# Patient Record
Sex: FEMALE | Race: WHITE | NOT HISPANIC OR LATINO | Employment: FULL TIME | URBAN - METROPOLITAN AREA
[De-identification: names, ages, dates, MRNs, and addresses within clinical notes are randomized per-mention and may not be internally consistent; named-entity substitution may affect disease eponyms.]

---

## 2017-01-16 ENCOUNTER — TELEPHONE (OUTPATIENT)
Dept: OBSTETRICS AND GYNECOLOGY | Facility: CLINIC | Age: 22
End: 2017-01-16

## 2017-01-16 NOTE — TELEPHONE ENCOUNTER
----- Message from Erica Cardozo sent at 1/16/2017 11:18 AM CST -----  Contact: Holly Marshall is calling as has questions regarding Nexplanon implant. States endometriosis is painful and it is hard to go to school or work. Please call [phone]. Thanks!

## 2017-01-24 ENCOUNTER — OFFICE VISIT (OUTPATIENT)
Dept: OBSTETRICS AND GYNECOLOGY | Facility: CLINIC | Age: 22
End: 2017-01-24
Payer: COMMERCIAL

## 2017-01-24 VITALS
BODY MASS INDEX: 19.12 KG/M2 | WEIGHT: 126.13 LBS | DIASTOLIC BLOOD PRESSURE: 78 MMHG | SYSTOLIC BLOOD PRESSURE: 116 MMHG | HEIGHT: 68 IN

## 2017-01-24 DIAGNOSIS — G43.001 MIGRAINE WITHOUT AURA AND WITH STATUS MIGRAINOSUS, NOT INTRACTABLE: ICD-10-CM

## 2017-01-24 DIAGNOSIS — Z30.40 ENCOUNTER FOR SURVEILLANCE OF CONTRACEPTIVES: ICD-10-CM

## 2017-01-24 DIAGNOSIS — N94.6 DYSMENORRHEA: Primary | ICD-10-CM

## 2017-01-24 DIAGNOSIS — N80.9 ENDOMETRIOSIS: ICD-10-CM

## 2017-01-24 PROCEDURE — 99999 PR PBB SHADOW E&M-EST. PATIENT-LVL II: CPT | Mod: PBBFAC,,, | Performed by: OBSTETRICS & GYNECOLOGY

## 2017-01-24 PROCEDURE — 1159F MED LIST DOCD IN RCRD: CPT | Mod: S$GLB,,, | Performed by: OBSTETRICS & GYNECOLOGY

## 2017-01-24 PROCEDURE — 99213 OFFICE O/P EST LOW 20 MIN: CPT | Mod: S$GLB,,, | Performed by: OBSTETRICS & GYNECOLOGY

## 2017-01-24 RX ORDER — DROSPIRENONE AND ETHINYL ESTRADIOL 0.02-3(28)
1 KIT ORAL DAILY
Qty: 30 TABLET | Refills: 11 | Status: SHIPPED | OUTPATIENT
Start: 2017-01-24 | End: 2017-09-01 | Stop reason: SDUPTHER

## 2017-01-24 RX ORDER — SUMATRIPTAN 50 MG/1
50 TABLET, FILM COATED ORAL ONCE AS NEEDED
Qty: 10 TABLET | Refills: 2 | Status: SHIPPED | OUTPATIENT
Start: 2017-01-24 | End: 2017-07-07

## 2017-01-24 NOTE — PROGRESS NOTES
Chief Complaint   Patient presents with    Menorrhagia     break through    Abdominal Pain     every day, causes nausea and vomiting    Migraine       History of Present Illness: Holly Joseph is a 21 y.o. female that presents today 1/24/2017 for   Chief Complaint   Patient presents with    Menorrhagia     break through    Abdominal Pain     every day, causes nausea and vomiting    Migraine     She reports severe cramping prior to periods and after. She reports last month no bleeding this month spotting. She reports migraines were better and she did not have cramping. She reports having a diagnosis of endometriosis and thinks that her endometriosis is back. She reports pain all during the month.     Past Medical History   Diagnosis Date    Allergy     Asthma     Constipation     Endometriosis     Fibromyalgia     Gastritis     HLA B27 (HLA B27 positive)     Migraine     Scoliosis     Smoker        Past Surgical History   Procedure Laterality Date    Adenoidectomy      Mouth surgery       x2    Appendectomy  09/2014     endometriosis    Colonoscopy N/A 5/19/2016     Procedure: COLONOSCOPY;  Surgeon: Jarret Zhao Jr., MD;  Location: [facility];  Service: Endoscopy;  Laterality: N/A;    Upper gastrointestinal endoscopy  05/2016     Dr. Zhao       Current Outpatient Prescriptions   Medication Sig Dispense Refill    ETONOGESTREL (NEXPLANON SDRM) by Subdermal route.      montelukast (SINGULAIR) 10 mg tablet Take 1 tablet (10 mg total) by mouth once daily. 30 tablet 3    drospirenone-ethinyl estradiol (DB) 3-0.02 mg per tablet Take 1 tablet by mouth once daily. Active pills only 30 tablet 11    levalbuterol (XOPENEX HFA) 45 mcg/actuation inhaler Inhale 1-2 puffs into the lungs every 4 (four) hours as needed for Wheezing. 1 Inhaler 1    levalbuterol (XOPENEX) 1.25 mg/3 mL nebulizer solution Take 3 mLs (1.25 mg total) by nebulization every 4 (four) hours as needed for Wheezing. 3 mL 7     levocetirizine (XYZAL) 5 MG tablet Take 1 tablet (5 mg total) by mouth every evening. 30 tablet 12    ondansetron (ZOFRAN ODT) 8 MG TbDL Take 1 tablet (8 mg total) by mouth 3 (three) times daily as needed. 12 tablet 1    sumatriptan (IMITREX) 50 MG tablet Take 1 tablet (50 mg total) by mouth once as needed for Migraine. 10 tablet 2     No current facility-administered medications for this visit.        Review of patient's allergies indicates:   Allergen Reactions    Toradol [ketorolac]     Penicillins Rash    Sulfa (sulfonamide antibiotics) Hives and Rash       Family History   Problem Relation Age of Onset    Fibromyalgia Mother     Depression Mother     Diabetes Father     Depression Father     Mental illness Father      bipolar disorder    Multiple sclerosis Maternal Aunt     Crohn's disease Maternal Grandmother     Colon cancer Neg Hx     Colon polyps Neg Hx     Ulcerative colitis Neg Hx     Stomach cancer Neg Hx     Esophageal cancer Neg Hx        Social History   Substance Use Topics    Smoking status: Current Every Day Smoker     Packs/day: 0.50     Types: Cigarettes    Smokeless tobacco: Never Used    Alcohol use 0.0 oz/week     0 Standard drinks or equivalent per week      Comment: social       OB History    Para Term  AB SAB TAB Ectopic Multiple Living   0                      Review of Symptoms:  GENERAL: Denies weight gain or weight loss. Feeling well overall.   SKIN: Denies rash or lesions.   HEAD: Denies head injury or headache.   NODES: Denies enlarged lymph nodes.   CHEST: Denies chest pain or shortness of breath.   CARDIOVASCULAR: Denies palpitations or left sided chest pain.   ABDOMEN: No abdominal pain, constipation, diarrhea, nausea, vomiting or rectal bleeding.   URINARY: No frequency, dysuria, hematuria, or burning on urination.  HEMATOLOGIC: No easy bruisability or excessive bleeding.   MUSCULOSKELETAL: Denies joint pain or swelling.     Visit Vitals    BP  "116/78     5' 8" (1.727 m)    Wt 57.2 kg (126 lb 1.7 oz)    LMP 01/17/2017     Physical Exam:  APPEARANCE: Well nourished, well developed, in no acute distress.  SKIN: Normal skin turgor, no lesions.  NECK: Neck symmetric without masses   RESPIRATORY: Normal respiratory effort with no retractions or use of accessory muscles  CARDIOVASCULAR: Peripheral vascular system with no swelling no varicosities and palpation of pulses normal  LYMPHATIC: No enlargements of the lymph nodes noted in the neck, axillae, or groin  ABDOMEN: Soft. No tenderness or masses. No hepatosplenomegaly. No hernias.EXTREMITIES: No clubbing cyanosis or edema.    ASSESSMENT/PLAN:  Dysmenorrhea  -     drospirenone-ethinyl estradiol (DB) 3-0.02 mg per tablet; Take 1 tablet by mouth once daily. Active pills only  Dispense: 30 tablet; Refill: 11    Migraine without aura and with status migrainosus, not intractable  -     drospirenone-ethinyl estradiol (DB) 3-0.02 mg per tablet; Take 1 tablet by mouth once daily. Active pills only  Dispense: 30 tablet; Refill: 11  -     sumatriptan (IMITREX) 50 MG tablet; Take 1 tablet (50 mg total) by mouth once as needed for Migraine.  Dispense: 10 tablet; Refill: 2    Endometriosis  -     drospirenone-ethinyl estradiol (DB) 3-0.02 mg per tablet; Take 1 tablet by mouth once daily. Active pills only  Dispense: 30 tablet; Refill: 11    Encounter for surveillance of contraceptives      We discussed repeat laparoscopy for endometriosis and chromopertubation.   15 minutes spent today with this patient. Greater than half spent in counseling today.     "

## 2017-01-24 NOTE — MR AVS SNAPSHOT
[facility] - [facility]  [address] [place] [address]  Phone: [phone]                  Holly Joseph   2017 1:20 PM   Office Visit    Description:  Female : 1995   Provider:  Ana María Easley MD   Department:  [facility]           Reason for Visit     Menorrhagia     Abdominal Pain     Migraine                To Do List           Goals (5 Years of Data)     None      [facility]     [facility] -  Assistance  Registered nurses in the [facility] On Call Center provide clinical advisement, health education, appointment booking, and other advisory services.  Call for this free service at [phone].             Medications           Message regarding Medications     Verify the changes and/or additions to your medication regime listed below are the same as discussed with your clinician today.  If any of these changes or additions are incorrect, please notify your healthcare provider.        STOP taking these medications     venlafaxine (EFFEXOR-XR) 75 MG 24 hr capsule Take 1 capsule (75 mg total) by mouth once daily.    norgestimate-ethinyl estradiol (ORTHO TRI-CYCLEN,TRI-SPRINTEC) 0.18/0.215/0.25 mg-35 mcg (28) tablet Take 1 tablet by mouth once daily.    hydrOXYzine HCl (ATARAX) 25 MG tablet Take 1 tablet (25 mg total) by mouth daily as needed for Anxiety.    dicyclomine (BENTYL) 10 MG capsule Take 1 capsule (10 mg total) by mouth 4 (four) times daily before meals and nightly. 10-20 mg, q ac & hs, prn, to ease stomach pains.           Verify that the below list of medications is an accurate representation of the medications you are currently taking.  If none reported, the list may be blank. If incorrect, please contact your healthcare provider. Carry this list with you in case of emergency.           Current Medications     ETONOGESTREL (NEXPLANON SDRM) by Subdermal route.    montelukast (SINGULAIR) 10 mg tablet Take 1 tablet (10 mg  "total) by mouth once daily.    levalbuterol (XOPENEX HFA) 45 mcg/actuation inhaler Inhale 1-2 puffs into the lungs every 4 (four) hours as needed for Wheezing.    levalbuterol (XOPENEX) 1.25 mg/3 mL nebulizer solution Take 3 mLs (1.25 mg total) by nebulization every 4 (four) hours as needed for Wheezing.    levocetirizine (XYZAL) 5 MG tablet Take 1 tablet (5 mg total) by mouth every evening.    ondansetron (ZOFRAN ODT) 8 MG TbDL Take 1 tablet (8 mg total) by mouth 3 (three) times daily as needed.           Clinical Reference Information           Vital Signs - Last Recorded  Most recent update: 1/24/2017  1:20 PM by Holly Quijano LPN    BP Ht Wt LMP BMI    116/78 5' 8" (1.727 m) 57.2 kg (126 lb 1.7 oz) 01/17/2017 19.17 kg/m2      Blood Pressure          Most Recent Value    BP  116/78      Allergies as of 1/24/2017     Toradol [Ketorolac]    Penicillins    Sulfa (Sulfonamide Antibiotics)      Immunizations Administered on Date of Encounter - 1/24/2017     None      Smoking Cessation     If you would like to quit smoking:   You may be eligible for free services if you are a [place] resident and started smoking cigarettes before September 1, 1988.  Call the Smoking Cessation Trust (SCT) toll free at ([phone] 014-8146 or ([phone] [phone].   Call 9-800-QUIT-NOW if you do not meet the above criteria.            "

## 2017-01-30 ENCOUNTER — OFFICE VISIT (OUTPATIENT)
Dept: OBSTETRICS AND GYNECOLOGY | Facility: CLINIC | Age: 22
End: 2017-01-30
Payer: COMMERCIAL

## 2017-01-30 VITALS
SYSTOLIC BLOOD PRESSURE: 118 MMHG | HEIGHT: 68 IN | BODY MASS INDEX: 19.12 KG/M2 | DIASTOLIC BLOOD PRESSURE: 78 MMHG | WEIGHT: 126.13 LBS

## 2017-01-30 DIAGNOSIS — N94.6 DYSMENORRHEA: ICD-10-CM

## 2017-01-30 DIAGNOSIS — N80.9 ENDOMETRIOSIS: Primary | ICD-10-CM

## 2017-01-30 PROCEDURE — 99024 POSTOP FOLLOW-UP VISIT: CPT | Mod: S$GLB,,, | Performed by: OBSTETRICS & GYNECOLOGY

## 2017-01-30 PROCEDURE — 99999 PR PBB SHADOW E&M-EST. PATIENT-LVL II: CPT | Mod: PBBFAC,,, | Performed by: OBSTETRICS & GYNECOLOGY

## 2017-01-30 NOTE — MR AVS SNAPSHOT
[facility] - [facility]  [address] [place] [address]  Phone: [phone]                  Holly Joseph   2017 10:00 AM   Office Visit    Description:  Female : 1995   Provider:  Ana María Easley MD   Department:  [facility] - [facility]           Reason for Visit     Pre-op Exam           Diagnoses this Visit        Comments    Endometriosis    -  Primary     Dysmenorrhea                To Do List           Goals (5 Years of Data)     None      [facility]     [facility] On [facility] Care [facility] -  Assistance  Registered nurses in the [facility] On Call Center provide clinical advisement, health education, appointment booking, and other advisory services.  Call for this free service at [phone].             Medications           Message regarding Medications     Verify the changes and/or additions to your medication regime listed below are the same as discussed with your clinician today.  If any of these changes or additions are incorrect, please notify your healthcare provider.             Verify that the below list of medications is an accurate representation of the medications you are currently taking.  If none reported, the list may be blank. If incorrect, please contact your healthcare provider. Carry this list with you in case of emergency.           Current Medications     drospirenone-ethinyl estradiol (DB) 3-0.02 mg per tablet Take 1 tablet by mouth once daily. Active pills only    ETONOGESTREL (NEXPLANON SDRM) by Subdermal route.    levalbuterol (XOPENEX HFA) 45 mcg/actuation inhaler Inhale 1-2 puffs into the lungs every 4 (four) hours as needed for Wheezing.    levalbuterol (XOPENEX) 1.25 mg/3 mL nebulizer solution Take 3 mLs (1.25 mg total) by nebulization every 4 (four) hours as needed for Wheezing.    levocetirizine (XYZAL) 5 MG tablet Take 1 tablet (5 mg total) by mouth every evening.    montelukast (SINGULAIR) 10 mg tablet Take 1 tablet (10 mg total) by  "mouth once daily.    ondansetron (ZOFRAN ODT) 8 MG TbDL Take 1 tablet (8 mg total) by mouth 3 (three) times daily as needed.    sumatriptan (IMITREX) 50 MG tablet Take 1 tablet (50 mg total) by mouth once as needed for Migraine.           Clinical Reference Information           Vital Signs - Last Recorded  Most recent update: 1/30/2017 10:03 AM by Odilia San LPN    BP Ht Wt LMP BMI    118/78 5' 8" (1.727 m) 57.2 kg (126 lb 1.7 oz) 01/17/2017 19.17 kg/m2      Blood Pressure          Most Recent Value    BP  118/78      Allergies as of 1/30/2017     Toradol [Ketorolac]    Penicillins    Sulfa (Sulfonamide Antibiotics)      Immunizations Administered on Date of Encounter - 1/30/2017     None      Smoking Cessation     If you would like to quit smoking:   You may be eligible for free services if you are a [place] resident and started smoking cigarettes before September 1, 1988.  Call the Smoking Cessation Trust (SCT) toll free at (758) [phone] or [phone]) 398-5307.   Call 1-886-QUIT-NOW if you do not meet the above criteria.            "

## 2017-01-30 NOTE — PROGRESS NOTES
Chief Complaint   Patient presents with    Pre-op Exam     2/2/2017 Dx lap       History of Present Illness: Holly Joseph is a 21 y.o. female that presents today 1/30/2017 for   Chief Complaint   Patient presents with    Pre-op Exam     2/2/2017 Dx lap     She reports severe cramping prior to periods and after. She reports last month no bleeding this month spotting. She reports migraines were better and she did not have cramping. She reports having a diagnosis of endometriosis and thinks that her endometriosis is back. She reports pain all during the month.     Past Medical History   Diagnosis Date    Allergy     Asthma     Constipation     Endometriosis     Fibromyalgia     Gastritis     HLA B27 (HLA B27 positive)     Migraine     Scoliosis     Smoker        Past Surgical History   Procedure Laterality Date    Adenoidectomy      Mouth surgery       x2    Appendectomy  09/2014     endometriosis    Colonoscopy N/A 5/19/2016     Procedure: COLONOSCOPY;  Surgeon: Jarret Zhao Jr., MD;  Location: [facility];  Service: Endoscopy;  Laterality: N/A;    Upper gastrointestinal endoscopy  05/2016     Dr. Zhao       Current Outpatient Prescriptions   Medication Sig Dispense Refill    drospirenone-ethinyl estradiol (DB) 3-0.02 mg per tablet Take 1 tablet by mouth once daily. Active pills only 30 tablet 11    ETONOGESTREL (NEXPLANON SDRM) by Subdermal route.      levalbuterol (XOPENEX HFA) 45 mcg/actuation inhaler Inhale 1-2 puffs into the lungs every 4 (four) hours as needed for Wheezing. 1 Inhaler 1    levalbuterol (XOPENEX) 1.25 mg/3 mL nebulizer solution Take 3 mLs (1.25 mg total) by nebulization every 4 (four) hours as needed for Wheezing. 3 mL 7    levocetirizine (XYZAL) 5 MG tablet Take 1 tablet (5 mg total) by mouth every evening. 30 tablet 12    montelukast (SINGULAIR) 10 mg tablet Take 1 tablet (10 mg total) by mouth once daily. 30 tablet 3    ondansetron (ZOFRAN ODT) 8 MG TbDL Take 1  "tablet (8 mg total) by mouth 3 (three) times daily as needed. 12 tablet 1    sumatriptan (IMITREX) 50 MG tablet Take 1 tablet (50 mg total) by mouth once as needed for Migraine. 10 tablet 2     No current facility-administered medications for this visit.        Review of patient's allergies indicates:   Allergen Reactions    Toradol [ketorolac]     Penicillins Rash    Sulfa (sulfonamide antibiotics) Hives and Rash       Family History   Problem Relation Age of Onset    Fibromyalgia Mother     Depression Mother     Diabetes Father     Depression Father     Mental illness Father      bipolar disorder    Multiple sclerosis Maternal Aunt     Crohn's disease Maternal Grandmother     Colon cancer Neg Hx     Colon polyps Neg Hx     Ulcerative colitis Neg Hx     Stomach cancer Neg Hx     Esophageal cancer Neg Hx        Social History   Substance Use Topics    Smoking status: Current Every Day Smoker     Packs/day: 0.50     Types: Cigarettes    Smokeless tobacco: Never Used    Alcohol use 0.0 oz/week     0 Standard drinks or equivalent per week      Comment: social       OB History    Para Term  AB SAB TAB Ectopic Multiple Living   0                      Review of Symptoms:  GENERAL: Denies weight gain or weight loss. Feeling well overall.   SKIN: Denies rash or lesions.   HEAD: Denies head injury or headache.   NODES: Denies enlarged lymph nodes.   CHEST: Denies chest pain or shortness of breath.   CARDIOVASCULAR: Denies palpitations or left sided chest pain.   ABDOMEN: No abdominal pain, constipation, diarrhea, nausea, vomiting or rectal bleeding.   URINARY: No frequency, dysuria, hematuria, or burning on urination.  HEMATOLOGIC: No easy bruisability or excessive bleeding.   MUSCULOSKELETAL: Denies joint pain or swelling.     Visit Vitals    /78    Ht 5' 8" (1.727 m)    Wt 57.2 kg (126 lb 1.7 oz)    LMP 2017     Physical Exam:  APPEARANCE: Well nourished, well developed, in " no acute distress.  SKIN: Normal skin turgor, no lesions.  NECK: Neck symmetric without masses   RESPIRATORY: Normal respiratory effort with no retractions or use of accessory muscles  CARDIOVASCULAR: Peripheral vascular system with no swelling no varicosities and palpation of pulses normal  LYMPHATIC: No enlargements of the lymph nodes noted in the neck, axillae, or groin  ABDOMEN: Soft. No tenderness or masses. No hepatosplenomegaly. No hernias.EXTREMITIES: No clubbing cyanosis or edema.    ASSESSMENT/PLAN:  Endometriosis    Dysmenorrhea      We discussed repeat laparoscopy for endometriosis and chromopertubation again and risks an benefits discussed

## 2017-02-02 ENCOUNTER — TELEPHONE (OUTPATIENT)
Dept: OBSTETRICS AND GYNECOLOGY | Facility: CLINIC | Age: 22
End: 2017-02-02

## 2017-02-02 ENCOUNTER — DOCUMENTATION ONLY (OUTPATIENT)
Dept: OBSTETRICS AND GYNECOLOGY | Facility: CLINIC | Age: 22
End: 2017-02-02

## 2017-02-02 RX ORDER — OXYCODONE AND ACETAMINOPHEN 5; 325 MG/1; MG/1
1 TABLET ORAL EVERY 4 HOURS PRN
Qty: 20 TABLET | Refills: 0 | Status: SHIPPED | OUTPATIENT
Start: 2017-02-02 | End: 2017-02-16

## 2017-02-02 NOTE — PROGRESS NOTES
OPERATIVE REPORT    SURGEON: Ana María Easley M.D.     ASSISTANT: None    PREOPERATIVE DIAGNOSIS:   1. Dysmenorrhea  2. Endometriosis    POSTOPERATIVE DIAGNOSIS:   1. same     OPERATION:   1. Diagnostic Laparoscopy  2. Chromopertubation      ANESTHESIA: General.    ESTIMATED BLOOD LOSS: minimal    URINE OUTPUT: 200cc    FINDINGS: Grossly normal female anatomy  Uterus and upper abdomen grossly normal. Normal appearing right and left ovary. With normal spill from bilateral tubes.       PROCEDURE IN DETAIL: Patient was taken to the operating room, where general endotracheal anesthesia was found to be adequate. Patient was prepped and draped in the usual sterile fashion in the dorsal lithotomy position in the [facility]. A Hidalgo catheter was placed in the bladder and HUMI uterine manipulator was transfixed to the cervix. Attention was then turned to the abdomen, where a 10-mm umbilical skin incision was made sharply with the scalpel and carried down until the rectus fascia was identified in the midline and scored. The 5mm bladeless trocar was introduced through this incision, and after intraabdominal placement was confirmed with the 5-mm laparoscope, approximately 3 liters of CO2 gas were used to insufflate the abdominal cavity. A 5mm bladeless trocar was introduced into the left lower quadrant  under direct laparoscopic visualization. Inspection performed. Then methylene blue was pushed through the HUMI manipulator and blue spill noted from both tubes. The left port was then removed under direct laparoscopic visualization and was hemostatic. All CO2 gas was allowed to escape from the abdominal cavity, and the umbilical port was removed. The skin incisions were closed with 4-0 Vicryl in a subcuticular fashion and dressing placed. Sponge, lap, and needle counts were correct x 2 and there were no complications. The patient was taken out of the [facility], awakened from anesthesia, extubated, and doing well on her way to  the recovery room.

## 2017-02-13 ENCOUNTER — TELEPHONE (OUTPATIENT)
Dept: OBSTETRICS AND GYNECOLOGY | Facility: CLINIC | Age: 22
End: 2017-02-13

## 2017-02-13 NOTE — TELEPHONE ENCOUNTER
----- Message from Liza Ortiz sent at 2/13/2017  8:14 AM CST -----  Please call patient in regards to a 2 week follow up appt from surgery [phone] (home)

## 2017-02-16 ENCOUNTER — OFFICE VISIT (OUTPATIENT)
Dept: OBSTETRICS AND GYNECOLOGY | Facility: CLINIC | Age: 22
End: 2017-02-16
Payer: COMMERCIAL

## 2017-02-16 VITALS — BODY MASS INDEX: 19.11 KG/M2 | WEIGHT: 125.69 LBS

## 2017-02-16 DIAGNOSIS — Z09 POSTOP CHECK: Primary | ICD-10-CM

## 2017-02-16 PROCEDURE — 99999 PR PBB SHADOW E&M-EST. PATIENT-LVL II: CPT | Mod: PBBFAC,,, | Performed by: OBSTETRICS & GYNECOLOGY

## 2017-02-16 PROCEDURE — 99024 POSTOP FOLLOW-UP VISIT: CPT | Mod: S$GLB,,, | Performed by: OBSTETRICS & GYNECOLOGY

## 2017-02-16 NOTE — PROGRESS NOTES
POST-OP NOTE    2/16/2017    HPI: no complaints    Past Medical History   Diagnosis Date    Allergy     Asthma     Constipation     Endometriosis     Fibromyalgia     Gastritis     HLA B27 (HLA B27 positive)     Migraine     Scoliosis     Smoker      Past Surgical History   Procedure Laterality Date    Adenoidectomy      Mouth surgery       x2    Appendectomy  09/2014     endometriosis    Colonoscopy N/A 5/19/2016     Procedure: COLONOSCOPY;  Surgeon: Jarret Zhao Jr., MD;  Location: [facility];  Service: Endoscopy;  Laterality: N/A;    Upper gastrointestinal endoscopy  05/2016     Dr. Zhao     Current Outpatient Prescriptions   Medication Sig Dispense Refill    drospirenone-ethinyl estradiol (DB) 3-0.02 mg per tablet Take 1 tablet by mouth once daily. Active pills only 30 tablet 11    ETONOGESTREL (NEXPLANON SDRM) by Subdermal route.      levalbuterol (XOPENEX HFA) 45 mcg/actuation inhaler Inhale 1-2 puffs into the lungs every 4 (four) hours as needed for Wheezing. 1 Inhaler 1    levalbuterol (XOPENEX) 1.25 mg/3 mL nebulizer solution Take 3 mLs (1.25 mg total) by nebulization every 4 (four) hours as needed for Wheezing. 3 mL 7    levocetirizine (XYZAL) 5 MG tablet Take 1 tablet (5 mg total) by mouth every evening. 30 tablet 12    montelukast (SINGULAIR) 10 mg tablet Take 1 tablet (10 mg total) by mouth once daily. 30 tablet 3    ondansetron (ZOFRAN ODT) 8 MG TbDL Take 1 tablet (8 mg total) by mouth 3 (three) times daily as needed. 12 tablet 1    sumatriptan (IMITREX) 50 MG tablet Take 1 tablet (50 mg total) by mouth once as needed for Migraine. 10 tablet 2     No current facility-administered medications for this visit.      Review of patient's allergies indicates:   Allergen Reactions    Toradol [ketorolac]     Penicillins Rash    Sulfa (sulfonamide antibiotics) Hives and Rash     Social History   Substance Use Topics    Smoking status: Current Every Day Smoker     Packs/day: 0.50      Types: Cigarettes    Smokeless tobacco: Never Used    Alcohol use 0.0 oz/week     0 Standard drinks or equivalent per week      Comment: social     Visit Vitals    Wt 57 kg (125 lb 10.6 oz)    LMP 01/17/2017       ROS:  GENERAL: Denies weight gain, weight loss, fatigue, and malaise.   SKIN: Denies rash or lesions.   HEAD: Denies head injury or headache.   NODES: Denies enlarged lymph nodes.   CHEST: Denies chest pain or shortness of breath.   CARDIOVASCULAR: Denies palpitations or left sided chest pain.   ABDOMEN: No abdominal pain, constipation, diarrhea, nausea, vomiting or rectal bleeding.   URINARY: No frequency, urgency, dysuria, or hematuria  MUSCULOSKELETAL: Denies joint pain or swelling.   NEUROLOGIC: Denies seizures.    PE:   APPEARANCE: Well nourished, well developed, in no acute distress.  SKIN: Normal skin turgor, no lesions.  ABDOMEN: Incision healing fine. Soft. No tenderness or masses. No hepatosplenomegaly. No hernias.  EXTREMITIES: NO clubbing cyanosis or edema    ASSESSMENT  Encounter Diagnoses   Name Primary?    Postop check Yes       PLAN  1. RTC:prn

## 2017-02-16 NOTE — MR AVS SNAPSHOT
[facility]  [address] [place] [address]  Phone: [phone]                  Holly Joseph   2017 11:00 AM   Office Visit    Description:  Female : 1995   Provider:  Ana María Easley MD   Department:  [facility]           Reason for Visit     Post-op Evaluation                To Do List           Goals (5 Years of Data)     None      [facility]     [facility] Care [facility] -  Assistance  Registered nurses in the [facility] provide clinical advisement, health education, appointment booking, and other advisory services.  Call for this free service at [phone].             Medications           Message regarding Medications     Verify the changes and/or additions to your medication regime listed below are the same as discussed with your clinician today.  If any of these changes or additions are incorrect, please notify your healthcare provider.        STOP taking these medications     oxycodone-acetaminophen (ROXICET) 5-325 mg per tablet Take 1 tablet by mouth every 4 (four) hours as needed for Pain.           Verify that the below list of medications is an accurate representation of the medications you are currently taking.  If none reported, the list may be blank. If incorrect, please contact your healthcare provider. Carry this list with you in case of emergency.           Current Medications     drospirenone-ethinyl estradiol (DB) 3-0.02 mg per tablet Take 1 tablet by mouth once daily. Active pills only    ETONOGESTREL (NEXPLANON SDRM) by Subdermal route.    levalbuterol (XOPENEX HFA) 45 mcg/actuation inhaler Inhale 1-2 puffs into the lungs every 4 (four) hours as needed for Wheezing.    levalbuterol (XOPENEX) 1.25 mg/3 mL nebulizer solution Take 3 mLs (1.25 mg total) by nebulization every 4 (four) hours as needed for Wheezing.    levocetirizine (XYZAL) 5 MG tablet Take 1 tablet (5 mg total) by mouth every evening.     montelukast (SINGULAIR) 10 mg tablet Take 1 tablet (10 mg total) by mouth once daily.    ondansetron (ZOFRAN ODT) 8 MG TbDL Take 1 tablet (8 mg total) by mouth 3 (three) times daily as needed.    sumatriptan (IMITREX) 50 MG tablet Take 1 tablet (50 mg total) by mouth once as needed for Migraine.           Clinical Reference Information           Your Vitals Were     Weight Last Period BMI          57 kg (125 lb 10.6 oz) 01/17/2017 19.11 kg/m2        Allergies as of 2/16/2017     Toradol [Ketorolac]    Penicillins    Sulfa (Sulfonamide Antibiotics)      Immunizations Administered on Date of Encounter - 2/16/2017     None      Smoking Cessation     If you would like to quit smoking:   You may be eligible for free services if you are a [place] resident and started smoking cigarettes before September 1, 1988.  Call the Smoking Cessation Trust ([facility]) toll free at (630) 631-1755 or (932) 554-4047.   Call 1-800-QUIT-NOW if you do not meet the above criteria.            Language Assistance Services     ATTENTION: Language assistance services are available, free of charge. Please call [phone].      ATENCIÓN: Si habla español, tiene a bedolla disposición servicios gratuitos de asistencia lingüística. Llame al [phone].     MARIELA Ý: N?u b?n nói Ti?ng Vi?t, có các d?ch v? h? tr? ngôn ng? mi?n phí dành cho b?n. G?i s? [phone].         [facility] complies with applicable Federal civil rights laws and does not discriminate on the basis of race, color, national origin, age, disability, or sex.

## 2017-03-13 ENCOUNTER — TELEPHONE (OUTPATIENT)
Dept: OBSTETRICS AND GYNECOLOGY | Facility: CLINIC | Age: 22
End: 2017-03-13

## 2017-03-13 NOTE — TELEPHONE ENCOUNTER
Pt states she's been vaginally bleeding since surgery 2/2 and for the last two days its been very heavy she is changing 1 large tampon and super plus pad an hour. She is on OCP in addition to Nexplanon. Advised her to go to ED regarding bleeding and I would forward message to you. Please advise.

## 2017-03-13 NOTE — TELEPHONE ENCOUNTER
----- Message from Latia Davidson sent at 3/13/2017  2:05 PM CDT -----  Contact: self [phone]  Patient is requesting a call back from the nurse stated she had surgery on Feb 2 and been bleeding every since, and bleeding through everything within a hour time.  Requesting advice.    Please call the patient upon request at phone number [phone].

## 2017-04-10 ENCOUNTER — OFFICE VISIT (OUTPATIENT)
Dept: FAMILY MEDICINE | Facility: CLINIC | Age: 22
End: 2017-04-10
Payer: COMMERCIAL

## 2017-04-10 ENCOUNTER — LAB VISIT (OUTPATIENT)
Dept: LAB | Facility: HOSPITAL | Age: 22
End: 2017-04-10
Payer: COMMERCIAL

## 2017-04-10 VITALS
OXYGEN SATURATION: 98 % | SYSTOLIC BLOOD PRESSURE: 100 MMHG | DIASTOLIC BLOOD PRESSURE: 80 MMHG | HEART RATE: 120 BPM | TEMPERATURE: 98 F | BODY MASS INDEX: 19.21 KG/M2 | HEIGHT: 68 IN | WEIGHT: 126.75 LBS

## 2017-04-10 DIAGNOSIS — R50.9 FEVER, UNSPECIFIED FEVER CAUSE: ICD-10-CM

## 2017-04-10 DIAGNOSIS — J02.9 SORE THROAT: ICD-10-CM

## 2017-04-10 DIAGNOSIS — R50.9 FEVER, UNSPECIFIED FEVER CAUSE: Primary | ICD-10-CM

## 2017-04-10 DIAGNOSIS — B34.9 VIRAL ILLNESS: ICD-10-CM

## 2017-04-10 DIAGNOSIS — R05.9 COUGH: ICD-10-CM

## 2017-04-10 LAB
FLUAV AG SPEC QL IA: NEGATIVE
FLUBV AG SPEC QL IA: NEGATIVE
HETEROPH AB SERPL QL IA: NEGATIVE
SPECIMEN SOURCE: NORMAL

## 2017-04-10 PROCEDURE — 86308 HETEROPHILE ANTIBODY SCREEN: CPT | Mod: PO

## 2017-04-10 PROCEDURE — 87400 INFLUENZA A/B EACH AG IA: CPT | Mod: PO

## 2017-04-10 PROCEDURE — 99213 OFFICE O/P EST LOW 20 MIN: CPT | Mod: S$GLB,,, | Performed by: NURSE PRACTITIONER

## 2017-04-10 PROCEDURE — 1160F RVW MEDS BY RX/DR IN RCRD: CPT | Mod: S$GLB,,, | Performed by: NURSE PRACTITIONER

## 2017-04-10 PROCEDURE — 36415 COLL VENOUS BLD VENIPUNCTURE: CPT | Mod: PO

## 2017-04-10 PROCEDURE — 99999 PR PBB SHADOW E&M-EST. PATIENT-LVL III: CPT | Mod: PBBFAC,,, | Performed by: NURSE PRACTITIONER

## 2017-04-10 RX ORDER — PROMETHAZINE HYDROCHLORIDE AND PHENYLEPHRINE HYDROCHLORIDE 6.25; 5 MG/5ML; MG/5ML
5 SYRUP ORAL EVERY 6 HOURS PRN
Qty: 180 ML | Refills: 0 | Status: SHIPPED | OUTPATIENT
Start: 2017-04-10 | End: 2017-04-20

## 2017-04-10 NOTE — MR AVS SNAPSHOT
[facility]  [address]  [place] [facility] [place] [facility] [address]  Phone: [phone]  Fax: [phone]                  Holly Joseph   4/10/2017 3:20 PM   Office Visit    Description:  Female : 1995   Provider:  Traci Joyce NP   Department:  [facility]           Reason for Visit     Sore Throat           Diagnoses this Visit        Comments    Fever, unspecified fever cause    -  Primary     Sore throat         Cough                To Do List           Future Appointments        Provider Department Dept Phone    4/10/2017 4:05 PM LAB, [place] [facility] [phone]    2017 3:20 PM Ana María Easley MD [facility] - [phone]      Goals (5 Years of Data)     None       These Medications        Disp Refills Start End    promethazine-phenylephrine (PROMETHAZINE VC) 6.25-5 mg/5 mL Syrp 180 mL 0 4/10/2017 2017    Take 5 mLs by mouth every 6 (six) hours as needed. - Oral    Pharmacy: [facility] [address] [place] [address] [address] [facility] [address] Ph #: [phone]         [facility]     [facility] Care [facility] - 24 Assistance  Unless otherwise directed by your provider, please contact [facility], our nurse care line that is available for / assistance.     Registered nurses in the [facility] provide: appointment scheduling, clinical advisement, health education, and other advisory services.  Call: [phone] (toll free)               Medications           Message regarding Medications     Verify the changes and/or additions to your medication regime listed below are the same as discussed with your clinician today.  If any of these changes or additions are incorrect, please notify your healthcare provider.        START taking these NEW medications        Refills    promethazine-phenylephrine (PROMETHAZINE VC) 6.25-5 mg/5 mL Syrp 0    Sig: Take 5 mLs by  "mouth every 6 (six) hours as needed.    Class: Normal    Route: Oral           Verify that the below list of medications is an accurate representation of the medications you are currently taking.  If none reported, the list may be blank. If incorrect, please contact your healthcare provider. Carry this list with you in case of emergency.           Current Medications     drospirenone-ethinyl estradiol (DB) 3-0.02 mg per tablet Take 1 tablet by mouth once daily. Active pills only    ETONOGESTREL (NEXPLANON SDRM) by Subdermal route.    levalbuterol (XOPENEX HFA) 45 mcg/actuation inhaler Inhale 1-2 puffs into the lungs every 4 (four) hours as needed for Wheezing.    levalbuterol (XOPENEX) 1.25 mg/3 mL nebulizer solution Take 3 mLs (1.25 mg total) by nebulization every 4 (four) hours as needed for Wheezing.    levocetirizine (XYZAL) 5 MG tablet Take 1 tablet (5 mg total) by mouth every evening.    montelukast (SINGULAIR) 10 mg tablet Take 1 tablet (10 mg total) by mouth once daily.    ondansetron (ZOFRAN ODT) 8 MG TbDL Take 1 tablet (8 mg total) by mouth 3 (three) times daily as needed.    promethazine-phenylephrine (PROMETHAZINE VC) 6.25-5 mg/5 mL Syrp Take 5 mLs by mouth every 6 (six) hours as needed.    sumatriptan (IMITREX) 50 MG tablet Take 1 tablet (50 mg total) by mouth once as needed for Migraine.           Clinical Reference Information           Your Vitals Were     BP Pulse Temp Height Weight SpO2    100/80 120 98.4 °F (36.9 °C) (Oral) 5' 8" (1.727 m) 57.5 kg (126 lb 12.2 oz) 98%    BMI                19.27 kg/m2          Blood Pressure          Most Recent Value    BP  100/80      Allergies as of 4/10/2017     Toradol [Ketorolac]    Penicillins    Sulfa (Sulfonamide Antibiotics)      Immunizations Administered on Date of Encounter - 4/10/2017     None      Orders Placed During Today's Visit      Normal Orders This Visit    Influenza antigen Nasopharyngeal Swab     Future Labs/Procedures Expected by Expires "    HETEROPHILE AB SCREEN  4/10/2017 6/9/2018      Smoking Cessation     If you would like to quit smoking:   You may be eligible for free services if you are a [place] resident and started smoking cigarettes before September 1, 1988.  Call the Smoking Cessation Trust ([employer]) toll free at (739) 147-0960 or (940) 034-8907.   Call 1-800-QUIT-NOW if you do not meet the above criteria.   Contact us via email: [email or web address]   View our website for more information: [email or web address]        Language Assistance Services     ATTENTION: Language assistance services are available, free of charge. Please call [phone].      ATENCIÓN: Si habla lenny, tiene a bedolla disposición servicios gratuitos de asistencia lingüística. Llame al [phone].     CHÚ Ý: N?u b?n nói Ti?ng Vi?t, có các d?ch v? h? tr? ngôn ng? mi?n phí dành cho b?n. G?i s? [phone].         [facility] complies with applicable Federal civil rights laws and does not discriminate on the basis of race, color, national origin, age, disability, or sex.

## 2017-04-10 NOTE — PROGRESS NOTES
Subjective:       Patient ID: Holly Joseph is a 21 y.o. female.    Chief Complaint: Sore Throat (sore throat,hoarseness,fever,fatigue,bodyaches since weekend)    HPI Comments: History of mono 2012       Sore Throat    This is a new problem. The current episode started in the past 7 days. The problem has been rapidly worsening. The maximum temperature recorded prior to her arrival was 101 - 101.9 F. Associated symptoms include coughing, swollen glands and trouble swallowing. Pertinent negatives include no abdominal pain, congestion, diarrhea, drooling, ear discharge, ear pain, headaches, hoarse voice, plugged ear sensation, neck pain, shortness of breath, stridor or vomiting. She has had no exposure to strep or mono. The treatment provided no relief.     Vitals:    04/10/17 1517   BP: 100/80   Pulse: (!) 120   Temp: 98.4 °F (36.9 °C)     Review of Systems   Constitutional: Negative.  Negative for diaphoresis, fatigue and fever.   HENT: Positive for sore throat and trouble swallowing. Negative for congestion, drooling, ear discharge, ear pain and hoarse voice.    Eyes: Negative.    Respiratory: Positive for cough. Negative for shortness of breath, wheezing and stridor.    Cardiovascular: Negative.  Negative for chest pain.   Gastrointestinal: Negative.  Negative for abdominal pain, diarrhea, nausea and vomiting.   Endocrine: Negative.    Genitourinary: Negative.  Negative for dysuria and hematuria.   Musculoskeletal: Negative.  Negative for neck pain.   Skin: Negative for color change and rash.   Allergic/Immunologic: Negative.    Neurological: Negative for speech difficulty, numbness and headaches.   Hematological: Negative.    Psychiatric/Behavioral: Negative.        Past Medical History:   Diagnosis Date    Allergy     Asthma     Constipation     Endometriosis     Fibromyalgia     Gastritis     HLA B27 (HLA B27 positive)     Migraine     Scoliosis     Smoker      Objective:      Physical Exam    Constitutional: She is oriented to person, place, and time. She appears well-developed and well-nourished.   HENT:   Head: Normocephalic and atraumatic.   Right Ear: Hearing, tympanic membrane and ear canal normal.   Left Ear: Hearing, tympanic membrane and ear canal normal.   Nose: Mucosal edema present. Right sinus exhibits no maxillary sinus tenderness and no frontal sinus tenderness. Left sinus exhibits no maxillary sinus tenderness and no frontal sinus tenderness.   Mouth/Throat: Uvula is midline and mucous membranes are normal. Posterior oropharyngeal erythema present.   Eyes: Conjunctivae and EOM are normal. Pupils are equal, round, and reactive to light.   Neck: Neck supple.   Cardiovascular: Normal rate, regular rhythm, normal heart sounds and intact distal pulses.  Exam reveals no friction rub.    No murmur heard.  Pulmonary/Chest: Effort normal and breath sounds normal.   Abdominal: Soft. Bowel sounds are normal.   Musculoskeletal: Normal range of motion.   Neurological: She is alert and oriented to person, place, and time.   Skin: Skin is warm and dry.   Psychiatric: She has a normal mood and affect. Her behavior is normal. Judgment and thought content normal.   Nursing note and vitals reviewed.      Assessment:       1. Fever, unspecified fever cause    2. Sore throat    3. Cough    4. Viral illness        Plan:       Fever, unspecified fever cause  -     Influenza antigen Nasopharyngeal Swab  -     HETEROPHILE AB SCREEN; Future; Expected date: 4/10/17    Sore throat  -     Influenza antigen Nasopharyngeal Swab  -     HETEROPHILE AB SCREEN; Future; Expected date: 4/10/17    Cough  -     Influenza antigen Nasopharyngeal Swab  -     HETEROPHILE AB SCREEN; Future; Expected date: 4/10/17  -     promethazine-phenylephrine (PROMETHAZINE VC) 6.25-5 mg/5 mL Syrp; Take 5 mLs by mouth every 6 (six) hours as needed.  Dispense: 180 mL; Refill: 0    Viral illness      discussed to take cough meds and symptoms meds    Call if not better

## 2017-04-19 ENCOUNTER — TELEPHONE (OUTPATIENT)
Dept: FAMILY MEDICINE | Facility: CLINIC | Age: 22
End: 2017-04-19

## 2017-06-06 NOTE — TELEPHONE ENCOUNTER
----- Message from Lashawn Hay sent at 4/19/2017 12:29 PM CDT -----  Contact: self  Patinet was in last week due to a migraine, cough and other symptoms. Patient is not doing better. Please call patient at [phone]. Thanks!  
Scheduled patient appointment with April tomorrow at 3:30 advised patient to go to [facility] if she is unable to wait.    
yes

## 2017-06-08 ENCOUNTER — OFFICE VISIT (OUTPATIENT)
Dept: OBSTETRICS AND GYNECOLOGY | Facility: CLINIC | Age: 22
End: 2017-06-08
Payer: COMMERCIAL

## 2017-06-08 VITALS — SYSTOLIC BLOOD PRESSURE: 108 MMHG | DIASTOLIC BLOOD PRESSURE: 62 MMHG | BODY MASS INDEX: 19.16 KG/M2 | WEIGHT: 126 LBS

## 2017-06-08 DIAGNOSIS — R30.0 DYSURIA: ICD-10-CM

## 2017-06-08 DIAGNOSIS — Z01.419 ENCOUNTER FOR GYNECOLOGICAL EXAMINATION WITHOUT ABNORMAL FINDING: Primary | ICD-10-CM

## 2017-06-08 DIAGNOSIS — Z12.4 CERVICAL CANCER SCREENING: ICD-10-CM

## 2017-06-08 PROCEDURE — 87624 HPV HI-RISK TYP POOLED RSLT: CPT

## 2017-06-08 PROCEDURE — 88141 CYTOPATH C/V INTERPRET: CPT | Mod: ,,, | Performed by: PATHOLOGY

## 2017-06-08 PROCEDURE — 87086 URINE CULTURE/COLONY COUNT: CPT

## 2017-06-08 PROCEDURE — 99999 PR PBB SHADOW E&M-EST. PATIENT-LVL III: CPT | Mod: PBBFAC,,, | Performed by: OBSTETRICS & GYNECOLOGY

## 2017-06-08 PROCEDURE — 99395 PREV VISIT EST AGE 18-39: CPT | Mod: S$GLB,,, | Performed by: OBSTETRICS & GYNECOLOGY

## 2017-06-08 PROCEDURE — 88175 CYTOPATH C/V AUTO FLUID REDO: CPT | Performed by: PATHOLOGY

## 2017-06-08 RX ORDER — CIPROFLOXACIN 250 MG/1
250 TABLET, FILM COATED ORAL 2 TIMES DAILY
Qty: 14 TABLET | Refills: 0 | Status: SHIPPED | OUTPATIENT
Start: 2017-06-08 | End: 2017-06-15

## 2017-06-08 NOTE — PROGRESS NOTES
Chief Complaint   Patient presents with    Well Woman    Dysuria     for 3 weeks       History of Present Illness: Holly Joseph is a 21 y.o. female that presents today 6/8/2017 for well gyn visit.    Past Medical History:   Diagnosis Date    Allergy     Asthma     Constipation     Endometriosis     Fibromyalgia     Gastritis     HLA B27 (HLA B27 positive)     Migraine     Scoliosis     Smoker        Past Surgical History:   Procedure Laterality Date    ADENOIDECTOMY      APPENDECTOMY  09/2014    endometriosis    COLONOSCOPY N/A 5/19/2016    Procedure: COLONOSCOPY;  Surgeon: Jarret Zhao Jr., MD;  Location: [facility];  Service: Endoscopy;  Laterality: N/A;    MOUTH SURGERY      x2    UPPER GASTROINTESTINAL ENDOSCOPY  05/2016    Dr. Zhao       Current Outpatient Prescriptions   Medication Sig Dispense Refill    ETONOGESTREL (NEXPLANON SDRM) by Subdermal route.      ciprofloxacin HCl (CIPRO) 250 MG tablet Take 1 tablet (250 mg total) by mouth 2 (two) times daily. 14 tablet 0    drospirenone-ethinyl estradiol (DB) 3-0.02 mg per tablet Take 1 tablet by mouth once daily. Active pills only 30 tablet 11    levalbuterol (XOPENEX HFA) 45 mcg/actuation inhaler Inhale 1-2 puffs into the lungs every 4 (four) hours as needed for Wheezing. 1 Inhaler 1    levalbuterol (XOPENEX) 1.25 mg/3 mL nebulizer solution Take 3 mLs (1.25 mg total) by nebulization every 4 (four) hours as needed for Wheezing. 3 mL 7    levocetirizine (XYZAL) 5 MG tablet Take 1 tablet (5 mg total) by mouth every evening. 30 tablet 12    montelukast (SINGULAIR) 10 mg tablet Take 1 tablet (10 mg total) by mouth once daily. 30 tablet 3    ondansetron (ZOFRAN ODT) 8 MG TbDL Take 1 tablet (8 mg total) by mouth 3 (three) times daily as needed. 12 tablet 1    sumatriptan (IMITREX) 50 MG tablet Take 1 tablet (50 mg total) by mouth once as needed for Migraine. 10 tablet 2     No current facility-administered medications for this visit.         Review of patient's allergies indicates:   Allergen Reactions    Toradol [ketorolac]     Penicillins Rash    Sulfa (sulfonamide antibiotics) Hives and Rash       Family History   Problem Relation Age of Onset    Fibromyalgia Mother     Depression Mother     Diabetes Father     Depression Father     Mental illness Father      bipolar disorder    Multiple sclerosis Maternal Aunt     Crohn's disease Maternal Grandmother     Colon cancer Neg Hx     Colon polyps Neg Hx     Ulcerative colitis Neg Hx     Stomach cancer Neg Hx     Esophageal cancer Neg Hx        Social History     Social History    Marital status: Single     Spouse name: N/A    Number of children: 0    Years of education: N/A     Occupational History    [employer]      Social History Main Topics    Smoking status: Current Every Day Smoker     Packs/day: 0.50     Types: Cigarettes    Smokeless tobacco: Never Used    Alcohol use 0.0 oz/week      Comment: social    Drug use: No    Sexual activity: Yes     Partners: Male     Birth control/ protection: OCP     Other Topics Concern    None     Social History Narrative    None       OB History    Para Term  AB Living   0        SAB TAB Ectopic Multiple Live Births                    Review of Symptoms:  GENERAL: Denies weight gain or weight loss. Feeling well overall.   SKIN: Denies rash or lesions.   HEAD: Denies head injury or headache.   NODES: Denies enlarged lymph nodes.   CHEST: Denies chest pain or shortness of breath.   CARDIOVASCULAR: Denies palpitations or left sided chest pain.   ABDOMEN: No abdominal pain, constipation, diarrhea, nausea, vomiting or rectal bleeding.   URINARY: +dysuria, hematuria, or burning on urination.  HEMATOLOGIC: No easy bruisability or excessive bleeding.   MUSCULOSKELETAL: Denies joint pain or swelling.     /62   Wt 57.2 kg (126 lb)   LMP 2017   Physical Exam:  APPEARANCE: Well nourished, well developed, in no  acute distress.  SKIN: Normal skin turgor, no lesions.  NECK: Neck symmetric without masses   RESPIRATORY: Normal respiratory effort with no retractions or use of accessory muscles  CARDIOVASCULAR: Peripheral vascular system with no swelling no varicosities and palpation of pulses normal  LYMPHATIC: No enlargements of the lymph nodes noted in the neck, axillae, or groin  ABDOMEN: Soft. No tenderness or masses. No hepatosplenomegaly. No hernias.  BREASTS: Symmetrical, no skin changes or visible lesions. No palpable masses, nipple discharge or adenopathy bilaterally.  PELVIC: Normal external female genitalia without lesions. Normal hair distribution. Adequate perineal body, normal urethral meatus. Urethra with no masses.  Bladder nontender. Vagina moist and well rugated without lesions or discharge. Cervix pink and without lesions. No significant cystocele or rectocele. Bimanual exam showed uterus normal size, shape, position, mobile and nontender. Adnexa without masses or tenderness. Urethra and bladder normal. PAP DONE  EXTREMITIES: No clubbing cyanosis or edema.    ASSESSMENT/PLAN:  Encounter for gynecological examination without abnormal finding    Cervical cancer screening  -     Urine culture    Dysuria  -     Liquid-based pap smear, screening    Other orders  -     ciprofloxacin HCl (CIPRO) 250 MG tablet; Take 1 tablet (250 mg total) by mouth 2 (two) times daily.  Dispense: 14 tablet; Refill: 0          Patient was counseled today on Pap guidelines, recommendation for pelvic exams, mammograms every other year after the age of 40 and annually after the age of 50, Colonoscopy after the age of 50, Dexa Bone Scan and calcium and vitamin D supplementation in menopause and to see her PCP for other health maintenance.   FOLLOW-UP:prn

## 2017-06-09 LAB — BACTERIA UR CULT: NO GROWTH

## 2017-06-19 ENCOUNTER — PATIENT MESSAGE (OUTPATIENT)
Dept: OBSTETRICS AND GYNECOLOGY | Facility: CLINIC | Age: 22
End: 2017-06-19

## 2017-06-19 LAB
HPV HR 12 DNA CVX QL NAA+PROBE: POSITIVE
HPV16 DNA SPEC QL NAA+PROBE: NEGATIVE
HPV18 DNA SPEC QL NAA+PROBE: NEGATIVE

## 2017-06-20 ENCOUNTER — OFFICE VISIT (OUTPATIENT)
Dept: FAMILY MEDICINE | Facility: CLINIC | Age: 22
End: 2017-06-20
Payer: COMMERCIAL

## 2017-06-20 ENCOUNTER — TELEPHONE (OUTPATIENT)
Dept: NEUROLOGY | Facility: CLINIC | Age: 22
End: 2017-06-20

## 2017-06-20 ENCOUNTER — TELEPHONE (OUTPATIENT)
Dept: FAMILY MEDICINE | Facility: CLINIC | Age: 22
End: 2017-06-20

## 2017-06-20 VITALS
DIASTOLIC BLOOD PRESSURE: 70 MMHG | OXYGEN SATURATION: 97 % | TEMPERATURE: 99 F | RESPIRATION RATE: 16 BRPM | WEIGHT: 127.88 LBS | HEART RATE: 86 BPM | BODY MASS INDEX: 19.38 KG/M2 | SYSTOLIC BLOOD PRESSURE: 100 MMHG | HEIGHT: 68 IN

## 2017-06-20 DIAGNOSIS — J45.21 MILD INTERMITTENT ASTHMA WITH ACUTE EXACERBATION: ICD-10-CM

## 2017-06-20 DIAGNOSIS — R05.9 COUGH: ICD-10-CM

## 2017-06-20 DIAGNOSIS — R51.9 CHRONIC NONINTRACTABLE HEADACHE, UNSPECIFIED HEADACHE TYPE: ICD-10-CM

## 2017-06-20 DIAGNOSIS — G89.29 CHRONIC NONINTRACTABLE HEADACHE, UNSPECIFIED HEADACHE TYPE: ICD-10-CM

## 2017-06-20 DIAGNOSIS — R06.2 WHEEZING: Primary | ICD-10-CM

## 2017-06-20 PROCEDURE — 94640 AIRWAY INHALATION TREATMENT: CPT | Mod: 59,S$GLB,, | Performed by: NURSE PRACTITIONER

## 2017-06-20 PROCEDURE — 99214 OFFICE O/P EST MOD 30 MIN: CPT | Mod: 25,S$GLB,, | Performed by: NURSE PRACTITIONER

## 2017-06-20 PROCEDURE — 99999 PR PBB SHADOW E&M-EST. PATIENT-LVL IV: CPT | Mod: PBBFAC,,, | Performed by: NURSE PRACTITIONER

## 2017-06-20 PROCEDURE — 96372 THER/PROPH/DIAG INJ SC/IM: CPT | Mod: S$GLB,,, | Performed by: NURSE PRACTITIONER

## 2017-06-20 RX ORDER — PREDNISONE 10 MG/1
TABLET ORAL
Qty: 18 TABLET | Refills: 0 | Status: SHIPPED | OUTPATIENT
Start: 2017-06-20 | End: 2017-07-07

## 2017-06-20 RX ORDER — BETAMETHASONE SODIUM PHOSPHATE AND BETAMETHASONE ACETATE 3; 3 MG/ML; MG/ML
6 INJECTION, SUSPENSION INTRA-ARTICULAR; INTRALESIONAL; INTRAMUSCULAR; SOFT TISSUE
Status: COMPLETED | OUTPATIENT
Start: 2017-06-20 | End: 2017-06-20

## 2017-06-20 RX ORDER — LEVALBUTEROL TARTRATE 45 UG/1
1-2 AEROSOL, METERED ORAL EVERY 4 HOURS PRN
Qty: 1 INHALER | Refills: 1 | Status: SHIPPED | OUTPATIENT
Start: 2017-06-20 | End: 2018-08-08 | Stop reason: SDUPTHER

## 2017-06-20 RX ORDER — LEVALBUTEROL INHALATION SOLUTION 1.25 MG/3ML
1.25 SOLUTION RESPIRATORY (INHALATION)
Status: COMPLETED | OUTPATIENT
Start: 2017-06-20 | End: 2017-06-20

## 2017-06-20 RX ORDER — LEVALBUTEROL 1.25 MG/.5ML
1.25 SOLUTION, CONCENTRATE RESPIRATORY (INHALATION)
Status: DISCONTINUED | OUTPATIENT
Start: 2017-06-20 | End: 2017-06-20

## 2017-06-20 RX ORDER — CYCLOBENZAPRINE HCL 10 MG
10 TABLET ORAL NIGHTLY
Qty: 90 TABLET | Refills: 0 | Status: SHIPPED | OUTPATIENT
Start: 2017-06-20 | End: 2017-06-30

## 2017-06-20 RX ADMIN — LEVALBUTEROL INHALATION SOLUTION 1.25 MG: 1.25 SOLUTION RESPIRATORY (INHALATION) at 02:06

## 2017-06-20 RX ADMIN — BETAMETHASONE SODIUM PHOSPHATE AND BETAMETHASONE ACETATE 6 MG: 3; 3 INJECTION, SUSPENSION INTRA-ARTICULAR; INTRALESIONAL; INTRAMUSCULAR; SOFT TISSUE at 02:06

## 2017-06-20 NOTE — PROGRESS NOTES
Subjective:       Patient ID: Holly Joseph is a 21 y.o. female.    Chief Complaint: Asthma (asthma flare up since sunday.Wheezing,hard to breath.Just took her inhaler an hour ago. Been doing breathing treatment,but not helping)    Cough   This is a new problem. The current episode started in the past 7 days. The problem has been rapidly worsening. The problem occurs constantly. The cough is productive of sputum. Associated symptoms include shortness of breath and wheezing. Pertinent negatives include no chest pain, chills, ear congestion, ear pain, fever, headaches, heartburn, hemoptysis, myalgias, nasal congestion, postnasal drip, rash, rhinorrhea, sore throat, sweats or weight loss. She has tried rest and a beta-agonist inhaler for the symptoms. Her past medical history is significant for asthma. There is no history of bronchiectasis, bronchitis, COPD, emphysema, environmental allergies or pneumonia.     Vitals:    06/20/17 1357   BP: 100/70   Pulse: 86   Resp: 16   Temp: 98.6 °F (37 °C)     Review of Systems   Constitutional: Negative for chills, diaphoresis, fatigue, fever and weight loss.   HENT: Negative.  Negative for ear pain, postnasal drip, rhinorrhea and sore throat.    Eyes: Negative.    Respiratory: Positive for shortness of breath and wheezing. Negative for cough and hemoptysis.    Cardiovascular: Negative for chest pain.   Gastrointestinal: Negative for abdominal pain, diarrhea, heartburn and nausea.   Endocrine: Negative.    Genitourinary: Negative for dysuria and hematuria.   Musculoskeletal: Negative.  Negative for myalgias.   Skin: Negative for color change and rash.   Allergic/Immunologic: Negative.  Negative for environmental allergies.   Neurological: Negative for speech difficulty, numbness and headaches.   Hematological: Negative.    Psychiatric/Behavioral: Negative.        Past Medical History:   Diagnosis Date    Allergy     Asthma     Constipation     Endometriosis      Fibromyalgia     Gastritis     HLA B27 (HLA B27 positive)     Migraine     Scoliosis     Smoker      Objective:      Physical Exam   Constitutional: She is oriented to person, place, and time. She appears well-developed and well-nourished.   HENT:   Head: Normocephalic and atraumatic.   Right Ear: Hearing, tympanic membrane and ear canal normal.   Left Ear: Hearing, tympanic membrane and ear canal normal.   Nose: Nose normal. Right sinus exhibits no maxillary sinus tenderness and no frontal sinus tenderness. Left sinus exhibits no maxillary sinus tenderness and no frontal sinus tenderness.   Mouth/Throat: Uvula is midline, oropharynx is clear and moist and mucous membranes are normal.   Eyes: Conjunctivae and EOM are normal. Pupils are equal, round, and reactive to light.   Neck: Neck supple.   Cardiovascular: Normal rate, regular rhythm, normal heart sounds and intact distal pulses.  Exam reveals no gallop and no friction rub.    No murmur heard.  Pulmonary/Chest: She is in respiratory distress. She has wheezes.   Abdominal: Soft. Bowel sounds are normal.   Musculoskeletal: Normal range of motion.   Neurological: She is alert and oriented to person, place, and time.   Skin: Skin is warm and dry.   Psychiatric: She has a normal mood and affect. Her behavior is normal.   Nursing note and vitals reviewed.      Assessment:       1. Wheezing    2. Cough    3. Mild intermittent asthma with acute exacerbation    4. Chronic nonintractable headache, unspecified headache type        Plan:       Wheezing  -     levalbuterol nebulizer solution 1.25 mg; Take 0.5 mLs (1.25 mg total) by nebulization one time.  -     betamethasone acetate-betamethasone sodium phosphate injection 6 mg; Inject 1 mL (6 mg total) into the muscle one time.    Cough  -     levalbuterol nebulizer solution 1.25 mg; Take 0.5 mLs (1.25 mg total) by nebulization one time.  -     betamethasone acetate-betamethasone sodium phosphate injection 6 mg; Inject  1 mL (6 mg total) into the muscle one time.    Mild intermittent asthma with acute exacerbation  -     levalbuterol (XOPENEX HFA) 45 mcg/actuation inhaler; Inhale 1-2 puffs into the lungs every 4 (four) hours as needed for Wheezing.  Dispense: 1 Inhaler; Refill: 1  -     betamethasone acetate-betamethasone sodium phosphate injection 6 mg; Inject 1 mL (6 mg total) into the muscle one time.  -     predniSONE (DELTASONE) 10 MG tablet; Take 3 tablets for 3 days then 2 tablets for 3 days then 1 tablet for 3 days  Dispense: 18 tablet; Refill: 0    Chronic nonintractable headache, unspecified headache type  -     Ambulatory referral to Neurology  -     cyclobenzaprine (FLEXERIL) 10 MG tablet; Take 1 tablet (10 mg total) by mouth every evening.  Dispense: 90 tablet; Refill: 0        Fu if not better

## 2017-06-20 NOTE — TELEPHONE ENCOUNTER
----- Message from Danae Grayson sent at 6/20/2017  2:26 PM CDT -----  Schedule a new patient appointment and referral is in the system.  Call [phone].

## 2017-06-21 ENCOUNTER — PATIENT MESSAGE (OUTPATIENT)
Dept: NEUROLOGY | Facility: CLINIC | Age: 22
End: 2017-06-21

## 2017-06-21 ENCOUNTER — OFFICE VISIT (OUTPATIENT)
Dept: NEUROLOGY | Facility: CLINIC | Age: 22
End: 2017-06-21
Payer: COMMERCIAL

## 2017-06-21 VITALS
WEIGHT: 123.44 LBS | DIASTOLIC BLOOD PRESSURE: 81 MMHG | HEIGHT: 68 IN | SYSTOLIC BLOOD PRESSURE: 112 MMHG | BODY MASS INDEX: 18.71 KG/M2 | RESPIRATION RATE: 18 BRPM | HEART RATE: 81 BPM

## 2017-06-21 DIAGNOSIS — J45.902 UNSPECIFIED ASTHMA, WITH STATUS ASTHMATICUS: ICD-10-CM

## 2017-06-21 DIAGNOSIS — G43.719 CHRONIC MIGRAINE WITHOUT AURA, WITH INTRACTABLE MIGRAINE, SO STATED, WITHOUT MENTION OF STATUS MIGRAINOSUS: ICD-10-CM

## 2017-06-21 DIAGNOSIS — G47.00 INSOMNIA, UNSPECIFIED TYPE: ICD-10-CM

## 2017-06-21 DIAGNOSIS — M41.20 SCOLIOSIS (AND KYPHOSCOLIOSIS), IDIOPATHIC: ICD-10-CM

## 2017-06-21 DIAGNOSIS — R51.9 INTRACTABLE HEADACHE, UNSPECIFIED CHRONICITY PATTERN, UNSPECIFIED HEADACHE TYPE: Primary | ICD-10-CM

## 2017-06-21 DIAGNOSIS — F33.0 MDD (MAJOR DEPRESSIVE DISORDER), RECURRENT EPISODE, MILD: ICD-10-CM

## 2017-06-21 PROCEDURE — 99204 OFFICE O/P NEW MOD 45 MIN: CPT | Mod: S$GLB,,, | Performed by: PSYCHIATRY & NEUROLOGY

## 2017-06-21 PROCEDURE — 99999 PR PBB SHADOW E&M-EST. PATIENT-LVL III: CPT | Mod: PBBFAC,,, | Performed by: PSYCHIATRY & NEUROLOGY

## 2017-06-21 RX ORDER — NARATRIPTAN 2.5 MG/1
TABLET ORAL
Qty: 9 TABLET | Refills: 3 | Status: SHIPPED | OUTPATIENT
Start: 2017-06-21 | End: 2017-11-30

## 2017-06-21 RX ORDER — GABAPENTIN 300 MG/1
300 CAPSULE ORAL 3 TIMES DAILY
Qty: 90 CAPSULE | Refills: 11 | Status: SHIPPED | OUTPATIENT
Start: 2017-06-21 | End: 2017-12-13

## 2017-06-21 NOTE — LETTER
June 21, 2017      Traci Joyce, NP  [address] [facility] [address] [place] [address]           [facility]  [address] [facility] [address] [place] [address]  Phone: [phone]  Fax: [phone]          Patient: Holly Joseph   MR Number: [id number]   YOB: 1995   Date of Visit: 6/21/2017       Dear Traci Joyce:    Thank you for referring Holly Joseph to me for evaluation. Attached you will find relevant portions of my assessment and plan of care.    If you have questions, please do not hesitate to call me. I look forward to following Holly Joseph along with you.    Sincerely,    Katherine Zarate MD    Enclosure  CC:  No Recipients    If you would like to receive this communication electronically, please contact [email or web address] or [phone] to request more information on [employer] Link access.    For providers and/or their staff who would like to refer a patient to [facility], please contact us through our one-stop-shop provider referral line, [facility], at [phone].    If you feel you have received this communication in error or would no longer like to receive these types of communications, please e-mail [email or web address]

## 2017-06-21 NOTE — PROGRESS NOTES
Headache questionnaire    1. When did your Headaches start?    2009      2. Where are your headaches located?   All around      3. Your headache's characteristics:   Throbbing, Pounding, Sharp      4. How long does the headache last?   days      5. How often does the headache occur?   weekly      6. Are your headaches preceded or accompanied by other symptoms? no   If yes, please describe.  N/A      7. Does the headache awaken you at night? yes   If so, how often? 4x a week or I just can't sleep        8. Please lucinda the word that best describes your headache's intensity:    severe      9. Using a scale of 1 through 10, with 0 = no pain and 10 = the worst pain:   What score is your headache now? 3   What score is your headache at its worst? 10   What score is your headache at its best? 1        10. Possible associated headache symptoms:  [x]  Sensitivity to light  [] Dizziness  [] Nasal or sinus pressure/ pain   [x] Sensitivity to noise  [] Vertigo  [x] Problems with concentration  [] Sensitivity to smells  [] Ringing in ears  [] Problems with memory    [x] Blurred vision  [] Irritability  [x] Problems with task completion   [] Double vision  [] Anger  [x]  Problems with relaxation  [x] Loss of appetite  [x] Anxiety  [x] Neck tightness, Neck pain  [x] Nausea   [] Nasal congestion  [] Vomiting         11. Headache improving factors:  [x] Sleep  [] Heat  [x] Darkness  [] Ice  [] Local pressure [] Menses (period)  [] Massage   [] Medications:        12. Headache worsening factors:   [] Fatigue [] Sneezing  [x] Changes in Weather  [x] Light [] Bending Over [x] Stress  [x] Noise [] Ovulation  [] Multiple Sclerosis Flare-Up  [] Smells  [] Menses  [] Food   [] Coughing [] Alcohol      13. Number of caffeinated drinks per day: 2 at most      14. Number of diet drinks per day:  0      15. Have you seen any other [facility]ists within the last 3 years?  no          Please Check any Medications Tried for Headache    AED  Neuromodulators  MAOIs  Ergot Alkaloids    Acetazolamide (Diamox) [] Phenelzine (Nardil) [] Dihydroergotamine (Migranal) []   Carbamazepine (Tegretol) [] Tranylcypromine (Parnate) [] Ergotamine (Ergomar) []   Gabapentin (Neurontin) [] Antihistamine/Serotonergic  Triptans    Lacosamide (Vimpat) [] Cyproheptadine (Periactin) [] Almotriptan (Axert) []   Lamotrigine (Lamictal) [] Antihypertensives  Eletriptan (Relpax) []   Levatiracetam (Keppra) [] Atenolol (Tenormin) [] Frovatriptan (Frova) []   Oxcarbazepine (Trileptal) [] Bisoprostol (Zebeta) [] Naratriptan (Amerge) []   Phenobarbital [] Candesartan (Atacand) [] Rizatriptan (Maxalt) []   Phenytoin (Dilantin) [] Diltiazem (Cardizem) [] Sumatriptan (Imitrex) [x]   Pregabalin (Lyrica) [] Lisinopril (Prinivil, Zestril) [] Zolmitriptan (Zomig) []   Primidone (Mysoline) [] Metoprolol (Toprol) [] Combo Abortives    Tiagabine (Gabatril) [] Nadolol (Corgard) [] Butalbital and Acetaminophen (Bupap) []   Topiramate (Topamax)  (Trokendi) [] Nicardipine (Cardene) []     Vigabatrin (Sabril) [] Nimodipine (Nimotop) [] Butalbital, Acetaminophen, and caffeine (Fioricet) [x]   Valproic Acid (Depakote) (Divalproex Sodium) [] Propranolol (Inderal) []     Zonisamide (Zonegran) [] Telmisartan (Micardis) [] Butalbital, Aspirin, and caffeine (Fiorinal) []   Benzodiazepines  Timolol (Blocadren) []     Alprazolam (Xanax) [] Verapamil (Calan, Verelan) [] Butalbital, Caffeine, Acetaminophen, and Codeine (Fioricet with Codeine) []   Diazepam (Valium) [] NSAIDs      Lorazepam (Ativan) [] Acetaminophen (Tylenol) [x]     Clonazepam (Klonopin) [] Acetylsalicylic Acid (Aspirin) [x] Butalbital, Caffeine, Aspirin, and Codeine  (Fiorinal with Codeine) []   Antidepressants  Diclofenac (Cambia) []     Amitriptyline (Elavil) [] Ibuprofen (Motrin) [x]     Desipramine (Norpramin) [] Indomethacin (Indocin) [] Aspirin, Caffeine, and Acetaminophen (Excedrin) (Goodys) [x]   Doxepin (Sinequan) [] Ketoprofen  (Orudis) []     Fluoxetine (Prozac) [x] Ketorolac (Toradol) [x] Acetaminophen, Dichloralphenazone, and Isometheptene (Midrin) []   Imipramine (Tofranil) [] Naproxen (Anaprox) (Aleve) [x]     Nortriptyline (Pamelor) [] Meclofenamic Acid (Meclomen) []     Venlafaxine (Effexor) [x] Meloxicam (Mobic) [] Aspirin, Salicylamide, and Caffeine (BC Powder) []

## 2017-06-21 NOTE — PROGRESS NOTES
Subjective:       Patient ID: Holly Joseph is a 21 y.o. female.    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 At onset the headaches were episodic, they gradually became chronic and daily. She does not report a visual aura. She has tried Prozac (suicidal ideation) and Venlafaxine (jaw tightening). For acute attacks she has tried sumatriptan (jaw tightening) and Excedrin migraine (jitteriness).   Please see details of headache characteristics headache below.  Headache questionnaire   1. When did your Headaches start?   2009   2. Where are your headaches located?   All around   3. Your headache's characteristics:   Throbbing, Pounding, Sharp   4. How long does the headache last?   days   5. How often does the headache occur?   weekly   6. Are your headaches preceded or accompanied by other symptoms? no   If yes, please describe. N/A   7. Does the headache awaken you at night? yes   If so, how often? 4x a week or I just can't sleep   8. Please lucinda the word that best describes your headache's intensity:   severe   9. Using a scale of 1 through 10, with 0 = no pain and 10 = the worst pain:   What score is your headache now? 3   What score is your headache at its worst? 10   What score is your headache at its best? 1   10. Possible associated headache symptoms:   Sensitivity to light Dizziness Nasal or sinus pressure/ pain   Sensitivity to noise Vertigo Problems with concentration   Sensitivity to smells Ringing in ears Problems with memory   Blurred vision Irritability Problems with task completion   Double vision Anger Problems with relaxation   Loss of appetite Anxiety Neck tightness, Neck pain   Nausea Nasal congestion   Vomiting   11. Headache improving factors:   Sleep Heat   Darkness Ice   Local pressure Menses (period)   Massage Medications:   12. Headache worsening factors:   Fatigue Sneezing Changes  in Weather   Light Bending Over Stress   Noise Ovulation Multiple Sclerosis Flare-Up   Smells Menses Food   Coughing Alcohol   13. Number of caffeinated drinks per day: 2 at most   14. Number of diet drinks per day: 0     Review of Systems   Constitutional: Positive for fatigue. Negative for activity change, appetite change and fever.   HENT: Negative for congestion, dental problem, hearing loss, sinus pressure, tinnitus, trouble swallowing and voice change.    Eyes: Positive for photophobia and pain. Negative for redness and visual disturbance.   Respiratory: Positive for cough, chest tightness, shortness of breath and wheezing.    Cardiovascular: Positive for chest pain. Negative for palpitations and leg swelling.   Gastrointestinal: Positive for abdominal pain and constipation. Negative for blood in stool, nausea and vomiting.   Endocrine: Negative for cold intolerance and heat intolerance.   Genitourinary: Negative for difficulty urinating, frequency, menstrual problem and urgency.   Musculoskeletal: Positive for arthralgias, back pain, myalgias and neck pain. Negative for gait problem, joint swelling and neck stiffness.   Skin: Negative.    Neurological: Positive for light-headedness and headaches. Negative for dizziness, tremors, seizures, syncope, facial asymmetry, speech difficulty, weakness and numbness.   Hematological: Negative for adenopathy. Does not bruise/bleed easily.   Psychiatric/Behavioral: Positive for decreased concentration, dysphoric mood and sleep disturbance. Negative for agitation, behavioral problems, confusion, self-injury and suicidal ideas. The patient is nervous/anxious. The patient is not hyperactive.          Past Medical History:   Diagnosis Date    Allergy     Asthma     Constipation     Endometriosis     Fibromyalgia     Gastritis     HLA B27 (HLA B27 positive)     Migraine     Scoliosis     Smoker      Past Surgical History:   Procedure Laterality Date    ADENOIDECTOMY       APPENDECTOMY  09/2014    endometriosis    COLONOSCOPY N/A 5/19/2016    Procedure: COLONOSCOPY;  Surgeon: Jarret Zhao Jr., MD;  Location: [facility];  Service: Endoscopy;  Laterality: N/A;    MOUTH SURGERY      x2    UPPER GASTROINTESTINAL ENDOSCOPY  05/2016    Dr. Zhao     Family History   Problem Relation Age of Onset    Fibromyalgia Mother     Depression Mother     Diabetes Father     Depression Father     Mental illness Father      bipolar disorder    Multiple sclerosis Maternal Aunt     Crohn's disease Maternal Grandmother     Colon cancer Neg Hx     Colon polyps Neg Hx     Ulcerative colitis Neg Hx     Stomach cancer Neg Hx     Esophageal cancer Neg Hx      Social History     Social History    Marital status: Single     Spouse name: N/A    Number of children: 0    Years of education: N/A     Occupational History    hotel management      Social History Main Topics    Smoking status: Current Every Day Smoker     Packs/day: 0.50     Types: Cigarettes    Smokeless tobacco: Never Used    Alcohol use 0.0 oz/week      Comment: social    Drug use: No    Sexual activity: Yes     Partners: Male     Birth control/ protection: OCP     Other Topics Concern    Not on file     Social History Narrative    No narrative on file     Review of patient's allergies indicates:   Allergen Reactions    Toradol [ketorolac]     Penicillins Rash    Sulfa (sulfonamide antibiotics) Hives and Rash       Current Outpatient Prescriptions:     cyclobenzaprine (FLEXERIL) 10 MG tablet, Take 1 tablet (10 mg total) by mouth every evening., Disp: 90 tablet, Rfl: 0    drospirenone-ethinyl estradiol (DB) 3-0.02 mg per tablet, Take 1 tablet by mouth once daily. Active pills only, Disp: 30 tablet, Rfl: 11    ETONOGESTREL (NEXPLANON SDRM), by Subdermal route., Disp: , Rfl:     levalbuterol (XOPENEX HFA) 45 mcg/actuation inhaler, Inhale 1-2 puffs into the lungs every 4 (four) hours as needed for Wheezing.,  Disp: 1 Inhaler, Rfl: 1    levalbuterol (XOPENEX) 1.25 mg/3 mL nebulizer solution, Take 3 mLs (1.25 mg total) by nebulization every 4 (four) hours as needed for Wheezing., Disp: 3 mL, Rfl: 7    predniSONE (DELTASONE) 10 MG tablet, Take 3 tablets for 3 days then 2 tablets for 3 days then 1 tablet for 3 days, Disp: 18 tablet, Rfl: 0    sumatriptan (IMITREX) 50 MG tablet, Take 1 tablet (50 mg total) by mouth once as needed for Migraine., Disp: 10 tablet, Rfl: 2  No current facility-administered medications for this visit.       Objective:      Vitals:    06/21/17 0959   BP: 112/81   Pulse: 81   Resp: 18     Body mass index is 18.77 kg/m².    Physical Exam     Constitutional:   She appears well-developed and well-nourished. She is well groomed    HENT:    Head: Atraumatic, oral and nasal mucosa intact  Eyes: Conjunctivae and EOM are normal. Pupils are equal, round, and reactive to light OU  Neck: Neck supple. No thyromegaly present  Cardiovascular: Normal rate and normal heart sounds  No murmur heard  Pulmonary/Chest: Effort normal and breath sounds normal  Musculoskeletal: Normal range of motion. No joint stiffness. No vertebral point tenderness  Skin: Skin is warm and dry. Onychomycosis right foot  Psychiatric: Normal mood and affect     Neuro exam:    Mental status:  Awake, attentive, Alert, oriented to self, place, year and month  Language function is intact    Cranial Nerves:  Smell was not formally evaluated  Cranial Nerves II - XII: intact  Pursuits were smooth, normal saccades, no nystagmus OU  Funduscopic exam - disc were flat and pink, no exudates or hemorrhages OU  Motor - facial movement was symmetrical and normal     Palate moved well and was symmetrical with normal palatal and oral sensation  Tongue slightly asymmetric noted     Coordination:     Rapid alternating movements and rapid finger tapping - normal bilaterally  Finger to nose - normal and symmetric bilaterally   Heel to shin test - normal and  symmetric bilaterally   Arm roll - smooth and symmetric   No intentional or positional tremor.     Motor:  Normal muscle bulk and symmetry. No fasciculations were noted    No pronator drift  Strength 5/5 bilaterally     Reflexes:  Tendon reflexes were 2 + at biceps, triceps, brachioradialis, patellar, and Achilles bilaterally  On plantar stimulation toes were down going bilaterally  No clonus was noted     Sensory: Intact to light touch, pin prick in all extremities.     Gait: Romberg absent. Normal gait. Normal arm swing and turns. Good tandem    Review of Data: Unremarkable CBC, CMP        Assessment and Plan   Chronic intractable Migraine without aura. Never imaged, exam with minimal tongue asymmetry, likely of no significant but will obtain an MRI   Therapeutic options are restricted. Not a candidate for Topamax or Zonegran due to side effects of increased depression and decreased appetite. The patient is underweight and has tendencies to depression. Her blood pressure is too low to allow use of antihypertensives. She has been on Prozac (suicidal ideation) and Venlafaxine (jaw tightening).  I have chosen Gabapentin starting 300 mg QHS titrating to TID as tolerated. Consider Depakote if no benefit or poorly tolerated  For acute attacks, since she experiencing severe triptan effect with sumatriptan, will use a very well tolerated triptan, Amerge  I have discussed the side effects of the medications prescribed and the patient acknowledges understanding  Multiple co-morbidities as listed in the problem list  RTC in 3 months with headache diaries    Meg Zarate M.D  Medical Director, Headache and Facial Pain  [facility]

## 2017-06-28 ENCOUNTER — TELEPHONE (OUTPATIENT)
Dept: NEUROLOGY | Facility: CLINIC | Age: 22
End: 2017-06-28

## 2017-06-28 NOTE — TELEPHONE ENCOUNTER
Last clinic note, demographics and insurance inrfo faxed to [employer] at 1 [phone] per request for approval.

## 2017-07-07 ENCOUNTER — HOSPITAL ENCOUNTER (OUTPATIENT)
Dept: RADIOLOGY | Facility: HOSPITAL | Age: 22
Discharge: HOME | End: 2017-07-07
Attending: PSYCHIATRY & NEUROLOGY
Payer: COMMERCIAL

## 2017-07-07 ENCOUNTER — OFFICE VISIT (OUTPATIENT)
Dept: OBSTETRICS AND GYNECOLOGY | Facility: CLINIC | Age: 22
End: 2017-07-07
Payer: COMMERCIAL

## 2017-07-07 VITALS — HEIGHT: 68 IN | BODY MASS INDEX: 20.18 KG/M2 | WEIGHT: 133.19 LBS

## 2017-07-07 DIAGNOSIS — N80.9 ENDOMETRIOSIS: ICD-10-CM

## 2017-07-07 DIAGNOSIS — R87.610 ATYPICAL SQUAMOUS CELL CHANGES OF UNDETERMINED SIGNIFICANCE (ASCUS) ON CERVICAL CYTOLOGY WITH POSITIVE HIGH RISK HUMAN PAPILLOMA VIRUS (HPV): Primary | ICD-10-CM

## 2017-07-07 DIAGNOSIS — G43.001 MIGRAINE WITHOUT AURA AND WITH STATUS MIGRAINOSUS, NOT INTRACTABLE: ICD-10-CM

## 2017-07-07 DIAGNOSIS — N87.0 CIN I (CERVICAL INTRAEPITHELIAL NEOPLASIA I): ICD-10-CM

## 2017-07-07 DIAGNOSIS — R87.810 ATYPICAL SQUAMOUS CELL CHANGES OF UNDETERMINED SIGNIFICANCE (ASCUS) ON CERVICAL CYTOLOGY WITH POSITIVE HIGH RISK HUMAN PAPILLOMA VIRUS (HPV): Primary | ICD-10-CM

## 2017-07-07 DIAGNOSIS — R51.9 INTRACTABLE HEADACHE, UNSPECIFIED CHRONICITY PATTERN, UNSPECIFIED HEADACHE TYPE: ICD-10-CM

## 2017-07-07 DIAGNOSIS — N94.6 DYSMENORRHEA: ICD-10-CM

## 2017-07-07 LAB
B-HCG UR QL: NEGATIVE
CTP QC/QA: YES

## 2017-07-07 PROCEDURE — 99499 UNLISTED E&M SERVICE: CPT | Mod: S$GLB,,, | Performed by: OBSTETRICS & GYNECOLOGY

## 2017-07-07 PROCEDURE — 81025 URINE PREGNANCY TEST: CPT | Mod: QW,S$GLB,, | Performed by: OBSTETRICS & GYNECOLOGY

## 2017-07-07 PROCEDURE — 88305 TISSUE EXAM BY PATHOLOGIST: CPT | Mod: 26,,, | Performed by: PATHOLOGY

## 2017-07-07 PROCEDURE — 70553 MRI BRAIN STEM W/O & W/DYE: CPT | Mod: 26,,, | Performed by: RADIOLOGY

## 2017-07-07 PROCEDURE — 25500020 PHARM REV CODE 255: Mod: PO | Performed by: PSYCHIATRY & NEUROLOGY

## 2017-07-07 PROCEDURE — 99999 PR PBB SHADOW E&M-EST. PATIENT-LVL II: CPT | Mod: PBBFAC,,, | Performed by: OBSTETRICS & GYNECOLOGY

## 2017-07-07 PROCEDURE — 57455 BIOPSY OF CERVIX W/SCOPE: CPT | Mod: S$GLB,,, | Performed by: OBSTETRICS & GYNECOLOGY

## 2017-07-07 PROCEDURE — A9585 GADOBUTROL INJECTION: HCPCS | Mod: PO | Performed by: PSYCHIATRY & NEUROLOGY

## 2017-07-07 PROCEDURE — 70553 MRI BRAIN STEM W/O & W/DYE: CPT | Mod: TC,PO

## 2017-07-07 PROCEDURE — 88305 TISSUE EXAM BY PATHOLOGIST: CPT | Performed by: PATHOLOGY

## 2017-07-07 RX ORDER — DROSPIRENONE AND ETHINYL ESTRADIOL 0.02-3(28)
1 KIT ORAL DAILY
Qty: 30 TABLET | Refills: 11 | Status: SHIPPED | OUTPATIENT
Start: 2017-07-07 | End: 2017-11-30

## 2017-07-07 RX ORDER — CYCLOBENZAPRINE HCL 10 MG
10 TABLET ORAL 3 TIMES DAILY PRN
COMMUNITY
End: 2018-02-05

## 2017-07-07 RX ORDER — GADOBUTROL 604.72 MG/ML
6 INJECTION INTRAVENOUS
Status: COMPLETED | OUTPATIENT
Start: 2017-07-07 | End: 2017-07-07

## 2017-07-07 RX ADMIN — GADOBUTROL 6 ML: 604.72 INJECTION INTRAVENOUS at 03:07

## 2017-07-07 NOTE — PROGRESS NOTES
TIMEOUT PERFORMED  Patient identified, procedure confirmed, and allergies reviewed.     COLPOSCOPY:    UPT: negative    Female patient presents for colposcopy.    PRE-COLPOSCOPY PROCEDURE COUNSELING:  Discussed the abnormal pap test findings, HPV, need for colposcopy and possible biopsies to determine a diagnosis and plan of care, treatments available, the minimal risks of bleeding and infection with a colposcopy, alternatives to colposcopy and she agrees to proceed.    COLPOSCOPY EXAM:   TIME OUT PERFORMED. The cervix was visualized with a speculum. Acetic acid was applied.  The entire tranformation zone could be adequately visualized.      White flat epithelium was present at  2,Location.  Mosaic pattern was not present.    Punctation was not present.    Abnormal vessels were not present.    Biopsy performed at 2 Location.    ECC - Not done.    Specimens placed in formalin and sent to pathology. Monsel's solution was applied at biopsy sites to stop bleeding.    The speculum was removed.  The patient tolerated the procedure well.    IMPRESSION:   Abnormal Pap 795.09    Colposcopy Impression:  Satisfactory:  SUE 1    POST COLPOSCOPY COUNSELING:   Manage post colposcopy cramping with NSAIDs, Tylenol or Rx per [employer].  Avoid anything in vagina (intercourse, douching, tampons) one week after the procedure.  Expect a clumpy blackish vaginal discharge (Monsel's solution) for several days.  Report bleeding heavier than a period, worsening pain, fever > 101.0 F, or foul-smelling vaginal discharge.  HPV vaccine recommended according to FDA age guidelines.  Importance of follow-up stressed.    Counseling lasted approximately 15 minutes and all her questions were answered.    FOLLOW-UP:   Based on biopsy results.

## 2017-08-21 ENCOUNTER — TELEPHONE (OUTPATIENT)
Dept: NEUROLOGY | Facility: CLINIC | Age: 22
End: 2017-08-21

## 2017-08-22 ENCOUNTER — TELEPHONE (OUTPATIENT)
Dept: NEUROLOGY | Facility: CLINIC | Age: 22
End: 2017-08-22

## 2017-08-22 NOTE — TELEPHONE ENCOUNTER
----- Message from Lashawn Hay sent at 8/22/2017  9:56 AM CDT -----  Contact: self  Patient is returning call regarding rescheduling an appointment.  Please call patient at [phone] available at 12:00 or through My chart.  Thanks!

## 2017-08-24 ENCOUNTER — TELEPHONE (OUTPATIENT)
Dept: FAMILY MEDICINE | Facility: CLINIC | Age: 22
End: 2017-08-24

## 2017-08-24 DIAGNOSIS — Z11.1 VISIT FOR TB SKIN TEST: Primary | ICD-10-CM

## 2017-08-24 NOTE — TELEPHONE ENCOUNTER
----- Message from Halima Bhakta sent at 8/24/2017  8:52 AM CDT -----  Patient stated she needs to get TB test before starting clinicals on Monday, August 28th/please call back at [phone] to advise. (okay to leave message)

## 2017-08-24 NOTE — TELEPHONE ENCOUNTER
Nurse visit scheduled for admin of ppd tomorrow at 1:15 pm, and scheduled for read of ppd on Monday at 11:45 am.  Patient advised that read must be done a minimum of 48 and maximum of 72 hours after administration of skin test.  Voices understanding.    Order pended for review.

## 2017-08-25 ENCOUNTER — CLINICAL SUPPORT (OUTPATIENT)
Dept: FAMILY MEDICINE | Facility: CLINIC | Age: 22
End: 2017-08-25
Payer: COMMERCIAL

## 2017-08-25 DIAGNOSIS — Z11.1 VISIT FOR TB SKIN TEST: Primary | ICD-10-CM

## 2017-08-25 PROCEDURE — 86580 TB INTRADERMAL TEST: CPT | Mod: S$GLB,,, | Performed by: FAMILY MEDICINE

## 2017-08-28 ENCOUNTER — CLINICAL SUPPORT (OUTPATIENT)
Dept: FAMILY MEDICINE | Facility: CLINIC | Age: 22
End: 2017-08-28
Payer: COMMERCIAL

## 2017-08-28 DIAGNOSIS — Z11.1 ENCOUNTER FOR PPD SKIN TEST READING: Primary | ICD-10-CM

## 2017-08-28 LAB
TB INDURATION - 48 HR READ: NORMAL MM
TB INDURATION - 72 HR READ: 0 MM
TB SKIN TEST - 48 HR READ: NORMAL
TB SKIN TEST - 72 HR READ: NEGATIVE

## 2017-08-28 NOTE — PROGRESS NOTES
Patient in clinic for PPD reading, left arm (placed 8/25/17).  Negative, 0mm.  See immunization and LINKS

## 2017-09-01 ENCOUNTER — TELEPHONE (OUTPATIENT)
Dept: FAMILY MEDICINE | Facility: CLINIC | Age: 22
End: 2017-09-01

## 2017-09-01 ENCOUNTER — OFFICE VISIT (OUTPATIENT)
Dept: FAMILY MEDICINE | Facility: CLINIC | Age: 22
End: 2017-09-01
Payer: COMMERCIAL

## 2017-09-01 VITALS
WEIGHT: 134.06 LBS | SYSTOLIC BLOOD PRESSURE: 100 MMHG | RESPIRATION RATE: 16 BRPM | OXYGEN SATURATION: 96 % | DIASTOLIC BLOOD PRESSURE: 80 MMHG | BODY MASS INDEX: 20.32 KG/M2 | HEIGHT: 68 IN | TEMPERATURE: 99 F | HEART RATE: 94 BPM

## 2017-09-01 DIAGNOSIS — R05.9 COUGH: ICD-10-CM

## 2017-09-01 DIAGNOSIS — J01.00 ACUTE NON-RECURRENT MAXILLARY SINUSITIS: ICD-10-CM

## 2017-09-01 DIAGNOSIS — R06.2 WHEEZING: Primary | ICD-10-CM

## 2017-09-01 DIAGNOSIS — F17.200 TOBACCO DEPENDENCE: ICD-10-CM

## 2017-09-01 DIAGNOSIS — F17.200 SMOKING: Primary | ICD-10-CM

## 2017-09-01 DIAGNOSIS — J06.9 UPPER RESPIRATORY TRACT INFECTION, UNSPECIFIED TYPE: ICD-10-CM

## 2017-09-01 DIAGNOSIS — J45.902 UNSPECIFIED ASTHMA, WITH STATUS ASTHMATICUS: ICD-10-CM

## 2017-09-01 DIAGNOSIS — J45.21 MILD INTERMITTENT ASTHMA WITH ACUTE EXACERBATION: ICD-10-CM

## 2017-09-01 PROCEDURE — 99999 PR PBB SHADOW E&M-EST. PATIENT-LVL IV: CPT | Mod: PBBFAC,,, | Performed by: NURSE PRACTITIONER

## 2017-09-01 PROCEDURE — 3008F BODY MASS INDEX DOCD: CPT | Mod: S$GLB,,, | Performed by: NURSE PRACTITIONER

## 2017-09-01 PROCEDURE — 96372 THER/PROPH/DIAG INJ SC/IM: CPT | Mod: S$GLB,,, | Performed by: NURSE PRACTITIONER

## 2017-09-01 PROCEDURE — 99214 OFFICE O/P EST MOD 30 MIN: CPT | Mod: 25,S$GLB,, | Performed by: NURSE PRACTITIONER

## 2017-09-01 RX ORDER — VARENICLINE TARTRATE 1 MG/1
1 TABLET, FILM COATED ORAL 2 TIMES DAILY
Qty: 60 TABLET | Refills: 5 | Status: SHIPPED | OUTPATIENT
Start: 2017-09-01 | End: 2017-09-12

## 2017-09-01 RX ORDER — PREDNISONE 20 MG/1
TABLET ORAL
Qty: 9 TABLET | Refills: 0 | Status: SHIPPED | OUTPATIENT
Start: 2017-09-01 | End: 2017-09-12 | Stop reason: ALTCHOICE

## 2017-09-01 RX ORDER — DOXYCYCLINE 100 MG/1
100 CAPSULE ORAL 2 TIMES DAILY
Qty: 20 CAPSULE | Refills: 0 | Status: SHIPPED | OUTPATIENT
Start: 2017-09-01 | End: 2017-09-12 | Stop reason: ALTCHOICE

## 2017-09-01 RX ORDER — VARENICLINE TARTRATE 0.5 (11)-1
KIT ORAL
Qty: 1 PACKAGE | Refills: 0 | Status: SHIPPED | OUTPATIENT
Start: 2017-09-01 | End: 2017-09-12

## 2017-09-01 RX ORDER — BETAMETHASONE SODIUM PHOSPHATE AND BETAMETHASONE ACETATE 3; 3 MG/ML; MG/ML
6 INJECTION, SUSPENSION INTRA-ARTICULAR; INTRALESIONAL; INTRAMUSCULAR; SOFT TISSUE
Status: COMPLETED | OUTPATIENT
Start: 2017-09-01 | End: 2017-09-01

## 2017-09-01 RX ADMIN — BETAMETHASONE SODIUM PHOSPHATE AND BETAMETHASONE ACETATE 6 MG: 3; 3 INJECTION, SUSPENSION INTRA-ARTICULAR; INTRALESIONAL; INTRAMUSCULAR; SOFT TISSUE at 10:09

## 2017-09-01 NOTE — PROGRESS NOTES
Subjective:       Patient ID: Holly Joseph is a 22 y.o. female.    Chief Complaint: Asthma (cough,congestion,fever,bodyaches.Symptoms since Wednesday)    Asthma   She complains of chest tightness, shortness of breath and wheezing. There is no cough, difficulty breathing, frequent throat clearing, hemoptysis, hoarse voice or sputum production. This is a new problem. The current episode started in the past 7 days. Asthma causes daytime symptoms frequently. Asthma causes nighttime symptoms frequently. The problem has been gradually improving. Associated symptoms include a fever, nasal congestion, rhinorrhea and a sore throat. Pertinent negatives include no appetite change, chest pain, dyspnea on exertion, ear congestion, ear pain, headaches, heartburn, malaise/fatigue, myalgias, orthopnea, PND, postnasal drip, sneezing, sweats, trouble swallowing or weight loss. Her symptoms are aggravated by exposure to smoke and URI. Relieved by: vit c, afrin nose spray, xyzal  She reports no improvement on treatment. Her past medical history is significant for asthma.     Vitals:    09/01/17 1002   BP: 100/80   Pulse: 94   Resp: 16   Temp: 99.4 °F (37.4 °C)     Review of Systems   Constitutional: Positive for fever. Negative for appetite change, diaphoresis, fatigue, malaise/fatigue and weight loss.   HENT: Positive for rhinorrhea and sore throat. Negative for ear pain, hoarse voice, postnasal drip, sneezing and trouble swallowing.    Eyes: Negative.    Respiratory: Positive for shortness of breath and wheezing. Negative for cough, hemoptysis and sputum production.    Cardiovascular: Negative.  Negative for chest pain, dyspnea on exertion and PND.   Gastrointestinal: Negative.  Negative for abdominal pain, diarrhea, heartburn and nausea.   Endocrine: Negative.    Genitourinary: Negative for dysuria and hematuria.   Musculoskeletal: Negative.  Negative for myalgias.   Skin: Negative for color change and rash.    Allergic/Immunologic: Negative.    Neurological: Negative.  Negative for speech difficulty, numbness and headaches.   Hematological: Negative.    Psychiatric/Behavioral: Negative.        Past Medical History:   Diagnosis Date    Allergy     Asthma     Constipation     Endometriosis     Fibromyalgia     Gastritis     HLA B27 (HLA B27 positive)     Migraine     Scoliosis     Smoker      Objective:      Physical Exam   Constitutional: She is oriented to person, place, and time. She appears well-developed and well-nourished.   HENT:   Head: Normocephalic and atraumatic.   Right Ear: Hearing, tympanic membrane and ear canal normal.   Left Ear: Hearing, tympanic membrane and ear canal normal.   Nose: Mucosal edema and rhinorrhea present.   Mouth/Throat: Posterior oropharyngeal erythema present.   Eyes: Conjunctivae and EOM are normal. Pupils are equal, round, and reactive to light.   Neck: Neck supple.   Cardiovascular: Normal rate, regular rhythm, normal heart sounds and intact distal pulses.  Exam reveals no friction rub.    No murmur heard.  Pulmonary/Chest: Effort normal. Tachypnea noted. No apnea. No respiratory distress. She has wheezes.   Abdominal: Soft. Bowel sounds are normal.   Musculoskeletal: Normal range of motion.   Neurological: She is alert and oriented to person, place, and time.   Skin: Skin is warm and dry.   Psychiatric: She has a normal mood and affect. Her behavior is normal.   Nursing note and vitals reviewed.      Assessment:       1. Wheezing    2. Cough    3. Mild intermittent asthma with acute exacerbation    4. Tobacco dependence    5. Unspecified asthma, with status asthmaticus    6. Upper respiratory tract infection, unspecified type    7. Acute non-recurrent maxillary sinusitis        Plan:       Wheezing  -     betamethasone acetate-betamethasone sodium phosphate injection 6 mg; Inject 1 mL (6 mg total) into the muscle one time.  -     varenicline (CHANTIX STARTING MONTH PAK)  0.5 mg (11)- 1 mg (42) tablet; Take one 0.5mg tab by mouth once daily X3 days,then increase to one 0.5mg tab twice daily X4 days,then increase to one 1mg tab twice daily  Dispense: 1 Package; Refill: 0  -     varenicline (CHANTIX) 1 mg Tab; Take 1 tablet (1 mg total) by mouth 2 (two) times daily.  Dispense: 60 tablet; Refill: 5  -     predniSONE (DELTASONE) 20 MG tablet; Take 2 tablets for 3 days then 1 tablet for 3 days  Dispense: 9 tablet; Refill: 0    Cough  -     betamethasone acetate-betamethasone sodium phosphate injection 6 mg; Inject 1 mL (6 mg total) into the muscle one time.  -     varenicline (CHANTIX STARTING MONTH DANIELLE) 0.5 mg (11)- 1 mg (42) tablet; Take one 0.5mg tab by mouth once daily X3 days,then increase to one 0.5mg tab twice daily X4 days,then increase to one 1mg tab twice daily  Dispense: 1 Package; Refill: 0  -     varenicline (CHANTIX) 1 mg Tab; Take 1 tablet (1 mg total) by mouth 2 (two) times daily.  Dispense: 60 tablet; Refill: 5    Mild intermittent asthma with acute exacerbation  -     betamethasone acetate-betamethasone sodium phosphate injection 6 mg; Inject 1 mL (6 mg total) into the muscle one time.  -     varenicline (CHANTIX STARTING MONTH DANIELLE) 0.5 mg (11)- 1 mg (42) tablet; Take one 0.5mg tab by mouth once daily X3 days,then increase to one 0.5mg tab twice daily X4 days,then increase to one 1mg tab twice daily  Dispense: 1 Package; Refill: 0  -     varenicline (CHANTIX) 1 mg Tab; Take 1 tablet (1 mg total) by mouth 2 (two) times daily.  Dispense: 60 tablet; Refill: 5  -     NEBULIZER FOR HOME USE  -     doxycycline (MONODOX) 100 MG capsule; Take 1 capsule (100 mg total) by mouth 2 (two) times daily.  Dispense: 20 capsule; Refill: 0  -     predniSONE (DELTASONE) 20 MG tablet; Take 2 tablets for 3 days then 1 tablet for 3 days  Dispense: 9 tablet; Refill: 0    Tobacco dependence  -     varenicline (CHANTIX STARTING MONTH DANIELLE) 0.5 mg (11)- 1 mg (42) tablet; Take one 0.5mg tab by mouth  once daily X3 days,then increase to one 0.5mg tab twice daily X4 days,then increase to one 1mg tab twice daily  Dispense: 1 Package; Refill: 0  -     varenicline (CHANTIX) 1 mg Tab; Take 1 tablet (1 mg total) by mouth 2 (two) times daily.  Dispense: 60 tablet; Refill: 5    Unspecified asthma, with status asthmaticus  -     NEBULIZER FOR HOME USE  -     doxycycline (MONODOX) 100 MG capsule; Take 1 capsule (100 mg total) by mouth 2 (two) times daily.  Dispense: 20 capsule; Refill: 0    Upper respiratory tract infection, unspecified type    Acute non-recurrent maxillary sinusitis  -     doxycycline (MONODOX) 100 MG capsule; Take 1 capsule (100 mg total) by mouth 2 (two) times daily.  Dispense: 20 capsule; Refill: 0          Received call from pharmacy that the chantix was not covered will enroll patient in smoking cessation program =- as she is interesting in quitting and will need hel to quit

## 2017-09-01 NOTE — TELEPHONE ENCOUNTER
----- Message from Erica Puente sent at 9/1/2017  8:12 AM CDT -----  Contact: self  Patient [phone] is requesting an appt as soon as possible today as she is having an asthma flareup/running fever of 101.1/congested/coughing/sneezing/bodyaches/please call patient as I do not show any appts with Ms Isiah/patient is requesting to see Ms Isiah only

## 2017-09-01 NOTE — TELEPHONE ENCOUNTER
This should be the chantix that I gave her --- if they wont pay for it., then she can see smoking cessation program - they will enroll her and get her to stop smoking

## 2017-09-01 NOTE — TELEPHONE ENCOUNTER
Patient advised on smoking cessation program, voices understanding.  \[facility]\"" pharmacy will send us prior auth info for the chantix.  Advised will process PA once we receive info from pharmacy.

## 2017-09-01 NOTE — TELEPHONE ENCOUNTER
Spoke to patient and was able to get her an appointment today at 10:00. Pt verbalized understanding.

## 2017-09-01 NOTE — TELEPHONE ENCOUNTER
----- Message from Latia Davidson sent at 9/1/2017 11:46 AM CDT -----  Contact: [phone]  Patient is requesting a call back from the nurse stated medication not covered by the insurance.    Please call the patient upon request at phone number [phone].

## 2017-09-12 ENCOUNTER — TELEPHONE (OUTPATIENT)
Dept: NEUROLOGY | Facility: CLINIC | Age: 22
End: 2017-09-12

## 2017-09-12 ENCOUNTER — OFFICE VISIT (OUTPATIENT)
Dept: NEUROLOGY | Facility: CLINIC | Age: 22
End: 2017-09-12
Payer: COMMERCIAL

## 2017-09-12 VITALS
HEIGHT: 68 IN | SYSTOLIC BLOOD PRESSURE: 122 MMHG | WEIGHT: 134.06 LBS | RESPIRATION RATE: 18 BRPM | BODY MASS INDEX: 20.32 KG/M2 | DIASTOLIC BLOOD PRESSURE: 73 MMHG | HEART RATE: 79 BPM

## 2017-09-12 DIAGNOSIS — G43.711 CHRONIC MIGRAINE WITHOUT AURA, WITH INTRACTABLE MIGRAINE, SO STATED, WITH STATUS MIGRAINOSUS: ICD-10-CM

## 2017-09-12 PROCEDURE — 99999 PR PBB SHADOW E&M-EST. PATIENT-LVL III: CPT | Mod: PBBFAC,,, | Performed by: PSYCHIATRY & NEUROLOGY

## 2017-09-12 PROCEDURE — 3008F BODY MASS INDEX DOCD: CPT | Mod: S$GLB,,, | Performed by: PSYCHIATRY & NEUROLOGY

## 2017-09-12 PROCEDURE — 99214 OFFICE O/P EST MOD 30 MIN: CPT | Mod: S$GLB,,, | Performed by: PSYCHIATRY & NEUROLOGY

## 2017-09-12 RX ORDER — BUTALBITAL, ACETAMINOPHEN AND CAFFEINE 50; 325; 40 MG/1; MG/1; MG/1
1 TABLET ORAL EVERY 4 HOURS PRN
Qty: 12 TABLET | Refills: 3 | Status: SHIPPED | OUTPATIENT
Start: 2017-09-12 | End: 2017-09-12 | Stop reason: CLARIF

## 2017-09-12 RX ORDER — BUTALBITAL AND ACETAMINOPHEN 325; 50 MG/1; MG/1
2 TABLET ORAL 2 TIMES DAILY PRN
Qty: 12 EACH | Refills: 2 | Status: SHIPPED | OUTPATIENT
Start: 2017-09-12 | End: 2017-10-12

## 2017-09-12 RX ORDER — BUTALBITAL, ACETAMINOPHEN, CAFFEINE AND CODEINE PHOSPHATE 50; 325; 40; 30 MG/1; MG/1; MG/1; MG/1
CAPSULE ORAL
Qty: 12 EACH | Refills: 3 | Status: SHIPPED | OUTPATIENT
Start: 2017-09-12 | End: 2017-09-12 | Stop reason: CLARIF

## 2017-09-12 RX ORDER — METHYLPREDNISOLONE 4 MG/1
TABLET ORAL
Qty: 1 PACKAGE | Refills: 0 | Status: SHIPPED | OUTPATIENT
Start: 2017-09-12 | End: 2017-10-03

## 2017-09-12 NOTE — PROGRESS NOTES
Subjective:       Patient ID: Holly Joseph is a 21 y.o. female.    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 At onset the headaches were episodic, they gradually became chronic and daily. She does not report a visual aura. She has tried Prozac (suicidal ideation) and Venlafaxine (jaw tightening). For acute attacks she has tried sumatriptan (jaw tightening) and Excedrin migraine (jitteriness).   Please see details of headache characteristics headache below.  Headache questionnaire   1. When did your Headaches start?   2009   2. Where are your headaches located?   All around   3. Your headache's characteristics:   Throbbing, Pounding, Sharp   4. How long does the headache last?   days   5. How often does the headache occur?   weekly   6. Are your headaches preceded or accompanied by other symptoms? no   If yes, please describe. N/A   7. Does the headache awaken you at night? yes   If so, how often? 4x a week or I just can't sleep   8. Please lucinda the word that best describes your headache's intensity:   severe   9. Using a scale of 1 through 10, with 0 = no pain and 10 = the worst pain:   What score is your headache now? 3   What score is your headache at its worst? 10   What score is your headache at its best? 1   10. Possible associated headache symptoms:   Sensitivity to light Dizziness Nasal or sinus pressure/ pain   Sensitivity to noise Vertigo Problems with concentration   Sensitivity to smells Ringing in ears Problems with memory   Blurred vision Irritability Problems with task completion   Double vision Anger Problems with relaxation   Loss of appetite Anxiety Neck tightness, Neck pain   Nausea Nasal congestion   Vomiting   11. Headache improving factors:   Sleep Heat   Darkness Ice   Local pressure Menses (period)   Massage Medications:   12. Headache worsening factors:    Fatigue Sneezing Changes in Weather   Light Bending Over Stress   Noise Ovulation Multiple Sclerosis Flare-Up   Smells Menses Food   Coughing Alcohol   13. Number of caffeinated drinks per day: 2 at most   14. Number of diet drinks per day: 0     Review of Systems   Constitutional: Positive for fatigue. Negative for activity change, appetite change and fever.   HENT: Negative for congestion, dental problem, hearing loss, sinus pressure, tinnitus, trouble swallowing and voice change.    Eyes: Positive for photophobia and pain. Negative for redness and visual disturbance.   Respiratory: Positive for cough, chest tightness, shortness of breath and wheezing.    Cardiovascular: Positive for chest pain. Negative for palpitations and leg swelling.   Gastrointestinal: Positive for abdominal pain and constipation. Negative for blood in stool, nausea and vomiting.   Endocrine: Negative for cold intolerance and heat intolerance.   Genitourinary: Negative for difficulty urinating, frequency, menstrual problem and urgency.   Musculoskeletal: Positive for arthralgias, back pain, myalgias and neck pain. Negative for gait problem, joint swelling and neck stiffness.   Skin: Negative.    Neurological: Positive for light-headedness and headaches. Negative for dizziness, tremors, seizures, syncope, facial asymmetry, speech difficulty, weakness and numbness.   Hematological: Negative for adenopathy. Does not bruise/bleed easily.   Psychiatric/Behavioral: Positive for decreased concentration, dysphoric mood and sleep disturbance. Negative for agitation, behavioral problems, confusion, self-injury and suicidal ideas. The patient is nervous/anxious. The patient is not hyperactive.          Past Medical History:   Diagnosis Date    Allergy     Asthma     Constipation     Endometriosis     Fibromyalgia     Gastritis     HLA B27 (HLA B27 positive)     Migraine     Scoliosis     Smoker      Past Surgical History:   Procedure  Laterality Date    ADENOIDECTOMY      APPENDECTOMY  09/2014    endometriosis    COLONOSCOPY N/A 5/19/2016    Procedure: COLONOSCOPY;  Surgeon: Jarret Zhao Jr., MD;  Location: [facility];  Service: Endoscopy;  Laterality: N/A;    MOUTH SURGERY      x2    UPPER GASTROINTESTINAL ENDOSCOPY  05/2016    Dr. Zhao     Family History   Problem Relation Age of Onset    Fibromyalgia Mother     Depression Mother     Diabetes Father     Depression Father     Mental illness Father      bipolar disorder    Multiple sclerosis Maternal Aunt     Crohn's disease Maternal Grandmother     Colon cancer Neg Hx     Colon polyps Neg Hx     Ulcerative colitis Neg Hx     Stomach cancer Neg Hx     Esophageal cancer Neg Hx      Social History     Social History    Marital status: Single     Spouse name: N/A    Number of children: 0    Years of education: N/A     Occupational History    hotel management      Social History Main Topics    Smoking status: Current Every Day Smoker     Packs/day: 0.50     Types: Cigarettes    Smokeless tobacco: Never Used    Alcohol use 0.0 oz/week      Comment: social    Drug use: No    Sexual activity: Yes     Partners: Male     Birth control/ protection: OCP     Other Topics Concern    Not on file     Social History Narrative    No narrative on file     Review of patient's allergies indicates:   Allergen Reactions    Toradol [ketorolac]     Penicillins Rash    Sulfa (sulfonamide antibiotics) Hives and Rash       Current Outpatient Prescriptions:     cyclobenzaprine (FLEXERIL) 10 MG tablet, Take 1 tablet (10 mg total) by mouth every evening., Disp: 90 tablet, Rfl: 0    drospirenone-ethinyl estradiol (DB) 3-0.02 mg per tablet, Take 1 tablet by mouth once daily. Active pills only, Disp: 30 tablet, Rfl: 11    ETONOGESTREL (NEXPLANON SDRM), by Subdermal route., Disp: , Rfl:     levalbuterol (XOPENEX HFA) 45 mcg/actuation inhaler, Inhale 1-2 puffs into the lungs every 4 (four)  hours as needed for Wheezing., Disp: 1 Inhaler, Rfl: 1    levalbuterol (XOPENEX) 1.25 mg/3 mL nebulizer solution, Take 3 mLs (1.25 mg total) by nebulization every 4 (four) hours as needed for Wheezing., Disp: 3 mL, Rfl: 7    predniSONE (DELTASONE) 10 MG tablet, Take 3 tablets for 3 days then 2 tablets for 3 days then 1 tablet for 3 days, Disp: 18 tablet, Rfl: 0    sumatriptan (IMITREX) 50 MG tablet, Take 1 tablet (50 mg total) by mouth once as needed for Migraine., Disp: 10 tablet, Rfl: 2  No current facility-administered medications for this visit.       Objective:      Vitals:    09/12/17 1339   BP: 122/73   Pulse: 79   Resp: 18     Body mass index is 20.38 kg/m².      Physical Exam     Constitutional:   She appears well-developed and well-nourished. She is well groomed    HENT:    Head: Atraumatic, oral and nasal mucosa intact  Eyes: Conjunctivae and EOM are normal. Pupils are equal, round, and reactive to light OU  Neck: Neck supple. No thyromegaly present  Cardiovascular: Normal rate and normal heart sounds  No murmur heard  Pulmonary/Chest: Effort normal and breath sounds normal  Musculoskeletal: Normal range of motion. No joint stiffness. No vertebral point tenderness  Skin: Skin is warm and dry. Onychomycosis right foot  Psychiatric: Normal mood and affect     Neuro exam:    Mental status:  Awake, attentive, Alert, oriented to self, place, year and month  Language function is intact    Cranial Nerves:  Smell was not formally evaluated  Cranial Nerves II - XII: intact  Pursuits were smooth, normal saccades, no nystagmus OU  Funduscopic exam - disc were flat and pink, no exudates or hemorrhages OU  Motor - facial movement was symmetrical and normal     Palate moved well and was symmetrical with normal palatal and oral sensation  Tongue slightly asymmetric noted     Coordination:     Rapid alternating movements and rapid finger tapping - normal bilaterally  Finger to nose - normal and symmetric bilaterally    Heel to shin test - normal and symmetric bilaterally   Arm roll - smooth and symmetric   No intentional or positional tremor.     Motor:  Normal muscle bulk and symmetry. No fasciculations were noted    No pronator drift  Strength 5/5 bilaterally     Reflexes:  Tendon reflexes were 2 + at biceps, triceps, brachioradialis, patellar, and Achilles bilaterally  On plantar stimulation toes were down going bilaterally  No clonus was noted     Sensory: Intact to light touch, pin prick in all extremities.     Gait: Romberg absent. Normal gait. Normal arm swing and turns. Good tandem    Review of Data: Unremarkable CBC, CMP  Results for orders placed or performed during the hospital encounter of 07/07/17   MRI Brain W WO Contrast    Narrative    Exam: MRI of the brain without and with contrast    Comparison: None.    Technique: Multiplanar MR imaging of the brain was performed both before and following the intravenous administration of 6 cc of Gadavist contrast material.  The patient refused to remove earings from the left ear resulting in obscuration of the left mastoid air cells    Findings:     The brain is normally formed. No evidence of acute/recent major vascular distribution cerebral infarction, intraparenchymal hemorrhage, or intra-axial space occupying lesion.  No neuronal migration anomaly.  The ventricular system is normal in appearance for age. No extra-axial fluid collections or blood products. No abnormal dural enhancement or enhancing masses. No enhancing vascular malformations.     The skull base flow-voids are unremarkable. The sella and parasellar regions show no significant abnormality. No Chiari malformation.    There is leftward bony nasal septal deviation.  There is a darron bullosa of the right middle turbinate.  There is minimal mucosal retention cyst formation present anteriorly at the floor of the left maxillary sinus.  The right mastoid air cells are unremarkable.  The left mastoid air cells are  obscured by overlying metal artifact from the patient during..    Impression         1. No acute intracranial abnormality appreciated.    2.  Mild left maxillary sinus disease.    3.  Leftward bony nasal septal deviation and darron bullosa of the right middle turbinate.      Electronically signed by: Mamta Perry MD  Date:     07/07/17  Time:    15:26         Lab Results   Component Value Date     03/13/2017    K 4.0 03/13/2017     03/13/2017    CO2 24 03/13/2017    BUN 10 03/13/2017    CREATININE 0.63 03/13/2017     03/13/2017    AST 19 03/13/2017    AST 19 03/29/2016    ALT 21 03/13/2017    ALBUMIN 4.3 03/13/2017    PROT 7.5 03/13/2017    BILITOT 0.5 03/13/2017    CHOL 284 (H) 11/02/2015    HDL 65 11/02/2015    LDLCALC 190.2 (H) 11/02/2015    TRIG 144 11/02/2015       Lab Results   Component Value Date    WBC 8.18 03/13/2017    HGB 15.2 03/13/2017    HCT 43.5 03/13/2017    MCV 87 03/13/2017     03/13/2017           Assessment and Plan   Chronic intractable Migraine without aura. Never imaged, exam with minimal tongue asymmetry, likely of no significant but will obtain an MRI   Therapeutic options are restricted. Not a candidate for Topamax or Zonegran due to side effects of increased depression and decreased appetite. The patient is underweight and has tendencies to depression. Her blood pressure is too low to allow use of antihypertensives. She has been on Prozac (suicidal ideation) and Venlafaxine (jaw tightening).  I have chosen Gabapentin starting 300 mg QHS titrating to TID as tolerated. Consider Depakote if no benefit or poorly tolerated  For acute attacks, since she experiencing severe triptan effect with sumatriptan, will use a very well tolerated triptan, Amerge  I have discussed the side effects of the medications prescribed and the patient acknowledges understanding  Multiple co-morbidities as listed in the problem list  RTC in 3 months with headache diaries    Meg  HEBER Zarate  Medical Director, Headache and Facial Pain  [facility]

## 2017-09-13 ENCOUNTER — TELEPHONE (OUTPATIENT)
Dept: NEUROLOGY | Facility: CLINIC | Age: 22
End: 2017-09-13

## 2017-09-13 NOTE — TELEPHONE ENCOUNTER
Pt took Neurontin last night and this morning Neurontin and also 2 Fioricet. She is having no relief.  She started steroids this morning.  She states she feels like her head is going to explode. Instructed to go to ER if pain is unbearable.  Notified pt to cont with plan of care and go to ER if needed per Dr. Zarate verbalized understanding.

## 2017-09-13 NOTE — TELEPHONE ENCOUNTER
----- Message from Theodore Cobb sent at 9/12/2017  3:35 PM CDT -----  Contact: pt  Pt is requesting a callback, need to inform doctor that the medication hat was sent over for her is out of stock  Call Back#[phone]  Thanks

## 2017-09-20 ENCOUNTER — TELEPHONE (OUTPATIENT)
Dept: FAMILY MEDICINE | Facility: CLINIC | Age: 22
End: 2017-09-20

## 2017-09-20 NOTE — TELEPHONE ENCOUNTER
----- Message from Latia Davidson sent at 9/20/2017  4:40 PM CDT -----  Contact: [phone]  Patient is returning nurse's phone call.  Please call patient back at [phone].

## 2017-09-20 NOTE — TELEPHONE ENCOUNTER
----- Message from Lashawn Hay sent at 9/20/2017  4:26 PM CDT -----  Contact: self  Patient called asking for advice about medication for a UTI.  Please call patient at [phone]. Thanks!    [facility] [employer] [address] [place] [address] [address] [address]  Phone: [phone] Fax: [phone]

## 2017-09-25 ENCOUNTER — TELEPHONE (OUTPATIENT)
Dept: OBSTETRICS AND GYNECOLOGY | Facility: CLINIC | Age: 22
End: 2017-09-25

## 2017-09-25 NOTE — TELEPHONE ENCOUNTER
----- Message from Clementina Alexander sent at 9/25/2017 12:25 PM CDT -----  Contact: Self  X   1st Request  _  2nd Request  _  3rd Request        Who: MILDRED TITUS [[id number]]    Why: Requesting a call back in regards to discuss her birth control medication. Pt states she was taking antibiotics and she has an implant in her arm. Pt states she has a positive pregnancy test and would like to speak to someone.     Please return the call at earliest convenience. Thanks!    What Number to Call Back: [phone]    When to Expect a call back: (Within 24 hours)

## 2017-09-26 ENCOUNTER — OFFICE VISIT (OUTPATIENT)
Dept: OBSTETRICS AND GYNECOLOGY | Facility: CLINIC | Age: 22
End: 2017-09-26
Payer: COMMERCIAL

## 2017-09-26 VITALS — BODY MASS INDEX: 21.59 KG/M2 | WEIGHT: 142 LBS

## 2017-09-26 DIAGNOSIS — N91.2 ABSENT MENSES: Primary | ICD-10-CM

## 2017-09-26 PROCEDURE — 99999 PR PBB SHADOW E&M-EST. PATIENT-LVL II: CPT | Mod: PBBFAC,,, | Performed by: OBSTETRICS & GYNECOLOGY

## 2017-09-26 PROCEDURE — 11982 REMOVE DRUG IMPLANT DEVICE: CPT | Mod: S$GLB,,, | Performed by: OBSTETRICS & GYNECOLOGY

## 2017-09-26 PROCEDURE — 99499 UNLISTED E&M SERVICE: CPT | Mod: S$GLB,,, | Performed by: OBSTETRICS & GYNECOLOGY

## 2017-09-26 NOTE — PROGRESS NOTES
TIMEOUT PERFORMED  Patient identified, procedure confirmed, and allergies reviewed.     IMPLANON REMOVAL:    Female patient  presents for Implanon removal because it is time for its scheduled removal.    UPT negative    PRE-IMPLANON REMOVAL COUNSELING:  The patient was advised of minimal risks of bleeding and pain and she agrees to proceed.    EXAM:  Implanon palpable by palpation or imaging.  The end closest to the elbow was marked.    PROCEDURE:  TIME OUT PERFORMED.  The patient was placed in same position as for insertion.  The area was prepped with antiseptic.  2 cc of 1% Xylocaine with epinephrine was injected just underneath end of implant closest to the elbow.  Downward pressure was placed on the end of the implant closest to the axilla.  Using sterile technique, a 2-3 mm incision was made in longitudinal direction of arm at tip of the implant closest to the elbow.  The implant was gently pushed toward the incision until tip was visible.  The implant was grasped with a sterile mosquito forceps and gently removed.  The entire 4 cm implant was removed.  The incision site was closed with steri strips and a small adhesive bandage and then pressure bandage was placed over insertion site.  The patient tolerated the procedure well.    ASSESSMENT:  Contraceptive Management / Removal Implanon. V25.0.    POST IMPLANON REMOVAL COUNSELING:  Expect period-like flow to occur after Implanon removal and periods to return to pre-Implanon  pattern.  Manage post Implanon arm pain with Tylenol or Rx per [employer].  Keep arm elevated and apply intermittent ice packs to decrease pain and bruising for 24 hours.  May remove bandage in 24 hours.  Implanon removal danger signs (worsening pain at incision site, bleeding through  bandage, redness and/or pus drainage at incision site).    Counseling lasted approximately 15 minutes and all her questions were answered.    FOLLOW-UP: with me for annual gyn exam or prn.

## 2017-10-31 ENCOUNTER — TELEPHONE (OUTPATIENT)
Dept: OBSTETRICS AND GYNECOLOGY | Facility: CLINIC | Age: 22
End: 2017-10-31

## 2017-10-31 NOTE — TELEPHONE ENCOUNTER
----- Message from Tory Saleem sent at 10/31/2017  9:55 AM CDT -----  Patient is calling concerning her endometriosis. She is cramping and in pain during intercourse. Please call back to advise at [phone].

## 2017-11-12 DIAGNOSIS — R51.9 CHRONIC NONINTRACTABLE HEADACHE, UNSPECIFIED HEADACHE TYPE: ICD-10-CM

## 2017-11-12 DIAGNOSIS — G89.29 CHRONIC NONINTRACTABLE HEADACHE, UNSPECIFIED HEADACHE TYPE: ICD-10-CM

## 2017-11-12 RX ORDER — CYCLOBENZAPRINE HCL 10 MG
TABLET ORAL
Qty: 90 TABLET | Refills: 0 | OUTPATIENT
Start: 2017-11-12

## 2017-11-30 ENCOUNTER — OFFICE VISIT (OUTPATIENT)
Dept: FAMILY MEDICINE | Facility: CLINIC | Age: 22
End: 2017-11-30
Payer: COMMERCIAL

## 2017-11-30 VITALS
TEMPERATURE: 98 F | DIASTOLIC BLOOD PRESSURE: 86 MMHG | HEART RATE: 89 BPM | HEIGHT: 68 IN | BODY MASS INDEX: 21.85 KG/M2 | OXYGEN SATURATION: 98 % | SYSTOLIC BLOOD PRESSURE: 92 MMHG | WEIGHT: 144.19 LBS | RESPIRATION RATE: 16 BRPM

## 2017-11-30 DIAGNOSIS — F41.9 ANXIETY: Primary | ICD-10-CM

## 2017-11-30 DIAGNOSIS — G47.00 INSOMNIA, UNSPECIFIED TYPE: ICD-10-CM

## 2017-11-30 DIAGNOSIS — Z91.09 ENVIRONMENTAL ALLERGIES: ICD-10-CM

## 2017-11-30 DIAGNOSIS — F32.A DEPRESSION, UNSPECIFIED DEPRESSION TYPE: ICD-10-CM

## 2017-11-30 PROCEDURE — 99214 OFFICE O/P EST MOD 30 MIN: CPT | Mod: S$GLB,,, | Performed by: NURSE PRACTITIONER

## 2017-11-30 PROCEDURE — 99999 PR PBB SHADOW E&M-EST. PATIENT-LVL IV: CPT | Mod: PBBFAC,,, | Performed by: NURSE PRACTITIONER

## 2017-11-30 RX ORDER — LEVOCETIRIZINE DIHYDROCHLORIDE 5 MG/1
5 TABLET, FILM COATED ORAL NIGHTLY
Qty: 30 TABLET | Refills: 11 | Status: SHIPPED | OUTPATIENT
Start: 2017-11-30 | End: 2018-03-29 | Stop reason: ALTCHOICE

## 2017-11-30 RX ORDER — VILAZODONE HYDROCHLORIDE 10 MG/1
10 TABLET ORAL DAILY
Qty: 30 TABLET | Refills: 0 | Status: SHIPPED | OUTPATIENT
Start: 2017-11-30 | End: 2018-02-05

## 2017-11-30 RX ORDER — ONDANSETRON 4 MG/1
4 TABLET, FILM COATED ORAL EVERY 8 HOURS PRN
Qty: 30 TABLET | Refills: 0 | Status: SHIPPED | OUTPATIENT
Start: 2017-11-30 | End: 2017-11-30 | Stop reason: SDUPTHER

## 2017-11-30 RX ORDER — ONDANSETRON 4 MG/1
4 TABLET, FILM COATED ORAL EVERY 8 HOURS PRN
Qty: 30 TABLET | Refills: 0 | Status: SHIPPED | OUTPATIENT
Start: 2017-11-30 | End: 2018-02-05

## 2017-11-30 NOTE — PROGRESS NOTES
Subjective:       Patient ID: Holly Joseph is a 22 y.o. female.    Chief Complaint: Depression; Generalized Body Aches; Migraine; Fever; Nasal Congestion; and Fatigue    Anxiety   Presents for initial visit. Onset was 1 to 5 years ago. The problem has been gradually worsening. Symptoms include decreased concentration, depressed mood, excessive worry, insomnia, irritability, nervous/anxious behavior and panic. Patient reports no chest pain, compulsions, confusion, dizziness, dry mouth, feeling of choking, hyperventilation, impotence, malaise, muscle tension, nausea, obsessions, palpitations, restlessness, shortness of breath or suicidal ideas.       Sinusitis   This is a new problem. The current episode started in the past 7 days. The problem has been waxing and waning since onset. There has been no fever. Associated symptoms include congestion, coughing, ear pain and a sore throat. Pertinent negatives include no chills, diaphoresis, headaches, hoarse voice, neck pain, shortness of breath, sinus pressure, sneezing or swollen glands. Past treatments include nothing. The treatment provided no relief.     Vitals:    11/30/17 1026   BP: 92/86   Pulse: 89   Resp: 16   Temp: 97.8 °F (36.6 °C)     Review of Systems   Constitutional: Positive for irritability. Negative for chills, diaphoresis, fatigue and fever.   HENT: Positive for congestion, ear pain and sore throat. Negative for hoarse voice, sinus pressure and sneezing.    Eyes: Negative.    Respiratory: Positive for cough. Negative for shortness of breath and wheezing.    Cardiovascular: Negative for chest pain and palpitations.   Gastrointestinal: Negative for abdominal pain, diarrhea and nausea.   Endocrine: Negative.    Genitourinary: Negative for dysuria, hematuria and impotence.   Musculoskeletal: Negative.  Negative for neck pain.   Skin: Negative for color change and rash.   Allergic/Immunologic: Negative.    Neurological: Negative for dizziness, speech  difficulty, numbness and headaches.   Hematological: Negative.    Psychiatric/Behavioral: Positive for decreased concentration. Negative for confusion and suicidal ideas. The patient is nervous/anxious and has insomnia.        Past Medical History:   Diagnosis Date    Allergy     Asthma     Constipation     Endometriosis     Fibromyalgia     Gastritis     HLA B27 (HLA B27 positive)     Migraine     Scoliosis     Smoker      Objective:      Physical Exam   Constitutional: She is oriented to person, place, and time. She appears well-developed and well-nourished.   HENT:   Head: Normocephalic and atraumatic.   Mouth/Throat: Oropharynx is clear and moist.   Eyes: Conjunctivae and EOM are normal. Pupils are equal, round, and reactive to light.   Neck: Neck supple.   Cardiovascular: Normal rate, regular rhythm, normal heart sounds and intact distal pulses.    Pulmonary/Chest: Effort normal and breath sounds normal.   Abdominal: Soft. Bowel sounds are normal.   Musculoskeletal: Normal range of motion.   Neurological: She is alert and oriented to person, place, and time.   Skin: Skin is warm and dry.   Psychiatric: She has a normal mood and affect. Her behavior is normal.   Nursing note and vitals reviewed.      Assessment:       1. Anxiety    2. Depression, unspecified depression type    3. Environmental allergies    4. Insomnia, unspecified type        Plan:       Anxiety  -     vilazodone (VIIBRYD) 10 mg Tab tablet; Take 1 tablet (10 mg total) by mouth once daily.  Dispense: 30 tablet; Refill: 0    Depression, unspecified depression type  -     vilazodone (VIIBRYD) 10 mg Tab tablet; Take 1 tablet (10 mg total) by mouth once daily.  Dispense: 30 tablet; Refill: 0    Environmental allergies  -     levocetirizine (XYZAL) 5 MG tablet; Take 1 tablet (5 mg total) by mouth every evening.  Dispense: 30 tablet; Refill: 11    Insomnia, unspecified type    Other orders  - -     ondansetron (ZOFRAN) 4 MG tablet; Take 1  tablet (4 mg total) by mouth every 8 (eight) hours as needed.  Dispense: 30 tablet; Refill: 0            Discussed and reviewed Dr Cadet note from 10-16   Would like to start her on different SSRI to see if we can get better control of symptoms   Discussed side effects with this med - but feel that this is one to try   Discussed that feel that she needs to try to get through side effects - will add zofran for the nausea   FU with me in 4 weeks

## 2017-12-08 ENCOUNTER — OFFICE VISIT (OUTPATIENT)
Dept: NEUROLOGY | Facility: CLINIC | Age: 22
End: 2017-12-08
Payer: COMMERCIAL

## 2017-12-08 VITALS
DIASTOLIC BLOOD PRESSURE: 74 MMHG | SYSTOLIC BLOOD PRESSURE: 109 MMHG | BODY MASS INDEX: 22.01 KG/M2 | HEART RATE: 66 BPM | WEIGHT: 145.19 LBS | HEIGHT: 68 IN

## 2017-12-08 DIAGNOSIS — G47.00 INSOMNIA, UNSPECIFIED TYPE: ICD-10-CM

## 2017-12-08 DIAGNOSIS — G43.711 CHRONIC MIGRAINE WITHOUT AURA, WITH INTRACTABLE MIGRAINE, SO STATED, WITH STATUS MIGRAINOSUS: Primary | ICD-10-CM

## 2017-12-08 DIAGNOSIS — F33.0 MDD (MAJOR DEPRESSIVE DISORDER), RECURRENT EPISODE, MILD: ICD-10-CM

## 2017-12-08 DIAGNOSIS — M41.20 SCOLIOSIS (AND KYPHOSCOLIOSIS), IDIOPATHIC: ICD-10-CM

## 2017-12-08 PROCEDURE — 99999 PR PBB SHADOW E&M-EST. PATIENT-LVL III: CPT | Mod: PBBFAC,,, | Performed by: PSYCHIATRY & NEUROLOGY

## 2017-12-08 PROCEDURE — 99214 OFFICE O/P EST MOD 30 MIN: CPT | Mod: S$GLB,,, | Performed by: PSYCHIATRY & NEUROLOGY

## 2017-12-08 RX ORDER — DIVALPROEX SODIUM 250 MG/1
TABLET, FILM COATED, EXTENDED RELEASE ORAL
Qty: 60 TABLET | Refills: 11 | Status: SHIPPED | OUTPATIENT
Start: 2017-12-08 | End: 2018-02-05

## 2017-12-08 NOTE — PROGRESS NOTES
"Subjective:       Patient ID: Holly Joseph is a 21 y.o. female.    Chief Complaint: Headache    INTERVAL HISTORY  The patient is "status quo." not doing well. She is on Gabapentin and Flexeril with poor tolerability, cognitive deficits, she is going to school. She has not responded to Fioricet or Bupap, both make her "jittery." She is willing to try another preventing. She suffers with insomnia. She is on a new antidepressant but her main issue is anxiety.  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 At onset the headaches were episodic, they gradually became chronic and daily. She does not report a visual aura. She has tried Prozac (suicidal ideation) and Venlafaxine (jaw tightening). For acute attacks she has tried sumatriptan (jaw tightening) and Excedrin migraine (jitteriness).   Please see details of headache characteristics headache below.  Headache questionnaire   1. When did your Headaches start?   2009   2. Where are your headaches located?   All around   3. Your headache's characteristics:   Throbbing, Pounding, Sharp   4. How long does the headache last?   days   5. How often does the headache occur?   weekly   6. Are your headaches preceded or accompanied by other symptoms? no   If yes, please describe. N/A   7. Does the headache awaken you at night? yes   If so, how often? 4x a week or I just can't sleep   8. Please lucinda the word that best describes your headache's intensity:   severe   9. Using a scale of 1 through 10, with 0 = no pain and 10 = the worst pain:   What score is your headache now? 3   What score is your headache at its worst? 10   What score is your headache at its best? 1   10. Possible associated headache symptoms:   Sensitivity to light Dizziness Nasal or sinus pressure/ pain   Sensitivity to noise Vertigo Problems with concentration   Sensitivity to smells Ringing in ears Problems with memory   Blurred " vision Irritability Problems with task completion   Double vision Anger Problems with relaxation   Loss of appetite Anxiety Neck tightness, Neck pain   Nausea Nasal congestion   Vomiting   11. Headache improving factors:   Sleep Heat   Darkness Ice   Local pressure Menses (period)   Massage Medications:   12. Headache worsening factors:   Fatigue Sneezing Changes in Weather   Light Bending Over Stress   Noise Ovulation Multiple Sclerosis Flare-Up   Smells Menses Food   Coughing Alcohol   13. Number of caffeinated drinks per day: 2 at most   14. Number of diet drinks per day: 0     Review of Systems   Constitutional: Positive for fatigue. Negative for activity change, appetite change and fever.   HENT: Negative for congestion, dental problem, hearing loss, sinus pressure, tinnitus, trouble swallowing and voice change.    Eyes: Positive for photophobia and pain. Negative for redness and visual disturbance.   Respiratory: Positive for cough, chest tightness, shortness of breath and wheezing.    Cardiovascular: Positive for chest pain. Negative for palpitations and leg swelling.   Gastrointestinal: Positive for abdominal pain and constipation. Negative for blood in stool, nausea and vomiting.   Endocrine: Negative for cold intolerance and heat intolerance.   Genitourinary: Negative for difficulty urinating, frequency, menstrual problem and urgency.   Musculoskeletal: Positive for arthralgias, back pain, myalgias and neck pain. Negative for gait problem, joint swelling and neck stiffness.   Skin: Negative.    Neurological: Positive for light-headedness and headaches. Negative for dizziness, tremors, seizures, syncope, facial asymmetry, speech difficulty, weakness and numbness.   Hematological: Negative for adenopathy. Does not bruise/bleed easily.   Psychiatric/Behavioral: Positive for decreased concentration, dysphoric mood and sleep disturbance. Negative for agitation, behavioral problems, confusion, self-injury and  suicidal ideas. The patient is nervous/anxious. The patient is not hyperactive.          Past Medical History:   Diagnosis Date    Allergy     Asthma     Constipation     Endometriosis     Fibromyalgia     Gastritis     HLA B27 (HLA B27 positive)     Migraine     Scoliosis     Smoker      Past Surgical History:   Procedure Laterality Date    ADENOIDECTOMY      APPENDECTOMY  09/2014    endometriosis    COLONOSCOPY N/A 5/19/2016    Procedure: COLONOSCOPY;  Surgeon: Jarret Zhao Jr., MD;  [facility];  Service: Endoscopy;  Laterality: N/A;    MOUTH SURGERY      x2    UPPER GASTROINTESTINAL ENDOSCOPY  05/2016    Dr. Zhao     Family History   Problem Relation Age of Onset    Fibromyalgia Mother     Depression Mother     Diabetes Father     Depression Father     Mental illness Father      bipolar disorder    Multiple sclerosis Maternal Aunt     Crohn's disease Maternal Grandmother     Colon cancer Neg Hx     Colon polyps Neg Hx     Ulcerative colitis Neg Hx     Stomach cancer Neg Hx     Esophageal cancer Neg Hx      Social History     Social History    Marital status: Single     Spouse name: N/A    Number of children: 0    Years of education: N/A     Occupational History    hotel management      Social History Main Topics    Smoking status: Current Every Day Smoker     Packs/day: 0.50     Types: Cigarettes    Smokeless tobacco: Never Used    Alcohol use 0.0 oz/week      Comment: social    Drug use: No    Sexual activity: Yes     Partners: Male     Birth control/ protection: OCP     Other Topics Concern    Not on file     Social History Narrative    No narrative on file     Review of patient's allergies indicates:   Allergen Reactions    Toradol [ketorolac]     Penicillins Rash    Sulfa (sulfonamide antibiotics) Hives and Rash       Current Outpatient Prescriptions:     cyclobenzaprine (FLEXERIL) 10 MG tablet, Take 1 tablet (10 mg total) by mouth every evening., Disp:  90 tablet, Rfl: 0    drospirenone-ethinyl estradiol (DB) 3-0.02 mg per tablet, Take 1 tablet by mouth once daily. Active pills only, Disp: 30 tablet, Rfl: 11    ETONOGESTREL (NEXPLANON SDRM), by Subdermal route., Disp: , Rfl:     levalbuterol (XOPENEX HFA) 45 mcg/actuation inhaler, Inhale 1-2 puffs into the lungs every 4 (four) hours as needed for Wheezing., Disp: 1 Inhaler, Rfl: 1    levalbuterol (XOPENEX) 1.25 mg/3 mL nebulizer solution, Take 3 mLs (1.25 mg total) by nebulization every 4 (four) hours as needed for Wheezing., Disp: 3 mL, Rfl: 7    predniSONE (DELTASONE) 10 MG tablet, Take 3 tablets for 3 days then 2 tablets for 3 days then 1 tablet for 3 days, Disp: 18 tablet, Rfl: 0    sumatriptan (IMITREX) 50 MG tablet, Take 1 tablet (50 mg total) by mouth once as needed for Migraine., Disp: 10 tablet, Rfl: 2  No current facility-administered medications for this visit.       Objective:      Vitals:    12/08/17 1029   BP: 109/74   Pulse: 66     Body mass index is 22.07 kg/m².    Physical Exam     Constitutional:   She appears well-developed and well-nourished. She is well groomed    HENT:    Head: Atraumatic, oral and nasal mucosa intact  Eyes: Conjunctivae and EOM are normal. Pupils are equal, round, and reactive to light OU  Neck: Neck supple. No thyromegaly present  Cardiovascular: Normal rate and normal heart sounds  No murmur heard  Pulmonary/Chest: Effort normal and breath sounds normal  Musculoskeletal: Normal range of motion. No joint stiffness. No vertebral point tenderness  Skin: Skin is warm and dry. Onychomycosis right foot  Psychiatric: Normal mood and affect     Neuro exam:    Mental status:  Awake, attentive, Alert, oriented to self, place, year and month  Language function is intact    Cranial Nerves:  Smell was not formally evaluated  Cranial Nerves II - XII: intact  Pursuits were smooth, normal saccades, no nystagmus OU  Funduscopic exam - disc were flat and pink, no exudates or  hemorrhages OU  Motor - facial movement was symmetrical and normal     Palate moved well and was symmetrical with normal palatal and oral sensation  Tongue slightly asymmetric noted     Coordination:     Rapid alternating movements and rapid finger tapping - normal bilaterally  Finger to nose - normal and symmetric bilaterally   Heel to shin test - normal and symmetric bilaterally   Arm roll - smooth and symmetric   No intentional or positional tremor.     Motor:  Normal muscle bulk and symmetry. No fasciculations were noted    No pronator drift  Strength 5/5 bilaterally     Reflexes:  Tendon reflexes were 2 + at biceps, triceps, brachioradialis, patellar, and Achilles bilaterally  On plantar stimulation toes were down going bilaterally  No clonus was noted     Sensory: Intact to light touch, pin prick in all extremities.     Gait: Romberg absent. Normal gait. Normal arm swing and turns. Good tandem    Review of Data: Unremarkable CBC, CMP  Results for orders placed or performed during the hospital encounter of 07/07/17   MRI Brain W WO Contrast    Narrative    Exam: MRI of the brain without and with contrast    Comparison: None.    Technique: Multiplanar MR imaging of the brain was performed both before and following the intravenous administration of 6 cc of Gadavist contrast material.  The patient refused to remove earings from the left ear resulting in obscuration of the left mastoid air cells    Findings:     The brain is normally formed. No evidence of acute/recent major vascular distribution cerebral infarction, intraparenchymal hemorrhage, or intra-axial space occupying lesion.  No neuronal migration anomaly.  The ventricular system is normal in appearance for age. No extra-axial fluid collections or blood products. No abnormal dural enhancement or enhancing masses. No enhancing vascular malformations.     The skull base flow-voids are unremarkable. The sella and parasellar regions show no significant  abnormality. No Chiari malformation.    There is leftward bony nasal septal deviation.  There is a darron bullosa of the right middle turbinate.  There is minimal mucosal retention cyst formation present anteriorly at the floor of the left maxillary sinus.  The right mastoid air cells are unremarkable.  The left mastoid air cells are obscured by overlying metal artifact from the patient during..    Impression         1. No acute intracranial abnormality appreciated.    2.  Mild left maxillary sinus disease.    3.  Leftward bony nasal septal deviation and darron bullosa of the right middle turbinate.      Electronically signed by: Mamta Perry MD  Date:     07/07/17  Time:    15:26         Lab Results   Component Value Date     03/13/2017    K 4.0 03/13/2017     03/13/2017    CO2 24 03/13/2017    BUN 10 03/13/2017    CREATININE 0.63 03/13/2017     03/13/2017    AST 19 03/13/2017    AST 19 03/29/2016    ALT 21 03/13/2017    ALBUMIN 4.3 03/13/2017    PROT 7.5 03/13/2017    BILITOT 0.5 03/13/2017    CHOL 284 (H) 11/02/2015    HDL 65 11/02/2015    LDLCALC 190.2 (H) 11/02/2015    TRIG 144 11/02/2015       Lab Results   Component Value Date    WBC 8.18 03/13/2017    HGB 15.2 03/13/2017    HCT 43.5 03/13/2017    MCV 87 03/13/2017     03/13/2017           Assessment and Plan   Chronic intractable Migraine without aura. MRI of the brain negative  Therapeutic options are restricted. Not a candidate for Topamax or Zonegran due to side effects of increased depression and decreased appetite. The patient is underweight and has tendencies to depression. Her blood pressure is too low to allow use of antihypertensives. She has been on Prozac (suicidal ideation) and Venlafaxine (jaw tightening).  I had chosen Gabapentin TID but poorly tolerating but having cognitive issues, will discontinue and start Depakote. Start at 250 mg with dinner for 1 week, then 500 mg (2 tabs)  For acute attacks, since she  experiencing severe triptan effect with sumatriptan, Amerge not effective. Fioricet made her jittery and not helpful, the same thing happened with Phrenilin.  Trial of Midrin. Take 2 caps at onset of headache escalation then repeat 1 cap every hour to a max of 4 per day 2 days per week    I have discussed the side effects of the medications prescribed and the patient acknowledges understanding    Multiple co-morbidities as listed in the problem list    Consider Botox as the next option    RTC in 3 months with headache diaries    Meg Zarate M.D  Medical Director, Headache and Facial Pain  [facility]

## 2017-12-13 ENCOUNTER — OFFICE VISIT (OUTPATIENT)
Dept: FAMILY MEDICINE | Facility: CLINIC | Age: 22
End: 2017-12-13
Payer: COMMERCIAL

## 2017-12-13 ENCOUNTER — LAB VISIT (OUTPATIENT)
Dept: LAB | Facility: HOSPITAL | Age: 22
End: 2017-12-13
Attending: NURSE PRACTITIONER
Payer: COMMERCIAL

## 2017-12-13 VITALS
BODY MASS INDEX: 22.02 KG/M2 | RESPIRATION RATE: 16 BRPM | WEIGHT: 145.31 LBS | HEART RATE: 69 BPM | HEIGHT: 68 IN | DIASTOLIC BLOOD PRESSURE: 76 MMHG | OXYGEN SATURATION: 99 % | TEMPERATURE: 98 F | SYSTOLIC BLOOD PRESSURE: 108 MMHG

## 2017-12-13 DIAGNOSIS — F33.0 MDD (MAJOR DEPRESSIVE DISORDER), RECURRENT EPISODE, MILD: ICD-10-CM

## 2017-12-13 DIAGNOSIS — R51.9 CHRONIC NONINTRACTABLE HEADACHE, UNSPECIFIED HEADACHE TYPE: ICD-10-CM

## 2017-12-13 DIAGNOSIS — F32.A DEPRESSION, UNSPECIFIED DEPRESSION TYPE: ICD-10-CM

## 2017-12-13 DIAGNOSIS — R53.83 FATIGUE, UNSPECIFIED TYPE: ICD-10-CM

## 2017-12-13 DIAGNOSIS — F17.200 TOBACCO DEPENDENCE: ICD-10-CM

## 2017-12-13 DIAGNOSIS — M79.7 FIBROMYALGIA: ICD-10-CM

## 2017-12-13 DIAGNOSIS — R53.83 FATIGUE, UNSPECIFIED TYPE: Primary | ICD-10-CM

## 2017-12-13 DIAGNOSIS — G89.29 CHRONIC NONINTRACTABLE HEADACHE, UNSPECIFIED HEADACHE TYPE: ICD-10-CM

## 2017-12-13 DIAGNOSIS — E55.9 VITAMIN D INSUFFICIENCY: ICD-10-CM

## 2017-12-13 LAB
ALBUMIN SERPL BCP-MCNC: 3.9 G/DL
ALP SERPL-CCNC: 87 U/L
ALT SERPL W/O P-5'-P-CCNC: 83 U/L
ANION GAP SERPL CALC-SCNC: 9 MMOL/L
AST SERPL-CCNC: 56 U/L
BASOPHILS # BLD AUTO: 0.08 K/UL
BASOPHILS NFR BLD: 0.6 %
BILIRUB SERPL-MCNC: 0.7 MG/DL
BUN SERPL-MCNC: 9 MG/DL
CALCIUM SERPL-MCNC: 9.3 MG/DL
CHLORIDE SERPL-SCNC: 104 MMOL/L
CO2 SERPL-SCNC: 27 MMOL/L
CREAT SERPL-MCNC: 0.8 MG/DL
DIFFERENTIAL METHOD: ABNORMAL
EOSINOPHIL # BLD AUTO: 0.3 K/UL
EOSINOPHIL NFR BLD: 2.3 %
ERYTHROCYTE [DISTWIDTH] IN BLOOD BY AUTOMATED COUNT: 11.9 %
EST. GFR  (AFRICAN AMERICAN): >60 ML/MIN/1.73 M^2
EST. GFR  (NON AFRICAN AMERICAN): >60 ML/MIN/1.73 M^2
GLUCOSE SERPL-MCNC: 84 MG/DL
HCT VFR BLD AUTO: 42.3 %
HGB BLD-MCNC: 14.6 G/DL
IMM GRANULOCYTES # BLD AUTO: 0.04 K/UL
IMM GRANULOCYTES NFR BLD AUTO: 0.3 %
LYMPHOCYTES # BLD AUTO: 3.1 K/UL
LYMPHOCYTES NFR BLD: 24.9 %
MCH RBC QN AUTO: 30.5 PG
MCHC RBC AUTO-ENTMCNC: 34.5 G/DL
MCV RBC AUTO: 88 FL
MONOCYTES # BLD AUTO: 0.8 K/UL
MONOCYTES NFR BLD: 6.6 %
NEUTROPHILS # BLD AUTO: 8.2 K/UL
NEUTROPHILS NFR BLD: 65.3 %
NRBC BLD-RTO: 0 /100 WBC
PLATELET # BLD AUTO: 237 K/UL
PMV BLD AUTO: 11 FL
POTASSIUM SERPL-SCNC: 3.8 MMOL/L
PROT SERPL-MCNC: 7 G/DL
RBC # BLD AUTO: 4.79 M/UL
SODIUM SERPL-SCNC: 140 MMOL/L
TSH SERPL DL<=0.005 MIU/L-ACNC: 0.83 UIU/ML
WBC # BLD AUTO: 12.51 K/UL

## 2017-12-13 PROCEDURE — 80053 COMPREHEN METABOLIC PANEL: CPT

## 2017-12-13 PROCEDURE — 36415 COLL VENOUS BLD VENIPUNCTURE: CPT | Mod: PO

## 2017-12-13 PROCEDURE — 84443 ASSAY THYROID STIM HORMONE: CPT

## 2017-12-13 PROCEDURE — 99999 PR PBB SHADOW E&M-EST. PATIENT-LVL IV: CPT | Mod: PBBFAC,,, | Performed by: NURSE PRACTITIONER

## 2017-12-13 PROCEDURE — 99214 OFFICE O/P EST MOD 30 MIN: CPT | Mod: S$GLB,,, | Performed by: NURSE PRACTITIONER

## 2017-12-13 PROCEDURE — 85025 COMPLETE CBC W/AUTO DIFF WBC: CPT

## 2017-12-14 ENCOUNTER — TELEPHONE (OUTPATIENT)
Dept: FAMILY MEDICINE | Facility: CLINIC | Age: 22
End: 2017-12-14

## 2017-12-14 ENCOUNTER — PATIENT MESSAGE (OUTPATIENT)
Dept: FAMILY MEDICINE | Facility: CLINIC | Age: 22
End: 2017-12-14

## 2017-12-14 DIAGNOSIS — R79.89 ELEVATED LFTS: Primary | ICD-10-CM

## 2017-12-14 NOTE — TELEPHONE ENCOUNTER
All labs are stable - but increased liver enzymes   Stop all NSAIDS and alcohol for 1-2 weeks and jessi labs in 7-10 days   Need to see why these are elevated

## 2017-12-14 NOTE — TELEPHONE ENCOUNTER
Spoke with patient- gave results as directed. She states she does not take any NSAIDS and has not had any alcohol in several months. She has also sent a [employer] message in the meantime. She states someone suggested that it could be her gallbladder with the nausea she is having. Wondering what your thoughts on this are- please advise.

## 2017-12-14 NOTE — TELEPHONE ENCOUNTER
Numbers dont look that way - but lets check US abdomen also to get GB and see if anything else is going on

## 2017-12-15 ENCOUNTER — HOSPITAL ENCOUNTER (OUTPATIENT)
Dept: RADIOLOGY | Facility: HOSPITAL | Age: 22
Discharge: HOME | End: 2017-12-15
Attending: NURSE PRACTITIONER
Payer: COMMERCIAL

## 2017-12-15 DIAGNOSIS — R79.89 ELEVATED LFTS: ICD-10-CM

## 2017-12-15 PROCEDURE — 76700 US EXAM ABDOM COMPLETE: CPT | Mod: TC,PO

## 2017-12-15 PROCEDURE — 76700 US EXAM ABDOM COMPLETE: CPT | Mod: 26,,, | Performed by: RADIOLOGY

## 2017-12-18 ENCOUNTER — TELEPHONE (OUTPATIENT)
Dept: FAMILY MEDICINE | Facility: CLINIC | Age: 22
End: 2017-12-18

## 2017-12-18 ENCOUNTER — LAB VISIT (OUTPATIENT)
Dept: LAB | Facility: HOSPITAL | Age: 22
End: 2017-12-18
Attending: FAMILY MEDICINE
Payer: COMMERCIAL

## 2017-12-18 DIAGNOSIS — R79.89 ELEVATED LFTS: Primary | ICD-10-CM

## 2017-12-18 DIAGNOSIS — R79.89 ELEVATED LFTS: ICD-10-CM

## 2017-12-18 DIAGNOSIS — R11.0 NAUSEA: ICD-10-CM

## 2017-12-18 DIAGNOSIS — K83.8 COMMON BILE DUCT DILATION: ICD-10-CM

## 2017-12-18 LAB
ALBUMIN SERPL BCP-MCNC: 4 G/DL
ALP SERPL-CCNC: 102 U/L
ALT SERPL W/O P-5'-P-CCNC: 79 U/L
AST SERPL-CCNC: 48 U/L
BILIRUB DIRECT SERPL-MCNC: 0.1 MG/DL
BILIRUB SERPL-MCNC: 0.4 MG/DL
PROT SERPL-MCNC: 7.3 G/DL

## 2017-12-18 PROCEDURE — 80076 HEPATIC FUNCTION PANEL: CPT

## 2017-12-18 PROCEDURE — 80074 ACUTE HEPATITIS PANEL: CPT

## 2017-12-18 PROCEDURE — 36415 COLL VENOUS BLD VENIPUNCTURE: CPT | Mod: PO

## 2017-12-18 NOTE — TELEPHONE ENCOUNTER
Please call patient and let her know that her US does show that she has common bile duct is enlarged and her LFT are up so this could be cause for her nausea - she needs to see GI for evaluation  - I have placed referral - She can see Anya Bedoya

## 2017-12-18 NOTE — TELEPHONE ENCOUNTER
----- Message from Erica Puente sent at 12/18/2017 12:19 PM CST -----  Contact: self  Patient [phone] is calling to schedule her followup lab work and stated that it is not the lab work that is in the computer for the hepatitis/please call

## 2017-12-19 ENCOUNTER — OFFICE VISIT (OUTPATIENT)
Dept: GASTROENTEROLOGY | Facility: CLINIC | Age: 22
End: 2017-12-19
Payer: COMMERCIAL

## 2017-12-19 ENCOUNTER — LAB VISIT (OUTPATIENT)
Dept: LAB | Facility: HOSPITAL | Age: 22
End: 2017-12-19
Attending: INTERNAL MEDICINE
Payer: COMMERCIAL

## 2017-12-19 VITALS
BODY MASS INDEX: 21.85 KG/M2 | HEART RATE: 68 BPM | SYSTOLIC BLOOD PRESSURE: 120 MMHG | DIASTOLIC BLOOD PRESSURE: 88 MMHG | HEIGHT: 68 IN | WEIGHT: 144.19 LBS

## 2017-12-19 DIAGNOSIS — R10.11 RIGHT UPPER QUADRANT ABDOMINAL PAIN: ICD-10-CM

## 2017-12-19 DIAGNOSIS — R79.89 ELEVATED LFTS: ICD-10-CM

## 2017-12-19 DIAGNOSIS — R79.89 ELEVATED LFTS: Primary | ICD-10-CM

## 2017-12-19 PROBLEM — K82.9 GALLBLADDER DISEASE: Status: ACTIVE | Noted: 2017-12-19

## 2017-12-19 LAB
AMYLASE SERPL-CCNC: 60 U/L
HAV IGM SERPL QL IA: NEGATIVE
HBV CORE IGM SERPL QL IA: NEGATIVE
HBV SURFACE AG SERPL QL IA: NEGATIVE
HCV AB SERPL QL IA: NEGATIVE
IGA SERPL-MCNC: 141 MG/DL
IGG SERPL-MCNC: 963 MG/DL
IGM SERPL-MCNC: 175 MG/DL
LIPASE SERPL-CCNC: 44 U/L

## 2017-12-19 PROCEDURE — 99244 OFF/OP CNSLTJ NEW/EST MOD 40: CPT | Mod: S$GLB,,, | Performed by: INTERNAL MEDICINE

## 2017-12-19 PROCEDURE — 83516 IMMUNOASSAY NONANTIBODY: CPT

## 2017-12-19 PROCEDURE — 82150 ASSAY OF AMYLASE: CPT

## 2017-12-19 PROCEDURE — 83690 ASSAY OF LIPASE: CPT

## 2017-12-19 PROCEDURE — 99999 PR PBB SHADOW E&M-EST. PATIENT-LVL III: CPT | Mod: PBBFAC,,, | Performed by: INTERNAL MEDICINE

## 2017-12-19 PROCEDURE — 36415 COLL VENOUS BLD VENIPUNCTURE: CPT | Mod: PO

## 2017-12-19 PROCEDURE — 86038 ANTINUCLEAR ANTIBODIES: CPT

## 2017-12-19 PROCEDURE — 86235 NUCLEAR ANTIGEN ANTIBODY: CPT

## 2017-12-19 PROCEDURE — 86256 FLUORESCENT ANTIBODY TITER: CPT | Mod: 91

## 2017-12-19 PROCEDURE — 82784 ASSAY IGA/IGD/IGG/IGM EACH: CPT | Mod: 59

## 2017-12-19 NOTE — PROGRESS NOTES
Subjective:    PCP: Timothy Nieves MD    Referring physician: Baileyreferral Self      Patient ID: Holyl Nichols is a 22 y.o. female.    Chief Complaint: Elevated Hepatic Enzymes; Nausea; and Abdominal Pain (c/o pain in RUQ radiating to back)      HPI   Holly Nichols is a 22 y.o. /White female here today for  Elevated LFTs    Abdominal Pain  Patient complains of abdominal pain. The pain is located in the RUQ. The pain is described as aching and sharp, and is 8/10 in intensity, with radiation of her back and shoulder blades. Onset was a few weeks ago. Symptoms have been unchanged since. Aggravating factors include eating.  Alleviating factors include none. Associated symptoms include diarrhea and nausea.    She has intermittent heartburn which has been worse lately as well. She denies dysphagia or odynophagia.     She had presented to ER with sudden onset of pain, nausea and vomiting about a month ago and was noted to have elevated AST/ALT and recently had an US which show CBD dilation to 8 mm.     Acute hepatitis panel has been negative. She has social infrequent alcohol intake. No new meds or OTC herbals. No family history of liver disease.     Past Medical History:   Diagnosis Date    Allergy     Asthma     Constipation     Endometriosis     Fibromyalgia     Gastritis     HLA B27 (HLA B27 positive)     Migraine     Scoliosis     Smoker        Past Surgical History:   Procedure Laterality Date    ADENOIDECTOMY      APPENDECTOMY  09/2014    endometriosis    COLONOSCOPY N/A 5/19/2016    Procedure: COLONOSCOPY;  Surgeon: Jarret Zaho Jr., MD;  Location: [facility];  Service: Endoscopy;  Laterality: N/A;    MOUTH SURGERY      x2    UPPER GASTROINTESTINAL ENDOSCOPY  05/2016    Dr. Zhao       Family History   Problem Relation Age of Onset    Fibromyalgia Mother     Depression Mother     Diabetes Father     Depression Father     Mental illness Father      bipolar disorder     Multiple sclerosis Maternal Aunt     Crohn's disease Maternal Grandmother     Colon cancer Neg Hx     Colon polyps Neg Hx     Ulcerative colitis Neg Hx     Stomach cancer Neg Hx     Esophageal cancer Neg Hx        Social History     Social History    Marital status:      Spouse name: N/A    Number of children: 0    Years of education: N/A     Occupational History    hotel management      Social History Main Topics    Smoking status: Current Every Day Smoker     Packs/day: 0.50     Types: Cigarettes    Smokeless tobacco: Never Used    Alcohol use 0.0 oz/week      Comment: social    Drug use: No    Sexual activity: Yes     Partners: Male     Birth control/ protection: OCP     Other Topics Concern    None     Social History Narrative    None       Review of patient's allergies indicates:   Allergen Reactions    Tramadol     Penicillins Rash    Sulfa (sulfonamide antibiotics) Hives and Rash       Current Outpatient Prescriptions   Medication Sig Dispense Refill    cyclobenzaprine (FLEXERIL) 10 MG tablet Take 10 mg by mouth 3 (three) times daily as needed for Muscle spasms.      divalproex ER (DEPAKOTE ER) 250 MG 24 hr tablet Take 1 po QHS for 1 week, then 2 po QHS 60 tablet 11    isometheptene-apap-dichloralphenazone [id number] (MIDRIN) -325 mg per capsule Take 2 caps at onset of headache escalation then repeat 1 cap every hour to a max of 4 per day 2 days per week 10 capsule 3    levalbuterol (XOPENEX HFA) 45 mcg/actuation inhaler Inhale 1-2 puffs into the lungs every 4 (four) hours as needed for Wheezing. 1 Inhaler 1    levocetirizine (XYZAL) 5 MG tablet Take 1 tablet (5 mg total) by mouth every evening. 30 tablet 11    ondansetron (ZOFRAN) 4 MG tablet Take 1 tablet (4 mg total) by mouth every 8 (eight) hours as needed. 30 tablet 0    vilazodone (VIIBRYD) 10 mg Tab tablet Take 1 tablet (10 mg total) by mouth once daily. 30 tablet 0     No current facility-administered  "medications for this visit.        Review of Systems   Constitutional: Negative for activity change, appetite change, chills, diaphoresis, fatigue and fever.   HENT: Negative for congestion, ear pain, facial swelling, mouth sores, nosebleeds, rhinorrhea, sore throat and voice change.    Eyes: Negative for photophobia, pain, discharge, redness and visual disturbance.   Respiratory: Positive for shortness of breath. Negative for apnea, cough, choking, chest tightness and wheezing.    Cardiovascular: Negative for chest pain, palpitations and leg swelling.   Gastrointestinal:        As per HPI   Genitourinary: Negative for decreased urine volume, difficulty urinating, dysuria, frequency and hematuria.   Musculoskeletal: Positive for arthralgias and back pain. Negative for myalgias, neck pain and neck stiffness.   Skin: Negative for pallor and rash.   Neurological: Positive for headaches. Negative for tremors, seizures, syncope and weakness.   Hematological: Negative for adenopathy. Does not bruise/bleed easily.   Psychiatric/Behavioral: Negative for behavioral problems, confusion, hallucinations and sleep disturbance. The patient is nervous/anxious.        Objective:   /88   Pulse 68   Ht 5' 8" (1.727 m)   Wt 65.4 kg (144 lb 2.9 oz)   LMP 12/13/2017 (Exact Date)   BMI 21.92 kg/m²   Wt Readings from Last 1 Encounters:   12/19/17 1302 65.4 kg (144 lb 2.9 oz)       Physical Exam   Constitutional: She is oriented to person, place, and time. She appears well-developed and well-nourished. No distress.   HENT:   Head: Normocephalic and atraumatic.   Nose: Nose normal.   Mouth/Throat: Oropharynx is clear and moist.   Eyes: EOM are normal. Pupils are equal, round, and reactive to light. Right eye exhibits no discharge. Left eye exhibits no discharge. No scleral icterus.   Neck: Normal range of motion. Neck supple. No tracheal deviation present.   Cardiovascular: Normal rate, regular rhythm, normal heart sounds and " intact distal pulses.  Exam reveals no gallop and no friction rub.    No murmur heard.  Pulmonary/Chest: Effort normal and breath sounds normal. No stridor. No respiratory distress. She has no wheezes. She has no rales. She exhibits no tenderness.   Abdominal: Soft. Bowel sounds are normal. She exhibits no distension and no mass. There is no tenderness. There is no rebound and no guarding. No hernia.   Musculoskeletal: Normal range of motion. She exhibits no edema or tenderness.   Lymphadenopathy:     She has no cervical adenopathy.   Neurological: She is alert and oriented to person, place, and time. She has normal reflexes.   Skin: Skin is warm and dry. She is not diaphoretic.   Psychiatric: She has a normal mood and affect. Her behavior is normal. Judgment and thought content normal.       Lab Results   Component Value Date    WBC 12.51 12/13/2017    HGB 14.6 12/13/2017    HCT 42.3 12/13/2017    MCV 88 12/13/2017     12/13/2017       Chemistry        Component Value Date/Time     12/13/2017 1334    K 3.8 12/13/2017 1334     12/13/2017 1334    CO2 27 12/13/2017 1334    BUN 9 12/13/2017 1334    CREATININE 0.8 12/13/2017 1334    GLU 84 12/13/2017 1334        Component Value Date/Time    CALCIUM 9.3 12/13/2017 1334    ALKPHOS 102 12/18/2017 1602    AST 48 (H) 12/18/2017 1602    AST 19 03/29/2016 2340    ALT 79 (H) 12/18/2017 1602    BILITOT 0.4 12/18/2017 1602    ESTGFRAFRICA >60.0 12/13/2017 1334    EGFRNONAA >60.0 12/13/2017 1334          No results found for: APTT  No results found for: INR, PROTIME    No components found for: [id number]    Lab Results   Component Value Date    TSH 0.829 12/13/2017     No results found for: HGBA1C    No results found for: AMYLASE  Lab Results   Component Value Date    LIPASE 130 07/09/2016       Lab Results   Component Value Date    HEPAIGM Negative 12/18/2017    HEPBIGM Negative 12/18/2017    HEPCAB Negative 12/18/2017     Lab Results   Component Value Date    PPD  Negative 08/28/2017       No results found for: OCCULTBLOOD    No results found for: IRON, TIBC, FERRITIN, SATURATEDIRO    Assessment:      1. Elevated LFTs    2. Right upper quadrant abdominal pain         Plan:     Elevated LFTs  -     NNEKA; Future; Expected date: 12/19/2017  -     Anti-smooth muscle antibody; Future; Expected date: 12/19/2017  -     Antimitochondrial antibody; Future; Expected date: 12/19/2017  -     TISSUE TRANSGLUTAMINASE (TTG), IGA; Future; Expected date: 12/19/2017  -     Immunoglobulins (IgG, IgA, IgM) Quantitative; Future; Expected date: 12/19/2017  -     MRI MRCP Without Contrast; Future; Expected date: 12/19/2017  -     MRI Abdomen W WO Contrast; Future; Expected date: 12/19/2017  -     Lipase; Future; Expected date: 12/19/2017  -     Amylase; Future; Expected date: 12/19/2017    Right upper quadrant abdominal pain  -     NM Hepatobiliary Imaging HIDA; Future; Expected date: 12/19/2017      Return in about 4 weeks (around 1/16/2018).      Ye Gross MD

## 2017-12-20 LAB
ANA SER QL IF: NORMAL
MITOCHONDRIA AB TITR SER IF: NORMAL {TITER}
SMOOTH MUSCLE AB TITR SER IF: NORMAL {TITER}

## 2017-12-22 LAB — TTG IGA SER IA-ACNC: 3 UNITS

## 2018-01-09 ENCOUNTER — TELEPHONE (OUTPATIENT)
Dept: OBSTETRICS AND GYNECOLOGY | Facility: CLINIC | Age: 23
End: 2018-01-09

## 2018-01-09 NOTE — TELEPHONE ENCOUNTER
----- Message from Tory Saleem sent at 1/9/2018  8:07 AM CST -----  Patient is calling concerning her testing positive for pregnancy in a home pregnancy test. She has an appointment on 1/23/18 and on the waiting list but she would like to get in earlier. She is so excited and wants to know sooner. She states she would even drop off a urine sample if necessary. Please call to advise at [phone].

## 2018-01-15 ENCOUNTER — OFFICE VISIT (OUTPATIENT)
Dept: FAMILY MEDICINE | Facility: CLINIC | Age: 23
End: 2018-01-15
Payer: COMMERCIAL

## 2018-01-15 ENCOUNTER — LAB VISIT (OUTPATIENT)
Dept: LAB | Facility: HOSPITAL | Age: 23
End: 2018-01-15
Attending: NURSE PRACTITIONER
Payer: COMMERCIAL

## 2018-01-15 VITALS
BODY MASS INDEX: 21.55 KG/M2 | TEMPERATURE: 99 F | WEIGHT: 142.19 LBS | SYSTOLIC BLOOD PRESSURE: 98 MMHG | OXYGEN SATURATION: 99 % | RESPIRATION RATE: 16 BRPM | HEIGHT: 68 IN | DIASTOLIC BLOOD PRESSURE: 78 MMHG | HEART RATE: 94 BPM

## 2018-01-15 DIAGNOSIS — N92.6 MISSED PERIOD: ICD-10-CM

## 2018-01-15 DIAGNOSIS — J11.1 INFLUENZA: ICD-10-CM

## 2018-01-15 DIAGNOSIS — J45.901 EXACERBATION OF ASTHMA, UNSPECIFIED ASTHMA SEVERITY, UNSPECIFIED WHETHER PERSISTENT: ICD-10-CM

## 2018-01-15 DIAGNOSIS — R05.9 COUGH: ICD-10-CM

## 2018-01-15 DIAGNOSIS — R06.2 WHEEZING: ICD-10-CM

## 2018-01-15 DIAGNOSIS — R50.9 FEVER, UNSPECIFIED FEVER CAUSE: Primary | ICD-10-CM

## 2018-01-15 LAB
FLUAV AG SPEC QL IA: NEGATIVE
FLUBV AG SPEC QL IA: NEGATIVE
HCG INTACT+B SERPL-ACNC: 1596 MIU/ML
SPECIMEN SOURCE: NORMAL

## 2018-01-15 PROCEDURE — 99214 OFFICE O/P EST MOD 30 MIN: CPT | Mod: S$GLB,,, | Performed by: NURSE PRACTITIONER

## 2018-01-15 PROCEDURE — 99999 PR PBB SHADOW E&M-EST. PATIENT-LVL V: CPT | Mod: PBBFAC,,, | Performed by: NURSE PRACTITIONER

## 2018-01-15 PROCEDURE — 36415 COLL VENOUS BLD VENIPUNCTURE: CPT | Mod: PO

## 2018-01-15 PROCEDURE — 84702 CHORIONIC GONADOTROPIN TEST: CPT

## 2018-01-15 PROCEDURE — 87400 INFLUENZA A/B EACH AG IA: CPT | Mod: 59,PO

## 2018-01-15 RX ORDER — OSELTAMIVIR PHOSPHATE 75 MG/1
75 CAPSULE ORAL 2 TIMES DAILY
Qty: 10 CAPSULE | Refills: 0 | Status: SHIPPED | OUTPATIENT
Start: 2018-01-15 | End: 2018-01-20

## 2018-01-15 RX ORDER — LEVALBUTEROL INHALATION SOLUTION 1.25 MG/3ML
1 SOLUTION RESPIRATORY (INHALATION) 3 TIMES DAILY PRN
Qty: 75 ML | Refills: 0 | Status: SHIPPED | OUTPATIENT
Start: 2018-01-15 | End: 2018-08-08 | Stop reason: SDUPTHER

## 2018-01-15 RX ORDER — LEVALBUTEROL INHALATION SOLUTION 1.25 MG/3ML
1.25 SOLUTION RESPIRATORY (INHALATION)
Status: DISCONTINUED | OUTPATIENT
Start: 2018-01-15 | End: 2018-08-08

## 2018-01-15 NOTE — PROGRESS NOTES
Subjective:       Patient ID: Holly Nichols is a 22 y.o. female.    Chief Complaint: Asthma; Cough; and Generalized Body Aches    Cough   This is a new problem. The current episode started in the past 7 days (3 days - 100.3 fever ). The problem has been gradually worsening. The problem occurs constantly. The cough is non-productive. Associated symptoms include a fever, nasal congestion, rhinorrhea, shortness of breath and wheezing. Pertinent negatives include no chest pain, chills, ear congestion, ear pain, headaches, heartburn, hemoptysis, myalgias, postnasal drip, rash, sore throat, sweats or weight loss. Associated symptoms comments: 101 today . The symptoms are aggravated by lying down. She has tried a beta-agonist inhaler for the symptoms.     Vitals:    01/15/18 1237   BP: 98/78   Pulse: 94   Resp: 16   Temp: 98.7 °F (37.1 °C)     Review of Systems   Constitutional: Positive for fever. Negative for chills, diaphoresis, fatigue and weight loss.   HENT: Positive for rhinorrhea. Negative for ear pain, postnasal drip and sore throat.    Eyes: Negative.    Respiratory: Positive for cough, shortness of breath and wheezing. Negative for hemoptysis.    Cardiovascular: Negative for chest pain.   Gastrointestinal: Negative for abdominal pain, diarrhea, heartburn and nausea.   Endocrine: Negative.    Genitourinary: Negative for dysuria and hematuria.   Musculoskeletal: Negative.  Negative for myalgias.   Skin: Negative for color change and rash.   Allergic/Immunologic: Negative.    Neurological: Negative for speech difficulty, numbness and headaches.   Hematological: Negative.    Psychiatric/Behavioral: Negative.        Past Medical History:   Diagnosis Date    Allergy     Asthma     Constipation     Endometriosis     Fibromyalgia     Gastritis     HLA B27 (HLA B27 positive)     Migraine     Scoliosis     Smoker      Objective:      Physical Exam   Constitutional: She is oriented to person, place, and time.  She appears well-developed and well-nourished.   HENT:   Head: Normocephalic and atraumatic.   Mouth/Throat: Oropharynx is clear and moist.   Eyes: Conjunctivae and EOM are normal. Pupils are equal, round, and reactive to light.   Neck: Neck supple.   Cardiovascular: Normal rate, regular rhythm and intact distal pulses.  Exam reveals friction rub.    No murmur heard.  Pulmonary/Chest: Effort normal. No respiratory distress. She has wheezes. She has no rales.   Abdominal: Soft. Bowel sounds are normal.   Musculoskeletal: Normal range of motion.   Neurological: She is alert and oriented to person, place, and time.   Skin: Skin is warm and dry.   Psychiatric: She has a normal mood and affect. Her behavior is normal.   Nursing note and vitals reviewed.      Assessment:       1. Fever, unspecified fever cause    2. Cough    3. Wheezing    4. Exacerbation of asthma, unspecified asthma severity, unspecified whether persistent    5. Influenza    6. Missed period        Plan:       Holly was seen today for asthma, cough and generalized body aches.    Diagnoses and all orders for this visit:    Fever, unspecified fever cause  -     Influenza antigen Nasopharyngeal Swab  -     oseltamivir (TAMIFLU) 75 MG capsule; Take 1 capsule (75 mg total) by mouth 2 (two) times daily.    Cough  -     Influenza antigen Nasopharyngeal Swab  -     oseltamivir (TAMIFLU) 75 MG capsule; Take 1 capsule (75 mg total) by mouth 2 (two) times daily.    Wheezing  -     Influenza antigen Nasopharyngeal Swab  -     levalbuterol nebulizer solution 1.25 mg; Take 3 mLs (1.25 mg total) by nebulization one time.  -     levalbuterol (XOPENEX) 1.25 mg/3 mL nebulizer solution; Take 3 mLs (1.25 mg total) by nebulization 3 (three) times daily as needed for Wheezing. Rescue    Exacerbation of asthma, unspecified asthma severity, unspecified whether persistent    Influenza  -     oseltamivir (TAMIFLU) 75 MG capsule; Take 1 capsule (75 mg total) by mouth 2 (two)  times daily.    Missed period  -     hCG, quantitative; Future          Discussed xopenex tid   Had positive pregnancy test   Will treat illness with tamiflu and will check blood work for pregnancy

## 2018-02-05 ENCOUNTER — LAB VISIT (OUTPATIENT)
Dept: LAB | Facility: HOSPITAL | Age: 23
End: 2018-02-05
Attending: OBSTETRICS & GYNECOLOGY
Payer: COMMERCIAL

## 2018-02-05 ENCOUNTER — OFFICE VISIT (OUTPATIENT)
Dept: OBSTETRICS AND GYNECOLOGY | Facility: CLINIC | Age: 23
End: 2018-02-05
Payer: COMMERCIAL

## 2018-02-05 VITALS
WEIGHT: 142.63 LBS | SYSTOLIC BLOOD PRESSURE: 140 MMHG | DIASTOLIC BLOOD PRESSURE: 80 MMHG | BODY MASS INDEX: 21.62 KG/M2 | HEIGHT: 68 IN

## 2018-02-05 DIAGNOSIS — Z34.91 NORMAL PREGNANCY IN FIRST TRIMESTER: ICD-10-CM

## 2018-02-05 DIAGNOSIS — N91.2 ABSENT MENSES: ICD-10-CM

## 2018-02-05 DIAGNOSIS — O36.8390 UNABLE TO HEAR FETAL HEART TONES AS REASON FOR ULTRASOUND SCAN: ICD-10-CM

## 2018-02-05 DIAGNOSIS — Z34.91 NORMAL PREGNANCY IN FIRST TRIMESTER: Primary | ICD-10-CM

## 2018-02-05 DIAGNOSIS — G43.001 MIGRAINE WITHOUT AURA AND WITH STATUS MIGRAINOSUS, NOT INTRACTABLE: ICD-10-CM

## 2018-02-05 DIAGNOSIS — N87.0 CIN I (CERVICAL INTRAEPITHELIAL NEOPLASIA I): ICD-10-CM

## 2018-02-05 LAB
ABO + RH BLD: NORMAL
B-HCG UR QL: POSITIVE
BASOPHILS # BLD AUTO: 0.06 K/UL
BASOPHILS NFR BLD: 0.6 %
BLD GP AB SCN CELLS X3 SERPL QL: NORMAL
CTP QC/QA: YES
DIFFERENTIAL METHOD: ABNORMAL
EOSINOPHIL # BLD AUTO: 0.1 K/UL
EOSINOPHIL NFR BLD: 0.9 %
ERYTHROCYTE [DISTWIDTH] IN BLOOD BY AUTOMATED COUNT: 12.3 %
HCT VFR BLD AUTO: 42.2 %
HGB BLD-MCNC: 14.1 G/DL
IMM GRANULOCYTES # BLD AUTO: 0.05 K/UL
IMM GRANULOCYTES NFR BLD AUTO: 0.5 %
LYMPHOCYTES # BLD AUTO: 2.8 K/UL
LYMPHOCYTES NFR BLD: 26.2 %
MCH RBC QN AUTO: 30.1 PG
MCHC RBC AUTO-ENTMCNC: 33.4 G/DL
MCV RBC AUTO: 90 FL
MONOCYTES # BLD AUTO: 0.8 K/UL
MONOCYTES NFR BLD: 6.9 %
NEUTROPHILS # BLD AUTO: 7 K/UL
NEUTROPHILS NFR BLD: 64.9 %
NRBC BLD-RTO: 0 /100 WBC
PLATELET # BLD AUTO: 236 K/UL
PMV BLD AUTO: 11.1 FL
RBC # BLD AUTO: 4.69 M/UL
TSH SERPL DL<=0.005 MIU/L-ACNC: 0.45 UIU/ML
WBC # BLD AUTO: 10.83 K/UL

## 2018-02-05 PROCEDURE — 87340 HEPATITIS B SURFACE AG IA: CPT

## 2018-02-05 PROCEDURE — 81025 URINE PREGNANCY TEST: CPT | Mod: S$GLB,,, | Performed by: OBSTETRICS & GYNECOLOGY

## 2018-02-05 PROCEDURE — 76817 TRANSVAGINAL US OBSTETRIC: CPT | Mod: S$GLB,,, | Performed by: OBSTETRICS & GYNECOLOGY

## 2018-02-05 PROCEDURE — 85025 COMPLETE CBC W/AUTO DIFF WBC: CPT

## 2018-02-05 PROCEDURE — 86901 BLOOD TYPING SEROLOGIC RH(D): CPT

## 2018-02-05 PROCEDURE — 0502F SUBSEQUENT PRENATAL CARE: CPT | Mod: S$GLB,,, | Performed by: OBSTETRICS & GYNECOLOGY

## 2018-02-05 PROCEDURE — 84443 ASSAY THYROID STIM HORMONE: CPT

## 2018-02-05 PROCEDURE — 86592 SYPHILIS TEST NON-TREP QUAL: CPT

## 2018-02-05 PROCEDURE — 86762 RUBELLA ANTIBODY: CPT

## 2018-02-05 PROCEDURE — 36415 COLL VENOUS BLD VENIPUNCTURE: CPT | Mod: PO

## 2018-02-05 PROCEDURE — 99999 PR PBB SHADOW E&M-EST. PATIENT-LVL III: CPT | Mod: PBBFAC,,, | Performed by: OBSTETRICS & GYNECOLOGY

## 2018-02-05 PROCEDURE — 87086 URINE CULTURE/COLONY COUNT: CPT

## 2018-02-05 PROCEDURE — 86703 HIV-1/HIV-2 1 RESULT ANTBDY: CPT

## 2018-02-05 NOTE — PROCEDURES
Procedures     ULTRASOUND:   Ultrasound performed in the usual fashion, showing viable becerril intrauterine pregnancy, crown-rump length = 7,4 weeks with flicker,   consistent with      EDC 9/20/18  No free fluid in cul-de-sac or adnexal pathology.

## 2018-02-05 NOTE — PROGRESS NOTES
Holly Nichols is a 22 y.o. female with Patient's last menstrual period was 12/13/2017. and     Estimated Date of Delivery:9/19/18  7.5     Unknown on Today 2/5/2018    Outpatient Medications Prior to Visit   Medication Sig Dispense Refill    levalbuterol (XOPENEX HFA) 45 mcg/actuation inhaler Inhale 1-2 puffs into the lungs every 4 (four) hours as needed for Wheezing. 1 Inhaler 1    levalbuterol (XOPENEX) 1.25 mg/3 mL nebulizer solution Take 3 mLs (1.25 mg total) by nebulization 3 (three) times daily as needed for Wheezing. Rescue 75 mL 0    levocetirizine (XYZAL) 5 MG tablet Take 1 tablet (5 mg total) by mouth every evening. 30 tablet 11    cholestyramine (QUESTRAN) 4 gram packet Take 1 packet (4 g total) by mouth 3 (three) times daily with meals. 270 packet 3    cyclobenzaprine (FLEXERIL) 10 MG tablet Take 10 mg by mouth 3 (three) times daily as needed for Muscle spasms.      divalproex ER (DEPAKOTE ER) 250 MG 24 hr tablet Take 1 po QHS for 1 week, then 2 po QHS 60 tablet 11    isometheptene-apap-dichloralphenazone [id number] (MIDRIN) -325 mg per capsule Take 2 caps at onset of headache escalation then repeat 1 cap every hour to a max of 4 per day 2 days per week 10 capsule 3    ondansetron (ZOFRAN) 4 MG tablet Take 1 tablet (4 mg total) by mouth every 8 (eight) hours as needed. 30 tablet 0    oxyCODONE-acetaminophen (PERCOCET)  mg per tablet Take 1 tablet by mouth every 4 (four) hours as needed for Pain. 40 tablet 0    vilazodone (VIIBRYD) 10 mg Tab tablet Take 1 tablet (10 mg total) by mouth once daily. 30 tablet 0     Facility-Administered Medications Prior to Visit   Medication Dose Route Frequency Provider Last Rate Last Dose    levalbuterol nebulizer solution 1.25 mg  1.25 mg Nebulization 1 time in Clinic/HOD Traci Joyce NP           Review of patient's allergies indicates:   Allergen Reactions    Toradol [ketorolac]     Tramadol     Penicillins Rash    Sulfa  (sulfonamide antibiotics) Hives and Rash       Past Medical History:   Diagnosis Date    Allergy     Asthma     Constipation     Endometriosis     Fibromyalgia     Gastritis     HLA B27 (HLA B27 positive)     Migraine     Scoliosis     Smoker        Past Surgical History:   Procedure Laterality Date    ADENOIDECTOMY      APPENDECTOMY  2014    endometriosis    CHOLECYSTECTOMY  2017    [facility]LLA Anne, [facility]     COLONOSCOPY N/A 2016    Procedure: COLONOSCOPY;  Surgeon: Jarret Zhao Jr., MD;  [facility];  Service: Endoscopy;  Laterality: N/A;    MOUTH SURGERY      x2    TONSILLECTOMY  2016    UPPER GASTROINTESTINAL ENDOSCOPY  2016    Dr. Zhoa       OB History    Para Term  AB Living   1             SAB TAB Ectopic Multiple Live Births                  # Outcome Date GA Lbr Beny/2nd Weight Sex Delivery Anes PTL Lv   1 Current                   Family History   Problem Relation Age of Onset    Fibromyalgia Mother     Depression Mother     Diabetes Father     Depression Father     Mental illness Father      bipolar disorder    Multiple sclerosis Maternal Aunt     Crohn's disease Maternal Grandmother     Colon cancer Neg Hx     Colon polyps Neg Hx     Ulcerative colitis Neg Hx     Stomach cancer Neg Hx     Esophageal cancer Neg Hx        Social History     Social History    Marital status:      Spouse name: N/A    Number of children: 0    Years of education: N/A     Occupational History    [employer]      Social History Main Topics    Smoking status: Former Smoker     Packs/day: 0.50     Types: Cigarettes    Smokeless tobacco: Never Used    Alcohol use No      Comment: social    Drug use: No    Sexual activity: Yes     Partners: Male     Other Topics Concern    None     Social History Narrative    None       Review of Symptoms:  GENERAL: Denies weight gain, weight loss, fatigue, and malaise.   SKIN: Denies rash or lesions.    HEAD: Denies head injury or headache.   NODES: Denies enlarged lymph nodes.   CHEST: Denies chest pain or shortness of breath.   CARDIOVASCULAR: Denies palpitations or left sided chest pain.   ABDOMEN: No abdominal pain, constipation, diarrhea, nausea, vomiting or rectal bleeding.   URINARY: No frequency, urgency, dysuria, or hematuria  MUSCULOSKELETAL: Denies joint pain or swelling.   NEUROLOGIC: Denies seizures.    Vitals:    02/05/18 1133   BP: (!) 140/80     Physical Exam:   APPEARANCE: Well nourished, well developed, in no acute distress.  SKIN: Normal skin turgor, no lesions.  NECK: Neck symmetric without masses or thyromegaly.  NODES: No inguinal, cervical, axillary or femoral lymph node enlargement.  CHEST: Lungs clear to auscultation.  CARDIOVASCULAR: Normal S1, S2. No rubs, murmurs or gallops.  ABDOMEN: Soft. No tenderness or masses. No hepatosplenomegaly. No hernias.  PELVIC: Normal external female genitalia without lesions. Normal hair distribution. Adequate perineal body, normal urethral meatus. Normal palpable bladder and urethra. Vagina moist and well rugated without lesions or discharge. Cervix pink and without lesions. Bimanual exam showed uterus normal size, position, mobile and nontender. Adnexa without masses or tenderness. PELVIS ADEQUATE  EXTREMITIES: NO clubbing cyanosis or edema      ULTRASOUND:   Transvaginal ultrasound performed in the usual fashion with 3.5mhz probe, showing viable becerril intrauterine pregnancy, crown-rump length =  13.4 mm with flicker,   consistent with   7,4       and EDC 9/20/18  No free fluid in cul-de-sac or adnexal pathology.    ASSESSMENT  Encounter Diagnoses   Name Primary?    Normal pregnancy in first trimester Yes    Absent menses     Unable to hear fetal heart tones as reason for ultrasound scan     SUE I (cervical intraepithelial neoplasia I)     Migraine without aura and with status migrainosus, not intractable        PLAN  Orders Placed This  Encounter   Procedures    TSH    CBC auto differential    RPR    Hepatitis B surface antigen    Rubella antibody, IgG    HIV-1 and HIV-2 antibodies    POCT Urine Pregnancy    Post Partum Type and Screen     Start prenatal vitamins  RETURN in 4 WEEKS

## 2018-02-06 LAB
BACTERIA UR CULT: NORMAL
BACTERIA UR CULT: NORMAL
HBV SURFACE AG SERPL QL IA: NEGATIVE
HIV 1+2 AB+HIV1 P24 AG SERPL QL IA: NEGATIVE
RPR SER QL: NORMAL
RUBV IGG SER-ACNC: 26.2 IU/ML
RUBV IGG SER-IMP: REACTIVE

## 2018-02-27 ENCOUNTER — TELEPHONE (OUTPATIENT)
Dept: OBSTETRICS AND GYNECOLOGY | Facility: CLINIC | Age: 23
End: 2018-02-27

## 2018-02-27 NOTE — TELEPHONE ENCOUNTER
----- Message from Maria D Abdullahi sent at 2/27/2018 11:06 AM CST -----  Contact: patient   Patient calling to reschedule appt. She is scheduled 3/20/18 but would like an appointment sooner. Her  would not be in town for the scheduled appt. No avail appt. Please advise.   Call back    Thanks!

## 2018-03-05 ENCOUNTER — ROUTINE PRENATAL (OUTPATIENT)
Dept: OBSTETRICS AND GYNECOLOGY | Facility: CLINIC | Age: 23
End: 2018-03-05
Payer: COMMERCIAL

## 2018-03-05 VITALS
WEIGHT: 144.19 LBS | SYSTOLIC BLOOD PRESSURE: 120 MMHG | BODY MASS INDEX: 21.92 KG/M2 | DIASTOLIC BLOOD PRESSURE: 68 MMHG

## 2018-03-05 DIAGNOSIS — Z34.91 NORMAL PREGNANCY IN FIRST TRIMESTER: Primary | ICD-10-CM

## 2018-03-05 DIAGNOSIS — Z3A.11 11 WEEKS GESTATION OF PREGNANCY: ICD-10-CM

## 2018-03-05 PROCEDURE — 0502F SUBSEQUENT PRENATAL CARE: CPT | Mod: S$GLB,,, | Performed by: OBSTETRICS & GYNECOLOGY

## 2018-03-05 PROCEDURE — 99999 PR PBB SHADOW E&M-EST. PATIENT-LVL III: CPT | Mod: PBBFAC,,, | Performed by: OBSTETRICS & GYNECOLOGY

## 2018-03-05 RX ORDER — DOXYLAMINE SUCCINATE AND PYRIDOXINE HYDROCHLORIDE, DELAYED RELEASE TABLETS 10 MG/10 MG 10; 10 MG/1; MG/1
TABLET, DELAYED RELEASE ORAL
Qty: 100 TABLET | Refills: 2 | Status: SHIPPED | OUTPATIENT
Start: 2018-03-05 | End: 2018-05-09

## 2018-03-05 NOTE — PROGRESS NOTES
Holly Nichols is a 22 y.o. female with Estimated Date of Delivery: 18   Obstetric History       T0      L0     SAB0   TAB0   Ectopic0   Multiple0   Live Births0         AT 11w4d  Here today on 3/5/2018 for   Chief Complaint   Patient presents with    Routine Prenatal Visit    Nausea    Vomiting       Current Outpatient Prescriptions   Medication Sig Dispense Refill    levalbuterol (XOPENEX) 1.25 mg/3 mL nebulizer solution Take 3 mLs (1.25 mg total) by nebulization 3 (three) times daily as needed for Wheezing. Rescue 75 mL 0    doxylamine-pyridoxine, vit B6, (DICLEGIS) 10-10 mg TbEC Two at bedtime, then one QAm and one after lunch 100 tablet 2    levalbuterol (XOPENEX HFA) 45 mcg/actuation inhaler Inhale 1-2 puffs into the lungs every 4 (four) hours as needed for Wheezing. 1 Inhaler 1    levocetirizine (XYZAL) 5 MG tablet Take 1 tablet (5 mg total) by mouth every evening. 30 tablet 11     Current Facility-Administered Medications   Medication Dose Route Frequency Provider Last Rate Last Dose    levalbuterol nebulizer solution 1.25 mg  1.25 mg Nebulization 1 time in Clinic/HOD Traci Joyce NP         Review of patient's allergies indicates:   Allergen Reactions    Toradol [ketorolac]     Tramadol     Penicillins Rash    Sulfa (sulfonamide antibiotics) Hives and Rash       Past Medical History:   Diagnosis Date    Allergy     Asthma     Constipation     Endometriosis     Fibromyalgia     Gastritis     HLA B27 (HLA B27 positive)     Migraine     Scoliosis     Smoker        Past Surgical History:   Procedure Laterality Date    ADENOIDECTOMY      APPENDECTOMY  2014    endometriosis    CHOLECYSTECTOMY  2017    [facility]LL[facility] Anne, [facility]     COLONOSCOPY N/A 2016    Procedure: COLONOSCOPY;  Surgeon: Jarret Zhao Jr., MD;  Location: [facility];  Service: Endoscopy;  Laterality: N/A;    MOUTH SURGERY      x2    TONSILLECTOMY  2016    UPPER GASTROINTESTINAL  ENDOSCOPY  2016    Dr. Zhao       OB History    Para Term  AB Living   1             SAB TAB Ectopic Multiple Live Births                  # Outcome Date GA Lbr Beny/2nd Weight Sex Delivery Anes PTL Lv   1 Current                   Social History     Social History    Marital status:      Spouse name: N/A    Number of children: 0    Years of education: N/A     Occupational History    hotel management      Social History Main Topics    Smoking status: Former Smoker     Packs/day: 0.50     Types: Cigarettes    Smokeless tobacco: Never Used    Alcohol use No      Comment: social    Drug use: No    Sexual activity: Yes     Partners: Male     Other Topics Concern    Not on file     Social History Narrative    No narrative on file       Vitals:    18 1034   BP: 120/68       Review of Symptoms:  GENERAL: Denies fatigue, and malaise.   SKIN: Denies rash or lesions.   HEAD: Denies head injury or headache.   NODES: Denies enlarged lymph nodes.   CHEST: Denies chest pain or shortness of breath.   CARDIOVASCULAR: Denies palpitations or left sided chest pain.   ABDOMEN: No abdominal pain, constipation, diarrhea, nausea, vomiting or rectal bleeding.   URINARY: No frequency, urgency, dysuria, or hematuria  MUSCULOSKELETAL: Denies joint pain or swelling.   NEUROLOGIC: Denies seizures.    O POS    ASSESSMENT    Encounter Diagnoses   Name Primary?    Normal pregnancy in first trimester Yes    11 weeks gestation of pregnancy        PLAN  1.  RTC     I have reviewed the blood pressure, urine results, fetal heart rate check, fundal height and agree.

## 2018-03-14 ENCOUNTER — NURSE TRIAGE (OUTPATIENT)
Dept: ADMINISTRATIVE | Facility: CLINIC | Age: 23
End: 2018-03-14

## 2018-03-14 NOTE — TELEPHONE ENCOUNTER
"    Reason for Disposition   Dizziness, lightheadedness, or weakness    Protocols used: ST HEART RATE AND HEARTBEAT [id number]    Pt is 12 weeks pregnant- states that heart rate has been as high at 140 today and then drops into the low 60s. Pt reports that she feels dizzy and "about to pass out " when this happens. Denies symptoms at this time but was feeling this way prior to call. Advised ER  "

## 2018-03-29 ENCOUNTER — ROUTINE PRENATAL (OUTPATIENT)
Dept: OBSTETRICS AND GYNECOLOGY | Facility: CLINIC | Age: 23
End: 2018-03-29
Payer: COMMERCIAL

## 2018-03-29 VITALS
DIASTOLIC BLOOD PRESSURE: 72 MMHG | SYSTOLIC BLOOD PRESSURE: 108 MMHG | WEIGHT: 146.63 LBS | BODY MASS INDEX: 22.29 KG/M2

## 2018-03-29 DIAGNOSIS — Z3A.15 15 WEEKS GESTATION OF PREGNANCY: Primary | ICD-10-CM

## 2018-03-29 LAB
BILIRUB SERPL-MCNC: NORMAL MG/DL
BLOOD URINE, POC: NORMAL
COLOR, POC UA: NORMAL
GLUCOSE UR QL STRIP: NORMAL
KETONES UR QL STRIP: NORMAL
LEUKOCYTE ESTERASE URINE, POC: NORMAL
NITRITE, POC UA: NORMAL
PH, POC UA: 6
PROTEIN, POC: NORMAL
SPECIFIC GRAVITY, POC UA: NORMAL
UROBILINOGEN, POC UA: NORMAL

## 2018-03-29 PROCEDURE — 0502F SUBSEQUENT PRENATAL CARE: CPT | Mod: S$GLB,,, | Performed by: OBSTETRICS & GYNECOLOGY

## 2018-03-29 PROCEDURE — 99999 PR PBB SHADOW E&M-EST. PATIENT-LVL II: CPT | Mod: PBBFAC,,, | Performed by: OBSTETRICS & GYNECOLOGY

## 2018-03-29 RX ORDER — PYRIDOXINE HCL (VITAMIN B6) 25 MG
TABLET ORAL
Qty: 60 TABLET | Refills: 1 | Status: ON HOLD | COMMUNITY
Start: 2018-03-29 | End: 2018-09-07

## 2018-03-29 NOTE — PROGRESS NOTES
Holly Nichols is a 22 y.o. female with Estimated Date of Delivery: 18   Obstetric History       T0      L0     SAB0   TAB0   Ectopic0   Multiple0   Live Births0         AT 15w0d  Here today on 3/29/2018 for No chief complaint on file.      Current Outpatient Prescriptions   Medication Sig Dispense Refill    levalbuterol (XOPENEX HFA) 45 mcg/actuation inhaler Inhale 1-2 puffs into the lungs every 4 (four) hours as needed for Wheezing. 1 Inhaler 1    levalbuterol (XOPENEX) 1.25 mg/3 mL nebulizer solution Take 3 mLs (1.25 mg total) by nebulization 3 (three) times daily as needed for Wheezing. Rescue 75 mL 0    doxylamine-pyridoxine, vit B6, (DICLEGIS) 10-10 mg TbEC Two at bedtime, then one QAm and one after lunch 100 tablet 2    pyridoxine, vitamin B6, (B-6) 25 MG Tab 1/2 tablet 4 times daily 60 tablet 1     Current Facility-Administered Medications   Medication Dose Route Frequency Provider Last Rate Last Dose    levalbuterol nebulizer solution 1.25 mg  1.25 mg Nebulization 1 time in Clinic/HOD Traci Joyce NP         Review of patient's allergies indicates:   Allergen Reactions    Toradol [ketorolac]     Tramadol     Penicillins Rash    Sulfa (sulfonamide antibiotics) Hives and Rash       Past Medical History:   Diagnosis Date    Allergy     Asthma     Constipation     Endometriosis     Fibromyalgia     Gastritis     HLA B27 (HLA B27 positive)     Migraine     Scoliosis     Smoker        Past Surgical History:   Procedure Laterality Date    ADENOIDECTOMY      APPENDECTOMY  2014    endometriosis    [facility]LECYSTECTOMY  2017    [facility], [facility]     COLONOSCOPY N/A 2016    Procedure: COLONOSCOPY;  Surgeon: Jarret Zhao Jr., MD;  Location[facility];  Service: Endoscopy;  Laterality: N/A;    MOUTH SURGERY      x2    TONSILLECTOMY  2016    UPPER GASTROINTESTINAL ENDOSCOPY  2016    Dr. Zhao       OB History    Para Term  AB Living    1             SAB TAB Ectopic Multiple Live Births                  # Outcome Date GA Lbr Beny/2nd Weight Sex Delivery Anes PTL Lv   1 Current                   Social History     Social History    Marital status:      Spouse name: N/A    Number of children: 0    Years of education: N/A     Occupational History    hotel management      Social History Main Topics    Smoking status: Former Smoker     Packs/day: 0.50     Types: Cigarettes    Smokeless tobacco: Never Used    Alcohol use No      Comment: social    Drug use: No    Sexual activity: Yes     Partners: Male     Other Topics Concern    Not on file     Social History Narrative    No narrative on file       Vitals:    03/29/18 0835   BP: 108/72       Review of Symptoms:  GENERAL: Denies fatigue, and malaise.   SKIN: Denies rash or lesions.   HEAD: Denies head injury or headache.   NODES: Denies enlarged lymph nodes.   CHEST: Denies chest pain or shortness of breath.   CARDIOVASCULAR: Denies palpitations or left sided chest pain.   ABDOMEN: No abdominal pain, constipation, diarrhea, nausea, vomiting or rectal bleeding.   URINARY: No frequency, urgency, dysuria, or hematuria  MUSCULOSKELETAL: Denies joint pain or swelling.   NEUROLOGIC: Denies seizures.    O POS    ASSESSMENT    Encounter Diagnoses   Name Primary?    15 weeks gestation of pregnancy Yes       PLAN  1.  RTC 4 weeks with ultrasound  2.  Add diclegis   3. Add laxatives    I have reviewed the blood pressure, urine results, fetal heart rate check, fundal height and agree.

## 2018-05-07 ENCOUNTER — HOSPITAL ENCOUNTER (OUTPATIENT)
Dept: RADIOLOGY | Facility: HOSPITAL | Age: 23
Discharge: HOME | End: 2018-05-07
Attending: OBSTETRICS & GYNECOLOGY
Payer: COMMERCIAL

## 2018-05-07 ENCOUNTER — TELEPHONE (OUTPATIENT)
Dept: OBSTETRICS AND GYNECOLOGY | Facility: CLINIC | Age: 23
End: 2018-05-07

## 2018-05-07 ENCOUNTER — ROUTINE PRENATAL (OUTPATIENT)
Dept: OBSTETRICS AND GYNECOLOGY | Facility: CLINIC | Age: 23
End: 2018-05-07
Payer: COMMERCIAL

## 2018-05-07 VITALS — SYSTOLIC BLOOD PRESSURE: 115 MMHG | WEIGHT: 155.88 LBS | BODY MASS INDEX: 23.7 KG/M2 | DIASTOLIC BLOOD PRESSURE: 69 MMHG

## 2018-05-07 DIAGNOSIS — Z3A.15 15 WEEKS GESTATION OF PREGNANCY: ICD-10-CM

## 2018-05-07 DIAGNOSIS — R00.2 PALPITATION: ICD-10-CM

## 2018-05-07 DIAGNOSIS — Z3A.20 20 WEEKS GESTATION OF PREGNANCY: ICD-10-CM

## 2018-05-07 DIAGNOSIS — Z34.92 NORMAL PREGNANCY IN SECOND TRIMESTER: Primary | ICD-10-CM

## 2018-05-07 LAB
BILIRUB SERPL-MCNC: NORMAL MG/DL
BLOOD URINE, POC: NORMAL
COLOR, POC UA: YELLOW
GLUCOSE UR QL STRIP: NORMAL
KETONES UR QL STRIP: NORMAL
LEUKOCYTE ESTERASE URINE, POC: NORMAL
NITRITE, POC UA: NORMAL
PH, POC UA: 7
PROTEIN, POC: NORMAL
SPECIFIC GRAVITY, POC UA: NORMAL
UROBILINOGEN, POC UA: NORMAL

## 2018-05-07 PROCEDURE — 99999 PR PBB SHADOW E&M-EST. PATIENT-LVL III: CPT | Mod: PBBFAC,,, | Performed by: OBSTETRICS & GYNECOLOGY

## 2018-05-07 PROCEDURE — 76805 OB US >/= 14 WKS SNGL FETUS: CPT | Mod: TC,PN

## 2018-05-07 PROCEDURE — 0502F SUBSEQUENT PRENATAL CARE: CPT | Mod: S$GLB,,, | Performed by: OBSTETRICS & GYNECOLOGY

## 2018-05-07 PROCEDURE — 76805 OB US >/= 14 WKS SNGL FETUS: CPT | Mod: 26,,, | Performed by: RADIOLOGY

## 2018-05-07 NOTE — PROGRESS NOTES
Holly Nichols is a 22 y.o. female with Estimated Date of Delivery: 18   Obstetric History       T0      L0     SAB0   TAB0   Ectopic0   Multiple0   Live Births0         AT 20w4d  Here today on 2018 for   Chief Complaint   Patient presents with    Routine Prenatal Visit       Current Outpatient Prescriptions   Medication Sig Dispense Refill    doxylamine-pyridoxine, vit B6, (DICLEGIS) 10-10 mg TbEC Two at bedtime, then one QAm and one after lunch 100 tablet 2    levalbuterol (XOPENEX HFA) 45 mcg/actuation inhaler Inhale 1-2 puffs into the lungs every 4 (four) hours as needed for Wheezing. 1 Inhaler 1    levalbuterol (XOPENEX) 1.25 mg/3 mL nebulizer solution Take 3 mLs (1.25 mg total) by nebulization 3 (three) times daily as needed for Wheezing. Rescue 75 mL 0    pyridoxine, vitamin B6, (B-6) 25 MG Tab 1/2 tablet 4 times daily 60 tablet 1     Current Facility-Administered Medications   Medication Dose Route Frequency Provider Last Rate Last Dose    levalbuterol nebulizer solution 1.25 mg  1.25 mg Nebulization 1 time in Clinic/HOD Traci Joyce NP         Review of patient's allergies indicates:   Allergen Reactions    Toradol [ketorolac]     Tramadol     Penicillins Rash    Sulfa (sulfonamide antibiotics) Hives and Rash       Past Medical History:   Diagnosis Date    Allergy     Asthma     Constipation     Endometriosis     Fibromyalgia     Gastritis     HLA B27 (HLA B27 positive)     Migraine     Scoliosis     Smoker        Past Surgical History:   Procedure Laterality Date    ADENOIDECTOMY      APPENDECTOMY  2014    endometriosis    [facility]    [facility]     COLONOSCOPY N/A 2016    Procedure: COLONOSCOPY;  Surgeon: Jarret Zhao Jr., MD;  Location: [facility];  Service: Endoscopy;  Laterality: N/A;    MOUTH SURGERY      x2    TONSILLECTOMY  2016    UPPER GASTROINTESTINAL ENDOSCOPY  2016    Dr. Zhao       OB  History    Para Term  AB Living   1             SAB TAB Ectopic Multiple Live Births                  # Outcome Date GA Lbr Beny/2nd Weight Sex Delivery Anes PTL Lv   1 Current                   Social History     Social History    Marital status:      Spouse name: N/A    Number of children: 0    Years of education: N/A     Occupational History    hotel management      Social History Main Topics    Smoking status: Former Smoker     Packs/day: 0.50     Types: Cigarettes    Smokeless tobacco: Never Used    Alcohol use No      Comment: social    Drug use: No    Sexual activity: Yes     Partners: Male     Other Topics Concern    None     Social History Narrative    None       Vitals:    18 0924   BP: 115/69       Review of Symptoms:  GENERAL: Denies fatigue, and malaise.   SKIN: Denies rash or lesions.   HEAD: Denies head injury or headache.   NODES: Denies enlarged lymph nodes.   CHEST: Denies chest pain or shortness of breath.   CARDIOVASCULAR: Denies palpitations or left sided chest pain.   ABDOMEN: No abdominal pain, constipation, diarrhea, nausea, vomiting or rectal bleeding.   URINARY: No frequency, urgency, dysuria, or hematuria  MUSCULOSKELETAL: Denies joint pain or swelling.   NEUROLOGIC: Denies seizures.    O POS    ASSESSMENT    Encounter Diagnoses   Name Primary?    Normal pregnancy in second trimester Yes    20 weeks gestation of pregnancy     Palpitation        PLAN  1.  RTC 4 weeks     I have reviewed the blood pressure, urine results, fetal heart rate check, fundal height and agree.

## 2018-05-07 NOTE — TELEPHONE ENCOUNTER
Pt is currently 20 weeks pregnant. Dr. Easley would like her to see Dr. Oropeza for consult for palpitations. The referral is in, can you help with getting this patient scheduled?

## 2018-05-07 NOTE — TELEPHONE ENCOUNTER
----- Message from Maria D Abdullahi sent at 5/7/2018  1:35 PM CDT -----  Contact: patient   Patient returning a missed call. Please advise. Call to pod. Call connected. Warm transferred. Thanks!

## 2018-05-08 ENCOUNTER — TELEPHONE (OUTPATIENT)
Dept: NEUROLOGY | Facility: CLINIC | Age: 23
End: 2018-05-08

## 2018-05-08 NOTE — TELEPHONE ENCOUNTER
"----- Message from Haylie Eason sent at 5/8/2018 11:01 AM CDT -----  Contact: Patient herself      Name of Who is Calling: MILDRED BANGURA [[id number]]      What is the request in detail:  Patient called requesting a call. Says, "she has questions and concerns as this pertains to an office visit because she's pregnant."   Please do so at your earliest convenience.     THANKS!      Can the clinic reply by [facility]: No      What Number to Call Back if not in [facility]:  [phone]                                    "

## 2018-05-09 ENCOUNTER — OFFICE VISIT (OUTPATIENT)
Dept: CARDIOLOGY | Facility: CLINIC | Age: 23
End: 2018-05-09
Payer: COMMERCIAL

## 2018-05-09 VITALS
BODY MASS INDEX: 23.36 KG/M2 | WEIGHT: 154.13 LBS | SYSTOLIC BLOOD PRESSURE: 112 MMHG | HEART RATE: 81 BPM | HEIGHT: 68 IN | DIASTOLIC BLOOD PRESSURE: 71 MMHG

## 2018-05-09 DIAGNOSIS — R00.2 PALPITATIONS: Primary | ICD-10-CM

## 2018-05-09 PROCEDURE — 99999 PR PBB SHADOW E&M-EST. PATIENT-LVL III: CPT | Mod: PBBFAC,,, | Performed by: INTERNAL MEDICINE

## 2018-05-09 PROCEDURE — 3008F BODY MASS INDEX DOCD: CPT | Mod: CPTII,S$GLB,, | Performed by: INTERNAL MEDICINE

## 2018-05-09 PROCEDURE — 99204 OFFICE O/P NEW MOD 45 MIN: CPT | Mod: S$GLB,,, | Performed by: INTERNAL MEDICINE

## 2018-05-09 NOTE — PROGRESS NOTES
Subjective:    Patient ID:  Holly Nichols is a 22 y.o. female who presents for evaluation of Palpitations (elevated bp - 21 weeks pregnant)      Pt is 21 weeks pregnant and has been having palpitations and short episodes of tachycardia. Has not had this prior to pregnancy. Some tines associated with lightheadedness. No syncope.         Review of Systems   Constitution: Negative for weight gain and weight loss.   HENT: Negative.    Eyes: Negative.    Cardiovascular: Positive for irregular heartbeat and palpitations. Negative for chest pain, claudication, cyanosis, dyspnea on exertion, leg swelling, near-syncope, orthopnea (no PND) and syncope.   Respiratory: Negative for cough, hemoptysis, shortness of breath and snoring.    Endocrine: Negative.    Skin: Negative.    Musculoskeletal: Negative for joint pain, muscle cramps, muscle weakness and myalgias.   Gastrointestinal: Negative for diarrhea, hematemesis, nausea and vomiting.   Genitourinary: Negative.    Neurological: Negative for dizziness, focal weakness, light-headedness, loss of balance, numbness, paresthesias and seizures.   Psychiatric/Behavioral: Negative.         Objective:    Physical Exam   Constitutional: She is oriented to person, place, and time. She appears well-developed and well-nourished.   Eyes: Pupils are equal, round, and reactive to light.   Neck: Normal range of motion. No thyromegaly present.   Cardiovascular: Normal rate, regular rhythm, S1 normal, S2 normal, normal heart sounds, intact distal pulses and normal pulses.   No extrasystoles are present. PMI is not displaced.  Exam reveals no friction rub.    No murmur heard.  Pulmonary/Chest: Effort normal and breath sounds normal. She has no wheezes. She has no rales. She exhibits no tenderness.   Abdominal: Soft. Bowel sounds are normal. She exhibits no distension and no mass. There is no tenderness.   Musculoskeletal: Normal range of motion. She exhibits no edema.   Neurological: She is  alert and oriented to person, place, and time.   Skin: Skin is warm and dry.   Vitals reviewed.      Test(s) Reviewed  I have reviewed the following in detail:  [] Stress test   [] Angiography   [] Echocardiogram   [x] Labs   [x] Other:  ECG       Assessment:       1. Palpitations         Plan:       Her description of some of the palpitations possibly c/w SVT others sound like premature beats.  Am hesitant to start a med w/o evidence of an actual sustained arrhythmia  Will place Holter and if longer monitoring needed, an event monitor  Echo

## 2018-05-09 NOTE — LETTER
May 9, 2018      Ana María Easley MD  [address] [facility]  [address]  [facility] [place] [address]           [facility]  [address] [facility] [address] [place] [address]  Phone: [phone]          Patient: Holly Nichols   MR Number: [id number]   YOB: 1995   Date of Visit: 5/9/2018       Dear Dr. Ana María Easley:    Thank you for referring Holly Nichols to me for evaluation. Attached you will find relevant portions of my assessment and plan of care.    If you have questions, please do not hesitate to call me. I look forward to following Holly Nichols along with you.    Sincerely,    Forest Oropeza MD    Enclosure  CC:  No Recipients    If you would like to receive this communication electronically, please contact [email or web address] or [phone] to request more information on [facility] access.    For providers and/or their staff who would like to refer a patient to [facility], please contact us through our one-stop-shop provider referral line, Vishal Candelario, at [phone].    If you feel you have received this communication in error or would no longer like to receive these types of communications, please e-mail [email or web address]

## 2018-05-10 ENCOUNTER — CLINICAL SUPPORT (OUTPATIENT)
Dept: CARDIOLOGY | Facility: CLINIC | Age: 23
End: 2018-05-10
Attending: INTERNAL MEDICINE
Payer: COMMERCIAL

## 2018-05-10 DIAGNOSIS — R00.2 PALPITATIONS: ICD-10-CM

## 2018-05-10 PROCEDURE — 93224 XTRNL ECG REC UP TO 48 HRS: CPT | Mod: S$GLB,,, | Performed by: INTERNAL MEDICINE

## 2018-06-03 NOTE — TELEPHONE ENCOUNTER
----- Message from Aidan Gallagher sent at 6/20/2017 12:25 PM CDT -----  Contact: Patient  States that her asthma is really acting up and taking the treatments every 6 hours and she needs a refill for the Rescue Inhaler.  She states that she still has the wheezing and her lungs are hurting her, in a lot of pain and she may need to come in for an injection.  Can you please call the patient back at [phone].  Thank you      [facility] [address] [place] [address] [id number] [place] [address] AT [facility] [address]  [address] [place] [address]  Phone: [phone] Fax: [phone]       Statement Selected

## 2018-06-04 ENCOUNTER — TELEPHONE (OUTPATIENT)
Dept: OBSTETRICS AND GYNECOLOGY | Facility: CLINIC | Age: 23
End: 2018-06-04

## 2018-06-04 ENCOUNTER — LAB VISIT (OUTPATIENT)
Dept: LAB | Facility: HOSPITAL | Age: 23
End: 2018-06-04
Attending: OBSTETRICS & GYNECOLOGY
Payer: COMMERCIAL

## 2018-06-04 ENCOUNTER — ROUTINE PRENATAL (OUTPATIENT)
Dept: OBSTETRICS AND GYNECOLOGY | Facility: CLINIC | Age: 23
End: 2018-06-04
Payer: COMMERCIAL

## 2018-06-04 VITALS
BODY MASS INDEX: 24.67 KG/M2 | SYSTOLIC BLOOD PRESSURE: 104 MMHG | WEIGHT: 162.25 LBS | DIASTOLIC BLOOD PRESSURE: 68 MMHG

## 2018-06-04 DIAGNOSIS — R00.2 PALPITATION: ICD-10-CM

## 2018-06-04 DIAGNOSIS — Z3A.24 24 WEEKS GESTATION OF PREGNANCY: Primary | ICD-10-CM

## 2018-06-04 DIAGNOSIS — Z34.92 NORMAL PREGNANCY IN SECOND TRIMESTER: ICD-10-CM

## 2018-06-04 LAB
BASOPHILS # BLD AUTO: 0.04 K/UL
BASOPHILS NFR BLD: 0.4 %
BILIRUB SERPL-MCNC: NEGATIVE MG/DL
BLOOD URINE, POC: NEGATIVE
COLOR, POC UA: NORMAL
DIFFERENTIAL METHOD: ABNORMAL
EOSINOPHIL # BLD AUTO: 0.1 K/UL
EOSINOPHIL NFR BLD: 1.2 %
ERYTHROCYTE [DISTWIDTH] IN BLOOD BY AUTOMATED COUNT: 13.4 %
GLUCOSE SERPL-MCNC: 141 MG/DL
GLUCOSE UR QL STRIP: 2
HCT VFR BLD AUTO: 40.6 %
HGB BLD-MCNC: 13.3 G/DL
IMM GRANULOCYTES # BLD AUTO: 0.3 K/UL
IMM GRANULOCYTES NFR BLD AUTO: 3 %
KETONES UR QL STRIP: NEGATIVE
LEUKOCYTE ESTERASE URINE, POC: NEGATIVE
LYMPHOCYTES # BLD AUTO: 2.1 K/UL
LYMPHOCYTES NFR BLD: 21.2 %
MCH RBC QN AUTO: 31.2 PG
MCHC RBC AUTO-ENTMCNC: 32.8 G/DL
MCV RBC AUTO: 95 FL
MONOCYTES # BLD AUTO: 0.5 K/UL
MONOCYTES NFR BLD: 5.1 %
NEUTROPHILS # BLD AUTO: 6.9 K/UL
NEUTROPHILS NFR BLD: 69.1 %
NITRITE, POC UA: NEGATIVE
NRBC BLD-RTO: 0 /100 WBC
PH, POC UA: 7
PLATELET # BLD AUTO: 161 K/UL
PMV BLD AUTO: 11.9 FL
PROTEIN, POC: NORMAL
RBC # BLD AUTO: 4.26 M/UL
SPECIFIC GRAVITY, POC UA: NORMAL
UROBILINOGEN, POC UA: NEGATIVE
WBC # BLD AUTO: 10 K/UL

## 2018-06-04 PROCEDURE — 36415 COLL VENOUS BLD VENIPUNCTURE: CPT | Mod: PO

## 2018-06-04 PROCEDURE — 99999 PR PBB SHADOW E&M-EST. PATIENT-LVL III: CPT | Mod: PBBFAC,,, | Performed by: OBSTETRICS & GYNECOLOGY

## 2018-06-04 PROCEDURE — 0502F SUBSEQUENT PRENATAL CARE: CPT | Mod: S$GLB,,, | Performed by: OBSTETRICS & GYNECOLOGY

## 2018-06-04 PROCEDURE — 82950 GLUCOSE TEST: CPT

## 2018-06-04 PROCEDURE — 85025 COMPLETE CBC W/AUTO DIFF WBC: CPT

## 2018-06-04 NOTE — TELEPHONE ENCOUNTER
----- Message from Lilia Schulte sent at 6/4/2018  8:51 AM CDT -----  Contact: Pt  Name of Who is Calling: Holly      What is the request in detail: Pt is calling requesting to speak with a nurse in regards to being at another appointment and is going to be there until 9:47, so she's going to be late to appointment.      Can the clinic reply by [facility]: yes      What Number to Call Back if not in [facility]: [phone] (home)

## 2018-06-04 NOTE — PROGRESS NOTES
Holly Nichols is a 22 y.o. female with Estimated Date of Delivery: 18   Obstetric History       T0      L0     SAB0   TAB0   Ectopic0   Multiple0   Live Births0         AT 24w4d  Here today on 2018 for   Chief Complaint   Patient presents with    Routine Prenatal Visit       Current Outpatient Prescriptions   Medication Sig Dispense Refill    levalbuterol (XOPENEX HFA) 45 mcg/actuation inhaler Inhale 1-2 puffs into the lungs every 4 (four) hours as needed for Wheezing. 1 Inhaler 1    levalbuterol (XOPENEX) 1.25 mg/3 mL nebulizer solution Take 3 mLs (1.25 mg total) by nebulization 3 (three) times daily as needed for Wheezing. Rescue 75 mL 0    pyridoxine, vitamin B6, (B-6) 25 MG Tab 1/2 tablet 4 times daily 60 tablet 1     Current Facility-Administered Medications   Medication Dose Route Frequency Provider Last Rate Last Dose    levalbuterol nebulizer solution 1.25 mg  1.25 mg Nebulization 1 time in Clinic/HOD Traci Joyce NP         Review of patient's allergies indicates:   Allergen Reactions    Toradol [ketorolac]     Tramadol     Penicillins Rash    Sulfa (sulfonamide antibiotics) Hives and Rash       Past Medical History:   Diagnosis Date    Allergy     Asthma     Constipation     Endometriosis     Fibromyalgia     Gastritis     HLA B27 (HLA B27 positive)     Migraine     Scoliosis     Smoker        Past Surgical History:   Procedure Laterality Date    ADENOIDECTOMY      APPENDECTOMY  2014    endometriosis    CHOLECYSTECTOMY  2017    [facility]LL[facility], [facility]     COLONOSCOPY N/A 2016    Procedure: COLONOSCOPY;  Surgeon: Jarret Zhao Jr., MD;  Location: [facility];  Service: Endoscopy;  Laterality: N/A;    MOUTH SURGERY      x2    TONSILLECTOMY  2016    UPPER GASTROINTESTINAL ENDOSCOPY  2016    Dr. Zhao       OB History    Para Term  AB Living   1             SAB TAB Ectopic Multiple Live Births                  # Outcome  Date GA Lbr Beny/2nd Weight Sex Delivery Anes PTL Lv   1 Current                   Social History     Social History    Marital status:      Spouse name: N/A    Number of children: 0    Years of education: N/A     Occupational History    hotel management      Social History Main Topics    Smoking status: Former Smoker     Packs/day: 0.50     Types: Cigarettes    Smokeless tobacco: Never Used    Alcohol use No      Comment: social    Drug use: No    Sexual activity: Yes     Partners: Male     Other Topics Concern    None     Social History Narrative    None       Vitals:    06/04/18 1013   BP: 104/68       Review of Symptoms:  GENERAL: Denies fatigue, and malaise.   SKIN: Denies rash or lesions.   HEAD: Denies head injury or headache.   NODES: Denies enlarged lymph nodes.   CHEST: Denies chest pain or shortness of breath.   CARDIOVASCULAR: Denies palpitations or left sided chest pain.   ABDOMEN: No abdominal pain, constipation, diarrhea, nausea, vomiting or rectal bleeding.   URINARY: No frequency, urgency, dysuria, or hematuria  MUSCULOSKELETAL: Denies joint pain or swelling.   NEUROLOGIC: Denies seizures.    O POS    ASSESSMENT    Encounter Diagnoses   Name Primary?    24 weeks gestation of pregnancy Yes       PLAN  1.  RTC 4 weeks     I have reviewed the blood pressure, urine results, fetal heart rate check, fundal height and agree.

## 2018-06-05 DIAGNOSIS — R73.09 ABNORMAL GTT (GLUCOSE TOLERANCE TEST): Primary | ICD-10-CM

## 2018-06-06 ENCOUNTER — TELEPHONE (OUTPATIENT)
Dept: OBSTETRICS AND GYNECOLOGY | Facility: CLINIC | Age: 23
End: 2018-06-06

## 2018-06-06 DIAGNOSIS — O99.810 ABNORMAL GLUCOSE TOLERANCE AFFECTING PREGNANCY, ANTEPARTUM: Primary | ICD-10-CM

## 2018-06-06 NOTE — TELEPHONE ENCOUNTER
----- Message from Toño LAGUNA Frisard sent at 6/6/2018  2:09 PM CDT -----  Contact: same  Patient called in and stated she wanted to let office know to please schedule her Glucose Tolerance test for Thursday 6/14/18 around 8:30am.  This is not a test we can schedule.    Patient stated a message can be left on her voicemail once date & time is confirmed at [phone]

## 2018-06-14 ENCOUNTER — LAB VISIT (OUTPATIENT)
Dept: LAB | Facility: HOSPITAL | Age: 23
End: 2018-06-14
Attending: OBSTETRICS & GYNECOLOGY
Payer: COMMERCIAL

## 2018-06-14 DIAGNOSIS — O99.810 ABNORMAL GLUCOSE TOLERANCE AFFECTING PREGNANCY, ANTEPARTUM: ICD-10-CM

## 2018-06-14 LAB
GLUCOSE SERPL-MCNC: 135 MG/DL
GLUCOSE SERPL-MCNC: 75 MG/DL
GLUCOSE SERPL-MCNC: 79 MG/DL
GLUCOSE SERPL-MCNC: 95 MG/DL

## 2018-06-14 PROCEDURE — 36415 COLL VENOUS BLD VENIPUNCTURE: CPT | Mod: PO

## 2018-06-14 PROCEDURE — 82951 GLUCOSE TOLERANCE TEST (GTT): CPT

## 2018-06-14 PROCEDURE — 82952 GTT-ADDED SAMPLES: CPT

## 2018-06-21 ENCOUNTER — TELEPHONE (OUTPATIENT)
Dept: NEUROLOGY | Facility: CLINIC | Age: 23
End: 2018-06-21

## 2018-07-02 ENCOUNTER — ROUTINE PRENATAL (OUTPATIENT)
Dept: OBSTETRICS AND GYNECOLOGY | Facility: CLINIC | Age: 23
End: 2018-07-02
Payer: COMMERCIAL

## 2018-07-02 VITALS
DIASTOLIC BLOOD PRESSURE: 72 MMHG | WEIGHT: 167.31 LBS | SYSTOLIC BLOOD PRESSURE: 120 MMHG | BODY MASS INDEX: 25.44 KG/M2

## 2018-07-02 DIAGNOSIS — Z3A.28 28 WEEKS GESTATION OF PREGNANCY: ICD-10-CM

## 2018-07-02 DIAGNOSIS — Z34.93 NORMAL PREGNANCY IN THIRD TRIMESTER: Primary | ICD-10-CM

## 2018-07-02 PROCEDURE — 0502F SUBSEQUENT PRENATAL CARE: CPT | Mod: S$GLB,,, | Performed by: OBSTETRICS & GYNECOLOGY

## 2018-07-02 PROCEDURE — 99999 PR PBB SHADOW E&M-EST. PATIENT-LVL II: CPT | Mod: PBBFAC,,, | Performed by: OBSTETRICS & GYNECOLOGY

## 2018-07-02 NOTE — PROGRESS NOTES
Holly Nichols is a 22 y.o. female with Estimated Date of Delivery: 18   Obstetric History       T0      L0     SAB0   TAB0   Ectopic0   Multiple0   Live Births0         AT 28w4d  Here today on 2018 for   Chief Complaint   Patient presents with    Routine Prenatal Visit    Back Pain     pt states severe constant back pain increasing over last 2 weeks, radiating to left groin and vaginal area, no relief, tylenol not helping       Current Outpatient Prescriptions   Medication Sig Dispense Refill    levalbuterol (XOPENEX HFA) 45 mcg/actuation inhaler Inhale 1-2 puffs into the lungs every 4 (four) hours as needed for Wheezing. 1 Inhaler 1    levalbuterol (XOPENEX) 1.25 mg/3 mL nebulizer solution Take 3 mLs (1.25 mg total) by nebulization 3 (three) times daily as needed for Wheezing. Rescue 75 mL 0    pyridoxine, vitamin B6, (B-6) 25 MG Tab 1/2 tablet 4 times daily 60 tablet 1     Current Facility-Administered Medications   Medication Dose Route Frequency Provider Last Rate Last Dose    levalbuterol nebulizer solution 1.25 mg  1.25 mg Nebulization 1 time in Clinic/HOD Traci Joyce NP         Review of patient's allergies indicates:   Allergen Reactions    Toradol [ketorolac]     Tramadol     Penicillins Rash    Sulfa (sulfonamide antibiotics) Hives and Rash       Past Medical History:   Diagnosis Date    Allergy     Asthma     Constipation     Endometriosis     Fibromyalgia     Gastritis     HLA B27 (HLA B27 positive)     Migraine     Scoliosis     Smoker        Past Surgical History:   Procedure Laterality Date    ADENOIDECTOMY      APPENDECTOMY  2014    endometriosis    CHOLECYSTECTOMY  2017    [facility]LL[facility], [facility]     COLONOSCOPY N/A 2016    Procedure: COLONOSCOPY;  Surgeon: Jarret Zhao Jr., MD;  Location: [facility];  Service: Endoscopy;  Laterality: N/A;    MOUTH SURGERY      x2    TONSILLECTOMY  2016    UPPER GASTROINTESTINAL ENDOSCOPY   2016    Dr. Zhao       OB History    Para Term  AB Living   1             SAB TAB Ectopic Multiple Live Births                  # Outcome Date GA Lbr Beny/2nd Weight Sex Delivery Anes PTL Lv   1 Current                   Social History     Social History    Marital status:      Spouse name: N/A    Number of children: 0    Years of education: N/A     Occupational History    hotel management      Social History Main Topics    Smoking status: Former Smoker     Packs/day: 0.50     Types: Cigarettes    Smokeless tobacco: Never Used    Alcohol use No      Comment: social    Drug use: No    Sexual activity: Yes     Partners: Male     Other Topics Concern    Not on file     Social History Narrative    No narrative on file       Vitals:    18 1032   BP: 120/72       Review of Symptoms:  GENERAL: Denies fatigue, and malaise.   SKIN: Denies rash or lesions.   HEAD: Denies head injury or headache.   NODES: Denies enlarged lymph nodes.   CHEST: Denies chest pain or shortness of breath.   CARDIOVASCULAR: Denies palpitations or left sided chest pain.   ABDOMEN: No abdominal pain, constipation, diarrhea, nausea, vomiting or rectal bleeding.   URINARY: No frequency, urgency, dysuria, or hematuria  MUSCULOSKELETAL: Denies joint pain or swelling.   NEUROLOGIC: Denies seizures.    O POS    ASSESSMENT    Encounter Diagnoses   Name Primary?    Normal pregnancy in third trimester Yes    28 weeks gestation of pregnancy        PLAN  1.  RTC 3 weeks     I have reviewed the blood pressure, urine results, fetal heart rate check, fundal height and agree.

## 2018-07-20 ENCOUNTER — ROUTINE PRENATAL (OUTPATIENT)
Dept: OBSTETRICS AND GYNECOLOGY | Facility: CLINIC | Age: 23
End: 2018-07-20
Payer: COMMERCIAL

## 2018-07-20 VITALS
BODY MASS INDEX: 26.01 KG/M2 | WEIGHT: 171.06 LBS | SYSTOLIC BLOOD PRESSURE: 118 MMHG | DIASTOLIC BLOOD PRESSURE: 72 MMHG

## 2018-07-20 DIAGNOSIS — Z3A.31 31 WEEKS GESTATION OF PREGNANCY: ICD-10-CM

## 2018-07-20 DIAGNOSIS — Z34.93 NORMAL PREGNANCY IN THIRD TRIMESTER: Primary | ICD-10-CM

## 2018-07-20 PROCEDURE — 99999 PR PBB SHADOW E&M-EST. PATIENT-LVL II: CPT | Mod: PBBFAC,,, | Performed by: OBSTETRICS & GYNECOLOGY

## 2018-07-20 PROCEDURE — 0502F SUBSEQUENT PRENATAL CARE: CPT | Mod: S$GLB,,, | Performed by: OBSTETRICS & GYNECOLOGY

## 2018-07-20 NOTE — PROGRESS NOTES
Holly Nichols is a 22 y.o. female with Estimated Date of Delivery: 18   Obstetric History       T0      L0     SAB0   TAB0   Ectopic0   Multiple0   Live Births0         AT 31w1d  Here today on 2018 for   Chief Complaint   Patient presents with    Routine Prenatal Visit       Current Outpatient Prescriptions   Medication Sig Dispense Refill    levalbuterol (XOPENEX HFA) 45 mcg/actuation inhaler Inhale 1-2 puffs into the lungs every 4 (four) hours as needed for Wheezing. 1 Inhaler 1    levalbuterol (XOPENEX) 1.25 mg/3 mL nebulizer solution Take 3 mLs (1.25 mg total) by nebulization 3 (three) times daily as needed for Wheezing. Rescue 75 mL 0    pyridoxine, vitamin B6, (B-6) 25 MG Tab 1/2 tablet 4 times daily 60 tablet 1     Current Facility-Administered Medications   Medication Dose Route Frequency Provider Last Rate Last Dose    levalbuterol nebulizer solution 1.25 mg  1.25 mg Nebulization 1 time in Clinic/HOD Traci Joyce NP         Review of patient's allergies indicates:   Allergen Reactions    Toradol [ketorolac]     Tramadol     Penicillins Rash    Sulfa (sulfonamide antibiotics) Hives and Rash       Past Medical History:   Diagnosis Date    Allergy     Asthma     Constipation     Endometriosis     Fibromyalgia     Gastritis     HLA B27 (HLA B27 positive)     Migraine     Scoliosis     Smoker        Past Surgical History:   Procedure Laterality Date    ADENOIDECTOMY      APPENDECTOMY  2014    endometriosis    CHOLECYSTECTOMY  2017    [facility]LL[facility], [facility]     COLONOSCOPY N/A 2016    Procedure: COLONOSCOPY;  Surgeon: Jarret Zhao Jr., MD;  Location: [facility];  Service: Endoscopy;  Laterality: N/A;    MOUTH SURGERY      x2    TONSILLECTOMY  2016    UPPER GASTROINTESTINAL ENDOSCOPY  2016    Dr. Zhao       OB History    Para Term  AB Living   1             SAB TAB Ectopic Multiple Live Births                  # Outcome  Date GA Lbr Beny/2nd Weight Sex Delivery Anes PTL Lv   1 Current                   Social History     Social History    Marital status:      Spouse name: N/A    Number of children: 0    Years of education: N/A     Occupational History    hotel management      Social History Main Topics    Smoking status: Former Smoker     Packs/day: 0.50     Types: Cigarettes    Smokeless tobacco: Never Used    Alcohol use No      Comment: social    Drug use: No    Sexual activity: Yes     Partners: Male     Other Topics Concern    Not on file     Social History Narrative    No narrative on file       Vitals:    07/20/18 1519   BP: 118/72       Review of Symptoms:  GENERAL: Denies fatigue, and malaise.   SKIN: Denies rash or lesions.   HEAD: Denies head injury or headache.   NODES: Denies enlarged lymph nodes.   CHEST: Denies chest pain or shortness of breath.   CARDIOVASCULAR: Denies palpitations or left sided chest pain.   ABDOMEN: No abdominal pain, constipation, diarrhea, nausea, vomiting or rectal bleeding.   URINARY: No frequency, urgency, dysuria, or hematuria  MUSCULOSKELETAL: Denies joint pain or swelling.   NEUROLOGIC: Denies seizures.    O POS    ASSESSMENT    Encounter Diagnoses   Name Primary?    Normal pregnancy in third trimester Yes    31 weeks gestation of pregnancy        PLAN  1.  RTC 3 weeks     I have reviewed the blood pressure, urine results, fetal heart rate check, fundal height and agree.

## 2018-08-08 ENCOUNTER — OFFICE VISIT (OUTPATIENT)
Dept: FAMILY MEDICINE | Facility: CLINIC | Age: 23
End: 2018-08-08
Payer: COMMERCIAL

## 2018-08-08 VITALS
RESPIRATION RATE: 20 BRPM | DIASTOLIC BLOOD PRESSURE: 70 MMHG | TEMPERATURE: 98 F | SYSTOLIC BLOOD PRESSURE: 118 MMHG | WEIGHT: 173.75 LBS | HEART RATE: 115 BPM | BODY MASS INDEX: 26.33 KG/M2 | HEIGHT: 68 IN | OXYGEN SATURATION: 97 %

## 2018-08-08 DIAGNOSIS — J45.901 MODERATE ASTHMA WITH ACUTE EXACERBATION, UNSPECIFIED WHETHER PERSISTENT: ICD-10-CM

## 2018-08-08 DIAGNOSIS — J01.01 ACUTE RECURRENT MAXILLARY SINUSITIS: ICD-10-CM

## 2018-08-08 DIAGNOSIS — R05.9 COUGH: Primary | ICD-10-CM

## 2018-08-08 DIAGNOSIS — J45.21 MILD INTERMITTENT ASTHMA WITH ACUTE EXACERBATION: ICD-10-CM

## 2018-08-08 DIAGNOSIS — R06.2 WHEEZING: ICD-10-CM

## 2018-08-08 PROCEDURE — 3008F BODY MASS INDEX DOCD: CPT | Mod: CPTII,S$GLB,, | Performed by: NURSE PRACTITIONER

## 2018-08-08 PROCEDURE — 99214 OFFICE O/P EST MOD 30 MIN: CPT | Mod: 25,S$GLB,, | Performed by: NURSE PRACTITIONER

## 2018-08-08 PROCEDURE — 94640 AIRWAY INHALATION TREATMENT: CPT | Mod: S$GLB,,, | Performed by: NURSE PRACTITIONER

## 2018-08-08 PROCEDURE — 99999 PR PBB SHADOW E&M-EST. PATIENT-LVL III: CPT | Mod: PBBFAC,,, | Performed by: NURSE PRACTITIONER

## 2018-08-08 RX ORDER — LEVALBUTEROL 1.25 MG/.5ML
1.25 SOLUTION, CONCENTRATE RESPIRATORY (INHALATION)
Status: DISCONTINUED | OUTPATIENT
Start: 2018-08-08 | End: 2018-08-08

## 2018-08-08 RX ORDER — LEVALBUTEROL TARTRATE 45 UG/1
1-2 AEROSOL, METERED ORAL EVERY 4 HOURS PRN
Qty: 1 INHALER | Refills: 1 | Status: SHIPPED | OUTPATIENT
Start: 2018-08-08 | End: 2018-12-20 | Stop reason: SDUPTHER

## 2018-08-08 RX ORDER — CEFUROXIME AXETIL 500 MG/1
500 TABLET ORAL 2 TIMES DAILY
Qty: 14 TABLET | Refills: 0 | Status: ON HOLD | OUTPATIENT
Start: 2018-08-08 | End: 2018-09-07

## 2018-08-08 RX ORDER — LEVALBUTEROL INHALATION SOLUTION 1.25 MG/3ML
1 SOLUTION RESPIRATORY (INHALATION) 3 TIMES DAILY PRN
Qty: 75 ML | Refills: 0 | Status: SHIPPED | OUTPATIENT
Start: 2018-08-08 | End: 2018-12-20 | Stop reason: SDUPTHER

## 2018-08-08 RX ORDER — LEVALBUTEROL INHALATION SOLUTION 1.25 MG/3ML
1.25 SOLUTION RESPIRATORY (INHALATION)
Status: COMPLETED | OUTPATIENT
Start: 2018-08-08 | End: 2018-08-08

## 2018-08-08 RX ADMIN — LEVALBUTEROL INHALATION SOLUTION 1.25 MG: 1.25 SOLUTION RESPIRATORY (INHALATION) at 03:08

## 2018-08-08 NOTE — PROGRESS NOTES
Subjective:       Patient ID: Holly Nichols is a 23 y.o. female.    Chief Complaint: Nasal Congestion; Flank Pain; and Asthma    Cough   This is a new problem. The current episode started in the past 7 days. The problem has been gradually worsening. The problem occurs constantly. The cough is non-productive. Associated symptoms include nasal congestion, postnasal drip, rhinorrhea, a sore throat and wheezing. Pertinent negatives include no chest pain, chills, ear congestion, ear pain, fever, headaches, heartburn, hemoptysis, myalgias, rash, shortness of breath, sweats or weight loss.     Vitals:    08/08/18 1159   BP: 118/70   Pulse: (!) 115   Resp: 20   Temp: 97.9 °F (36.6 °C)     Review of Systems   Constitutional: Positive for fatigue. Negative for chills, diaphoresis, fever, unexpected weight change and weight loss.   HENT: Positive for congestion, postnasal drip, rhinorrhea, sinus pain, sinus pressure and sore throat. Negative for ear pain.    Eyes: Negative.    Respiratory: Positive for cough and wheezing. Negative for hemoptysis and shortness of breath.    Cardiovascular: Negative.  Negative for chest pain.   Gastrointestinal: Negative.  Negative for abdominal pain, diarrhea, heartburn and nausea.   Endocrine: Negative.    Genitourinary: Negative.  Negative for dysuria and hematuria.   Musculoskeletal: Negative.  Negative for myalgias.   Skin: Negative.  Negative for color change and rash.   Allergic/Immunologic: Negative.    Neurological: Negative for speech difficulty, numbness and headaches.   Hematological: Negative.    Psychiatric/Behavioral: Negative.        Past Medical History:   Diagnosis Date    Allergy     Asthma     Constipation     Endometriosis     Fibromyalgia     Gastritis     HLA B27 (HLA B27 positive)     Migraine     Scoliosis     Smoker      Objective:      Physical Exam   Constitutional: She is oriented to person, place, and time. She appears well-developed and  well-nourished.   HENT:   Head: Normocephalic and atraumatic.   Right Ear: Hearing, tympanic membrane and ear canal normal.   Left Ear: Hearing and ear canal normal. Tympanic membrane is erythematous. A middle ear effusion is present.   Nose: Mucosal edema and rhinorrhea present. Right sinus exhibits maxillary sinus tenderness. Right sinus exhibits no frontal sinus tenderness. Left sinus exhibits maxillary sinus tenderness. Left sinus exhibits no frontal sinus tenderness.   Mouth/Throat: Uvula is midline. Posterior oropharyngeal erythema present.   Eyes: Conjunctivae and EOM are normal. Pupils are equal, round, and reactive to light.   Neck: Neck supple.   Cardiovascular: Normal rate, regular rhythm, normal heart sounds and intact distal pulses.    Pulmonary/Chest: Effort normal. She has wheezes. She has rhonchi.   Abdominal: Soft. Bowel sounds are normal.   Musculoskeletal: Normal range of motion.   Neurological: She is alert and oriented to person, place, and time.   Skin: Skin is warm and dry.   Psychiatric: She has a normal mood and affect. Her behavior is normal. Judgment and thought content normal.   Nursing note and vitals reviewed.      Assessment:       1. Cough    2. Moderate asthma with acute exacerbation, unspecified whether persistent    3. Acute recurrent maxillary sinusitis    4. Wheezing    5. Mild intermittent asthma with acute exacerbation        Plan:       Cough  -     cefUROXime (CEFTIN) 500 MG tablet; Take 1 tablet (500 mg total) by mouth 2 (two) times daily.  Dispense: 14 tablet; Refill: 0    Moderate asthma with acute exacerbation, unspecified whether persistent  -     cefUROXime (CEFTIN) 500 MG tablet; Take 1 tablet (500 mg total) by mouth 2 (two) times daily.  Dispense: 14 tablet; Refill: 0  -     Acute recurrent maxillary sinusitis    Wheezing  -     levalbuterol (XOPENEX) 1.25 mg/3 mL nebulizer solution; Take 3 mLs (1.25 mg total) by nebulization 3 (three) times daily as needed for  Wheezing. Rescue  Dispense: 75 mL; Refill: 0  -     levalbuterol nebulizer solution 1.25 mg; Take 3 mLs (1.25 mg total) by nebulization one time.    Mild intermittent asthma with acute exacerbation  -     levalbuterol (XOPENEX HFA) 45 mcg/actuation inhaler; Inhale 1-2 puffs into the lungs every 4 (four) hours as needed for Wheezing.  Dispense: 1 Inhaler; Refill: 1        discussed xopenex TID until coughing stops   Reviewed meds that can be taken with pregnancy   FU if not better

## 2018-08-13 ENCOUNTER — ROUTINE PRENATAL (OUTPATIENT)
Dept: OBSTETRICS AND GYNECOLOGY | Facility: CLINIC | Age: 23
End: 2018-08-13
Payer: COMMERCIAL

## 2018-08-13 ENCOUNTER — HOSPITAL ENCOUNTER (OUTPATIENT)
Dept: RADIOLOGY | Facility: HOSPITAL | Age: 23
Discharge: HOME | End: 2018-08-13
Attending: OBSTETRICS & GYNECOLOGY
Payer: COMMERCIAL

## 2018-08-13 VITALS — DIASTOLIC BLOOD PRESSURE: 68 MMHG | SYSTOLIC BLOOD PRESSURE: 106 MMHG | BODY MASS INDEX: 26.92 KG/M2 | WEIGHT: 177 LBS

## 2018-08-13 DIAGNOSIS — Z3A.28 28 WEEKS GESTATION OF PREGNANCY: ICD-10-CM

## 2018-08-13 DIAGNOSIS — Z34.93 NORMAL PREGNANCY IN THIRD TRIMESTER: ICD-10-CM

## 2018-08-13 DIAGNOSIS — Z3A.34 34 WEEKS GESTATION OF PREGNANCY: Primary | ICD-10-CM

## 2018-08-13 LAB
BILIRUB SERPL-MCNC: NEGATIVE MG/DL
BLOOD URINE, POC: NEGATIVE
COLOR, POC UA: NORMAL
GLUCOSE UR QL STRIP: 3
KETONES UR QL STRIP: NEGATIVE
LEUKOCYTE ESTERASE URINE, POC: NEGATIVE
NITRITE, POC UA: NEGATIVE
PH, POC UA: 7
PROTEIN, POC: NEGATIVE
SPECIFIC GRAVITY, POC UA: NORMAL
UROBILINOGEN, POC UA: NEGATIVE

## 2018-08-13 PROCEDURE — 0502F SUBSEQUENT PRENATAL CARE: CPT | Mod: S$GLB,,, | Performed by: OBSTETRICS & GYNECOLOGY

## 2018-08-13 PROCEDURE — 99999 PR PBB SHADOW E&M-EST. PATIENT-LVL II: CPT | Mod: PBBFAC,,, | Performed by: OBSTETRICS & GYNECOLOGY

## 2018-08-13 PROCEDURE — 76816 OB US FOLLOW-UP PER FETUS: CPT | Mod: TC,PN

## 2018-08-13 PROCEDURE — 76816 OB US FOLLOW-UP PER FETUS: CPT | Mod: 26,,, | Performed by: RADIOLOGY

## 2018-08-13 NOTE — PROGRESS NOTES
Holly Nichols is a 23 y.o. female with Estimated Date of Delivery: 18   Obstetric History       T0      L0     SAB0   TAB0   Ectopic0   Multiple0   Live Births0         AT 34w4d  Here today on 2018 for   Chief Complaint   Patient presents with    Routine Prenatal Visit       Current Outpatient Medications   Medication Sig Dispense Refill    cefUROXime (CEFTIN) 500 MG tablet Take 1 tablet (500 mg total) by mouth 2 (two) times daily. 14 tablet 0    levalbuterol (XOPENEX HFA) 45 mcg/actuation inhaler Inhale 1-2 puffs into the lungs every 4 (four) hours as needed for Wheezing. 1 Inhaler 1    levalbuterol (XOPENEX) 1.25 mg/3 mL nebulizer solution Take 3 mLs (1.25 mg total) by nebulization 3 (three) times daily as needed for Wheezing. Rescue 75 mL 0    pyridoxine, vitamin B6, (B-6) 25 MG Tab 1/2 tablet 4 times daily 60 tablet 1     No current facility-administered medications for this visit.      Review of patient's allergies indicates:   Allergen Reactions    Toradol [ketorolac]     Tramadol     Penicillins Rash    Sulfa (sulfonamide antibiotics) Hives and Rash       Past Medical History:   Diagnosis Date    Allergy     Asthma     Constipation     Endometriosis     Fibromyalgia     Gastritis     HLA B27 (HLA B27 positive)     Migraine     Scoliosis     Smoker        Past Surgical History:   Procedure Laterality Date    ADENOIDECTOMY      APPENDECTOMY  2014    endometriosis    CHOLECYSTECTOMY  2017    Dr. MAR[facility]LLA Anne, [facility]     MOUTH SURGERY      x2    TONSILLECTOMY  2016    UPPER GASTROINTESTINAL ENDOSCOPY  2016    Dr. Zhao       OB History    Para Term  AB Living   1             SAB TAB Ectopic Multiple Live Births                  # Outcome Date GA Lbr Beny/2nd Weight Sex Delivery Anes PTL Lv   1 Current                   Social History     Socioeconomic History    Marital status:      Spouse name: Not on file    Number of children: 0     Years of education: Not on file    Highest education level: Not on file   Social Needs    Financial resource strain: Not on file    Food insecurity - worry: Not on file    Food insecurity - inability: Not on file    Transportation needs - medical: Not on file    Transportation needs - non-medical: Not on file   Occupational History    Occupation: [employer] management   Tobacco Use    Smoking status: Former Smoker     Packs/day: 0.50     Types: Cigarettes    Smokeless tobacco: Never Used   Substance and Sexual Activity    Alcohol use: No     Alcohol/week: 0.0 oz     Comment: social    Drug use: No    Sexual activity: Yes     Partners: Male   Other Topics Concern    Not on file   Social History Narrative    Not on file       Vitals:    08/13/18 1051   BP: 106/68       Review of Symptoms:  GENERAL: Denies fatigue, and malaise.   SKIN: Denies rash or lesions.   HEAD: Denies head injury or headache.   NODES: Denies enlarged lymph nodes.   CHEST: Denies chest pain or shortness of breath.   CARDIOVASCULAR: Denies palpitations or left sided chest pain.   ABDOMEN: No abdominal pain, constipation, diarrhea, nausea, vomiting or rectal bleeding.   URINARY: No frequency, urgency, dysuria, or hematuria  MUSCULOSKELETAL: Denies joint pain or swelling.   NEUROLOGIC: Denies seizures.    O POS    ASSESSMENT    Encounter Diagnoses   Name Primary?    34 weeks gestation of pregnancy Yes       PLAN  1.  RTC 1 weeks     I have reviewed the blood pressure, urine results, fetal heart rate check, fundal height and agree.

## 2018-08-20 ENCOUNTER — TELEPHONE (OUTPATIENT)
Dept: OBSTETRICS AND GYNECOLOGY | Facility: CLINIC | Age: 23
End: 2018-08-20

## 2018-08-20 PROBLEM — O26.899 ABDOMINAL CRAMPING AFFECTING PREGNANCY: Status: ACTIVE | Noted: 2018-08-20

## 2018-08-20 PROBLEM — R10.9 ABDOMINAL CRAMPING AFFECTING PREGNANCY: Status: ACTIVE | Noted: 2018-08-20

## 2018-08-20 NOTE — TELEPHONE ENCOUNTER
Spoke with patient she states she is having very sharp pains in her side and her pulse is currently 180. I notified that she needs to go to L&D to be checked out she verbalized understanding with no further questions.

## 2018-08-22 PROBLEM — N89.8 VAGINAL DISCHARGE DURING PREGNANCY: Status: ACTIVE | Noted: 2018-08-22

## 2018-08-22 PROBLEM — O26.899 VAGINAL DISCHARGE DURING PREGNANCY: Status: ACTIVE | Noted: 2018-08-22

## 2018-08-23 ENCOUNTER — TELEPHONE (OUTPATIENT)
Dept: OBSTETRICS AND GYNECOLOGY | Facility: CLINIC | Age: 23
End: 2018-08-23

## 2018-08-23 NOTE — TELEPHONE ENCOUNTER
----- Message from Shonna Lin sent at 8/23/2018  1:01 PM CDT -----  Contact: pt  Pt calling states that she is not going to be able to come in on 8/31 cause have to stay at work,would like to come in earlier the week cause she has maternity papers that needs to be filled out before 8/30,please call her back at ..[phone] (home)

## 2018-08-27 ENCOUNTER — HOSPITAL ENCOUNTER (OUTPATIENT)
Dept: RADIOLOGY | Facility: HOSPITAL | Age: 23
Discharge: HOME | End: 2018-08-27
Attending: OBSTETRICS & GYNECOLOGY
Payer: COMMERCIAL

## 2018-08-27 ENCOUNTER — ROUTINE PRENATAL (OUTPATIENT)
Dept: OBSTETRICS AND GYNECOLOGY | Facility: CLINIC | Age: 23
End: 2018-08-27
Payer: COMMERCIAL

## 2018-08-27 VITALS
SYSTOLIC BLOOD PRESSURE: 124 MMHG | WEIGHT: 180.75 LBS | BODY MASS INDEX: 27.49 KG/M2 | DIASTOLIC BLOOD PRESSURE: 82 MMHG

## 2018-08-27 DIAGNOSIS — Z34.93 NORMAL PREGNANCY IN THIRD TRIMESTER: Primary | ICD-10-CM

## 2018-08-27 DIAGNOSIS — O36.8130 DECREASED FETAL MOVEMENTS IN THIRD TRIMESTER, SINGLE OR UNSPECIFIED FETUS: ICD-10-CM

## 2018-08-27 DIAGNOSIS — Z3A.36 36 WEEKS GESTATION OF PREGNANCY: ICD-10-CM

## 2018-08-27 LAB
BILIRUB SERPL-MCNC: NORMAL MG/DL
BLOOD URINE, POC: NORMAL
COLOR, POC UA: NORMAL
GLUCOSE UR QL STRIP: 2
KETONES UR QL STRIP: NORMAL
LEUKOCYTE ESTERASE URINE, POC: NORMAL
NITRITE, POC UA: NORMAL
PH, POC UA: 5
PROTEIN, POC: NORMAL
SPECIFIC GRAVITY, POC UA: NORMAL
UROBILINOGEN, POC UA: NORMAL

## 2018-08-27 PROCEDURE — 0502F SUBSEQUENT PRENATAL CARE: CPT | Mod: S$GLB,,, | Performed by: OBSTETRICS & GYNECOLOGY

## 2018-08-27 PROCEDURE — 76819 FETAL BIOPHYS PROFIL W/O NST: CPT | Mod: TC,PN

## 2018-08-27 PROCEDURE — 76819 FETAL BIOPHYS PROFIL W/O NST: CPT | Mod: 26,,, | Performed by: RADIOLOGY

## 2018-08-27 PROCEDURE — 99999 PR PBB SHADOW E&M-EST. PATIENT-LVL III: CPT | Mod: PBBFAC,,, | Performed by: OBSTETRICS & GYNECOLOGY

## 2018-08-27 PROCEDURE — 87081 CULTURE SCREEN ONLY: CPT

## 2018-08-27 NOTE — PROGRESS NOTES
Holly Nichols is a 23 y.o. female with Estimated Date of Delivery: 18   Obstetric History       T0      L0     SAB0   TAB0   Ectopic0   Multiple0   Live Births0         AT 36w4d  Here today on 2018 for   Chief Complaint   Patient presents with    Routine Prenatal Visit     36 weeks 4 days gestation        Current Outpatient Medications   Medication Sig Dispense Refill    cefUROXime (CEFTIN) 500 MG tablet Take 1 tablet (500 mg total) by mouth 2 (two) times daily. 14 tablet 0    levalbuterol (XOPENEX HFA) 45 mcg/actuation inhaler Inhale 1-2 puffs into the lungs every 4 (four) hours as needed for Wheezing. 1 Inhaler 1    levalbuterol (XOPENEX) 1.25 mg/3 mL nebulizer solution Take 3 mLs (1.25 mg total) by nebulization 3 (three) times daily as needed for Wheezing. Rescue 75 mL 0    prenatal 25/iron fum/folic/dha (PRENATAL-1 ORAL) Take 1 tablet by mouth once daily.      pyridoxine, vitamin B6, (B-6) 25 MG Tab 1/2 tablet 4 times daily 60 tablet 1     No current facility-administered medications for this visit.      Review of patient's allergies indicates:   Allergen Reactions    Toradol [ketorolac]     Tramadol     Penicillins Rash    Sulfa (sulfonamide antibiotics) Hives and Rash       Past Medical History:   Diagnosis Date    Allergy     Asthma     Constipation     Endometriosis     Fibromyalgia     Gastritis     HLA B27 (HLA B27 positive)     Migraine     Scoliosis     Smoker        Past Surgical History:   Procedure Laterality Date    ADENOIDECTOMY      APPENDECTOMY  2014    endometriosis    [facility]    [facility], [facility]     MOUTH SURGERY      x2    TONSILLECTOMY  2016    UPPER GASTROINTESTINAL ENDOSCOPY  2016    Dr. Zhao       OB History    Para Term  AB Living   1             SAB TAB Ectopic Multiple Live Births                  # Outcome Date GA Lbr Beny/2nd Weight Sex Delivery Anes PTL Lv   1 Current                    Social History     Socioeconomic History    Marital status:      Spouse name: None    Number of children: 0    Years of education: None    Highest education level: None   Social Needs    Financial resource strain: None    Food insecurity - worry: None    Food insecurity - inability: None    Transportation needs - medical: None    Transportation needs - non-medical: None   Occupational History    Occupation: [employer] management   Tobacco Use    Smoking status: Former Smoker     Packs/day: 0.50     Types: Cigarettes    Smokeless tobacco: Never Used   Substance and Sexual Activity    Alcohol use: No     Alcohol/week: 0.0 oz     Comment: social    Drug use: No    Sexual activity: Yes     Partners: Male   Other Topics Concern    None   Social History Narrative    None       Vitals:    08/27/18 1021   BP: 124/82       Review of Symptoms:  GENERAL: Denies fatigue, and malaise.   SKIN: Denies rash or lesions.   HEAD: Denies head injury or headache.   NODES: Denies enlarged lymph nodes.   CHEST: Denies chest pain or shortness of breath.   CARDIOVASCULAR: Denies palpitations or left sided chest pain.   ABDOMEN: No abdominal pain, constipation, diarrhea, nausea, vomiting or rectal bleeding.   URINARY: No frequency, urgency, dysuria, or hematuria  MUSCULOSKELETAL: Denies joint pain or swelling.   NEUROLOGIC: Denies seizures.    O POS    ASSESSMENT    Encounter Diagnoses   Name Primary?    Normal pregnancy in third trimester Yes    36 weeks gestation of pregnancy     Decreased fetal movements in third trimester, single or unspecified fetus        PLAN  1.  RTC 1 weeks     I have reviewed the blood pressure, urine results, fetal heart rate check, fundal height and agree.

## 2018-08-28 ENCOUNTER — TELEPHONE (OUTPATIENT)
Dept: OBSTETRICS AND GYNECOLOGY | Facility: CLINIC | Age: 23
End: 2018-08-28

## 2018-08-28 NOTE — TELEPHONE ENCOUNTER
----- Message from Carly Chacon sent at 8/28/2018  2:48 PM CDT -----  Contact: patient  Type: Needs Medical Advice    Who Called:  Patient  Symptoms (please be specific):    How long has patient had these symptoms:    Pharmacy name and phone #:    Best Call Back Number: [phone]  Additional Information: requesting documents that was left yesterday to be fax [phone]

## 2018-08-29 ENCOUNTER — TELEPHONE (OUTPATIENT)
Dept: OBSTETRICS AND GYNECOLOGY | Facility: CLINIC | Age: 23
End: 2018-08-29

## 2018-08-29 NOTE — TELEPHONE ENCOUNTER
----- Message from Brooke Kaye sent at 8/29/2018  4:12 PM CDT -----  Contact: pt  Pt is requesting to speak with a nurse to get her ultrasound results  Please call pt to advise  Call Back   Thanks

## 2018-08-29 NOTE — TELEPHONE ENCOUNTER
Please review [facility]PP patient read report and wants to know if she should be concerned about fetal gallbladder sludge seen.

## 2018-08-30 LAB — BACTERIA SPEC AEROBE CULT: NORMAL

## 2018-09-04 ENCOUNTER — ROUTINE PRENATAL (OUTPATIENT)
Dept: OBSTETRICS AND GYNECOLOGY | Facility: CLINIC | Age: 23
End: 2018-09-04
Payer: COMMERCIAL

## 2018-09-04 VITALS — WEIGHT: 181 LBS | DIASTOLIC BLOOD PRESSURE: 80 MMHG | SYSTOLIC BLOOD PRESSURE: 108 MMHG | BODY MASS INDEX: 27.52 KG/M2

## 2018-09-04 DIAGNOSIS — O36.8130 DECREASED FETAL MOVEMENTS IN THIRD TRIMESTER, SINGLE OR UNSPECIFIED FETUS: ICD-10-CM

## 2018-09-04 DIAGNOSIS — Z34.93 NORMAL PREGNANCY IN THIRD TRIMESTER: ICD-10-CM

## 2018-09-04 DIAGNOSIS — Z3A.38 38 WEEKS GESTATION OF PREGNANCY: Primary | ICD-10-CM

## 2018-09-04 PROCEDURE — 0502F SUBSEQUENT PRENATAL CARE: CPT | Mod: S$GLB,,, | Performed by: OBSTETRICS & GYNECOLOGY

## 2018-09-04 PROCEDURE — 59025 FETAL NON-STRESS TEST: CPT | Mod: S$GLB,,, | Performed by: OBSTETRICS & GYNECOLOGY

## 2018-09-04 PROCEDURE — 99999 PR PBB SHADOW E&M-EST. PATIENT-LVL I: CPT | Mod: PBBFAC,,, | Performed by: OBSTETRICS & GYNECOLOGY

## 2018-09-04 NOTE — PROGRESS NOTES
Holly Nichols is a 23 y.o. female with Estimated Date of Delivery: 18   Obstetric History       T0      L0     SAB0   TAB0   Ectopic0   Multiple0   Live Births0         AT 37w5d  Here today on 2018 for No chief complaint on file.      Current Outpatient Medications   Medication Sig Dispense Refill    cefUROXime (CEFTIN) 500 MG tablet Take 1 tablet (500 mg total) by mouth 2 (two) times daily. 14 tablet 0    levalbuterol (XOPENEX HFA) 45 mcg/actuation inhaler Inhale 1-2 puffs into the lungs every 4 (four) hours as needed for Wheezing. 1 Inhaler 1    levalbuterol (XOPENEX) 1.25 mg/3 mL nebulizer solution Take 3 mLs (1.25 mg total) by nebulization 3 (three) times daily as needed for Wheezing. Rescue 75 mL 0    prenatal 25/iron fum/folic/dha (PRENATAL-1 ORAL) Take 1 tablet by mouth once daily.      pyridoxine, vitamin B6, (B-6) 25 MG Tab 1/2 tablet 4 times daily 60 tablet 1     No current facility-administered medications for this visit.      Review of patient's allergies indicates:   Allergen Reactions    Toradol [ketorolac]     Tramadol     Penicillins Rash    Sulfa (sulfonamide antibiotics) Hives and Rash       Past Medical History:   Diagnosis Date    Allergy     Asthma     Constipation     Endometriosis     Fibromyalgia     Gastritis     HLA B27 (HLA B27 positive)     Migraine     Scoliosis     Smoker        Past Surgical History:   Procedure Laterality Date    ADENOIDECTOMY      APPENDECTOMY  2014    endometriosis    CHOLECY[facility]OM[facility]  2017    [facility], [facility]     MOUTH SURGERY      x2    TONSILLECTOMY  2016    UPPER GASTROINTESTINAL ENDOSCOPY  2016    Dr. Zhao       OB History    Para Term  AB Living   1             SAB TAB Ectopic Multiple Live Births                  # Outcome Date GA Lbr Beny/2nd Weight Sex Delivery Anes PTL Lv   1 Current                   Social History     Socioeconomic History    Marital status:       Spouse name: Not on file    Number of children: 0    Years of education: Not on file    Highest education level: Not on file   Social Needs    Financial resource strain: Not on file    Food insecurity - worry: Not on file    Food insecurity - inability: Not on file    Transportation needs - medical: Not on file    Transportation needs - non-medical: Not on file   Occupational History    Occupation: [employer] management   Tobacco Use    Smoking status: Former Smoker     Packs/day: 0.50     Types: Cigarettes    Smokeless tobacco: Never Used   Substance and Sexual Activity    Alcohol use: No     Alcohol/week: 0.0 oz     Comment: social    Drug use: No    Sexual activity: Yes     Partners: Male   Other Topics Concern    Not on file   Social History Narrative    Not on file       Vitals:    09/04/18 1348   BP: 108/80       Review of Symptoms:  GENERAL: Denies fatigue, and malaise.   SKIN: Denies rash or lesions.   HEAD: Denies head injury or headache.   NODES: Denies enlarged lymph nodes.   CHEST: Denies chest pain or shortness of breath.   CARDIOVASCULAR: Denies palpitations or left sided chest pain.   ABDOMEN: No abdominal pain, constipation, diarrhea, nausea, vomiting or rectal bleeding.   URINARY: No frequency, urgency, dysuria, or hematuria  MUSCULOSKELETAL: Denies joint pain or swelling.   NEUROLOGIC: Denies seizures.    140 moderate variability with accels    O POS    ASSESSMENT    Encounter Diagnoses   Name Primary?    38 weeks gestation of pregnancy Yes    Decreased fetal movements in third trimester, single or unspecified fetus     Normal pregnancy in third trimester        PLAN  1.  RTC plan for follow-up end of week  2. reassurring fetal testing today    I have reviewed the blood pressure, urine results, fetal heart rate check, fundal height and agree.

## 2018-09-06 ENCOUNTER — TELEPHONE (OUTPATIENT)
Dept: OBSTETRICS AND GYNECOLOGY | Facility: CLINIC | Age: 23
End: 2018-09-06

## 2018-09-06 NOTE — TELEPHONE ENCOUNTER
Patient states she has had vaginal spotting since last night and intense back pain that comes in waves that are about 10 minutes apart, reports +FHM. Advised pt to go to L&D she verb understanding.

## 2018-09-06 NOTE — TELEPHONE ENCOUNTER
----- Message from Merari Sutton sent at 9/6/2018  9:04 AM CDT -----  Contact: self   Patient is having intense back pain and would like to speak with a nurse, please call back at [phone] (home)

## 2018-09-07 ENCOUNTER — ROUTINE PRENATAL (OUTPATIENT)
Dept: OBSTETRICS AND GYNECOLOGY | Facility: CLINIC | Age: 23
End: 2018-09-07
Payer: COMMERCIAL

## 2018-09-07 VITALS
BODY MASS INDEX: 27.92 KG/M2 | SYSTOLIC BLOOD PRESSURE: 118 MMHG | DIASTOLIC BLOOD PRESSURE: 78 MMHG | WEIGHT: 183.63 LBS

## 2018-09-07 DIAGNOSIS — Z3A.38 38 WEEKS GESTATION OF PREGNANCY: Primary | ICD-10-CM

## 2018-09-07 PROBLEM — O47.9 UTERINE CONTRACTIONS DURING PREGNANCY: Status: ACTIVE | Noted: 2018-09-07

## 2018-09-07 PROCEDURE — 99999 PR PBB SHADOW E&M-EST. PATIENT-LVL III: CPT | Mod: PBBFAC,,, | Performed by: OBSTETRICS & GYNECOLOGY

## 2018-09-07 PROCEDURE — 0502F SUBSEQUENT PRENATAL CARE: CPT | Mod: S$GLB,,, | Performed by: OBSTETRICS & GYNECOLOGY

## 2018-09-07 NOTE — PROGRESS NOTES
Holly Nichols is a 23 y.o. female with Estimated Date of Delivery: 18   Obstetric History       T0      L0     SAB0   TAB0   Ectopic0   Multiple0   Live Births0         AT 38w1d  Here today on 2018 for   Chief Complaint   Patient presents with    Routine Prenatal Visit    Went to L&D  yesterday       Current Outpatient Medications   Medication Sig Dispense Refill    cefUROXime (CEFTIN) 500 MG tablet Take 1 tablet (500 mg total) by mouth 2 (two) times daily. 14 tablet 0    levalbuterol (XOPENEX HFA) 45 mcg/actuation inhaler Inhale 1-2 puffs into the lungs every 4 (four) hours as needed for Wheezing. 1 Inhaler 1    levalbuterol (XOPENEX) 1.25 mg/3 mL nebulizer solution Take 3 mLs (1.25 mg total) by nebulization 3 (three) times daily as needed for Wheezing. Rescue 75 mL 0    prenatal 25/iron fum/folic/dha (PRENATAL-1 ORAL) Take 1 tablet by mouth once daily.      pyridoxine, vitamin B6, (B-6) 25 MG Tab 1/2 tablet 4 times daily 60 tablet 1     No current facility-administered medications for this visit.      Review of patient's allergies indicates:   Allergen Reactions    Toradol [ketorolac]     Tramadol     Penicillins Rash    Sulfa (sulfonamide antibiotics) Hives and Rash       Past Medical History:   Diagnosis Date    Allergy     Asthma     Constipation     Endometriosis     Fibromyalgia     Gastritis     HLA B27 (HLA B27 positive)     Migraine     Scoliosis     Smoker        Past Surgical History:   Procedure Laterality Date    ADENOIDECTOMY      APPENDECTOMY  2014    endometriosis    CHOLECYSTECTOMY  2017    Dr. MARBELLA Anen, [facility]     CHOLECYSTECTOMY-LAPAROSCOPIC W/ CHOLANGIOGRAM N/A 2017    Performed by Kamran Anne MD at [facility] OR    COLONOSCOPY N/A 2016    Procedure: COLONOSCOPY;  Surgeon: Jarret Zhao Jr., MD;  Location: [facility];  Service: Endoscopy;  Laterality: N/A;    COLONOSCOPY N/A 2016    Performed by Jarret [facility] Jr., MD  at [facility] ENDO    ESOPHAGOGASTRODUODENOSCOPY (EGD) N/A 2016    Performed by Jarret [facility] Jr., MD at [facility] ENDO    MOUTH SURGERY      x2    TONSILLECTOMY  2016    TONSILLECTOMY Bilateral 7/15/2016    [facility] OR [facility]    UPPER GASTROINTESTINAL ENDOSCOPY  2016    Dr. Zhao       OB History    Para Term  AB Living   1             SAB TAB Ectopic Multiple Live Births                  # Outcome Date GA Lbr Beny/2nd Weight Sex Delivery Anes PTL Lv   1 Current                   Social History     Socioeconomic History    Marital status:      Spouse name: Not on file    Number of children: 0    Years of education: Not on file    Highest education level: Not on file   Social Needs    Financial resource strain: Not on file    Food insecurity - worry: Not on file    Food insecurity - inability: Not on file    Transportation needs - medical: Not on file    Transportation needs - non-medical: Not on file   Occupational History    Occupation: hotel management   Tobacco Use    Smoking status: Former Smoker     Packs/day: 0.50     Types: Cigarettes    Smokeless tobacco: Never Used   Substance and Sexual Activity    Alcohol use: No     Alcohol/week: 0.0 oz     Comment: social    Drug use: No    Sexual activity: Yes     Partners: Male   Other Topics Concern    Not on file   Social History Narrative    Not on file       Vitals:    18 1428   BP: 118/78       Review of Symptoms:  GENERAL: Denies fatigue, and malaise.   SKIN: Denies rash or lesions.   HEAD: Denies head injury or headache.   NODES: Denies enlarged lymph nodes.   CHEST: Denies chest pain or shortness of breath.   CARDIOVASCULAR: Denies palpitations or left sided chest pain.   ABDOMEN: No abdominal pain, constipation, diarrhea, nausea, vomiting or rectal bleeding.   URINARY: No frequency, urgency, dysuria, or hematuria  MUSCULOSKELETAL: Denies joint pain or swelling.    NEUROLOGIC: Denies seizures.    O POS    ASSESSMENT    Encounter Diagnoses   Name Primary?    38 weeks gestation of pregnancy Yes       PLAN  1.  Induction next week 10 pm with cytotec 50ug and pitocin 4 hours after    I have reviewed the blood pressure, urine results, fetal heart rate check, fundal height and agree.

## 2018-09-09 PROBLEM — Z34.90 ENCOUNTER FOR INDUCTION OF LABOR: Status: ACTIVE | Noted: 2018-09-09

## 2018-09-17 ENCOUNTER — TELEPHONE (OUTPATIENT)
Dept: OBSTETRICS AND GYNECOLOGY | Facility: CLINIC | Age: 23
End: 2018-09-17

## 2018-09-17 NOTE — TELEPHONE ENCOUNTER
Spoke with patient she needs a note stating that she can sit in class for 4 hours a day for the next two weeks to finish school.     Also scheduled 2 week FU appointment.

## 2018-09-17 NOTE — TELEPHONE ENCOUNTER
----- Message from Brian Davis sent at 9/17/2018  1:05 PM CDT -----  Contact: pt  The Pt is requesting a call back regarding a note for her Rx so she is able to take them at school. Please call Pt to advise.     Call Back#: [phone]   Thanks

## 2018-09-24 ENCOUNTER — TELEPHONE (OUTPATIENT)
Dept: OBSTETRICS AND GYNECOLOGY | Facility: CLINIC | Age: 23
End: 2018-09-24

## 2018-09-24 NOTE — TELEPHONE ENCOUNTER
----- Message from Coreen Anne sent at 9/24/2018 12:29 PM CDT -----  Pt had a vaginal birth 10 days ago / Experiencing a lot of pain ... Call pt [phone]

## 2018-09-24 NOTE — TELEPHONE ENCOUNTER
Spoke with patient, scheduled appt with Dr. Easley for exam tomorrow. Complaining of vaginal odor and pain

## 2018-09-25 ENCOUNTER — POSTPARTUM VISIT (OUTPATIENT)
Dept: OBSTETRICS AND GYNECOLOGY | Facility: CLINIC | Age: 23
End: 2018-09-25
Payer: COMMERCIAL

## 2018-09-25 VITALS
HEIGHT: 68 IN | BODY MASS INDEX: 25.23 KG/M2 | WEIGHT: 166.44 LBS | SYSTOLIC BLOOD PRESSURE: 122 MMHG | DIASTOLIC BLOOD PRESSURE: 82 MMHG

## 2018-09-25 PROCEDURE — 0503F POSTPARTUM CARE VISIT: CPT | Mod: S$GLB,,, | Performed by: OBSTETRICS & GYNECOLOGY

## 2018-09-25 PROCEDURE — 99999 PR PBB SHADOW E&M-EST. PATIENT-LVL III: CPT | Mod: PBBFAC,,, | Performed by: OBSTETRICS & GYNECOLOGY

## 2018-09-25 NOTE — PROGRESS NOTES
POST-PARTUM  2018    Holly Nichols presents today for postpartum care    OB History    Para Term  AB Living   1 1 1     1   SAB TAB Ectopic Multiple Live Births         0 1      # Outcome Date GA Lbr Beny/2nd Weight Sex Delivery Anes PTL Lv   1 Term 18 39w1d 12:11 / 00:20 3.459 kg (7 lb 10 oz) F Vag-Spont EPI N RACHELE          Past Surgical History:   Procedure Laterality Date    ADENOIDECTOMY      APPENDECTOMY  2014    endometriosis    CHOLECYSTECTOMY  2017    [facility]. MAR[facility]LLA Anne, [facility]     CHOLECYSTECTOMY-LAPAROSCOPIC W/ CHOLANGIOGRAM N/A 2017    Performed by Kamran [facility] at [facility] OR    COLONOSCOPY N/A 2016    Procedure: COLONOSCOPY;  Surgeon: Jarret Zhao Jr., MD;  Location: [facility];  Service: Endoscopy;  Laterality: N/A;    COLONOSCOPY N/A 2016    [facility] by [facility] at [facility] ENDO    ESOPHAGOGASTRODUODENOSCOPY (EGD) N/A 2016    Performed by Jarret Zhao Jr., MD at [facility] ENDO    MOUTH SURGERY      x2    TONSILLECTOMY  2016    TONSILLECTOMY Bilateral 7/15/2016    [facility] OR [address] [place] [address]    UPPER GASTROINTESTINAL ENDOSCOPY  2016    Dr. Zhao       Past Medical History:   Diagnosis Date    Abnormal Pap smear of cervix     Allergy     Asthma     last used inhaler 2018    Constipation     Endometriosis     Fibromyalgia     Gastritis     HLA B27 (HLA B27 positive)     Migraine     Scoliosis     Smoker     stopped in 2018     Current Outpatient Medications   Medication Sig Dispense Refill    levalbuterol (XOPENEX HFA) 45 mcg/actuation inhaler Inhale 1-2 puffs into the lungs every 4 (four) hours as needed for Wheezing. 1 Inhaler 1    levalbuterol (XOPENEX) 1.25 mg/3 mL nebulizer solution Take 3 mLs (1.25 mg total) by nebulization 3 (three) times daily as needed for Wheezing. Rescue 75 mL 0    prenatal 25/iron fum/folic/dha (PRENATAL-1 ORAL) Take 1 tablet by mouth once daily.  "     oxyCODONE-acetaminophen (PERCOCET) 5-325 mg per tablet Take 1 tablet by mouth every 4 (four) hours as needed for Pain. 20 tablet 0     No current facility-administered medications for this visit.      Review of patient's allergies indicates:   Allergen Reactions    Toradol [ketorolac] Other (See Comments)     paralysis    Tramadol Other (See Comments)     paralysis    Penicillins Rash    Sulfa (sulfonamide antibiotics) Hives and Rash     Social History     Socioeconomic History    Marital status:      Spouse name: None    Number of children: 0    Years of education: None    Highest education level: None   Social Needs    Financial resource strain: None    Food insecurity - worry: None    Food insecurity - inability: None    Transportation needs - medical: None    Transportation needs - non-medical: None   Occupational History    Occupation: [employer] management   Tobacco Use    Smoking status: Former Smoker     Packs/day: 0.50     Types: Cigarettes     Last attempt to quit: 2018     Years since quittin.7    Smokeless tobacco: Never Used   Substance and Sexual Activity    Alcohol use: No     Alcohol/week: 0.0 oz     Comment: social    Drug use: No    Sexual activity: Yes     Partners: Male   Other Topics Concern    None   Social History Narrative    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    Plan:   1.  pap smear due:  2.  desires contraception:  "

## 2018-10-05 ENCOUNTER — TELEPHONE (OUTPATIENT)
Dept: OBSTETRICS AND GYNECOLOGY | Facility: CLINIC | Age: 23
End: 2018-10-05

## 2018-10-05 NOTE — TELEPHONE ENCOUNTER
----- Message from Inés Beavers sent at 10/5/2018  3:12 PM CDT -----  Contact: self  Call placed to POD    Patient need to speak to nurse regarding breast pain and 101.3 fever and have questions about patient stitches     Patient will like to know if there is something she can take to not mess with her breast feeding       Please call to advice [phone] (home)         [facility] [employer] [address] [place] [address] [id number] [place] [address] & [place] [address]  [id number] [place] [id number]  Phone: [phone] Fax: [phone]

## 2018-10-05 NOTE — TELEPHONE ENCOUNTER
Patient woke up at 5 am with her left breast felt like it was on fire. She took a nap and woke up with the same pain, a fever of 101.3, a migraine and feels dehydrated. She states that she can barely touch it and the pain is an 8 on a 1-10 scale. I reviewed allergies and pharmacy with the patient. I informed Dr. Easley, verified patients allergies, and she gave me a verbal order for Keflex 500mg QID #28 no refills and have patient come in for an appointment if no improvement. Medication was called in and patient was informed. She voiced understanding.

## 2018-10-11 ENCOUNTER — TELEPHONE (OUTPATIENT)
Dept: OBSTETRICS AND GYNECOLOGY | Facility: CLINIC | Age: 23
End: 2018-10-11

## 2018-10-11 NOTE — TELEPHONE ENCOUNTER
----- Message from Liza Jordan sent at 10/11/2018 11:39 AM CDT -----  Type: Needs Medical Advice    Who Called:  Patient    Best Call Back Number: [phone] (home)     Additional Information: Wanted to discuss birth control options, release to work, and depression issues

## 2018-10-12 ENCOUNTER — POSTPARTUM VISIT (OUTPATIENT)
Dept: OBSTETRICS AND GYNECOLOGY | Facility: CLINIC | Age: 23
End: 2018-10-12
Payer: COMMERCIAL

## 2018-10-12 VITALS
DIASTOLIC BLOOD PRESSURE: 70 MMHG | BODY MASS INDEX: 24.34 KG/M2 | SYSTOLIC BLOOD PRESSURE: 102 MMHG | WEIGHT: 160.06 LBS

## 2018-10-12 DIAGNOSIS — F32.A DEPRESSION, UNSPECIFIED DEPRESSION TYPE: ICD-10-CM

## 2018-10-12 PROCEDURE — 99999 PR PBB SHADOW E&M-EST. PATIENT-LVL III: CPT | Mod: PBBFAC,,, | Performed by: OBSTETRICS & GYNECOLOGY

## 2018-10-12 RX ORDER — SERTRALINE HYDROCHLORIDE 50 MG/1
50 TABLET, FILM COATED ORAL DAILY
Qty: 30 TABLET | Refills: 2 | Status: SHIPPED | OUTPATIENT
Start: 2018-10-12 | End: 2019-05-08

## 2018-10-12 NOTE — PROGRESS NOTES
POST-PARTUM  10/12/2018    Holly Nichols presents today for postpartum care    OB History    Para Term  AB Living   1 1 1     1   SAB TAB Ectopic Multiple Live Births         0 1      # Outcome Date GA Lbr Beny/2nd Weight Sex Delivery Anes PTL Lv   1 Term 18 39w1d 12:11 / 00:20 3.459 kg (7 lb 10 oz) F Vag-Spont EPI N RACHELE          Past Surgical History:   Procedure Laterality Date    ADENOIDECTOMY      APPENDECTOMY  2014    endometriosis    CHOLECYSTECTOMY  2017    [facility] MAR[facility]LLA [facility], [facility]     CHOLECYSTECTOMY-LAPAROSCOPIC W/ CHOLANGIOGRAM N/A 2017    [facility]ran [facility] at [facility] OR    COLONOSCOPY N/A 2016    Procedure: COLONOSCOPY;  Surgeon: Jarret Zhao Jr., MD;  Location: [facility];  Service: Endoscopy;  Laterality: N/A;    COLONOSCOPY N/A 2016    [facility] by [facility]ret [facility] Jr[facility] ENDO    ESOPHAGOGASTRODUODENOSCOPY (EGD) N/A 2016    Per[facility] by Jarret [facility] Jr., MD at [facility] ENDO    MOUTH SURGERY      x2    TONSILLECTOMY  2016    TONSILLECTOMY Bilateral 7/15/2016    [facility] by [facility] at [facility] OR [address] [place] [address]    UPPER GASTROINTESTINAL ENDOSCOPY  2016    Dr. Zhao       Past Medical History:   Diagnosis Date    Abnormal Pap smear of cervix     Allergy     Asthma     last used inhaler 2018    Constipation     Endometriosis     Fibromyalgia     Gastritis     HLA B27 (HLA B27 positive)     Migraine     Scoliosis     Smoker     stopped in 2018     Current Outpatient Medications   Medication Sig Dispense Refill    levalbuterol (XOPENEX HFA) 45 mcg/actuation inhaler Inhale 1-2 puffs into the lungs every 4 (four) hours as needed for Wheezing. 1 Inhaler 1    levalbuterol (XOPENEX) 1.25 mg/3 mL nebulizer solution Take 3 mLs (1.25 mg total) by nebulization 3 (three) times daily as needed for Wheezing. Rescue 75 mL 0    oxyCODONE-acetaminophen (PERCOCET) 5-325 mg per tablet Take 1 tablet by mouth  every 4 (four) hours as needed for Pain. 20 tablet 0    prenatal 25/iron fum/folic/dha (PRENATAL-1 ORAL) Take 1 tablet by mouth once daily.      sertraline (ZOLOFT) 50 MG tablet Take 1 tablet (50 mg total) by mouth once daily. 1/2 tablet for 7 days 30 tablet 2     No current facility-administered medications for this visit.      Review of patient's allergies indicates:   Allergen Reactions    Toradol [ketorolac] Other (See Comments)     paralysis    Tramadol Other (See Comments)     paralysis    Penicillins Hives and Rash    Sulfa (sulfonamide antibiotics) Hives and Rash     Social History     Socioeconomic History    Marital status:      Spouse name: None    Number of children: 0    Years of education: None    Highest education level: None   Social Needs    Financial resource strain: None    Food insecurity - worry: None    Food insecurity - inability: None    Transportation needs - medical: None    Transportation needs - non-medical: None   Occupational History    Occupation: hotel management   Tobacco Use    Smoking status: Former Smoker     Packs/day: 0.50     Types: Cigarettes     Last attempt to quit: 2018     Years since quittin.7    Smokeless tobacco: Never Used   Substance and Sexual Activity    Alcohol use: No     Alcohol/week: 0.0 oz     Comment: social    Drug use: No    Sexual activity: Yes     Partners: Male   Other Topics Concern    None   Social History Narrative    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    Postpartum care and examination Yes    Depression, unspecified depression type        Plan:   1.  pap smear due: 2 weeks  2.  desires contraception: 2 weeks nexplanon

## 2018-10-30 ENCOUNTER — POSTPARTUM VISIT (OUTPATIENT)
Dept: OBSTETRICS AND GYNECOLOGY | Facility: CLINIC | Age: 23
End: 2018-10-30
Payer: COMMERCIAL

## 2018-10-30 VITALS
SYSTOLIC BLOOD PRESSURE: 120 MMHG | WEIGHT: 156.94 LBS | DIASTOLIC BLOOD PRESSURE: 82 MMHG | BODY MASS INDEX: 23.87 KG/M2

## 2018-10-30 DIAGNOSIS — Z30.017 NEXPLANON INSERTION: ICD-10-CM

## 2018-10-30 DIAGNOSIS — Z01.419 ENCOUNTER FOR GYNECOLOGICAL EXAMINATION WITHOUT ABNORMAL FINDING: Primary | ICD-10-CM

## 2018-10-30 LAB
B-HCG UR QL: NEGATIVE
CTP QC/QA: YES

## 2018-10-30 PROCEDURE — 88175 CYTOPATH C/V AUTO FLUID REDO: CPT | Performed by: PATHOLOGY

## 2018-10-30 PROCEDURE — 88141 CYTOPATH C/V INTERPRET: CPT | Mod: ,,, | Performed by: PATHOLOGY

## 2018-10-30 PROCEDURE — 99999 PR PBB SHADOW E&M-EST. PATIENT-LVL III: CPT | Mod: PBBFAC,,, | Performed by: OBSTETRICS & GYNECOLOGY

## 2018-10-30 PROCEDURE — 99395 PREV VISIT EST AGE 18-39: CPT | Mod: 24,S$GLB,, | Performed by: OBSTETRICS & GYNECOLOGY

## 2018-10-30 PROCEDURE — 11981 INSERTION DRUG DLVR IMPLANT: CPT | Mod: S$GLB,,, | Performed by: OBSTETRICS & GYNECOLOGY

## 2018-10-30 PROCEDURE — 81025 URINE PREGNANCY TEST: CPT | Mod: S$GLB,,, | Performed by: OBSTETRICS & GYNECOLOGY

## 2018-10-30 RX ORDER — ESOMEPRAZOLE MAGNESIUM 40 MG/1
40 CAPSULE, DELAYED RELEASE ORAL DAILY
Qty: 30 CAPSULE | Refills: 1 | Status: SHIPPED | OUTPATIENT
Start: 2018-10-30 | End: 2019-07-09

## 2018-10-30 RX ORDER — RIZATRIPTAN BENZOATE 5 MG/1
5 TABLET ORAL ONCE AS NEEDED
Qty: 30 TABLET | Refills: 2 | Status: SHIPPED | OUTPATIENT
Start: 2018-10-30 | End: 2019-07-09

## 2018-10-30 NOTE — PROGRESS NOTES
Chief Complaint   Patient presents with    Postpartum Care     PPD 3    Contraception     Nexplanon insertion    Gastroesophageal Reflux     pt states severe GERD, eating 1 time per day, milk supply decreasing     Migraine       History of Present Illness: Holly Nichols is a 23 y.o. female that presents today 10/30/2018 for well gyn visit.    Past Medical History:   Diagnosis Date    Abnormal Pap smear of cervix     Allergy     Asthma     last used inhaler 8/2018    Constipation     Endometriosis     Fibromyalgia     Gastritis     HLA B27 (HLA B27 positive)     Migraine     Scoliosis     Smoker     stopped in January 2018       Past Surgical History:   Procedure Laterality Date    ADENOIDECTOMY      APPENDECTOMY  09/2014    endometriosis    CHOLECYSTECTOMY  12/20/2017    Dr. MARBELLA Anne, [facility]     CHOLECYSTECTOMY-LAPAROSCOPIC W/ CHOLANGIOGRAM N/A 12/20/2017    Performed by Kamran Anne MD at [facility] OR    COLONOSCOPY N/A 5/19/2016    Procedure: COLONOSCOPY;  Surgeon: Jarret Zhao Jr., MD;  Location: [facility];  Service: Endoscopy;  Laterality: N/A;    COLONOSCOPY N/A 5/19/2016    [facility] by [facility] at [facility] ENDO    ESOPHAGOGASTRODUODENOSCOPY (EGD) N/A 5/19/2016    Performed by Jarret [facility] Jr., MD at [facility] ENDO    MOUTH SURGERY      x2    TONSILLECTOMY  07/2016    TONSILLECTOMY Bilateral 7/15/2016    Performed by Franc[facility] at [facility] OR [address] [place] [address]    UPPER GASTROINTESTINAL ENDOSCOPY  05/2016    Dr. Zhao       Current Outpatient Medications   Medication Sig Dispense Refill    prenatal 25/iron fum/folic/dha (PRENATAL-1 ORAL) Take 1 tablet by mouth once daily.      sertraline (ZOLOFT) 50 MG tablet Take 1 tablet (50 mg total) by mouth once daily. 1/2 tablet for 7 days 30 tablet 2    esomeprazole (NEXIUM) 40 MG capsule Take 1 capsule (40 mg total) by mouth once daily. 30 capsule 1    levalbuterol (XOPENEX HFA) 45 mcg/actuation inhaler Inhale 1-2 puffs into the  lungs every 4 (four) hours as needed for Wheezing. 1 Inhaler 1    levalbuterol (XOPENEX) 1.25 mg/3 mL nebulizer solution Take 3 mLs (1.25 mg total) by nebulization 3 (three) times daily as needed for Wheezing. Rescue 75 mL 0    rizatriptan (MAXALT) 5 MG tablet Take 1 tablet (5 mg total) by mouth once as needed for Migraine. 30 tablet 2     Current Facility-Administered Medications   Medication Dose Route Frequency Provider Last Rate Last Dose    etonogestrel Impl   Subdermal Once Ana María Easley MD           Review of patient's allergies indicates:   Allergen Reactions    Toradol [ketorolac] Other (See Comments)     paralysis    Tramadol Other (See Comments)     paralysis    Penicillins Hives and Rash    Sulfa (sulfonamide antibiotics) Hives and Rash       Family History   Problem Relation Age of Onset    Fibromyalgia Mother     Depression Mother     Diabetes Father     Depression Father     Mental illness Father         bipolar disorder    Multiple sclerosis Maternal Aunt     Crohn's disease Maternal Grandmother     Colon cancer Neg Hx     Colon polyps Neg Hx     Ulcerative colitis Neg Hx     Stomach cancer Neg Hx     Esophageal cancer Neg Hx     Breast cancer Neg Hx        Social History     Socioeconomic History    Marital status:      Spouse name: None    Number of children: 0    Years of education: None    Highest education level: None   Social Needs    Financial resource strain: None    Food insecurity - worry: None    Food insecurity - inability: None    Transportation needs - medical: None    Transportation needs - non-medical: None   Occupational History    Occupation: [employer] management   Tobacco Use    Smoking status: Former Smoker     Packs/day: 0.50     Types: Cigarettes     Last attempt to quit: 2018     Years since quittin.8    Smokeless tobacco: Never Used   Substance and Sexual Activity    Alcohol use: No     Alcohol/week: 0.0 oz     Comment:  social    Drug use: No    Sexual activity: Yes     Partners: Male   Other Topics Concern    None   Social History Narrative    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 Feeling well overall.   SKIN: Denies rash or lesions.   HEAD: Denies head injury or headache.   NODES: Denies enlarged lymph nodes.   CHEST: Denies chest pain or shortness of breath.   CARDIOVASCULAR: Denies palpitations or left sided chest pain.   ABDOMEN: No abdominal pain, constipation, diarrhea, nausea, vomiting or rectal bleeding.   URINARY: No frequency, dysuria, hematuria, or burning on urination.  HEMATOLOGIC: No easy bruisability or excessive bleeding.   MUSCULOSKELETAL: Denies joint pain or swelling.     /82   Wt 71.2 kg (156 lb 15.5 oz)   Physical Exam:  APPEARANCE: Well nourished, well developed, in no acute distress.  SKIN: Normal skin turgor, no lesions.  NECK: Neck symmetric without masses   RESPIRATORY: Normal respiratory effort with no retractions or use of accessory muscles  CARDIOVASCULAR: Peripheral vascular system with no swelling no varicosities and palpation of pulses normal  LYMPHATIC: No enlargements of the lymph nodes noted in the neck, axillae, or groin  ABDOMEN: Soft. No tenderness or masses. No hepatosplenomegaly. No hernias.  BREASTS: Symmetrical, no skin changes or visible lesions. No palpable masses, nipple discharge or adenopathy bilaterally.  PELVIC: Normal external female genitalia without lesions. Normal hair distribution. Adequate perineal body, normal urethral meatus. Urethra with no masses.  Bladder nontender. Vagina moist and well rugated without lesions or  discharge. Cervix pink and without lesions. No significant cystocele or rectocele. Bimanual exam showed uterus normal size, shape, position, mobile and nontender. Adnexa without masses or tenderness. Urethra and bladder normal.   EXTREMITIES: No clubbing cyanosis or edema.    ASSESSMENT/PLAN:  Encounter for gynecological examination without abnormal finding  -     Liquid-based pap smear, screening  -     Cancel: Insertion of IUD-Today  -     POCT urine pregnancy    Nexplanon insertion  -     Insertion of Nexplanon Device; Future; Expected date: 10/30/2018  -     etonogestrel Impl    Other orders  -     esomeprazole (NEXIUM) 40 MG capsule; Take 1 capsule (40 mg total) by mouth once daily.  Dispense: 30 capsule; Refill: 1  -     rizatriptan (MAXALT) 5 MG tablet; Take 1 tablet (5 mg total) by mouth once as needed for Migraine.  Dispense: 30 tablet; Refill: 2          Patient was counseled today on Pap guidelines, recommendation for pelvic exams, mammograms every other year after the age of 40 and annually after the age of 50, Colonoscopy after the age of 50, Dexa Bone Scan and calcium and vitamin D supplementation in menopause and to see her PCP for other health maintenance.   FOLLOW-UP:prn

## 2018-11-07 ENCOUNTER — TELEPHONE (OUTPATIENT)
Dept: OBSTETRICS AND GYNECOLOGY | Facility: CLINIC | Age: 23
End: 2018-11-07

## 2018-11-23 ENCOUNTER — OFFICE VISIT (OUTPATIENT)
Dept: FAMILY MEDICINE | Facility: CLINIC | Age: 23
End: 2018-11-23
Payer: COMMERCIAL

## 2018-11-23 ENCOUNTER — LAB VISIT (OUTPATIENT)
Dept: LAB | Facility: HOSPITAL | Age: 23
End: 2018-11-23
Attending: NURSE PRACTITIONER
Payer: COMMERCIAL

## 2018-11-23 VITALS
HEART RATE: 71 BPM | WEIGHT: 158.31 LBS | HEIGHT: 68 IN | BODY MASS INDEX: 23.99 KG/M2 | SYSTOLIC BLOOD PRESSURE: 100 MMHG | TEMPERATURE: 98 F | DIASTOLIC BLOOD PRESSURE: 78 MMHG | OXYGEN SATURATION: 98 % | RESPIRATION RATE: 18 BRPM

## 2018-11-23 DIAGNOSIS — J45.31 MILD PERSISTENT ASTHMA WITH ACUTE EXACERBATION: Primary | ICD-10-CM

## 2018-11-23 DIAGNOSIS — J45.901 EXACERBATION OF ASTHMA, UNSPECIFIED ASTHMA SEVERITY, UNSPECIFIED WHETHER PERSISTENT: ICD-10-CM

## 2018-11-23 DIAGNOSIS — J01.11 ACUTE RECURRENT FRONTAL SINUSITIS: ICD-10-CM

## 2018-11-23 DIAGNOSIS — R68.89 COLD FEELING: ICD-10-CM

## 2018-11-23 PROBLEM — O26.899 VAGINAL DISCHARGE DURING PREGNANCY: Status: RESOLVED | Noted: 2018-08-22 | Resolved: 2018-11-23

## 2018-11-23 PROBLEM — R10.9 ABDOMINAL CRAMPING AFFECTING PREGNANCY: Status: RESOLVED | Noted: 2018-08-20 | Resolved: 2018-11-23

## 2018-11-23 PROBLEM — O47.9 UTERINE CONTRACTIONS DURING PREGNANCY: Status: RESOLVED | Noted: 2018-09-07 | Resolved: 2018-11-23

## 2018-11-23 PROBLEM — O26.899 ABDOMINAL CRAMPING AFFECTING PREGNANCY: Status: RESOLVED | Noted: 2018-08-20 | Resolved: 2018-11-23

## 2018-11-23 PROBLEM — N89.8 VAGINAL DISCHARGE DURING PREGNANCY: Status: RESOLVED | Noted: 2018-08-22 | Resolved: 2018-11-23

## 2018-11-23 LAB
BASOPHILS # BLD AUTO: 0.07 K/UL
BASOPHILS NFR BLD: 0.9 %
DIFFERENTIAL METHOD: NORMAL
EOSINOPHIL # BLD AUTO: 0.4 K/UL
EOSINOPHIL NFR BLD: 4.8 %
ERYTHROCYTE [DISTWIDTH] IN BLOOD BY AUTOMATED COUNT: 12.7 %
HCT VFR BLD AUTO: 43.6 %
HGB BLD-MCNC: 14.1 G/DL
IMM GRANULOCYTES # BLD AUTO: 0.03 K/UL
IMM GRANULOCYTES NFR BLD AUTO: 0.4 %
LYMPHOCYTES # BLD AUTO: 3 K/UL
LYMPHOCYTES NFR BLD: 37.8 %
MCH RBC QN AUTO: 28.4 PG
MCHC RBC AUTO-ENTMCNC: 32.3 G/DL
MCV RBC AUTO: 88 FL
MONOCYTES # BLD AUTO: 0.6 K/UL
MONOCYTES NFR BLD: 7.5 %
NEUTROPHILS # BLD AUTO: 3.8 K/UL
NEUTROPHILS NFR BLD: 48.6 %
NRBC BLD-RTO: 0 /100 WBC
PLATELET # BLD AUTO: 261 K/UL
PMV BLD AUTO: 11.2 FL
RBC # BLD AUTO: 4.97 M/UL
TSH SERPL DL<=0.005 MIU/L-ACNC: 1.22 UIU/ML
WBC # BLD AUTO: 7.84 K/UL

## 2018-11-23 PROCEDURE — 99214 OFFICE O/P EST MOD 30 MIN: CPT | Mod: 25,S$GLB,, | Performed by: NURSE PRACTITIONER

## 2018-11-23 PROCEDURE — 96372 THER/PROPH/DIAG INJ SC/IM: CPT | Mod: S$GLB,,, | Performed by: NURSE PRACTITIONER

## 2018-11-23 PROCEDURE — 84443 ASSAY THYROID STIM HORMONE: CPT

## 2018-11-23 PROCEDURE — 85025 COMPLETE CBC W/AUTO DIFF WBC: CPT

## 2018-11-23 PROCEDURE — 99999 PR PBB SHADOW E&M-EST. PATIENT-LVL IV: CPT | Mod: PBBFAC,,, | Performed by: NURSE PRACTITIONER

## 2018-11-23 PROCEDURE — 3008F BODY MASS INDEX DOCD: CPT | Mod: CPTII,S$GLB,, | Performed by: NURSE PRACTITIONER

## 2018-11-23 PROCEDURE — 36415 COLL VENOUS BLD VENIPUNCTURE: CPT | Mod: PO

## 2018-11-23 RX ORDER — DEXAMETHASONE SODIUM PHOSPHATE 4 MG/ML
8 INJECTION, SOLUTION INTRA-ARTICULAR; INTRALESIONAL; INTRAMUSCULAR; INTRAVENOUS; SOFT TISSUE
Status: COMPLETED | OUTPATIENT
Start: 2018-11-23 | End: 2018-11-23

## 2018-11-23 RX ORDER — CEFUROXIME AXETIL 500 MG/1
500 TABLET ORAL EVERY 12 HOURS
Qty: 14 TABLET | Refills: 0 | Status: SHIPPED | OUTPATIENT
Start: 2018-11-23 | End: 2018-12-20

## 2018-11-23 RX ADMIN — DEXAMETHASONE SODIUM PHOSPHATE 8 MG: 4 INJECTION, SOLUTION INTRA-ARTICULAR; INTRALESIONAL; INTRAMUSCULAR; INTRAVENOUS; SOFT TISSUE at 12:11

## 2018-11-23 NOTE — PROGRESS NOTES
Subjective:       Patient ID: Holly Nichols is a 23 y.o. female.    Chief Complaint: Wheezing (started yesterday, took neb tx this morning (3 hrs ago), headache, congestion, feels foggy, pt states that since having her baby (sept 2018)  she has noticed that she is freezing all the time) and Cough (, pt breastfeeding)    Here today with asthma exacerbation   Recent birth of child - 2 months ago   Using nebs at home - recent neb this am       Cough   This is a new problem. The current episode started 1 to 4 weeks ago. The problem has been gradually worsening. The cough is productive of sputum. Associated symptoms include chest pain, headaches, nasal congestion, postnasal drip, rhinorrhea, shortness of breath and wheezing. Pertinent negatives include no chills, ear congestion, ear pain, fever, heartburn, hemoptysis, myalgias, rash, sore throat, sweats or weight loss. Nothing aggravates the symptoms. She has tried a beta-agonist inhaler (zyrtec) for the symptoms. Her past medical history is significant for asthma.     Vitals:    11/23/18 1202   BP: 100/78   Pulse: 71   Resp: 18   Temp: 97.7 °F (36.5 °C)     Review of Systems   Constitutional: Negative for activity change, chills, diaphoresis, fatigue, fever, unexpected weight change and weight loss.   HENT: Positive for postnasal drip, rhinorrhea and trouble swallowing. Negative for ear pain, hearing loss and sore throat.    Eyes: Negative.  Negative for discharge.   Respiratory: Positive for chest tightness, shortness of breath and wheezing. Negative for cough and hemoptysis.    Cardiovascular: Positive for chest pain and palpitations.   Gastrointestinal: Negative for abdominal pain, blood in stool, constipation, diarrhea, heartburn, nausea and vomiting.   Endocrine: Negative.  Negative for polydipsia and polyuria.   Genitourinary: Positive for menstrual problem. Negative for difficulty urinating, dysuria and hematuria.   Musculoskeletal: Negative.  Negative for  myalgias.   Skin: Negative for color change and rash.   Allergic/Immunologic: Negative.    Neurological: Positive for weakness and headaches. Negative for speech difficulty and numbness.   Hematological: Negative.    Psychiatric/Behavioral: Negative.  Negative for confusion and dysphoric mood.       Past Medical History:   Diagnosis Date    Abnormal Pap smear of cervix     Allergy     Asthma     last used inhaler 8/2018    Constipation     Endometriosis     Fibromyalgia     Gastritis     HLA B27 (HLA B27 positive)     Migraine     Scoliosis     Smoker     stopped in January 2018     Objective:      Physical Exam   Constitutional: She is oriented to person, place, and time. She appears well-developed and well-nourished.   HENT:   Head: Normocephalic and atraumatic.   Right Ear: External ear normal.   Left Ear: External ear normal.   Nose: Nose normal.   Mouth/Throat: Uvula is midline and mucous membranes are normal. Normal dentition. Posterior oropharyngeal erythema present. No posterior oropharyngeal edema.   Eyes: Conjunctivae and EOM are normal. Pupils are equal, round, and reactive to light.   Neck: Neck supple.   Cardiovascular: Normal rate, regular rhythm, normal heart sounds and intact distal pulses.   Pulmonary/Chest: Effort normal. No apnea and no tachypnea. No respiratory distress. She has wheezes.   Exp wheezing    Abdominal: Soft. Bowel sounds are normal.   Musculoskeletal: Normal range of motion.   Neurological: She is alert and oriented to person, place, and time.   Skin: Skin is warm and dry.   Psychiatric: She has a normal mood and affect. Her behavior is normal.   Nursing note and vitals reviewed.      Assessment:       1. Mild persistent asthma with acute exacerbation    2. Cold feeling    3. Exacerbation of asthma, unspecified asthma severity, unspecified whether persistent    4. Acute recurrent frontal sinusitis        Plan:           Holly was seen today for wheezing and  cough.    Diagnoses and all orders for this visit:    Mild persistent asthma with acute exacerbation  -     dexamethasone injection 8 mg    Cold feeling  -     TSH; Future  -     CBC auto differential; Future    Exacerbation of asthma, unspecified asthma severity, unspecified whether persistent  Do nebs 4 times day   Try im steroids   Since still breastfeeding - will hold off to start oral steroids     Sinusitis  -     cefUROXime (CEFTIN) 500 MG tablet; Take 1 tablet (500 mg total) by mouth every 12 (twelve) hours.        Call if not better

## 2018-11-27 ENCOUNTER — TELEPHONE (OUTPATIENT)
Dept: OBSTETRICS AND GYNECOLOGY | Facility: CLINIC | Age: 23
End: 2018-11-27

## 2018-11-27 NOTE — TELEPHONE ENCOUNTER
----- Message from Anabelle Kwan sent at 11/27/2018  9:16 AM CST -----  Contact: self [phone]  Please call her to reschedule the colposcopy.  Thank you!

## 2018-12-04 ENCOUNTER — TELEPHONE (OUTPATIENT)
Dept: OBSTETRICS AND GYNECOLOGY | Facility: CLINIC | Age: 23
End: 2018-12-04

## 2018-12-04 NOTE — TELEPHONE ENCOUNTER
----- Message from Shikha Ortega sent at 12/4/2018  1:49 PM CST -----  Contact: pt  Type: Needs Medical Advice    Who Called:  Patient  Best Call Back Number: [phone] (home)   Additional Information: Pt would like a nurse to call her in regards to her upcoming appt. Thanks.

## 2018-12-04 NOTE — TELEPHONE ENCOUNTER
"Patient stated she started her period for the second time this week and she is cramping badly and it "feels like knives stabbing her in the stomach". Tylenol isn't working. Patient wondering what she can take, she is still breastfeeding.   "

## 2018-12-06 ENCOUNTER — PATIENT MESSAGE (OUTPATIENT)
Dept: OBSTETRICS AND GYNECOLOGY | Facility: CLINIC | Age: 23
End: 2018-12-06

## 2018-12-07 ENCOUNTER — OFFICE VISIT (OUTPATIENT)
Dept: OBSTETRICS AND GYNECOLOGY | Facility: CLINIC | Age: 23
End: 2018-12-07
Payer: COMMERCIAL

## 2018-12-07 ENCOUNTER — TELEPHONE (OUTPATIENT)
Dept: OBSTETRICS AND GYNECOLOGY | Facility: CLINIC | Age: 23
End: 2018-12-07

## 2018-12-07 VITALS — DIASTOLIC BLOOD PRESSURE: 62 MMHG | WEIGHT: 156.5 LBS | BODY MASS INDEX: 23.8 KG/M2 | SYSTOLIC BLOOD PRESSURE: 116 MMHG

## 2018-12-07 DIAGNOSIS — N94.10 DYSPAREUNIA IN FEMALE: ICD-10-CM

## 2018-12-07 DIAGNOSIS — R10.2 PELVIC PAIN: ICD-10-CM

## 2018-12-07 DIAGNOSIS — N80.9 ENDOMETRIOSIS: Primary | ICD-10-CM

## 2018-12-07 DIAGNOSIS — N87.0 DYSPLASIA OF CERVIX, LOW GRADE (CIN 1): ICD-10-CM

## 2018-12-07 PROCEDURE — 88305 TISSUE EXAM BY PATHOLOGIST: CPT | Performed by: PATHOLOGY

## 2018-12-07 PROCEDURE — 99213 OFFICE O/P EST LOW 20 MIN: CPT | Mod: 25,S$GLB,, | Performed by: OBSTETRICS & GYNECOLOGY

## 2018-12-07 PROCEDURE — 3008F BODY MASS INDEX DOCD: CPT | Mod: CPTII,S$GLB,, | Performed by: OBSTETRICS & GYNECOLOGY

## 2018-12-07 PROCEDURE — 99999 PR PBB SHADOW E&M-EST. PATIENT-LVL III: CPT | Mod: PBBFAC,,, | Performed by: OBSTETRICS & GYNECOLOGY

## 2018-12-07 PROCEDURE — 88305 TISSUE EXAM BY PATHOLOGIST: CPT | Mod: 26,,, | Performed by: PATHOLOGY

## 2018-12-07 PROCEDURE — 57455 BIOPSY OF CERVIX W/SCOPE: CPT | Mod: S$GLB,,, | Performed by: OBSTETRICS & GYNECOLOGY

## 2018-12-07 NOTE — PROGRESS NOTES
Chief Complaint   Patient presents with    Colposcopy       History of Present Illness: Holly Nichols is a 23 y.o. female that presents today 12/7/2018 for   Chief Complaint   Patient presents with    Colposcopy     She reports pelvic pain like everything is on fire.  She reports that she has needles like pain in the vagina and in the abdomen.  She reports pain after intercourse. She reports pain better on the cycle. She reports heavy bleeding with the nexplanon. She reports alternating pain and more on the right.      Past Medical History:   Diagnosis Date    Abnormal Pap smear of cervix     Allergy     Asthma     last used inhaler 8/2018    Constipation     Endometriosis     Fibromyalgia     Gastritis     HLA B27 (HLA B27 positive)     Migraine     Scoliosis     Smoker     stopped in January 2018       Past Surgical History:   Procedure Laterality Date    ADENOIDECTOMY      APPENDECTOMY  09/2014    endometriosis    CHOLECYSTECTOMY  12/20/2017    [facility]LL[facility], [facility]     CHOLECYSTECTOMY-LAPAROSCOPIC W/ CHOLANGIOGRAM N/A 12/20/2017    [facility] by Kamran Anne [facility] at [facility] OR    COLONOSCOPY N/A 5/19/2016    Procedure: COLONOSCOPY;  Surgeon: Jarret Zhao Jr., MD;  Location: [facility];  Service: Endoscopy;  Laterality: N/A;    COLONOSCOPY N/A 5/19/2016    [facility] ENDO    ESOPHAGOGASTRODUODENOSCOPY (EGD) N/A 5/19/2016    [facility] by [facility]ret [facility] at [facility] ENDO    MOUTH SURGERY      x2    PELVIC LAPAROSCOPY      TONSILLECTOMY  07/2016    TONSILLECTOMY Bilateral 7/15/2016    Performed by Francois [facility] at [facility] OR [address] [place] [address]    UPPER GASTROINTESTINAL ENDOSCOPY  05/2016    Dr. Zhao       Current Outpatient Medications   Medication Sig Dispense Refill    esomeprazole (NEXIUM) 40 MG capsule Take 1 capsule (40 mg total) by mouth once daily. 30 capsule 1    levalbuterol (XOPENEX HFA) 45 mcg/actuation inhaler Inhale 1-2 puffs into the lungs  every 4 (four) hours as needed for Wheezing. 1 Inhaler 1    levalbuterol (XOPENEX) 1.25 mg/3 mL nebulizer solution Take 3 mLs (1.25 mg total) by nebulization 3 (three) times daily as needed for Wheezing. Rescue 75 mL 0    prenatal 25/iron fum/folic/dha (PRENATAL-1 ORAL) Take 1 tablet by mouth once daily.      sertraline (ZOLOFT) 50 MG tablet Take 1 tablet (50 mg total) by mouth once daily. 1/2 tablet for 7 days 30 tablet 2    cefUROXime (CEFTIN) 500 MG tablet Take 1 tablet (500 mg total) by mouth every 12 (twelve) hours. 14 tablet 0    rizatriptan (MAXALT) 5 MG tablet Take 1 tablet (5 mg total) by mouth once as needed for Migraine. 30 tablet 2     No current facility-administered medications for this visit.        Review of patient's allergies indicates:   Allergen Reactions    Toradol [ketorolac] Other (See Comments)     paralysis    Tramadol Other (See Comments)     paralysis    Penicillins Hives and Rash    Sulfa (sulfonamide antibiotics) Hives and Rash       Family History   Problem Relation Age of Onset    Fibromyalgia Mother     Depression Mother     Diabetes Father     Depression Father     Mental illness Father         bipolar disorder    Multiple sclerosis Maternal Aunt     Crohn's disease Maternal Grandmother     Colon cancer Neg Hx     Colon polyps Neg Hx     Ulcerative colitis Neg Hx     Stomach cancer Neg Hx     Esophageal cancer Neg Hx     Breast cancer Neg Hx        Social History     Tobacco Use    Smoking status: Former Smoker     Packs/day: 0.50     Types: Cigarettes     Last attempt to quit: 2018     Years since quittin.9    Smokeless tobacco: Never Used   Substance Use Topics    Alcohol use: No     Alcohol/week: 0.0 oz     Comment: social    Drug use: No       OB History    Para Term  AB Living   1 1 1     1   SAB TAB Ectopic Multiple Live Births         0 1      # Outcome Date GA Lbr Beny/2nd Weight Sex Delivery Anes PTL Lv   1 Term 18 39w1d  12:11 / 00:20 3.459 kg (7 lb 10 oz) F Vag-Spont EPI N RACHELE          Review of Symptoms:  GENERAL: Denies weight gain or weight loss. Feeling well overall.   SKIN: Denies rash or lesions.   HEAD: Denies head injury or headache.   NODES: Denies enlarged lymph nodes.   CHEST: Denies chest pain or shortness of breath.   CARDIOVASCULAR: Denies palpitations or left sided chest pain.   ABDOMEN: No abdominal pain, constipation, diarrhea, nausea, vomiting or rectal bleeding.   URINARY: No frequency, dysuria, hematuria, or burning on urination.  HEMATOLOGIC: No easy bruisability or excessive bleeding.   MUSCULOSKELETAL: Denies joint pain or swelling.     /62   Wt 71 kg (156 lb 8.4 oz)   LMP 12/07/2018 (Approximate)   Physical Exam:  APPEARANCE: Well nourished, well developed, in no acute distress.  SKIN: Normal skin turgor, no lesions.  NECK: Neck symmetric without masses   RESPIRATORY: Normal respiratory effort with no retractions or use of accessory muscles  CARDIOVASCULAR: Peripheral vascular system with no swelling no varicosities and palpation of pulses normal  LYMPHATIC: No enlargements of the lymph nodes noted in the neck, axillae, or groin  ABDOMEN: Soft. No tenderness or masses. No hepatosplenomegaly. No hernias.  BREASTS: Symmetrical, no skin changes or visible lesions. No palpable masses, nipple discharge or adenopathy bilaterally.  PELVIC: Normal external female genitalia without lesions. Normal hair distribution. Adequate perineal body, normal urethral meatus. Urethra with no masses.  Bladder nontender. Vagina moist and well rugated without lesions or discharge. Cervix pink and without lesions. No significant cystocele or rectocele. Bimanual exam showed uterus normal size, shape, position, mobile and nontender. Adnexa without masses or tenderness. Urethra and bladder normal.  EXTREMITIES: No clubbing cyanosis or edema.    TIMEOUT PERFORMED  Patient identified, procedure confirmed, and allergies reviewed.      COLPOSCOPY:    UPT: negative    Female patient presents for colposcopy.    PRE-COLPOSCOPY PROCEDURE COUNSELING:  Discussed the abnormal pap test findings, HPV, need for colposcopy and possible biopsies to determine a diagnosis and plan of care, treatments available, the minimal risks of bleeding and infection with a colposcopy, alternatives to colposcopy and she agrees to proceed.    COLPOSCOPY EXAM:   TIME OUT PERFORMED. The cervix was visualized with a speculum. Acetic acid was applied.  The entire tranformation zone could be adequately visualized.      White flat epithelium was present at 4,7,Location.  Mosaic pattern was not present.    Punctation was not present.    Abnormal vessels were not present.    Biopsy performed at 4,7 Location.    ECC - Not done.    Specimens placed in formalin and sent to pathology. Monsel's solution was applied at biopsy sites to stop bleeding.    The speculum was removed.  The patient tolerated the procedure well.    IMPRESSION:   Abnormal Pap 795.09    Colposcopy Impression:  Satisfactory:  SUE 1    POST COLPOSCOPY COUNSELING:   Manage post colposcopy cramping with NSAIDs, Tylenol or Rx per [employer].  Avoid anything in vagina (intercourse, douching, tampons) one week after the procedure.  Expect a clumpy blackish vaginal discharge (Monsel's solution) for several days.  Report bleeding heavier than a period, worsening pain, fever > 101.0 F, or foul-smelling vaginal discharge.  HPV vaccine recommended according to FDA age guidelines.  Importance of follow-up stressed.    Counseling lasted approximately 15 minutes and all her questions were answered.    FOLLOW-UP:   Based on biopsy results.      ASSESSMENT/PLAN:  Endometriosis  -     US Pelvis Complete Non OB; Future; Expected date: 12/07/2018    Pelvic pain  -     US Pelvis Complete Non OB; Future; Expected date: 12/07/2018    Dyspareunia in female  -     US Pelvis Complete Non OB; Future; Expected date: 12/07/2018        15  minutes spent today with this patient. Greater than half spent in counseling today.     RTC 4 weeks with period and pain diary.

## 2018-12-07 NOTE — TELEPHONE ENCOUNTER
----- Message from Livia Keith sent at 12/7/2018  1:01 PM CST -----  Contact: Patient  Type: Needs Medical Advice    Who Called:  Patient  Symptoms (please be specific):  na  How long has patient had these symptoms:  erlinda  Pharmacy name and phone #:  [facility]  Best Call Back Number: [phone] (home)    Additional Information: Patient is unsure if she needs to come for her appointment today due to previous conversations. Please call to advise.

## 2018-12-10 ENCOUNTER — HOSPITAL ENCOUNTER (OUTPATIENT)
Dept: RADIOLOGY | Facility: HOSPITAL | Age: 23
Discharge: HOME | End: 2018-12-10
Attending: OBSTETRICS & GYNECOLOGY
Payer: COMMERCIAL

## 2018-12-10 DIAGNOSIS — R10.2 PELVIC PAIN: ICD-10-CM

## 2018-12-10 DIAGNOSIS — N80.9 ENDOMETRIOSIS: ICD-10-CM

## 2018-12-10 DIAGNOSIS — N94.10 DYSPAREUNIA IN FEMALE: ICD-10-CM

## 2018-12-10 PROCEDURE — 76856 US EXAM PELVIC COMPLETE: CPT | Mod: TC,PN

## 2018-12-10 PROCEDURE — 76856 US EXAM PELVIC COMPLETE: CPT | Mod: 26,,, | Performed by: RADIOLOGY

## 2018-12-10 PROCEDURE — 76830 TRANSVAGINAL US NON-OB: CPT | Mod: 26,,, | Performed by: RADIOLOGY

## 2018-12-18 ENCOUNTER — TELEPHONE (OUTPATIENT)
Dept: OBSTETRICS AND GYNECOLOGY | Facility: CLINIC | Age: 23
End: 2018-12-18

## 2018-12-18 NOTE — TELEPHONE ENCOUNTER
----- Message from Linda Epstein sent at 2018  4:36 PM CST -----  Contact: Holly  Type: Needs Medical Advice    Who Called:  patient  Pharmacy name and phone #:    [id number] - [place], [place] - [address] AT [facility] [address] [place] [address]  [address] [address]  Phone: [phone] Fax: [phone]    Best Call Back Number: [phone]  Additional Information: requesting biopsy results from last Friday--patient would also like to know what else other than Tylenol she can take while breastfeeding her --tylenol isn't helping at all at this point--please advise--thank you

## 2018-12-20 ENCOUNTER — TELEPHONE (OUTPATIENT)
Dept: FAMILY MEDICINE | Facility: CLINIC | Age: 23
End: 2018-12-20

## 2018-12-20 ENCOUNTER — OFFICE VISIT (OUTPATIENT)
Dept: PRIMARY CARE CLINIC | Facility: CLINIC | Age: 23
End: 2018-12-20
Payer: COMMERCIAL

## 2018-12-20 ENCOUNTER — OFFICE VISIT (OUTPATIENT)
Dept: OBSTETRICS AND GYNECOLOGY | Facility: CLINIC | Age: 23
End: 2018-12-20
Payer: COMMERCIAL

## 2018-12-20 VITALS
DIASTOLIC BLOOD PRESSURE: 72 MMHG | HEART RATE: 76 BPM | BODY MASS INDEX: 23.69 KG/M2 | WEIGHT: 156.31 LBS | OXYGEN SATURATION: 100 % | HEIGHT: 68 IN | TEMPERATURE: 98 F | SYSTOLIC BLOOD PRESSURE: 108 MMHG

## 2018-12-20 VITALS — BODY MASS INDEX: 23.87 KG/M2 | WEIGHT: 156.94 LBS

## 2018-12-20 DIAGNOSIS — N92.1 BREAKTHROUGH BLEEDING ON NEXPLANON: Primary | ICD-10-CM

## 2018-12-20 DIAGNOSIS — F41.8 ANXIOUS DEPRESSION: ICD-10-CM

## 2018-12-20 DIAGNOSIS — R06.2 WHEEZING: ICD-10-CM

## 2018-12-20 DIAGNOSIS — J45.21 MILD INTERMITTENT ASTHMA WITH ACUTE EXACERBATION: ICD-10-CM

## 2018-12-20 DIAGNOSIS — N87.0 DYSPLASIA OF CERVIX, LOW GRADE (CIN 1): ICD-10-CM

## 2018-12-20 DIAGNOSIS — Z97.5 BREAKTHROUGH BLEEDING ON NEXPLANON: Primary | ICD-10-CM

## 2018-12-20 DIAGNOSIS — R10.2 PELVIC PAIN: ICD-10-CM

## 2018-12-20 DIAGNOSIS — J01.00 ACUTE MAXILLARY SINUSITIS, RECURRENCE NOT SPECIFIED: ICD-10-CM

## 2018-12-20 DIAGNOSIS — J45.31 MILD PERSISTENT ASTHMA WITH EXACERBATION: Primary | ICD-10-CM

## 2018-12-20 PROCEDURE — 3008F BODY MASS INDEX DOCD: CPT | Mod: CPTII,S$GLB,, | Performed by: OBSTETRICS & GYNECOLOGY

## 2018-12-20 PROCEDURE — 3008F BODY MASS INDEX DOCD: CPT | Mod: CPTII,S$GLB,, | Performed by: NURSE PRACTITIONER

## 2018-12-20 PROCEDURE — 99999 PR PBB SHADOW E&M-EST. PATIENT-LVL V: CPT | Mod: PBBFAC,,, | Performed by: NURSE PRACTITIONER

## 2018-12-20 PROCEDURE — 99999 PR PBB SHADOW E&M-EST. PATIENT-LVL III: CPT | Mod: PBBFAC,,, | Performed by: OBSTETRICS & GYNECOLOGY

## 2018-12-20 PROCEDURE — 96372 THER/PROPH/DIAG INJ SC/IM: CPT | Mod: S$GLB,,, | Performed by: NURSE PRACTITIONER

## 2018-12-20 PROCEDURE — 99213 OFFICE O/P EST LOW 20 MIN: CPT | Mod: S$GLB,,, | Performed by: OBSTETRICS & GYNECOLOGY

## 2018-12-20 PROCEDURE — 99214 OFFICE O/P EST MOD 30 MIN: CPT | Mod: 25,S$GLB,, | Performed by: NURSE PRACTITIONER

## 2018-12-20 RX ORDER — PREDNISONE 20 MG/1
TABLET ORAL
Qty: 18 TABLET | Refills: 0 | Status: SHIPPED | OUTPATIENT
Start: 2018-12-20 | End: 2019-01-17

## 2018-12-20 RX ORDER — METHYLPREDNISOLONE SOD SUCC 125 MG
125 VIAL (EA) INJECTION
Status: COMPLETED | OUTPATIENT
Start: 2018-12-20 | End: 2018-12-20

## 2018-12-20 RX ORDER — LEVALBUTEROL INHALATION SOLUTION 1.25 MG/3ML
1 SOLUTION RESPIRATORY (INHALATION) 3 TIMES DAILY PRN
Qty: 75 ML | Refills: 0 | Status: SHIPPED | OUTPATIENT
Start: 2018-12-20 | End: 2021-02-05 | Stop reason: ALTCHOICE

## 2018-12-20 RX ORDER — FLUTICASONE PROPIONATE AND SALMETEROL 100; 50 UG/1; UG/1
1 POWDER RESPIRATORY (INHALATION) 2 TIMES DAILY
Qty: 60 EACH | Refills: 2 | Status: SHIPPED | OUTPATIENT
Start: 2018-12-20 | End: 2019-07-09

## 2018-12-20 RX ORDER — SERTRALINE HYDROCHLORIDE 100 MG/1
100 TABLET, FILM COATED ORAL DAILY
Qty: 30 TABLET | Refills: 2 | Status: SHIPPED | OUTPATIENT
Start: 2018-12-20 | End: 2019-04-01 | Stop reason: SDUPTHER

## 2018-12-20 RX ORDER — CEFDINIR 300 MG/1
300 CAPSULE ORAL 2 TIMES DAILY
Qty: 20 CAPSULE | Refills: 0 | Status: SHIPPED | OUTPATIENT
Start: 2018-12-20 | End: 2018-12-30

## 2018-12-20 RX ORDER — LEVALBUTEROL TARTRATE 45 UG/1
1-2 AEROSOL, METERED ORAL EVERY 4 HOURS PRN
Qty: 1 INHALER | Refills: 1 | Status: SHIPPED | OUTPATIENT
Start: 2018-12-20 | End: 2019-07-09

## 2018-12-20 RX ORDER — MONTELUKAST SODIUM 10 MG/1
10 TABLET ORAL NIGHTLY
Qty: 30 TABLET | Refills: 0 | Status: SHIPPED | OUTPATIENT
Start: 2018-12-20 | End: 2019-01-19

## 2018-12-20 RX ORDER — LEVOCETIRIZINE DIHYDROCHLORIDE 5 MG/1
5 TABLET, FILM COATED ORAL NIGHTLY
Qty: 30 TABLET | Refills: 11 | Status: SHIPPED | OUTPATIENT
Start: 2018-12-20 | End: 2019-01-17

## 2018-12-20 RX ADMIN — Medication 125 MG: at 05:12

## 2018-12-20 NOTE — Clinical Note
Timothy Nieves MD,  I saw your patient today in the [facility].  If you have any questions, please do not hesitate to contact me.  Thank you!  JUDD Bullard

## 2018-12-20 NOTE — PROGRESS NOTES
Pt received 125mg Solu-Medrol injection IM in RUQ of buttocks. Pt tolerated procedure well. Pt stayed 15 min after the injection for observation with no adverse side effects.

## 2018-12-20 NOTE — PROGRESS NOTES
Chief Complaint   Patient presents with    Results     discuss tx for endometriosis and LEEP she'd like to do it at same time     Asthma     Pt needs oral steroids for asthma flare but wants to know if that is ok for breast feeding    Vaginal Bleeding     bleeding since nexplanon placement       History of Present Illness: Holly Nichols is a 23 y.o. female that presents today 12/20/2018 for   Chief Complaint   Patient presents with    Results     discuss tx for endometriosis and LEEP she'd like to do it at same time     Asthma     Pt needs oral steroids for asthma flare but wants to know if that is ok for breast feeding    Vaginal Bleeding     bleeding since nexplanon placement         Past Medical History:   Diagnosis Date    Abnormal Pap smear of cervix     Allergy     Asthma     last used inhaler 8/2018    Constipation     Endometriosis     Fibromyalgia     Gastritis     HLA B27 (HLA B27 positive)     Migraine     Scoliosis     Smoker     stopped in January 2018       Past Surgical History:   Procedure Laterality Date    ADENOIDECTOMY      APPENDECTOMY  09/2014    endometriosis    CHOLECYSTECTOMY  12/20/2017    Dr. MAR[facility]LLA Anne, [facility]     CHOLECYSTECTOMY-LAPAROSCOPIC W/ CHOLANGIOGRAM N/A 12/20/2017    Performed by Kamran Anne MD at [facility] OR    COLONOSCOPY N/A 5/19/2016    Procedure: COLONOSCOPY;  Surgeon: Jarret Zhao Jr., MD;  Location: [facility];  Service: Endoscopy;  Laterality: N/A;    COLONOSCOPY N/A 5/19/2016    Performed by Jarret [facility] MD at [facility] ENDO    ESOPHAGOGASTRODUODENOSCOPY (EGD) N/A 5/19/2016    Performed by Jarret Zhao Jr., MD at [facility] ENDO    MOUTH SURGERY      x2    PELVIC LAPAROSCOPY      TONSILLECTOMY  07/2016    TONSILLECTOMY Bilateral 7/15/2016    Performed by Franc[facility] at [facility] OR [address] [place] [address]    UPPER GASTROINTESTINAL ENDOSCOPY  05/2016    Dr. Zhao       Current Outpatient Medications   Medication Sig Dispense Refill    levalbuterol  (XOPENEX HFA) 45 mcg/actuation inhaler Inhale 1-2 puffs into the lungs every 4 (four) hours as needed for Wheezing. 1 Inhaler 1    prenatal 25/iron fum/folic/dha (PRENATAL-1 ORAL) Take 1 tablet by mouth once daily.      sertraline (ZOLOFT) 50 MG tablet Take 1 tablet (50 mg total) by mouth once daily. 1/2 tablet for 7 days 30 tablet 2    esomeprazole (NEXIUM) 40 MG capsule Take 1 capsule (40 mg total) by mouth once daily. 30 capsule 1    levalbuterol (XOPENEX) 1.25 mg/3 mL nebulizer solution Take 3 mLs (1.25 mg total) by nebulization 3 (three) times daily as needed for Wheezing. Rescue 75 mL 0    norgestrel-ethinyl estradiol (LO/OVRAL) 0.3-30 mg-mcg per tablet Take 1 tablet by mouth once daily. 28 tablet 11    rizatriptan (MAXALT) 5 MG tablet Take 1 tablet (5 mg total) by mouth once as needed for Migraine. 30 tablet 2    sertraline (ZOLOFT) 100 MG tablet Take 1 tablet (100 mg total) by mouth once daily. 30 tablet 2     No current facility-administered medications for this visit.        Review of patient's allergies indicates:   Allergen Reactions    Toradol [ketorolac] Other (See Comments)     paralysis    Tramadol Other (See Comments)     paralysis    Penicillins Hives and Rash    Sulfa (sulfonamide antibiotics) Hives and Rash       Family History   Problem Relation Age of Onset    Fibromyalgia Mother     Depression Mother     Diabetes Father     Depression Father     Mental illness Father         bipolar disorder    Multiple sclerosis Maternal Aunt     Crohn's disease Maternal Grandmother     Colon cancer Neg Hx     Colon polyps Neg Hx     Ulcerative colitis Neg Hx     Stomach cancer Neg Hx     Esophageal cancer Neg Hx     Breast cancer Neg Hx        Social History     Tobacco Use    Smoking status: Former Smoker     Packs/day: 0.50     Types: Cigarettes     Last attempt to quit: 2018     Years since quittin.9    Smokeless tobacco: Never Used   Substance Use Topics    Alcohol use:  No     Alcohol/week: 0.0 oz     Comment: social    Drug use: No       OB History    Para Term  AB Living   1 1 1     1   SAB TAB Ectopic Multiple Live Births         0 1      # Outcome Date GA Lbr Beny/2nd Weight Sex Delivery Anes PTL Lv   1 Term 18 39w1d 12:11 / 00:20 3.459 kg (7 lb 10 oz) F Vag-Spont EPI N RACHELE          Review of Symptoms:  GENERAL: Denies weight gain or weight loss. Feeling well overall.   SKIN: Denies rash or lesions.   HEAD: Denies head injury or headache.   NODES: Denies enlarged lymph nodes.   CHEST: Denies chest pain or shortness of breath.   CARDIOVASCULAR: Denies palpitations or left sided chest pain.   ABDOMEN: No abdominal pain, constipation, diarrhea, nausea, vomiting or rectal bleeding.   URINARY: No frequency, dysuria, hematuria, or burning on urination.  HEMATOLOGIC: No easy bruisability or excessive bleeding.   MUSCULOSKELETAL: Denies joint pain or swelling.     Wt 71.2 kg (156 lb 15.5 oz)   LMP 2018 (Approximate)   Physical Exam:  APPEARANCE: Well nourished, well developed, in no acute distress.  SKIN: Normal skin turgor, no lesions.  NECK: Neck symmetric without masses   RESPIRATORY: Normal respiratory effort with no retractions or use of accessory muscles  CARDIOVASCULAR: Peripheral vascular system with no swelling no varicosities and palpation of pulses normal  LYMPHATIC: No enlargements of the lymph nodes noted in the neck, axillae, or groin  ABDOMEN: Soft. No tenderness or masses. No hepatosplenomegaly. No hernias.  EXTREMITIES: No clubbing cyanosis or edema.    ASSESSMENT/PLAN:  Breakthrough bleeding on Nexplanon  -     norgestrel-ethinyl estradiol (LO/OVRAL) 0.3-30 mg-mcg per tablet; Take 1 tablet by mouth once daily.  Dispense: 28 tablet; Refill: 11    Anxious depression  -     sertraline (ZOLOFT) 100 MG tablet; Take 1 tablet (100 mg total) by mouth once daily.  Dispense: 30 tablet; Refill: 2    Dysplasia of cervix, low grade (SUE 1)    Pelvic  pain      We discussed LEEP for prolonged SUE 1    15 minutes spent today with this patient. Greater than half spent in counseling today.

## 2018-12-20 NOTE — LETTER
December 20, 2018    Holly Nichols  [address] [facility] [address]         [facility]  [address] [facility] [address]  Phone: [phone] December 20, 2018     Patient: Holly Nichols   YOB: 1995   Date of Visit: 12/20/2018       To Whom It May Concern:    It is my medical opinion that Holly Nichols {Work release (duty restriction):19106}.    If you have any questions or concerns, please don't hesitate to call.    Sincerely,        Doretha Fleming, NP

## 2018-12-20 NOTE — PROGRESS NOTES
Holly Nichols is a 23 y.o. female patient of Timothy Nieves MD who presents to the clinic today for   Chief Complaint   Patient presents with    Asthma    Sinus Problem   .    HPI    Pt, who is not known to me, reports a new problem to me: asthma exacerbation .  Normally gets a steroid injection and steroid pack when she gets this but last time it didn't seem to work (11/18/18)  Nasal congestion, intermittent cough, bilat ear pain.  No fever or ST.  Is breastfeeding.  Talked with OB/GYN today and asked about steroid use in breastfeeding.  Dr. Easley told her a burst of prednisone would be ok for her asthma exac, she states.    These symptoms began 11/11/18 and status is recurrent..     Symptoms are + acute exac of moderate persistent asthma.    Pt denies the following symptoms:  Fever, ST    Aggravating factors include none known .    Relieving factors include Xopenex (temporary), sleeping with head elevated..    OTC Medications tried are Zyrtec.    Prescription medications taken for symptoms are Xopenex nebs--helps for a time.    Pertinent medical history:  H/o asthma but never has persisted this long.    Typically gets a steroid injection and taper with AB for her asthma exac and that clears it up.  11/23/18 seen by NP Isiah for same.  She didn't take the AB (states cefuroxime makes her feel bad) and didn't received steroid taper r/t breastfeeding.  Was directed to check with OB about it and did see OB today, okayd for taper, she states.    Smoking status:  quit    ROS    Constitutional:   No  fever, + fatigue.    Head:   No headache  Ears:   bilat pain.  Eyes:  No sxs  Nose:   + sinus pain, + congestion, + runny nose, + post nasal drip.  Throat:  No ST pain.    Heart:  No palpitations, chest pain.    Lungs:  + difficulty breathing, + coughing, no sputum production, + wheezing.    GI/:  No sxs    MS:  No new bone, joint or muscle problems.    Skin:  No rashes, itching.      PAST MEDICAL  HISTORY:  Past Medical History:   Diagnosis Date    Abnormal Pap smear of cervix     Allergy     Asthma     last used inhaler 2018    Constipation     Endometriosis     Fibromyalgia     Gastritis     HLA B27 (HLA B27 positive)     Migraine     Scoliosis     Smoker     stopped in 2018       PAST SURGICAL HISTORY:  Past Surgical History:   Procedure Laterality Date    ADENOIDECTOMY      APPENDECTOMY  2014    endometriosis    CHOLECYSTECTOMY  2017    [facility]. MARBELLA Anne, [facility]     CHOLECYSTECTOMY-LAPAROSCOPIC W/ CHOLANGIOGRAM N/A 2017    Performed by Kamran Anne [facility] at [facility] OR    COLONOSCOPY N/A 2016    Procedure: COLONOSCOPY;  Surgeon: Jarret Zhao Jr., MD;  Location: [facility];  Service: Endoscopy;  Laterality: N/A;    COLONOSCOPY N/A 2016    [facility]O    ESOPHAGOGASTRODUODENOSCOPY (EGD) N/A 2016    [facility] at [facility] ENDO    MOUTH SURGERY      x2    PELVIC LAPAROSCOPY      TONSILLECTOMY  2016    TONSILLECTOMY Bilateral 7/15/2016    Per[facility] by [facility] at [facility] OR [address] [place] [address]    UPPER GASTROINTESTINAL ENDOSCOPY  2016    Dr. Zhao       SOCIAL HISTORY:  Social History     Socioeconomic History    Marital status:      Spouse name: Not on file    Number of children: 0    Years of education: Not on file    Highest education level: Not on file   Social Needs    Financial resource strain: Not on file    Food insecurity - worry: Not on file    Food insecurity - inability: Not on file    Transportation needs - medical: Not on file    Transportation needs - non-medical: Not on file   Occupational History    Occupation: hotel management   Tobacco Use    Smoking status: Former Smoker     Packs/day: 0.50     Types: Cigarettes     Last attempt to quit: 2018     Years since quittin.9    Smokeless tobacco: Never Used   Substance and Sexual Activity    Alcohol  use: No     Alcohol/week: 0.0 oz     Comment: social    Drug use: No    Sexual activity: Yes     Partners: Male   Other Topics Concern    Not on file   Social History Narrative    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  Review of patient's allergies indicates:   Allergen Reactions    Toradol [ketorolac] Other (See Comments)     paralysis    Tramadol Other (See Comments)     paralysis    Penicillins Hives and Rash    Sulfa (sulfonamide antibiotics) Hives and Rash     Current Outpatient Medications   Medication Sig Dispense Refill    esomeprazole (NEXIUM) 40 MG capsule Take 1 capsule (40 mg total) by mouth once daily. 30 capsule 1    levalbuterol (XOPENEX HFA) 45 mcg/actuation inhaler Inhale 1-2 puffs into the lungs every 4 (four) hours as needed for Wheezing. 1 Inhaler 1    levalbuterol (XOPENEX) 1.25 mg/3 mL nebulizer solution Take 3 mLs (1.25 mg total) by nebulization 3 (three) times daily as needed for Wheezing. Rescue 75 mL 0    norgestrel-ethinyl estradiol (LO/OVRAL) 0.3-30 mg-mcg per tablet Take 1 tablet by mouth once daily. 28 tablet 11    prenatal 25/iron fum/folic/dha (PRENATAL-1 ORAL) Take 1 tablet by mouth once daily.      rizatriptan (MAXALT) 5 MG tablet Take 1 tablet (5 mg total) by mouth once as needed for Migraine. 30 tablet 2    sertraline (ZOLOFT) 100 MG tablet Take 1 tablet (100 mg total) by mouth once daily. 30 tablet 2    sertraline (ZOLOFT) 50 MG tablet Take 1 tablet (50 mg  total) by mouth once daily. 1/2 tablet for 7 days 30 tablet 2     No current facility-administered medications for this visit.              PHYSICAL EXAM    Alert, coop 23 y.o. female patient in no acute distress.    Vitals:    12/20/18 1628   BP: 108/72   Pulse: 76   Temp: 98.4 °F (36.9 °C)     VS reviewed.  VS stable.  CC, nursing note, medications & PMH all reviewed today.    Head:  Normocephalic, atraumatic.    EENT:  EACs patent.  TMs no erythema, diffuse LR, bilat clear effusions (nonsupurative), no TM perforation.                              Eye lids normal, no discharge present.       Sinus tenderness to palp--frontal.               Nares--+ edema, + d/c present.    Pharynx not injected.                Tonsils not erythematous , not enlarged, no exudate present.    No anterior, no posterior cervical lymph nodes palpable.    No submental, submandibular or supraclavicular lymph nodes palp.             Resp:  Respirations even, unlabored.   Lungs CTA bilat.  Insp/exp wheezing.  No crackles.  Diminished air movement t/o    Heart:  RRR    MS:  Ambulates normally.             NEURO:  Alert and oriented x 4.  Responds appropriately during interaction.    Skin:  Warm, dry, color good.    Mild persistent asthma with exacerbation  -     predniSONE (DELTASONE) 20 MG tablet; 3 tabs daily for 3 days, 2 tabs daily for 3 days then 1 tab daily for 3 days.  Dispense: 18 tablet; Refill: 0  -     methylPREDNISolone sodium succinate injection 125 mg  -     levalbuterol (XOPENEX HFA) 45 mcg/actuation inhaler; Inhale 1-2 puffs into the lungs every 4 (four) hours as needed for Wheezing.  Dispense: 1 Inhaler; Refill: 1  -     montelukast (SINGULAIR) 10 mg tablet; Take 1 tablet (10 mg total) by mouth every evening.  Dispense: 30 tablet; Refill: 0  -     levocetirizine (XYZAL) 5 MG tablet; Take 1 tablet (5 mg total) by mouth every evening.  Dispense: 30 tablet; Refill: 11  -     Prior Authorization Order  -      fluticasone-salmeterol 100-50 mcg/dose (ADVAIR) 100-50 mcg/dose diskus inhaler; Inhale 1 puff into the lungs 2 (two) times daily. Controller  Dispense: 60 each; Refill: 2    Wheezing  -     levalbuterol (XOPENEX) 1.25 mg/3 mL nebulizer solution; Take 3 mLs (1.25 mg total) by nebulization 3 (three) times daily as needed for Wheezing. Rescue  Dispense: 75 mL; Refill: 0  -     Prior Authorization Order    Acute maxillary sinusitis, recurrence not specified  -     cefdinir (OMNICEF) 300 MG capsule; Take 1 capsule (300 mg total) by mouth 2 (two) times daily. for 10 days  Dispense: 20 capsule; Refill: 0    Pt today presents with 1 month of wheezing, coughing and difficulty breathing associated with asthma exac.  Seen 11/23/18 and received steroid injection but has not used her Advair (states she cannot remember).  Using Xopenex but it's not really working.  Today continues with insp/exp wheezing and coughing.  Also with frontal sinus tenderness.    This is a new problem to me.  No work up is planned.        Strongly encouraged to use Advair daily during any cold or allergy episode.  Refilled Xyzal and Singulair.  Pt advised to perform comfort measures recommended on patient instruction sheet .    If not better in 3 days, the patient is advised to call us.  If worse or concerns, the patient is advised to call us.  Explained exam findings, diagnosis and treatment plan to patient.  Questions answered and patient states understanding.

## 2018-12-20 NOTE — LETTER
December 20, 2018    Holly Nichols  [address] [facility] [address]         [facility]  [address] [facility] [address]  Phone: [phone] December 20, 2018     Patient: Holly Nichols   YOB: 1995   Date of Visit: 12/20/2018       To Whom It May Concern:    It is my medical opinion that Holly Nichols may return to work on 12/28/18.  .    If you have any questions or concerns, please don't hesitate to call.    Sincerely,        Doretha Fleming, NP

## 2018-12-20 NOTE — PATIENT INSTRUCTIONS
"  Asthma (Adult)  Asthma is a disease where the medium and  small air passages within the lung go into spasm and restrict the flow of air. Inflammation and swelling of the airways cause further restriction. During an acute asthma attack, these factors cause difficulty breathing, wheezing, cough and chest tightness.    An asthma attack can be triggered by many things. Common triggers include infections such as the common cold, bronchitis, pneumonia. Irritants such as smoke or pollutants in the air, emotional upset, and exercise can also trigger an attack. In many adults with asthma, allergies to dust, mold, pollen and animal dander can cause an asthma attack. Skipping doses of daily asthma medicine can also bring on an asthma attack.  Asthma can be controlled using the proper medicines prescribed by your healthcare provider and avoiding exposure to known triggers including allergens and irritants.  Home care  · Take prescribed medicine exactly at the times advised. If you need medicine such as from a hand held inhaler or aerosol breathing machine more than every 4 hours, contact your healthcare provider or seek immediate medical attention. If prescribed an antibiotic or prednisone, take all of the medicine as prescribed, even if you are feeling better after a few days.  · Do not smoke. Avoid being exposed to the smoke of others.  · Some people with asthma have worsening of their symptoms when they take aspirin and non-steroidal or fever-reducing medicines like ibuprofen and naproxen. Talk to your healthcare provider if you think this may apply to you.  Follow-up care  Follow up with your healthcare provider, or as advised. Always bring all of your current medicines to any appointments with your healthcare provider. Also bring a complete list of medications even those not taken for asthma. If you do not already have one, talk to your healthcare provider about developing a personalized "Asthma Action Plan."  A " pneumococcal (pneumonia) vaccine and yearly flu shot (every fall) are recommended. Ask your doctor about this.  When to seek medical advice  Call your healthcare provider right away if any of these occur:   · Increased wheezing or shortness of breath  · Need to use your inhalers more often than usual without relief  · Fever of 100.4ºF (38ºC) or higher, or as directed by your healthcare provider  · Coughing up lots of dark-colored or bloody sputum (mucus)  · Chest pain with each breath  · If you use a peak flow meter as part of an Asthma Action Plan, and you are still in the yellow zone (50% to 80%) 15 minutes after using inhaler medicine.  Call 911  Call 911 if any of the following occur  · Trouble walking or talking because of shortness of breath  · If you use a peak flow meter as part of an Asthma Action Plan and you are still in the red zone (less than 50%) 15 minutes after using inhaler medicine  · Lips or fingernails turning gray or blue  Date Last Reviewed: 12/2/2015  © 3023-9762 [employer]. [address] [place] [address]. All rights reserved. This information is not intended as a substitute for professional medical care. Always follow your healthcare professional's instructions.    Start using Advair.  Steroid injection today.  Take the prednisone as directed.  Omnicef for your sinuses.  Start back on Singulair, Xyzal.  Refills on Xopenex.    If you have any questions, please call.  You can reach us at [phone] Monday through Thursday (except holidays) 10 a.m. to 5 p.m. or call Dr. Nieves/JYOTI Joyce     Note:  I do not work on Fridays.  So if you have concerns or questions, please contact your primary care provider team or [facility] or go to the Urgent Care on Friday for concerns.     I recommend you go see Dr. Nieves and Dr. Bond about your worsening allergies and asthma.  Use the Advair with any cold or sniffle.    Thank you for using the [facility]!    RAYNE Bullard, CNP, FNP-BC  [facility]    To rate your experience with JUDD Bullard, click on the link below:        [email or web address]

## 2018-12-20 NOTE — TELEPHONE ENCOUNTER
----- Message from Erica Cardozo sent at 12/20/2018  2:53 PM CST -----  Contact: Patient  Type: Needs Medical Advice    Who Called:  Patient  Symptoms (please be specific):  Asthma   Best Call Back Number: [phone]  Additional Information: Calling to see if she can be seen today or if she can get medication called in to help with asthma.;

## 2018-12-28 NOTE — TELEPHONE ENCOUNTER
Called pt to reschedule appt no 09/15.  Dr. Zarate will not be seeing pt after 2pm.  Lm to return my call.    Quality 265: Biopsy Follow-Up: Biopsy results reviewed, communicated, tracked, and documented Quality 128: Preventive Care And Screening: Body Mass Index (Bmi) Screening And Follow-Up Plan: BMI is documented above normal parameters and a follow-up plan is documented Additional Notes: Patient will follow up up pcp for BMI and immunizations. Detail Level: Detailed Quality 111:Pneumonia Vaccination Status For Older Adults: Pneumococcal Vaccination not Administered or Previously Received, Reason not Otherwise Specified Quality 110: Preventive Care And Screening: Influenza Immunization: Influenza Immunization not Administered for Documented Reasons. Quality 226: Preventive Care And Screening: Tobacco Use: Screening And Cessation Intervention: Patient screened for tobacco and never smoked Quality 431: Preventive Care And Screening: Unhealthy Alcohol Use - Screening: Patient screened for unhealthy alcohol use using a single question and scores less than 2 times per year Quality 130: Documentation Of Current Medications In The Medical Record: Current Medications Documented

## 2019-01-04 ENCOUNTER — TELEPHONE (OUTPATIENT)
Dept: OBSTETRICS AND GYNECOLOGY | Facility: CLINIC | Age: 24
End: 2019-01-04

## 2019-01-04 NOTE — TELEPHONE ENCOUNTER
----- Message from Emmett Troncoso sent at 1/4/2019 11:14 AM CST -----  Type:  Patient Call Back    Who Called:  pt  Does the patient know what this is regarding?: needs to book a procedure   Best Call Back Number:  [phone]  Additional Information:  Please call pt and leave a detailed message if there is no answer.

## 2019-01-17 ENCOUNTER — OFFICE VISIT (OUTPATIENT)
Dept: OBSTETRICS AND GYNECOLOGY | Facility: CLINIC | Age: 24
End: 2019-01-17
Payer: COMMERCIAL

## 2019-01-17 VITALS
SYSTOLIC BLOOD PRESSURE: 121 MMHG | DIASTOLIC BLOOD PRESSURE: 73 MMHG | WEIGHT: 156.75 LBS | BODY MASS INDEX: 23.83 KG/M2

## 2019-01-17 DIAGNOSIS — J45.31 MILD PERSISTENT ASTHMA WITH EXACERBATION: ICD-10-CM

## 2019-01-17 DIAGNOSIS — N87.0 DYSPLASIA OF CERVIX, LOW GRADE (CIN 1): Primary | ICD-10-CM

## 2019-01-17 PROCEDURE — 99999 PR PBB SHADOW E&M-EST. PATIENT-LVL III: ICD-10-PCS | Mod: PBBFAC,,, | Performed by: OBSTETRICS & GYNECOLOGY

## 2019-01-17 PROCEDURE — 99499 UNLISTED E&M SERVICE: CPT | Mod: S$GLB,,, | Performed by: OBSTETRICS & GYNECOLOGY

## 2019-01-17 PROCEDURE — 57461 CONZ OF CERVIX W/SCOPE LEEP: CPT | Mod: S$GLB,,, | Performed by: OBSTETRICS & GYNECOLOGY

## 2019-01-17 PROCEDURE — 88307 TISSUE EXAM BY PATHOLOGIST: CPT | Performed by: PATHOLOGY

## 2019-01-17 PROCEDURE — 88307 TISSUE EXAM BY PATHOLOGIST: CPT | Mod: 26,,, | Performed by: PATHOLOGY

## 2019-01-17 PROCEDURE — 99999 PR PBB SHADOW E&M-EST. PATIENT-LVL III: CPT | Mod: PBBFAC,,, | Performed by: OBSTETRICS & GYNECOLOGY

## 2019-01-17 PROCEDURE — 57461 PR COLPOSCOPY,CERVIX W/ADJ VAG,W/LOOP CONIZ: ICD-10-PCS | Mod: S$GLB,,, | Performed by: OBSTETRICS & GYNECOLOGY

## 2019-01-17 PROCEDURE — 99499 NO LOS: ICD-10-PCS | Mod: S$GLB,,, | Performed by: OBSTETRICS & GYNECOLOGY

## 2019-01-17 PROCEDURE — 88307 TISSUE SPECIMEN TO PATHOLOGY, OBSTETRICS/GYNECOLOGY: ICD-10-PCS | Mod: 26,,, | Performed by: PATHOLOGY

## 2019-01-17 NOTE — PROGRESS NOTES
TIMEOUT PERFORMED  Patient identified, procedure confirmed, and allergies reviewed.     LEEP PROCEDURE:    Female patient  is here for LEEP.    DATA REVIEWED:  Last Pap:lgsil  Last Colposcopy biopsy result:cin1    PRE- LEEP PROCEDURE COUNSELING:  Risks of infection, bleeding, pain, injury to adjacent organs as well as future cervical incompetence.  That this procedure does not guarantee to completely remove all abnormal cells and that dysplasia may reoccur in the future.  Alternatives to the LEEP procedure were discussed and the patient agrees to proceed.    PROCEDURE:  TIME OUT PERFORMED.  The cervix and transformation zone were visualized with a speculum and a local block was obtained with 2 cc of 2% Xylocaine with epinephrine.  A medium wide size loop was selected.  The entire transformation zone was excised.  The base was cauterized with a ball cautery.  Monsels solution was applied to the base to obtain hemostasis and the speculum removed.  The specimen was placed in formalyn and sent to Pathology for a Histopathologic diagnosis.  The patient tolerated the procedure well.    ASSESSMENT:  1.Cervical dysplasia. 622.1.    POST LEEP PROCEDURE COUNSELING:  Manage post LEEP cramping with NSAIDs or Tylenol or Rx per [facility].  Avoid anything in vagina (intercourse, douching, tampons) two weeks after the Procedure.  Expect a watery grey to yellow vaginal discharge for several weeks.  Limit activity for 2 weeks.  Report bleeding heavier than a period, worsening pain, fever > 101.0 F, or foul-smelling vaginal discharge.  Importance of follow-up stressed.    Counseling lasted approximately 15 minutes and all her questions were answered.    FOLLOW-UP based on pathology results.

## 2019-01-18 RX ORDER — PREDNISONE 20 MG/1
TABLET ORAL
Qty: 18 TABLET | Refills: 0 | Status: SHIPPED | OUTPATIENT
Start: 2019-01-18 | End: 2019-05-08

## 2019-04-01 DIAGNOSIS — F41.8 ANXIOUS DEPRESSION: ICD-10-CM

## 2019-04-02 RX ORDER — SERTRALINE HYDROCHLORIDE 100 MG/1
100 TABLET, FILM COATED ORAL DAILY
Qty: 30 TABLET | Refills: 2 | Status: SHIPPED | OUTPATIENT
Start: 2019-04-02 | End: 2019-05-08

## 2019-05-08 ENCOUNTER — TELEPHONE (OUTPATIENT)
Dept: OBSTETRICS AND GYNECOLOGY | Facility: CLINIC | Age: 24
End: 2019-05-08

## 2019-05-08 ENCOUNTER — OFFICE VISIT (OUTPATIENT)
Dept: OBSTETRICS AND GYNECOLOGY | Facility: CLINIC | Age: 24
End: 2019-05-08
Payer: COMMERCIAL

## 2019-05-08 VITALS — SYSTOLIC BLOOD PRESSURE: 118 MMHG | DIASTOLIC BLOOD PRESSURE: 64 MMHG | WEIGHT: 145.5 LBS | BODY MASS INDEX: 22.12 KG/M2

## 2019-05-08 DIAGNOSIS — Z30.46 NEXPLANON REMOVAL: Primary | ICD-10-CM

## 2019-05-08 PROCEDURE — 11982 PR REMOVAL DRUG IMPLANT DEVICE: ICD-10-PCS | Mod: S$GLB,,, | Performed by: SPECIALIST

## 2019-05-08 PROCEDURE — 99213 OFFICE O/P EST LOW 20 MIN: CPT | Mod: 25,S$GLB,, | Performed by: SPECIALIST

## 2019-05-08 PROCEDURE — 99999 PR PBB SHADOW E&M-EST. PATIENT-LVL III: ICD-10-PCS | Mod: PBBFAC,,, | Performed by: SPECIALIST

## 2019-05-08 PROCEDURE — 11982 REMOVE DRUG IMPLANT DEVICE: CPT | Mod: S$GLB,,, | Performed by: SPECIALIST

## 2019-05-08 PROCEDURE — 3008F BODY MASS INDEX DOCD: CPT | Mod: CPTII,S$GLB,, | Performed by: SPECIALIST

## 2019-05-08 PROCEDURE — 99999 PR PBB SHADOW E&M-EST. PATIENT-LVL III: CPT | Mod: PBBFAC,,, | Performed by: SPECIALIST

## 2019-05-08 PROCEDURE — 99213 PR OFFICE/OUTPT VISIT, EST, LEVL III, 20-29 MIN: ICD-10-PCS | Mod: 25,S$GLB,, | Performed by: SPECIALIST

## 2019-05-08 PROCEDURE — 3008F PR BODY MASS INDEX (BMI) DOCUMENTED: ICD-10-PCS | Mod: CPTII,S$GLB,, | Performed by: SPECIALIST

## 2019-05-08 NOTE — PROGRESS NOTES
24 yo WF , Pt Dr Easley presents for requested Nexplanon removal.  Past Medical History:   Diagnosis Date    Abnormal Pap smear of cervix     Allergy     Asthma     last used inhaler 8/2018    Constipation     Endometriosis     Fibromyalgia     Gastritis     HLA B27 (HLA B27 positive)     Migraine     Scoliosis     Smoker     stopped in January 2018       Past Surgical History:   Procedure Laterality Date    ADENOIDECTOMY      APPENDECTOMY  09/2014    endometriosis    CHOLECYSTECTOMY  12/20/2017    Dr. MARBELLA Anne, [facility]     CHOLECYSTECTOMY-LAPAROSCOPIC W/ CHOLANGIOGRAM N/A 12/20/2017    Performed by Kamran Anne MD at [facility] OR    COLONOSCOPY N/A 5/19/2016    [facility] ENDO    ESOPHAGOGASTRODUODENOSCOPY (EGD) N/A 5/19/2016    [facility]ret [facility] at [facility] ENDO    MOUTH SURGERY      x2    PELVIC LAPAROSCOPY      TONSILLECTOMY  07/2016    TONSILLECTOMY Bilateral 7/15/2016    [facility] 2ND [facility]    UPPER GASTROINTESTINAL ENDOSCOPY  05/2016    Dr. Zhao       Family History   Problem Relation Age of Onset    Fibromyalgia Mother     Depression Mother     Diabetes Father     Depression Father     Mental illness Father         bipolar disorder    Multiple sclerosis Maternal Aunt     Crohn's disease Maternal Grandmother     Colon cancer Neg Hx     Colon polyps Neg Hx     Ulcerative colitis Neg Hx     Stomach cancer Neg Hx     Esophageal cancer Neg Hx     Breast cancer Neg Hx        Social History     Socioeconomic History    Marital status:      Spouse name: Not on file    Number of children: 0    Years of education: Not on file    Highest education level: Not on file   Occupational History    Occupation: hotel management   Social Needs    Financial resource strain: Not on file    Food insecurity:     Worry: Not on file     Inability: Not on file    Transportation needs:     Medical: Not on  file     Non-medical: Not on file   Tobacco Use    Smoking status: Former Smoker     Packs/day: 0.50     Types: Cigarettes     Last attempt to quit: 2018     Years since quittin.3    Smokeless tobacco: Never Used   Substance and Sexual Activity    Alcohol use: No     Alcohol/week: 0.0 oz     Comment: social    Drug use: No    Sexual activity: Yes     Partners: Male   Lifestyle    Physical activity:     Days per week: Not on file     Minutes per session: Not on file    Stress: Not on file   Relationships    Social connections:     Talks on phone: Not on file     Gets together: Not on file     Attends Zoroastrianism service: Not on file     Active member of club or organization: Not on file     Attends meetings of clubs or organizations: Not on file     Relationship status: Not on file   Other Topics Concern    Not on file   Social History Narrative    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 Controller 60 each 2    levalbuterol (XOPENEX HFA) 45 mcg/actuation inhaler Inhale 1-2 puffs into the lungs every 4 (four) hours as needed for Wheezing. 1 Inhaler 1    levalbuterol (XOPENEX) 1.25 mg/3 mL nebulizer solution Take 3 mLs (1.25 mg total) by nebulization 3 (three) times daily as needed for Wheezing. Rescue 75 mL 0    prenatal 25/iron fum/folic/dha (PRENATAL-1 ORAL) Take 1 tablet by mouth once daily.      esomeprazole (NEXIUM) 40 MG capsule Take 1 capsule (40 mg total) by mouth once daily. 30 capsule 1    rizatriptan (MAXALT) 5 MG tablet Take 1 tablet (5 mg total) by mouth once as needed for Migraine. 30 tablet 2     No current facility-administered medications for this visit.        Review of patient's allergies indicates:   Allergen Reactions    Toradol [ketorolac] Other (See Comments)     paralysis     Tramadol Other (See Comments)     paralysis    Penicillins Hives and Rash    Sulfa (sulfonamide antibiotics) Hives and Rash       Review of System:   General: no chills, fever, night sweats, weight gain or weight loss  Psychological: no depression or suicidal ideation  Breasts: no new or changing breast lumps, nipple discharge or masses.  Respiratory: no cough, shortness of breath, or wheezing  Cardiovascular: no chest pain or dyspnea on exertion  Gastrointestinal: no abdominal pain, change in bowel habits, or black or bloody stools  Genito-Urinary: no incontinence, urinary frequency/urgency or vulvar/vaginal symptoms, pelvic pain or abnormal vaginal bleeding.  Musculoskeletal: no gait disturbance or muscular weakness    Procedure  Nexplanon Removal     After informed consent, betadine prep over implant site followed by !% Lidocaine administered subQ over implant site. Small incision made over implant and mosquito hemostats used to grasp and remove implant w/o difficulty. Site bandaged and pt instructed to remove 30 min.       Plan RTO Dr Kaushal mcclain

## 2019-06-09 NOTE — TELEPHONE ENCOUNTER
----- Message from Inga Damian sent at 5/8/2019 11:44 AM CDT -----  Contact: self   Patient want to speak with a nurse regarding some medical questions please call back at [phone] (home)     
Spoke with patient and scheduled an appointment for her Nexplanon removal. Patient voiced understanding.   
abdominal pain

## 2019-07-05 ENCOUNTER — TELEPHONE (OUTPATIENT)
Dept: OBSTETRICS AND GYNECOLOGY | Facility: CLINIC | Age: 24
End: 2019-07-05

## 2019-07-05 NOTE — TELEPHONE ENCOUNTER
Has appt for July 12 for pap. ( first since leep)  In the meantime + UPT today.  LMP 06/04/19  Insurance wont be active until 8/1  Pt qusttions if okay to do pap then  States you told her last time to wait until after delivered.     Please advise

## 2019-07-05 NOTE — TELEPHONE ENCOUNTER
----- Message from Halima Simon sent at 7/5/2019 11:44 AM CDT -----  Type: Needs Medical Advice    Who Called:  Patient   Best Call Back Number: [phone]  Additional Information: patient is requesting a return call states she has questions she need answered she would did not want to give details.    Thank you

## 2019-07-09 ENCOUNTER — TELEPHONE (OUTPATIENT)
Dept: OBSTETRICS AND GYNECOLOGY | Facility: CLINIC | Age: 24
End: 2019-07-09

## 2019-07-09 ENCOUNTER — OFFICE VISIT (OUTPATIENT)
Dept: OBSTETRICS AND GYNECOLOGY | Facility: CLINIC | Age: 24
End: 2019-07-09

## 2019-07-09 VITALS
HEIGHT: 68 IN | WEIGHT: 149.69 LBS | BODY MASS INDEX: 22.69 KG/M2 | SYSTOLIC BLOOD PRESSURE: 120 MMHG | DIASTOLIC BLOOD PRESSURE: 70 MMHG

## 2019-07-09 DIAGNOSIS — R10.31 RIGHT LOWER QUADRANT ABDOMINAL PAIN: Primary | ICD-10-CM

## 2019-07-09 PROCEDURE — 99999 PR PBB SHADOW E&M-EST. PATIENT-LVL III: ICD-10-PCS | Mod: PBBFAC,,, | Performed by: OBSTETRICS & GYNECOLOGY

## 2019-07-09 PROCEDURE — 99213 OFFICE O/P EST LOW 20 MIN: CPT | Mod: S$PBB,,, | Performed by: OBSTETRICS & GYNECOLOGY

## 2019-07-09 PROCEDURE — 99213 OFFICE O/P EST LOW 20 MIN: CPT | Mod: PBBFAC,PN | Performed by: OBSTETRICS & GYNECOLOGY

## 2019-07-09 PROCEDURE — 99999 PR PBB SHADOW E&M-EST. PATIENT-LVL III: CPT | Mod: PBBFAC,,, | Performed by: OBSTETRICS & GYNECOLOGY

## 2019-07-09 PROCEDURE — 99213 PR OFFICE/OUTPT VISIT, EST, LEVL III, 20-29 MIN: ICD-10-PCS | Mod: S$PBB,,, | Performed by: OBSTETRICS & GYNECOLOGY

## 2019-07-09 NOTE — TELEPHONE ENCOUNTER
----- Message from Tory Saleem sent at 7/9/2019  9:30 AM CDT -----  Type: Needs Medical Advice    Who Called:  Patient  Pharmacy name and phone #:    [id number] - [place], [place] - [address] AT [facility] [address]   [address] [address]  Phone: [phone] Fax: [phone]    Best Call Back Number: [phone] (home)     Additional Information: Tested positive for pregnancy on 7/5/19 in a home test. States that her stomach started hurting really bad this morning. No bleeding, just a lot of pain. She said the nurse told her office would not see her until 8 weeks pregnant. Please call to advise.

## 2019-07-09 NOTE — Clinical Note
folow-up after 8/1 with [facility] Wednesday for quant, type and screen andCBCLab appt Friday for the other quant

## 2019-07-09 NOTE — PROGRESS NOTES
Holly Nichols is a 23 y.o. female with Patient's last menstrual period was 06/05/2019. and     Estimated Date of Delivery: 3/13/20  4.6 weeks with faint + today    1 day history of right sided abdominal pain and nausea and + UPT at home    Outpatient Medications Prior to Visit   Medication Sig Dispense Refill    prenatal 25/iron fum/folic/dha (PRENATAL-1 ORAL) Take 1 tablet by mouth once daily.      levalbuterol (XOPENEX) 1.25 mg/3 mL nebulizer solution Take 3 mLs (1.25 mg total) by nebulization 3 (three) times daily as needed for Wheezing. Rescue 75 mL 0    esomeprazole (NEXIUM) 40 MG capsule Take 1 capsule (40 mg total) by mouth once daily. 30 capsule 1    fluticasone-salmeterol 100-50 mcg/dose (ADVAIR) 100-50 mcg/dose diskus inhaler Inhale 1 puff into the lungs 2 (two) times daily. Controller 60 each 2    levalbuterol (XOPENEX HFA) 45 mcg/actuation inhaler Inhale 1-2 puffs into the lungs every 4 (four) hours as needed for Wheezing. 1 Inhaler 1    rizatriptan (MAXALT) 5 MG tablet Take 1 tablet (5 mg total) by mouth once as needed for Migraine. 30 tablet 2     No facility-administered medications prior to visit.        Review of patient's allergies indicates:   Allergen Reactions    Toradol [ketorolac] Other (See Comments)     paralysis    Tramadol Other (See Comments)     paralysis    Penicillins Hives and Rash    Sulfa (sulfonamide antibiotics) Hives and Rash       Past Medical History:   Diagnosis Date    Abnormal Pap smear of cervix     Allergy     Asthma     last used inhaler 8/2018    Constipation     Endometriosis     Fibromyalgia     Gastritis     HLA B27 (HLA B27 positive)     Migraine     Scoliosis     Smoker     stopped in January 2018       Past Surgical History:   Procedure Laterality Date    ADENOIDECTOMY      APPENDECTOMY  09/2014    endometriosis    CHOLECYSTECTOMY  12/20/2017    [facility]     CHOLECYSTECTOMY-LAPAROSCOPIC W/ CHOLANGIOGRAM N/A 12/20/2017     [facility]    COLONOSCOPY N/A 2016    [facility]O    ESOPHAGOGASTRODUODENOSCOPY (EGD) N/A 2016    [facility] at [facility] END[facility]    MOUTH SURGERY      x2    PELVIC LAPAROSCOPY      TONSILLECTOMY  2016    TONSILLECTOMY Bilateral 7/15/2016    [facility] 2ND FLR    UPPER GASTROINTESTINAL ENDOSCOPY  2016    Dr. Zhao       OB History    Para Term  AB Living   1 1 1     1   SAB TAB Ectopic Multiple Live Births         0 1      # Outcome Date GA Lbr Beny/2nd Weight Sex Delivery Anes PTL Lv   1 Term 18 39w1d 12:11 / 00:20 3.459 kg (7 lb 10 oz) F Vag-Spont EPI N RACHELE       Family History   Problem Relation Age of Onset    Fibromyalgia Mother     Depression Mother     Diabetes Father     Depression Father     Mental illness Father         bipolar disorder    Multiple sclerosis Maternal Aunt     Crohn's disease Maternal Grandmother     Colon cancer Neg Hx     Colon polyps Neg Hx     Ulcerative colitis Neg Hx     Stomach cancer Neg Hx     Esophageal cancer Neg Hx     Breast cancer Neg Hx     Ovarian cancer Neg Hx        Social History     Socioeconomic History    Marital status:      Spouse name: Not on file    Number of children: 0    Years of education: Not on file    Highest education level: Not on file   Occupational History    Occupation: [employer] management   Social Needs    Financial resource strain: Not on file    Food insecurity:     Worry: Not on file     Inability: Not on file    Transportation needs:     Medical: Not on file     Non-medical: Not on file   Tobacco Use    Smoking status: Former Smoker     Packs/day: 0.50     Types: Cigarettes     Last attempt to quit: 2018     Years since quittin.5    Smokeless tobacco: Never Used   Substance and Sexual Activity    Alcohol use: No     Alcohol/week: 0.0 oz      Comment: social    Drug use: No    Sexual activity: Yes     Partners: Male   Lifestyle    Physical activity:     Days per week: Not on file     Minutes per session: Not on file    Stress: Not on file   Relationships    Social connections:     Talks on phone: Not on file     Gets together: Not on file     Attends Yazidi service: Not on file     Active member of club or organization: Not on file     Attends meetings of clubs or organizations: Not on file     Relationship status: Not on file   Other Topics Concern    Not on file   Social History Narrative    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   SKIN: Denies rash or lesions.   HEAD: Denies head injury or headache.   NODES: Denies enlarged lymph nodes.   CHEST: Denies chest pain or shortness of breath.   CARDIOVASCULAR: Denies palpitations or left sided chest pain.   ABDOMEN: No abdominal pain, constipation, diarrhea, nausea, vomiting or rectal bleeding.   URINARY: No frequency, urgency, dysuria, or hematuria  MUSCULOSKELETAL: Denies joint pain or swelling.   NEUROLOGIC: Denies seizures.    Vitals:    07/09/19 1352   BP: 120/70     Physical Exam:   APPEARANCE: Well nourished, well developed, in no acute distress.  SKIN: Normal skin turgor, no lesions.  NECK: Neck symmetric without masses or thyromegaly.  NODES: No inguinal, cervical, axillary or femoral lymph node enlargement.  CHEST: Lungs clear to auscultation.  CARDIOVASCULAR: Normal S1, S2. No rubs, murmurs or gallops.  ABDOMEN: Soft. No tenderness or masses. No hepatosplenomegaly. No hernias.  PELVIC: Normal external female genitalia without lesions. Normal hair distribution. Adequate perineal body, normal urethral meatus. Normal palpable bladder and urethra. Vagina moist and well rugated without lesions or discharge. Cervix pink and without lesions. Bimanual exam showed uterus normal size, position, mobile and  nontender. Adnexa without masses and minimal right  tenderness.  PELVIS ADEQUATE  EXTREMITIES: NO clubbing cyanosis or edema      ULTRASOUND:   Transvaginal ultrasound performed in the usual fashion with 3.5mhz probe, stripe with small simple ovarian cyst on the right ovary  No free fluid in cul-de-sac    ASSESSMENT  Encounter Diagnoses   Name Primary?    Right lower quadrant abdominal pain Yes       PLAN  Orders Placed This Encounter   Procedures    hCG, quantitative    hCG, quantitative    CBC auto differential    Post Partum Type and Screen     Start prenatal vitamins  RETURN in 3 WEEKS for repeat USG

## 2019-07-17 ENCOUNTER — TELEPHONE (OUTPATIENT)
Dept: OBSTETRICS AND GYNECOLOGY | Facility: CLINIC | Age: 24
End: 2019-07-17

## 2019-07-17 NOTE — TELEPHONE ENCOUNTER
----- Message from Inés Beavers sent at 7/17/2019  1:48 PM CDT -----  Contact: self   Patient requesting to speak to nurse regarding she has question    Patient did not want to disclose more info      Please call patient at [phone] (home)

## 2019-07-17 NOTE — TELEPHONE ENCOUNTER
Pt c/o dark spotting today.   She was in on the 9th for pain with positive UPT.   She did not get the HCG labs done that were ordered.    Notified mild spotting in early pregnancy not uncommon but would ask Dr Ealsey for further advice    Please adivse.

## 2019-08-05 ENCOUNTER — TELEPHONE (OUTPATIENT)
Dept: OBSTETRICS AND GYNECOLOGY | Facility: CLINIC | Age: 24
End: 2019-08-05

## 2019-08-05 NOTE — TELEPHONE ENCOUNTER
----- Message from Linda Epstein sent at 8/5/2019  7:58 AM CDT -----  Contact: Holly  Type: Needs Medical Advice    Who Called:  patient  Pharmacy name and phone #:    [id number] - [place], [place] - [address] AT [facility] [address] [facility]  [address] [address] [address]  Phone: [phone] Fax: [phone]    Best Call Back Number: [phone]  Additional Information: requesting a call back as she states she feels completely dehydrated, feeling awful, & can't keep anything down at this point--states the smell of food makes her nauseous--wants to know if she needs to go to the hospital for fluids or can we suggest something for her to get & keep fluids? Please advise--thank you

## 2019-08-21 ENCOUNTER — ROUTINE PRENATAL (OUTPATIENT)
Dept: OBSTETRICS AND GYNECOLOGY | Facility: CLINIC | Age: 24
End: 2019-08-21
Payer: COMMERCIAL

## 2019-08-21 ENCOUNTER — LAB VISIT (OUTPATIENT)
Dept: LAB | Facility: HOSPITAL | Age: 24
End: 2019-08-21
Attending: OBSTETRICS & GYNECOLOGY
Payer: COMMERCIAL

## 2019-08-21 VITALS
BODY MASS INDEX: 23.13 KG/M2 | WEIGHT: 149.94 LBS | SYSTOLIC BLOOD PRESSURE: 110 MMHG | DIASTOLIC BLOOD PRESSURE: 66 MMHG

## 2019-08-21 DIAGNOSIS — Z34.91 NORMAL PREGNANCY IN FIRST TRIMESTER: Primary | ICD-10-CM

## 2019-08-21 DIAGNOSIS — N87.0 DYSPLASIA OF CERVIX, LOW GRADE (CIN 1): ICD-10-CM

## 2019-08-21 DIAGNOSIS — Z34.91 NORMAL PREGNANCY IN FIRST TRIMESTER: ICD-10-CM

## 2019-08-21 DIAGNOSIS — Z3A.08 8 WEEKS GESTATION OF PREGNANCY: ICD-10-CM

## 2019-08-21 LAB
BASOPHILS # BLD AUTO: 0.08 K/UL (ref 0–0.2)
BASOPHILS NFR BLD: 0.8 % (ref 0–1.9)
DIFFERENTIAL METHOD: ABNORMAL
EOSINOPHIL # BLD AUTO: 0.3 K/UL (ref 0–0.5)
EOSINOPHIL NFR BLD: 2.8 % (ref 0–8)
ERYTHROCYTE [DISTWIDTH] IN BLOOD BY AUTOMATED COUNT: 12.6 % (ref 11.5–14.5)
HCT VFR BLD AUTO: 41.2 % (ref 37–48.5)
HGB BLD-MCNC: 14 G/DL (ref 12–16)
IMM GRANULOCYTES # BLD AUTO: 0.05 K/UL (ref 0–0.04)
IMM GRANULOCYTES NFR BLD AUTO: 0.5 % (ref 0–0.5)
LYMPHOCYTES # BLD AUTO: 2.6 K/UL (ref 1–4.8)
LYMPHOCYTES NFR BLD: 27.6 % (ref 18–48)
MCH RBC QN AUTO: 30.4 PG (ref 27–31)
MCHC RBC AUTO-ENTMCNC: 34 G/DL (ref 32–36)
MCV RBC AUTO: 89 FL (ref 82–98)
MISCELLANEOUS TEST NAME: NORMAL
MONOCYTES # BLD AUTO: 0.5 K/UL (ref 0.3–1)
MONOCYTES NFR BLD: 5.1 % (ref 4–15)
NEUTROPHILS # BLD AUTO: 6 K/UL (ref 1.8–7.7)
NEUTROPHILS NFR BLD: 63.2 % (ref 38–73)
NRBC BLD-RTO: 0 /100 WBC
PLATELET # BLD AUTO: 178 K/UL (ref 150–350)
PMV BLD AUTO: 11.5 FL (ref 9.2–12.9)
RBC # BLD AUTO: 4.61 M/UL (ref 4–5.4)
WBC # BLD AUTO: 9.42 K/UL (ref 3.9–12.7)

## 2019-08-21 PROCEDURE — 86762 RUBELLA ANTIBODY: CPT

## 2019-08-21 PROCEDURE — 99213 OFFICE O/P EST LOW 20 MIN: CPT | Mod: 25,S$GLB,, | Performed by: OBSTETRICS & GYNECOLOGY

## 2019-08-21 PROCEDURE — 3008F PR BODY MASS INDEX (BMI) DOCUMENTED: ICD-10-PCS | Mod: CPTII,S$GLB,, | Performed by: OBSTETRICS & GYNECOLOGY

## 2019-08-21 PROCEDURE — 88175 CYTOPATH C/V AUTO FLUID REDO: CPT

## 2019-08-21 PROCEDURE — 99999 PR PBB SHADOW E&M-EST. PATIENT-LVL II: ICD-10-PCS | Mod: PBBFAC,,, | Performed by: OBSTETRICS & GYNECOLOGY

## 2019-08-21 PROCEDURE — 85025 COMPLETE CBC W/AUTO DIFF WBC: CPT

## 2019-08-21 PROCEDURE — 87340 HEPATITIS B SURFACE AG IA: CPT

## 2019-08-21 PROCEDURE — 86703 HIV-1/HIV-2 1 RESULT ANTBDY: CPT

## 2019-08-21 PROCEDURE — 84443 ASSAY THYROID STIM HORMONE: CPT

## 2019-08-21 PROCEDURE — 87077 CULTURE AEROBIC IDENTIFY: CPT

## 2019-08-21 PROCEDURE — 87491 CHLMYD TRACH DNA AMP PROBE: CPT

## 2019-08-21 PROCEDURE — 99213 PR OFFICE/OUTPT VISIT, EST, LEVL III, 20-29 MIN: ICD-10-PCS | Mod: 25,S$GLB,, | Performed by: OBSTETRICS & GYNECOLOGY

## 2019-08-21 PROCEDURE — 76817 PR US, OB, TRANSVAG APPROACH: ICD-10-PCS | Mod: S$GLB,,, | Performed by: OBSTETRICS & GYNECOLOGY

## 2019-08-21 PROCEDURE — 87186 SC STD MICRODIL/AGAR DIL: CPT

## 2019-08-21 PROCEDURE — 3008F BODY MASS INDEX DOCD: CPT | Mod: CPTII,S$GLB,, | Performed by: OBSTETRICS & GYNECOLOGY

## 2019-08-21 PROCEDURE — 99999 PR PBB SHADOW E&M-EST. PATIENT-LVL II: CPT | Mod: PBBFAC,,, | Performed by: OBSTETRICS & GYNECOLOGY

## 2019-08-21 PROCEDURE — 86901 BLOOD TYPING SEROLOGIC RH(D): CPT

## 2019-08-21 PROCEDURE — 76817 TRANSVAGINAL US OBSTETRIC: CPT | Mod: S$GLB,,, | Performed by: OBSTETRICS & GYNECOLOGY

## 2019-08-21 PROCEDURE — 86592 SYPHILIS TEST NON-TREP QUAL: CPT

## 2019-08-21 PROCEDURE — 36415 COLL VENOUS BLD VENIPUNCTURE: CPT | Mod: PO

## 2019-08-21 PROCEDURE — 87086 URINE CULTURE/COLONY COUNT: CPT

## 2019-08-21 PROCEDURE — 87088 URINE BACTERIA CULTURE: CPT

## 2019-08-21 NOTE — PROGRESS NOTES
Holly Nichols is a 24 y.o. female with Estimated Date of Delivery: 3/11/20   OB History    Para Term  AB Living   2 1 1 0 0 1   SAB TAB Ectopic Multiple Live Births   0 0 0 0 1        AT 11w0d  Here today on 2019 for   Chief Complaint   Patient presents with    Routine Prenatal Visit    us by md for dates       Current Outpatient Medications   Medication Sig Dispense Refill    prenatal 25/iron fum/folic/dha (PRENATAL-1 ORAL) Take 1 tablet by mouth once daily.      doxylamine-pyridoxine, vit B6, (DICLEGIS) 10-10 mg TbEC Take 2 tablets by mouth every evening. 14 tablet 0    levalbuterol (XOPENEX) 1.25 mg/3 mL nebulizer solution Take 3 mLs (1.25 mg total) by nebulization 3 (three) times daily as needed for Wheezing. Rescue 75 mL 0     No current facility-administered medications for this visit.      Review of patient's allergies indicates:   Allergen Reactions    Toradol [ketorolac] Other (See Comments)     paralysis    Tramadol Other (See Comments)     paralysis    Penicillins Hives and Rash    Sulfa (sulfonamide antibiotics) Hives and Rash       Past Medical History:   Diagnosis Date    Abnormal Pap smear of cervix     Allergy     Asthma     last used inhaler 2018    Constipation     Endometriosis     Fibromyalgia     Gastritis     HLA B27 (HLA B27 positive)     Migraine     Scoliosis     Smoker     stopped in 2018       Past Surgical History:   Procedure Laterality Date    ADENOIDECTOMY      APPENDECTOMY  2014    endometriosis    CHOLECYSTECTOMY  2017    [facility]LLA [facility], [facility]     CHOLECYSTECTOMY-LAPAROSCOPIC W/ CHOLANGIOGRAM N/A 2017    [facility] OR    COLONOSCOPY N/A 2016    [facility] ENDO    ESOPHAGOGASTRODUODENOSCOPY (EGD) N/A 2016    [facility] at [facility] ENDO    MOUTH SURGERY      x2    PELVIC LAPAROSCOPY      TONSILLECTOMY  2016     TONSILLECTOMY Bilateral 7/15/2016    [facility] 2ND FLR    UPPER GASTROINTESTINAL ENDOSCOPY  2016    Dr. Zhao       OB History    Para Term  AB Living   2 1 1     1   SAB TAB Ectopic Multiple Live Births         0 1      # Outcome Date GA Lbr Beny/2nd Weight Sex Delivery Anes PTL Lv   2 Current            1 Term 18 39w1d 12:11  00:20 3.459 kg (7 lb 10 oz) F Vag-Spont EPI N RACHELE       Social History     Socioeconomic History    Marital status:      Spouse name: Not on file    Number of children: 0    Years of education: Not on file    Highest education level: Not on file   Occupational History    Occupation: [employer] management   Social Needs    Financial resource strain: Not on file    Food insecurity:     Worry: Not on file     Inability: Not on file    Transportation needs:     Medical: Not on file     Non-medical: Not on file   Tobacco Use    Smoking status: Former Smoker     Packs/day: 0.50     Types: Cigarettes     Last attempt to quit: 2018     Years since quittin.6    Smokeless tobacco: Never Used   Substance and Sexual Activity    Alcohol use: No     Alcohol/week: 0.0 oz     Comment: social    Drug use: No    Sexual activity: Yes     Partners: Male   Lifestyle    Physical activity:     Days per week: Not on file     Minutes per session: Not on file    Stress: Not on file   Relationships    Social connections:     Talks on phone: Not on file     Gets together: Not on file     Attends Advent service: Not on file     Active member of club or organization: Not on file     Attends meetings of clubs or organizations: Not on file     Relationship status: Not on file   Other Topics Concern    Not on file   Social History Narrative    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   ABDOMEN: No abdominal pain, constipation,  diarrhea, nausea, vomiting or rectal bleeding.   URINARY: No frequency, urgency, dysuria, or hematuria      O POS    ASSESSMENT    Encounter Diagnoses   Name Primary?    8 weeks gestation of pregnancy Yes    Normal pregnancy in first trimester        PLAN  1.  RTC 4 weeks     I have reviewed the blood pressure, urine results, fetal heart rate check, fundal height and agree.

## 2019-08-22 LAB
ABO + RH BLD: NORMAL
BLD GP AB SCN CELLS X3 SERPL QL: NORMAL
C TRACH DNA SPEC QL NAA+PROBE: NOT DETECTED
HBV SURFACE AG SERPL QL IA: NEGATIVE
HIV 1+2 AB+HIV1 P24 AG SERPL QL IA: NEGATIVE
N GONORRHOEA DNA SPEC QL NAA+PROBE: NOT DETECTED
RPR SER QL: NORMAL
RUBV IGG SER-ACNC: 28.4 IU/ML
RUBV IGG SER-IMP: REACTIVE
TSH SERPL DL<=0.005 MIU/L-ACNC: 0.5 UIU/ML (ref 0.4–4)

## 2019-08-25 LAB — BACTERIA UR CULT: ABNORMAL

## 2019-08-26 ENCOUNTER — PATIENT MESSAGE (OUTPATIENT)
Dept: OBSTETRICS AND GYNECOLOGY | Facility: CLINIC | Age: 24
End: 2019-08-26

## 2019-08-26 ENCOUNTER — TELEPHONE (OUTPATIENT)
Dept: OBSTETRICS AND GYNECOLOGY | Facility: CLINIC | Age: 24
End: 2019-08-26

## 2019-08-26 RX ORDER — NITROFURANTOIN 25; 75 MG/1; MG/1
100 CAPSULE ORAL 2 TIMES DAILY
Qty: 14 CAPSULE | Refills: 0 | Status: SHIPPED | OUTPATIENT
Start: 2019-08-26 | End: 2019-09-02

## 2019-09-13 NOTE — PROCEDURES
Procedures     ULTRASOUND:   Ultrasound performed in the usual fashion, showing viable becerril intrauterine pregnancy, crown-rump length =  8,2 weeks with flicker,   consistent with   3/16/20 EDC   No free fluid in cul-de-sac or adnexal pathology.

## 2019-09-18 ENCOUNTER — ROUTINE PRENATAL (OUTPATIENT)
Dept: OBSTETRICS AND GYNECOLOGY | Facility: CLINIC | Age: 24
End: 2019-09-18
Payer: COMMERCIAL

## 2019-09-18 VITALS
WEIGHT: 153.25 LBS | SYSTOLIC BLOOD PRESSURE: 100 MMHG | BODY MASS INDEX: 23.64 KG/M2 | DIASTOLIC BLOOD PRESSURE: 60 MMHG

## 2019-09-18 DIAGNOSIS — Z34.92 NORMAL PREGNANCY IN SECOND TRIMESTER: ICD-10-CM

## 2019-09-18 DIAGNOSIS — Z3A.15 15 WEEKS GESTATION OF PREGNANCY: Primary | ICD-10-CM

## 2019-09-18 DIAGNOSIS — F41.9 ANXIETY: ICD-10-CM

## 2019-09-18 LAB
BILIRUB SERPL-MCNC: NORMAL MG/DL
BLOOD URINE, POC: NORMAL
COLOR, POC UA: NORMAL
GLUCOSE UR QL STRIP: NORMAL
KETONES UR QL STRIP: NORMAL
LEUKOCYTE ESTERASE URINE, POC: NORMAL
NITRITE, POC UA: NORMAL
PH, POC UA: 5
PROTEIN, POC: NORMAL
SPECIFIC GRAVITY, POC UA: NORMAL
UROBILINOGEN, POC UA: NORMAL

## 2019-09-18 PROCEDURE — 90686 IIV4 VACC NO PRSV 0.5 ML IM: CPT | Mod: S$GLB,,, | Performed by: OBSTETRICS & GYNECOLOGY

## 2019-09-18 PROCEDURE — 99999 PR PBB SHADOW E&M-EST. PATIENT-LVL III: CPT | Mod: PBBFAC,,, | Performed by: OBSTETRICS & GYNECOLOGY

## 2019-09-18 PROCEDURE — 0502F SUBSEQUENT PRENATAL CARE: CPT | Mod: CPTII,S$GLB,, | Performed by: OBSTETRICS & GYNECOLOGY

## 2019-09-18 PROCEDURE — 0502F PR SUBSEQUENT PRENATAL CARE: ICD-10-PCS | Mod: CPTII,S$GLB,, | Performed by: OBSTETRICS & GYNECOLOGY

## 2019-09-18 PROCEDURE — 90471 IMMUNIZATION ADMIN: CPT | Mod: S$GLB,,, | Performed by: OBSTETRICS & GYNECOLOGY

## 2019-09-18 PROCEDURE — 90686 FLU VACCINE (QUAD) GREATER THAN OR EQUAL TO 3YO PRESERVATIVE FREE IM: ICD-10-PCS | Mod: S$GLB,,, | Performed by: OBSTETRICS & GYNECOLOGY

## 2019-09-18 PROCEDURE — 99999 PR PBB SHADOW E&M-EST. PATIENT-LVL III: ICD-10-PCS | Mod: PBBFAC,,, | Performed by: OBSTETRICS & GYNECOLOGY

## 2019-09-18 PROCEDURE — 90471 FLU VACCINE (QUAD) GREATER THAN OR EQUAL TO 3YO PRESERVATIVE FREE IM: ICD-10-PCS | Mod: S$GLB,,, | Performed by: OBSTETRICS & GYNECOLOGY

## 2019-09-18 RX ORDER — LEVALBUTEROL TARTRATE 45 UG/1
AEROSOL, METERED ORAL
COMMUNITY
End: 2021-02-25 | Stop reason: SDUPTHER

## 2019-09-18 RX ORDER — CITALOPRAM 10 MG/1
10 TABLET ORAL DAILY
Qty: 30 TABLET | Refills: 2 | Status: SHIPPED | OUTPATIENT
Start: 2019-09-18 | End: 2019-12-24 | Stop reason: SDUPTHER

## 2019-09-18 NOTE — PROGRESS NOTES
Holly Nichols is a 24 y.o. female with Estimated Date of Delivery: 3/11/20   OB History    Para Term  AB Living   2 1 1 0 0 1   SAB TAB Ectopic Multiple Live Births   0 0 0 0 1        AT 15w0d  Here today on 2019 for   Chief Complaint   Patient presents with    Routine Prenatal Visit       Current Outpatient Medications   Medication Sig Dispense Refill    doxylamine-pyridoxine, vit B6, (DICLEGIS) 10-10 mg TbEC Take 2 tablets by mouth every evening. 14 tablet 0    levalbuterol (XOPENEX HFA) 45 mcg/actuation inhaler Inhale into the lungs.      levalbuterol (XOPENEX) 1.25 mg/3 mL nebulizer solution Take 3 mLs (1.25 mg total) by nebulization 3 (three) times daily as needed for Wheezing. Rescue 75 mL 0    prenatal 25/iron fum/folic/dha (PRENATAL-1 ORAL) Take 1 tablet by mouth once daily.       No current facility-administered medications for this visit.      Review of patient's allergies indicates:   Allergen Reactions    Toradol [ketorolac] Other (See Comments)     paralysis    Tramadol Other (See Comments)     paralysis    Penicillins Hives and Rash    Sulfa (sulfonamide antibiotics) Hives and Rash       Past Medical History:   Diagnosis Date    Abnormal Pap smear of cervix     Allergy     Asthma     last used inhaler 2018    Constipation     Endometriosis     Fibromyalgia     Gastritis     HLA B27 (HLA B27 positive)     Migraine     Scoliosis     Smoker     stopped in 2018       Past Surgical History:   Procedure Laterality Date    ADENOIDECTOMY      APPENDECTOMY  2014    endometriosis    CHOLECYSTECTOMY  2017    [facility], [facility]     CHOLECYSTECTOMY-LAPAROSCOPIC W/ CHOLANGIOGRAM N/A 2017    [facility] by [facility] at [facility] OR    COLONOSCOPY N/A 2016    [facility] END[facility]    ESOPHAGOGASTRODUODENOSCOPY (EGD) N/A 2016    Performed by [facility] at [facility] ENDO    MOUTH SURGERY      x2     PELVIC LAPAROSCOPY      TONSILLECTOMY  2016    TONSILLECTOMY Bilateral 7/15/2016    [facility]formed by [facility] at [facility] OR 2ND FLR    UPPER GASTROINTESTINAL ENDOSCOPY  2016    Dr. Zhao       OB History    Para Term  AB Living   2 1 1     1   SAB TAB Ectopic Multiple Live Births         0 1      # Outcome Date GA Lbr Beny/2nd Weight Sex Delivery Anes PTL Lv   2 Current            1 Term 18 39w1d 12:11 :20 3.459 kg (7 lb 10 oz) F Vag-Spont EPI N RACHELE       Social History     Socioeconomic History    Marital status:      Spouse name: Not on file    Number of children: 0    Years of education: Not on file    Highest education level: Not on file   Occupational History    Occupation: [employer] management   Social Needs    Financial resource strain: Not on file    Food insecurity:     Worry: Not on file     Inability: Not on file    Transportation needs:     Medical: Not on file     Non-medical: Not on file   Tobacco Use    Smoking status: Former Smoker     Packs/day: 0.50     Types: Cigarettes     Last attempt to quit: 2018     Years since quittin.7    Smokeless tobacco: Never Used   Substance and Sexual Activity    Alcohol use: No     Alcohol/week: 0.0 oz     Comment: social    Drug use: No    Sexual activity: Yes     Partners: Male   Lifestyle    Physical activity:     Days per week: Not on file     Minutes per session: Not on file    Stress: Not on file   Relationships    Social connections:     Talks on phone: Not on file     Gets together: Not on file     Attends Restorationism service: Not on file     Active member of club or organization: Not on file     Attends meetings of clubs or organizations: Not on file     Relationship status: Not on file   Other Topics Concern    Not on file   Social History Narrative    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   ABDOMEN: No abdominal pain, constipation, diarrhea, nausea, vomiting or rectal bleeding.   URINARY: No frequency, urgency, dysuria, or hematuria      O POS    ASSESSMENT    Encounter Diagnoses   Name Primary?    15 weeks gestation of pregnancy Yes    Anxiety     Normal pregnancy in second trimester        PLAN  1.  RTC  4 weeks     I have reviewed the blood pressure, urine results, fetal heart rate check, fundal height and agree.

## 2019-09-27 ENCOUNTER — TELEPHONE (OUTPATIENT)
Dept: OBSTETRICS AND GYNECOLOGY | Facility: CLINIC | Age: 24
End: 2019-09-27

## 2019-09-27 RX ORDER — BUTALBITAL, ACETAMINOPHEN AND CAFFEINE 50; 325; 40 MG/1; MG/1; MG/1
1 TABLET ORAL EVERY 4 HOURS PRN
Qty: 30 TABLET | Refills: 1 | Status: SHIPPED | OUTPATIENT
Start: 2019-09-27 | End: 2019-10-27

## 2019-09-27 NOTE — TELEPHONE ENCOUNTER
----- Message from Roxie Nye sent at 9/26/2019  4:54 PM CDT -----  Type: Needs Medical Advice    Who Called:  Patient   Symptoms (please be specific):  Migraines   Pharmacy name and phone #:    [id number] - [place], [place] - [address] [facility] [address] AT [place] [address] [address] [place] [address]  [address] [address] [address]  Phone: [phone] Fax: [phone]  Best Call Back Number: [phone]  Additional Information: pt stated needs something called in tylenol isn't working anymore ( all pregnant can take )

## 2019-09-27 NOTE — TELEPHONE ENCOUNTER
Pt notified. She states fioricet doesn't work for her. Advised that is all she can take while pregnant. She will try it and see if she gets any relief

## 2019-10-09 ENCOUNTER — OFFICE VISIT (OUTPATIENT)
Dept: FAMILY MEDICINE | Facility: CLINIC | Age: 24
End: 2019-10-09
Payer: COMMERCIAL

## 2019-10-09 ENCOUNTER — TELEPHONE (OUTPATIENT)
Dept: FAMILY MEDICINE | Facility: CLINIC | Age: 24
End: 2019-10-09

## 2019-10-09 VITALS
HEART RATE: 76 BPM | WEIGHT: 158.5 LBS | DIASTOLIC BLOOD PRESSURE: 68 MMHG | BODY MASS INDEX: 24.46 KG/M2 | OXYGEN SATURATION: 99 % | SYSTOLIC BLOOD PRESSURE: 110 MMHG

## 2019-10-09 DIAGNOSIS — J45.901 EXACERBATION OF ASTHMA, UNSPECIFIED ASTHMA SEVERITY, UNSPECIFIED WHETHER PERSISTENT: Primary | ICD-10-CM

## 2019-10-09 PROCEDURE — 99214 PR OFFICE/OUTPT VISIT, EST, LEVL IV, 30-39 MIN: ICD-10-PCS | Mod: S$GLB,,, | Performed by: PHYSICIAN ASSISTANT

## 2019-10-09 PROCEDURE — 99999 PR PBB SHADOW E&M-EST. PATIENT-LVL III: CPT | Mod: PBBFAC,,, | Performed by: PHYSICIAN ASSISTANT

## 2019-10-09 PROCEDURE — 99999 PR PBB SHADOW E&M-EST. PATIENT-LVL III: ICD-10-PCS | Mod: PBBFAC,,, | Performed by: PHYSICIAN ASSISTANT

## 2019-10-09 PROCEDURE — 99214 OFFICE O/P EST MOD 30 MIN: CPT | Mod: S$GLB,,, | Performed by: PHYSICIAN ASSISTANT

## 2019-10-09 PROCEDURE — 3008F PR BODY MASS INDEX (BMI) DOCUMENTED: ICD-10-PCS | Mod: CPTII,S$GLB,, | Performed by: PHYSICIAN ASSISTANT

## 2019-10-09 PROCEDURE — 3008F BODY MASS INDEX DOCD: CPT | Mod: CPTII,S$GLB,, | Performed by: PHYSICIAN ASSISTANT

## 2019-10-09 NOTE — PROGRESS NOTES
"Subjective:       Patient ID: Holly Nichols is a 24 y.o. female    Chief Complaint: Asthma (states she is having a flare up)    HPI  Patient is new to me, PCP is Dr. Nieves, she sees [facility] NP the most.  She is 18 weeks pregnant and is complaining of wheezing and shortness of breath that began last night.  She has a history of asthma that has been well controlled lately.  She did a breathing treatment last night which helped her a lot and today she did her rescue inhaler and is feeling better but still feeling some chest tightness and "burning in her lungs" .      Review of Systems   Constitutional: Negative for activity change, chills and fever.   HENT: Negative for congestion and sore throat.    Eyes: Negative for visual disturbance.   Respiratory: Positive for cough, shortness of breath and wheezing.    Cardiovascular: Negative for chest pain and palpitations.   Gastrointestinal: Negative for abdominal pain, diarrhea, nausea and vomiting.   Endocrine: Negative for polydipsia and polyuria.   Musculoskeletal: Negative for myalgias.   Skin: Negative for rash.   Neurological: Negative for headaches.   Psychiatric/Behavioral: The patient is not nervous/anxious.         Objective:   Physical Exam   Constitutional: She is oriented to person, place, and time. She appears well-developed and well-nourished. No distress.   HENT:   Head: Normocephalic and atraumatic.   Eyes: Pupils are equal, round, and reactive to light. EOM are normal.   Neck: Normal range of motion. Neck supple.   Cardiovascular: Normal rate, regular rhythm, normal heart sounds and intact distal pulses. Exam reveals no gallop and no friction rub.   No murmur heard.  Pulmonary/Chest: Effort normal and breath sounds normal. No respiratory distress. She has no wheezes. She has no rales. She exhibits no tenderness.   Musculoskeletal: Normal range of motion.   Neurological: She is alert and oriented to person, place, and time.   Skin: Skin is " warm and dry. No rash noted. She is not diaphoretic.   Psychiatric: She has a normal mood and affect. Her behavior is normal. Judgment and thought content normal.        Assessment:       1. Exacerbation of asthma, unspecified asthma severity, unspecified whether persistent          Plan:       Exacerbation of asthma, unspecified asthma severity, unspecified whether persistent  - excuse for work given the patient  - recommended continued home nebulizer treatments  - she will return to clinic as needed for any new or worsening symptoms

## 2019-10-09 NOTE — TELEPHONE ENCOUNTER
Spoke to pt. Pt states she is having an asthma flare up and is requesting to see someone today. Scheduled pt with PA. Pt verbalized understanding.

## 2019-10-09 NOTE — TELEPHONE ENCOUNTER
----- Message from Juvencio Cain sent at 10/9/2019  9:17 AM CDT -----  Type:  Same Day Appointment Request    Caller is requesting a same day appointment.  Caller declined first available appointment listed below.      Name of Caller:  Self   When is the first available appointment? Thursday 10/10/2019  Symptoms:  Asthma   Best Call Back Number:  [phone]  Additional Information:   Patient requesting to see Shania Joyce today for asthma

## 2019-10-09 NOTE — LETTER
October 9, 2019      [facility]  [address] [place] [address]  Phone: [phone]  Fax: [phone]       Patient: Holly Nichols   YOB: 1995  Date of Visit: 10/09/2019    To Whom It May Concern:    [place]  was at [facility] on 10/09/2019. She may return to work on 10/14/2019 with no restrictions. If you have any questions or concerns, or if I can be of further assistance, please do not hesitate to contact me.    Sincerely,    Marce Harris PA-C

## 2019-10-16 ENCOUNTER — HOSPITAL ENCOUNTER (OUTPATIENT)
Dept: RADIOLOGY | Facility: HOSPITAL | Age: 24
Discharge: HOME | End: 2019-10-16
Attending: OBSTETRICS & GYNECOLOGY
Payer: COMMERCIAL

## 2019-10-16 ENCOUNTER — ROUTINE PRENATAL (OUTPATIENT)
Dept: OBSTETRICS AND GYNECOLOGY | Facility: CLINIC | Age: 24
End: 2019-10-16
Payer: COMMERCIAL

## 2019-10-16 VITALS
SYSTOLIC BLOOD PRESSURE: 104 MMHG | BODY MASS INDEX: 24.56 KG/M2 | DIASTOLIC BLOOD PRESSURE: 62 MMHG | WEIGHT: 159.19 LBS

## 2019-10-16 DIAGNOSIS — Z3A.19 19 WEEKS GESTATION OF PREGNANCY: Primary | ICD-10-CM

## 2019-10-16 DIAGNOSIS — Z3A.15 15 WEEKS GESTATION OF PREGNANCY: ICD-10-CM

## 2019-10-16 DIAGNOSIS — Z34.92 NORMAL PREGNANCY IN SECOND TRIMESTER: ICD-10-CM

## 2019-10-16 PROCEDURE — 99999 PR PBB SHADOW E&M-EST. PATIENT-LVL II: CPT | Mod: PBBFAC,,, | Performed by: OBSTETRICS & GYNECOLOGY

## 2019-10-16 PROCEDURE — 76805 OB US >/= 14 WKS SNGL FETUS: CPT | Mod: TC,PN

## 2019-10-16 PROCEDURE — 0502F SUBSEQUENT PRENATAL CARE: CPT | Mod: CPTII,S$GLB,, | Performed by: OBSTETRICS & GYNECOLOGY

## 2019-10-16 PROCEDURE — 99999 PR PBB SHADOW E&M-EST. PATIENT-LVL II: ICD-10-PCS | Mod: PBBFAC,,, | Performed by: OBSTETRICS & GYNECOLOGY

## 2019-10-16 PROCEDURE — 0502F PR SUBSEQUENT PRENATAL CARE: ICD-10-PCS | Mod: CPTII,S$GLB,, | Performed by: OBSTETRICS & GYNECOLOGY

## 2019-10-16 PROCEDURE — 76805 OB US >/= 14 WKS SNGL FETUS: CPT | Mod: 26,,, | Performed by: RADIOLOGY

## 2019-10-16 PROCEDURE — 76805 US OB GREATER THAN 14 WEEKS FIRST GESTATION: ICD-10-PCS | Mod: 26,,, | Performed by: RADIOLOGY

## 2019-10-16 NOTE — PROGRESS NOTES
Holly Nichols is a 24 y.o. female with Estimated Date of Delivery: 3/11/20   OB History    Para Term  AB Living   2 1 1 0 0 1   SAB TAB Ectopic Multiple Live Births   0 0 0 0 1        AT 19w0d  Here today on 10/16/2019 for   Chief Complaint   Patient presents with    Routine Prenatal Visit       Current Outpatient Medications   Medication Sig Dispense Refill    butalbital-acetaminophen-caffeine -40 mg (FIORICET, ESGIC) -40 mg per tablet Take 1 tablet by mouth every 4 (four) hours as needed for Pain. 30 tablet 1    citalopram (CELEXA) 10 MG tablet Take 1 tablet (10 mg total) by mouth once daily. 30 tablet 2    prenatal 25/iron fum/folic/dha (PRENATAL-1 ORAL) Take 1 tablet by mouth once daily.      doxylamine-pyridoxine, vit B6, (DICLEGIS) 10-10 mg TbEC Take 2 tablets by mouth every evening. 14 tablet 0    levalbuterol (XOPENEX HFA) 45 mcg/actuation inhaler Inhale into the lungs.      levalbuterol (XOPENEX) 1.25 mg/3 mL nebulizer solution Take 3 mLs (1.25 mg total) by nebulization 3 (three) times daily as needed for Wheezing. Rescue (Patient not taking: Reported on 10/16/2019) 75 mL 0     No current facility-administered medications for this visit.      Review of patient's allergies indicates:   Allergen Reactions    Toradol [ketorolac] Other (See Comments)     paralysis    Tramadol Other (See Comments)     paralysis    Penicillins Hives and Rash    Sulfa (sulfonamide antibiotics) Hives and Rash       Past Medical History:   Diagnosis Date    Abnormal Pap smear of cervix     Allergy     Asthma     last used inhaler 2018    Constipation     Endometriosis     Fibromyalgia     Gastritis     HLA B27 (HLA B27 positive)     Migraine     Scoliosis     Smoker     stopped in 2018       Past Surgical History:   Procedure Laterality Date    ADENOIDECTOMY      APPENDECTOMY  2014    endometriosis    CHOLECYSTECTOMY  2017    [facility]LL[facility], [facility]     COLONOSCOPY  N/A 2016    Procedure: COLONOSCOPY;  Surgeon: Jarret Zhao Jr., MD;  Location: [facility] ENDO;  Service: Endoscopy;  Laterality: N/A;    MOUTH SURGERY      x2    PELVIC LAPAROSCOPY      TONSILLECTOMY  2016    UPPER GASTROINTESTINAL ENDOSCOPY  2016    Dr. Zhao       OB History    Para Term  AB Living   2 1 1     1   SAB TAB Ectopic Multiple Live Births         0 1      # Outcome Date GA Lbr Beny/2nd Weight Sex Delivery Anes PTL Lv   2 Current            1 Term 18 39w1d 12::20 3.459 kg (7 lb 10 oz) F Vag-Spont EPI N RACHELE       Social History     Socioeconomic History    Marital status:      Spouse name: Not on file    Number of children: 0    Years of education: Not on file    Highest education level: Not on file   Occupational History    Occupation: [employer]   Social Needs    Financial resource strain: Not on file    Food insecurity:     Worry: Not on file     Inability: Not on file    Transportation needs:     Medical: Not on file     Non-medical: Not on file   Tobacco Use    Smoking status: Former Smoker     Packs/day: 0.50     Types: Cigarettes     Last attempt to quit: 2018     Years since quittin.7    Smokeless tobacco: Never Used   Substance and Sexual Activity    Alcohol use: No     Alcohol/week: 0.0 standard drinks     Comment: social    Drug use: No    Sexual activity: Yes     Partners: Male   Lifestyle    Physical activity:     Days per week: Not on file     Minutes per session: Not on file    Stress: Not on file   Relationships    Social connections:     Talks on phone: Not on file     Gets together: Not on file     Attends Hinduism service: Not on file     Active member of club or organization: Not on file     Attends meetings of clubs or organizations: Not on file     Relationship status: Not on file   Other Topics Concern    Not on file   Social History Narrative    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   ABDOMEN: No abdominal pain, constipation, diarrhea, nausea, vomiting or rectal bleeding.   URINARY: No frequency, urgency, dysuria, or hematuria      O POS    ASSESSMENT    Encounter Diagnoses   Name Primary?    19 weeks gestation of pregnancy Yes       PLAN  1.  RTC 5 weeks    I have reviewed the blood pressure, urine results, fetal heart rate check, fundal height and agree.

## 2019-11-17 PROBLEM — O26.899 ABDOMINAL PAIN AFFECTING PREGNANCY: Status: ACTIVE | Noted: 2019-11-17

## 2019-11-17 PROBLEM — R10.9 ABDOMINAL PAIN AFFECTING PREGNANCY: Status: ACTIVE | Noted: 2019-11-17

## 2019-11-22 ENCOUNTER — ROUTINE PRENATAL (OUTPATIENT)
Dept: OBSTETRICS AND GYNECOLOGY | Facility: CLINIC | Age: 24
End: 2019-11-22
Payer: COMMERCIAL

## 2019-11-22 ENCOUNTER — LAB VISIT (OUTPATIENT)
Dept: LAB | Facility: HOSPITAL | Age: 24
End: 2019-11-22
Attending: OBSTETRICS & GYNECOLOGY
Payer: COMMERCIAL

## 2019-11-22 VITALS
SYSTOLIC BLOOD PRESSURE: 102 MMHG | BODY MASS INDEX: 25.41 KG/M2 | DIASTOLIC BLOOD PRESSURE: 62 MMHG | WEIGHT: 167.13 LBS

## 2019-11-22 DIAGNOSIS — Z3A.24 24 WEEKS GESTATION OF PREGNANCY: Primary | ICD-10-CM

## 2019-11-22 DIAGNOSIS — Z34.92 NORMAL PREGNANCY IN SECOND TRIMESTER: ICD-10-CM

## 2019-11-22 DIAGNOSIS — Z3A.19 19 WEEKS GESTATION OF PREGNANCY: ICD-10-CM

## 2019-11-22 LAB
BILIRUB SERPL-MCNC: NORMAL MG/DL
BLOOD URINE, POC: NORMAL
COLOR, POC UA: YELLOW
GLUCOSE SERPL-MCNC: 102 MG/DL (ref 70–140)
GLUCOSE UR QL STRIP: NORMAL
KETONES UR QL STRIP: NORMAL
LEUKOCYTE ESTERASE URINE, POC: NORMAL
NITRITE, POC UA: NORMAL
PH, POC UA: 8
PROTEIN, POC: NORMAL
SPECIFIC GRAVITY, POC UA: NORMAL
UROBILINOGEN, POC UA: NORMAL

## 2019-11-22 PROCEDURE — 99999 PR PBB SHADOW E&M-EST. PATIENT-LVL III: CPT | Mod: PBBFAC,,, | Performed by: OBSTETRICS & GYNECOLOGY

## 2019-11-22 PROCEDURE — 99999 PR PBB SHADOW E&M-EST. PATIENT-LVL III: ICD-10-PCS | Mod: PBBFAC,,, | Performed by: OBSTETRICS & GYNECOLOGY

## 2019-11-22 PROCEDURE — 0502F SUBSEQUENT PRENATAL CARE: CPT | Mod: CPTII,S$GLB,, | Performed by: OBSTETRICS & GYNECOLOGY

## 2019-11-22 PROCEDURE — 82950 GLUCOSE TEST: CPT

## 2019-11-22 PROCEDURE — 36415 COLL VENOUS BLD VENIPUNCTURE: CPT | Mod: PO

## 2019-11-22 PROCEDURE — 0502F PR SUBSEQUENT PRENATAL CARE: ICD-10-PCS | Mod: CPTII,S$GLB,, | Performed by: OBSTETRICS & GYNECOLOGY

## 2019-11-22 NOTE — PROGRESS NOTES
Holly Nichols is a 24 y.o. female with Estimated Date of Delivery: 3/11/20   OB History    Para Term  AB Living   2 1 1 0 0 1   SAB TAB Ectopic Multiple Live Births   0 0 0 0 1        AT 24w2d  Here today on 2019 for   Chief Complaint   Patient presents with    Routine Prenatal Visit       Current Outpatient Medications   Medication Sig Dispense Refill    citalopram (CELEXA) 10 MG tablet Take 1 tablet (10 mg total) by mouth once daily. 30 tablet 2    levalbuterol (XOPENEX HFA) 45 mcg/actuation inhaler Inhale into the lungs.      levalbuterol (XOPENEX) 1.25 mg/3 mL nebulizer solution Take 3 mLs (1.25 mg total) by nebulization 3 (three) times daily as needed for Wheezing. Rescue 75 mL 0    prenatal 25/iron fum/folic/dha (PRENATAL-1 ORAL) Take 1 tablet by mouth once daily.      doxylamine-pyridoxine, vit B6, (DICLEGIS) 10-10 mg TbEC Take 2 tablets by mouth every evening. 14 tablet 0     No current facility-administered medications for this visit.      Review of patient's allergies indicates:   Allergen Reactions    Toradol [ketorolac] Other (See Comments)     paralysis    Tramadol Other (See Comments)     paralysis    Penicillins Hives and Rash    Sulfa (sulfonamide antibiotics) Hives and Rash       Past Medical History:   Diagnosis Date    Abnormal Pap smear of cervix     Allergy     Asthma     last used inhaler 2018    Constipation     Endometriosis     Fibromyalgia     Gastritis     HLA B27 (HLA B27 positive)     Migraine     Scoliosis     Smoker     stopped in 2018       Past Surgical History:   Procedure Laterality Date    ADENOIDECTOMY      APPENDECTOMY  2014    endometriosis    CHOLECYSTECTOMY  2017    [facility]LL[facility], [facility]     COLONOSCOPY N/A 2016    Procedure: COLONOSCOPY;  Surgeon: Jarret Zhao Jr., MD;  Location: [facility];  Service: Endoscopy;  Laterality: N/A;    MOUTH SURGERY      x2    PELVIC LAPAROSCOPY      TONSILLECTOMY   2016    UPPER GASTROINTESTINAL ENDOSCOPY  2016    Dr. Zhao       OB History    Para Term  AB Living   2 1 1     1   SAB TAB Ectopic Multiple Live Births         0 1      # Outcome Date GA Lbr Beny/2nd Weight Sex Delivery Anes PTL Lv   2 Current            1 Term 18 39w1d 12:11 :20 3.459 kg (7 lb 10 oz) F Vag-Spont EPI N RACHELE       Social History     Socioeconomic History    Marital status:      Spouse name: Not on file    Number of children: 0    Years of education: Not on file    Highest education level: Not on file   Occupational History    Occupation: hotel management   Social Needs    Financial resource strain: Not on file    Food insecurity:     Worry: Not on file     Inability: Not on file    Transportation needs:     Medical: Not on file     Non-medical: Not on file   Tobacco Use    Smoking status: Former Smoker     Packs/day: 0.50     Types: Cigarettes     Last attempt to quit: 2018     Years since quittin.8    Smokeless tobacco: Never Used   Substance and Sexual Activity    Alcohol use: No     Alcohol/week: 0.0 standard drinks     Comment: social    Drug use: No    Sexual activity: Yes     Partners: Male   Lifestyle    Physical activity:     Days per week: Not on file     Minutes per session: Not on file    Stress: Not on file   Relationships    Social connections:     Talks on phone: Not on file     Gets together: Not on file     Attends Zoroastrian service: Not on file     Active member of club or organization: Not on file     Attends meetings of clubs or organizations: Not on file     Relationship status: Not on file   Other Topics Concern    Not on file   Social History Narrative    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   ABDOMEN: No abdominal pain, constipation, diarrhea, nausea, vomiting or rectal bleeding.   URINARY: No frequency,  urgency, dysuria, or hematuria      O POS    ASSESSMENT    Encounter Diagnoses   Name Primary?    24 weeks gestation of pregnancy Yes    Normal pregnancy in second trimester        PLAN  1.  RTC 4 weeks    I have reviewed the blood pressure, urine results, fetal heart rate check, fundal height and agree.

## 2019-12-17 ENCOUNTER — ROUTINE PRENATAL (OUTPATIENT)
Dept: OBSTETRICS AND GYNECOLOGY | Facility: CLINIC | Age: 24
End: 2019-12-17
Payer: COMMERCIAL

## 2019-12-17 VITALS — DIASTOLIC BLOOD PRESSURE: 66 MMHG | SYSTOLIC BLOOD PRESSURE: 122 MMHG | BODY MASS INDEX: 26.92 KG/M2 | WEIGHT: 177 LBS

## 2019-12-17 DIAGNOSIS — Z3A.27 27 WEEKS GESTATION OF PREGNANCY: Primary | ICD-10-CM

## 2019-12-17 DIAGNOSIS — Z34.93 NORMAL PREGNANCY IN THIRD TRIMESTER: ICD-10-CM

## 2019-12-17 PROCEDURE — 90471 TDAP VACCINE GREATER THAN OR EQUAL TO 7YO IM: ICD-10-PCS | Mod: S$GLB,,, | Performed by: OBSTETRICS & GYNECOLOGY

## 2019-12-17 PROCEDURE — 90471 IMMUNIZATION ADMIN: CPT | Mod: S$GLB,,, | Performed by: OBSTETRICS & GYNECOLOGY

## 2019-12-17 PROCEDURE — 0502F PR SUBSEQUENT PRENATAL CARE: ICD-10-PCS | Mod: CPTII,S$GLB,, | Performed by: OBSTETRICS & GYNECOLOGY

## 2019-12-17 PROCEDURE — 90715 TDAP VACCINE 7 YRS/> IM: CPT | Mod: S$GLB,,, | Performed by: OBSTETRICS & GYNECOLOGY

## 2019-12-17 PROCEDURE — 0502F SUBSEQUENT PRENATAL CARE: CPT | Mod: CPTII,S$GLB,, | Performed by: OBSTETRICS & GYNECOLOGY

## 2019-12-17 PROCEDURE — 99999 PR PBB SHADOW E&M-EST. PATIENT-LVL III: ICD-10-PCS | Mod: PBBFAC,,, | Performed by: OBSTETRICS & GYNECOLOGY

## 2019-12-17 PROCEDURE — 99999 PR PBB SHADOW E&M-EST. PATIENT-LVL III: CPT | Mod: PBBFAC,,, | Performed by: OBSTETRICS & GYNECOLOGY

## 2019-12-17 PROCEDURE — 90715 TDAP VACCINE GREATER THAN OR EQUAL TO 7YO IM: ICD-10-PCS | Mod: S$GLB,,, | Performed by: OBSTETRICS & GYNECOLOGY

## 2019-12-17 NOTE — NURSING
tdap vaccine given. Pt tolerated well. Pt advised to wait 15 minutes in lobby to ensure no allergic reaction

## 2019-12-17 NOTE — PROGRESS NOTES
Holly Nichols is a 24 y.o. female with Estimated Date of Delivery: 3/11/20   OB History    Para Term  AB Living   2 1 1 0 0 1   SAB TAB Ectopic Multiple Live Births   0 0 0 0 1        AT 27w6d  Here today on 2019 for   Chief Complaint   Patient presents with    Routine Prenatal Visit    having terrible back pain       Current Outpatient Medications   Medication Sig Dispense Refill    citalopram (CELEXA) 10 MG tablet Take 1 tablet (10 mg total) by mouth once daily. 30 tablet 2    levalbuterol (XOPENEX HFA) 45 mcg/actuation inhaler Inhale into the lungs.      levalbuterol (XOPENEX) 1.25 mg/3 mL nebulizer solution Take 3 mLs (1.25 mg total) by nebulization 3 (three) times daily as needed for Wheezing. Rescue 75 mL 0    prenatal 25/iron fum/folic/dha (PRENATAL-1 ORAL) Take 1 tablet by mouth once daily.      doxylamine-pyridoxine, vit B6, (DICLEGIS) 10-10 mg TbEC Take 2 tablets by mouth every evening. (Patient not taking: Reported on 2019) 14 tablet 0     No current facility-administered medications for this visit.      Review of patient's allergies indicates:   Allergen Reactions    Toradol [ketorolac] Other (See Comments)     paralysis    Tramadol Other (See Comments)     paralysis    Penicillins Hives and Rash    Sulfa (sulfonamide antibiotics) Hives and Rash       Past Medical History:   Diagnosis Date    Abnormal Pap smear of cervix     Allergy     Asthma     last used inhaler 2018    Constipation     Endometriosis     Fibromyalgia     Gastritis     HLA B27 (HLA B27 positive)     Migraine     Scoliosis     Smoker     stopped in 2018       Past Surgical History:   Procedure Laterality Date    ADENOIDECTOMY      APPENDECTOMY  2014    endometriosis    CHOLECYSTECTOMY  2017    Dr. MAR[facility]LLA Anne, [facility]     COLONOSCOPY N/A 2016    Procedure: COLONOSCOPY;  Surgeon: Jarret Zhao Jr., MD;  Location: [facility];  Service: Endoscopy;  Laterality: N/A;     MOUTH SURGERY      x2    PELVIC LAPAROSCOPY      TONSILLECTOMY  2016    UPPER GASTROINTESTINAL ENDOSCOPY  2016    Dr. Zhao       OB History    Para Term  AB Living   2 1 1     1   SAB TAB Ectopic Multiple Live Births         0 1      # Outcome Date GA Lbr Beny/2nd Weight Sex Delivery Anes PTL Lv   2 Current            1 Term 18 39w1d 12:11  00:20 3.459 kg (7 lb 10 oz) F Vag-Spont EPI N RACHELE       Social History     Socioeconomic History    Marital status:      Spouse name: Not on file    Number of children: 0    Years of education: Not on file    Highest education level: Not on file   Occupational History    Occupation: [employer] management   Social Needs    Financial resource strain: Not on file    Food insecurity:     Worry: Not on file     Inability: Not on file    Transportation needs:     Medical: Not on file     Non-medical: Not on file   Tobacco Use    Smoking status: Former Smoker     Packs/day: 0.50     Types: Cigarettes     Last attempt to quit: 2018     Years since quittin.9    Smokeless tobacco: Never Used   Substance and Sexual Activity    Alcohol use: No     Alcohol/week: 0.0 standard drinks     Comment: social    Drug use: No    Sexual activity: Yes     Partners: Male   Lifestyle    Physical activity:     Days per week: Not on file     Minutes per session: Not on file    Stress: Not on file   Relationships    Social connections:     Talks on phone: Not on file     Gets together: Not on file     Attends Mandaen service: Not on file     Active member of club or organization: Not on file     Attends meetings of clubs or organizations: Not on file     Relationship status: Not on file   Other Topics Concern    Not on file   Social History Narrative    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   ABDOMEN: No abdominal pain, constipation,  diarrhea, nausea, vomiting or rectal bleeding.   URINARY: No frequency, urgency, dysuria, or hematuria      O POS    ASSESSMENT    Encounter Diagnoses   Name Primary?    27 weeks gestation of pregnancy Yes    Normal pregnancy in third trimester        PLAN  1.  RTC  3 weeks     I have reviewed the blood pressure, urine results, fetal heart rate check, fundal height and agree.

## 2019-12-24 DIAGNOSIS — F41.9 ANXIETY: ICD-10-CM

## 2019-12-24 RX ORDER — CITALOPRAM 10 MG/1
10 TABLET ORAL DAILY
Qty: 30 TABLET | Refills: 2 | Status: SHIPPED | OUTPATIENT
Start: 2019-12-24 | End: 2020-04-02 | Stop reason: SDUPTHER

## 2019-12-31 ENCOUNTER — OFFICE VISIT (OUTPATIENT)
Dept: FAMILY MEDICINE | Facility: CLINIC | Age: 24
End: 2019-12-31
Payer: COMMERCIAL

## 2019-12-31 ENCOUNTER — LAB VISIT (OUTPATIENT)
Dept: LAB | Facility: HOSPITAL | Age: 24
End: 2019-12-31
Attending: NURSE PRACTITIONER
Payer: COMMERCIAL

## 2019-12-31 VITALS
WEIGHT: 175.25 LBS | SYSTOLIC BLOOD PRESSURE: 110 MMHG | BODY MASS INDEX: 26.56 KG/M2 | HEIGHT: 68 IN | DIASTOLIC BLOOD PRESSURE: 60 MMHG | OXYGEN SATURATION: 97 % | HEART RATE: 85 BPM | TEMPERATURE: 99 F

## 2019-12-31 DIAGNOSIS — J98.8 VIRAL RESPIRATORY ILLNESS: Primary | ICD-10-CM

## 2019-12-31 DIAGNOSIS — B97.89 VIRAL RESPIRATORY ILLNESS: Primary | ICD-10-CM

## 2019-12-31 DIAGNOSIS — J02.9 SORE THROAT: ICD-10-CM

## 2019-12-31 LAB
CTP QC/QA: YES
HETEROPH AB SERPL QL IA: NEGATIVE
S PYO RRNA THROAT QL PROBE: NEGATIVE

## 2019-12-31 PROCEDURE — 99999 PR PBB SHADOW E&M-EST. PATIENT-LVL III: CPT | Mod: PBBFAC,,, | Performed by: NURSE PRACTITIONER

## 2019-12-31 PROCEDURE — 36415 COLL VENOUS BLD VENIPUNCTURE: CPT | Mod: PO

## 2019-12-31 PROCEDURE — 87880 POCT RAPID STREP A: ICD-10-PCS | Mod: QW,S$GLB,, | Performed by: NURSE PRACTITIONER

## 2019-12-31 PROCEDURE — 3008F PR BODY MASS INDEX (BMI) DOCUMENTED: ICD-10-PCS | Mod: CPTII,S$GLB,, | Performed by: NURSE PRACTITIONER

## 2019-12-31 PROCEDURE — 87880 STREP A ASSAY W/OPTIC: CPT | Mod: QW,S$GLB,, | Performed by: NURSE PRACTITIONER

## 2019-12-31 PROCEDURE — 86308 HETEROPHILE ANTIBODY SCREEN: CPT | Mod: PO

## 2019-12-31 PROCEDURE — 99999 PR PBB SHADOW E&M-EST. PATIENT-LVL III: ICD-10-PCS | Mod: PBBFAC,,, | Performed by: NURSE PRACTITIONER

## 2019-12-31 PROCEDURE — 87081 CULTURE SCREEN ONLY: CPT

## 2019-12-31 PROCEDURE — 3008F BODY MASS INDEX DOCD: CPT | Mod: CPTII,S$GLB,, | Performed by: NURSE PRACTITIONER

## 2019-12-31 PROCEDURE — 99214 OFFICE O/P EST MOD 30 MIN: CPT | Mod: 25,S$GLB,, | Performed by: NURSE PRACTITIONER

## 2019-12-31 PROCEDURE — 99214 PR OFFICE/OUTPT VISIT, EST, LEVL IV, 30-39 MIN: ICD-10-PCS | Mod: 25,S$GLB,, | Performed by: NURSE PRACTITIONER

## 2019-12-31 NOTE — Clinical Note
Ana María Easley MD,  I saw your patient today in Primary Care  If you have any questions, please do not hesitate to contact me.  Thank you!  JUDD Bullard, [facility]

## 2019-12-31 NOTE — LETTER
December 31, 2019      [facility]  [address]  [place] [address]  Phone: [phone]  Fax: [phone]       Patient: Holly Nichols   YOB: 1995  Date of Visit: 12/31/2019    To Whom It May Concern:    West Nichols  was at [facility] on 12/31/2019. She may return to work/school on 1/6/19 with no restrictions. If you have any questions or concerns, or if I can be of further assistance, please do not hesitate to contact me.    Sincerely,    Doretha Fleming NP

## 2019-12-31 NOTE — PROGRESS NOTES
Holly Nichols is a 24 y.o. female patient of Ana María Easley MD who presents to the clinic today for   Chief Complaint   Patient presents with    Fever     cough, nasal congestion, wheezing, headaches, sneezing, fatigue, and lightheaded for about 2 days    .    HPI    Pt, who is known to me, reports a new problem to me: cold, nasal congestion, wheezing (using xopenex), headaches, sneezing and fatigue for 2 days.  Fever to 101.4 yesterday.  Started out at 100.2 today, lowered with Tylenol.  Also having diarrhea for 3 days.  (Gets this sometimes with stress.)  Usint Xopenex MDI and treatments.  Really got bad 2 nights ago.  Did get the flu shot.  15 month old has confirmed RSV.  She became ill 12/25/19.    These symptoms began 2 days ago and status is worse.     Symptoms are + acute.    Pt denies the following symptoms:  N/v, rash.    Aggravating factors include being up an around. .    Relieving factors include none.  Xopenex helps some .    OTC Medications tried are Tylenol.    Prescription medications taken for symptoms are Xopenex.    Pertinent medical history:  30 weeks IUP.  She had mono in 2012 and the ST and headache seem similar.  She's wondering if this could be a reactivation of the mono.    Smoking status:  nonsmoker    Answers for HPI/ROS submitted by the patient on 12/31/2019   Sore throat  Chronicity: new  Onset: yesterday  Progression since onset: rapidly worsening  Pain worse on: neither  Fever: 100 - 100.9 F  Fever duration: 1 to 2 days  Pain - numeric: 5/10  abdominal pain: No  congestion: Yes  cough: Yes  diarrhea: Yes  drooling: No  ear discharge: No  ear pain: Yes  headaches: Yes  hoarse voice: Yes  neck pain: No  plugged ear sensation: No  shortness of breath: Yes  stridor: No  swollen glands: No  trouble swallowing: No  vomiting: Yes  strep: No  mono: No  Treatments tried: acetaminophen, cool liquids, gargles  Improvement on treatment: no relief  Pain severity:  mild      ROS    Constitutional:   +  fever, + fatigue, decrease in appetite.    Head:   + headache  Ears:   No pain but ears feel muffled.  Eyes:  Eyes burning  Nose:   No sinus pain, + congestion, + runny nose, + post nasal drip.  Throat:  ++ ST pain, + swollen glands.    Heart:  No palpitations, chest pain.  Heart is racing    Lungs:  No difficulty breathing, + coughing, no sputum production, + wheezing.              Symptoms are community acquired.  Daughter has RSV.    GI/:  No sxs    MS:  No new bone, joint or muscle problems.    Skin:  No rashes, itching.      Past Medical History:   Diagnosis Date    Abnormal Pap smear of cervix     Allergy     Asthma     last used inhaler 8/2018    Constipation     Endometriosis     Fibromyalgia     Gastritis     HLA B27 (HLA B27 positive)     Migraine     Scoliosis     Smoker     stopped in January 2018       Current Outpatient Medications:     citalopram (CELEXA) 10 MG tablet, Take 1 tablet (10 mg total) by mouth once daily., Disp: 30 tablet, Rfl: 2    levalbuterol (XOPENEX HFA) 45 mcg/actuation inhaler, Inhale into the lungs., Disp: , Rfl:     prenatal 25/iron fum/folic/dha (PRENATAL-1 ORAL), Take 1 tablet by mouth once daily., Disp: , Rfl:     doxylamine-pyridoxine, vit B6, (DICLEGIS) 10-10 mg TbEC, Take 2 tablets by mouth every evening. (Patient not taking: Reported on 12/17/2019), Disp: 14 tablet, Rfl: 0    levalbuterol (XOPENEX) 1.25 mg/3 mL nebulizer solution, Take 3 mLs (1.25 mg total) by nebulization 3 (three) times daily as needed for Wheezing. Rescue, Disp: 75 mL, Rfl: 0    Patient is not a smoker.    PHYSICAL EXAM    Alert, coop 24 y.o. female patient in no acute distress.    Vitals:    12/31/19 1446   BP: 110/60   Pulse: 85   Temp: 98.5 °F (36.9 °C)     VS reviewed.  VS stable.  CC, nursing note, medications & PMH all reviewed today.    Head:  Normocephalic, atraumatic.    EENT:  EACs patent.  TMs no erythema, + LR, no effusions, no TM  perforation.                              Eye lids normal, no discharge present.       Sinus tenderness to palp--none.               Nares--mild edema, no d/c present.    Pharynx mildly injected.                Tonsils  absent.    Bilat tender anterior, no posterior cervical lymph nodes palpable.    No submental, submandibular or supraclavicular lymph nodes palp.             Resp:  Respirations even, unlabored.   Lungs CTA bilat.  No wheezing.  No crackles.  Moves air to bases bilat.    Heart:  RRR, no MRG.                MS:  Ambulates independently with steady gait..             NEURO:  Alert and oriented x 4.  Responds appropriately during interaction.    Skin:  Warm, dry, color good.    Viral respiratory illness  Comments:  known RSV exposure (household member)    Sore throat  -     POCT rapid strep A  -     HETEROPHILE AB SCREEN; Future; Expected date: 12/31/2019  -     Strep A culture, throat      Pt today presents with report of 4 days of very sore throat, nasal congestion, sneezing, runny nose, sore throat and swollen glands.  Her 15 month old child was diagnosed with RSV 5 days ago.   The pt is 30 weeks pregnant.  On exam she has nasal congestion, cervical lymphadenopathy and clear lungs..    This is a new problem to me and the following work up is planned.    Lab & Radiological Tests Ordered: POCT strep neg.  Strep culture and mono spot ordered.  The results are pending.  Given the short length of the illness, the monospot may be falsely neg at this time.  Pt would still like to be tested.    Pt advised to perform comfort measures recommended on patient instruction sheet .    If worse or concerns, the patient is advised to call us.  Explained exam findings, diagnosis and treatment plan to patient.  Questions answered and patient states understanding.

## 2020-01-03 LAB — BACTERIA THROAT CULT: NORMAL

## 2020-01-08 ENCOUNTER — ROUTINE PRENATAL (OUTPATIENT)
Dept: OBSTETRICS AND GYNECOLOGY | Facility: CLINIC | Age: 25
End: 2020-01-08
Payer: COMMERCIAL

## 2020-01-08 VITALS
WEIGHT: 177.69 LBS | DIASTOLIC BLOOD PRESSURE: 66 MMHG | BODY MASS INDEX: 27.02 KG/M2 | SYSTOLIC BLOOD PRESSURE: 110 MMHG

## 2020-01-08 DIAGNOSIS — Z3A.31 31 WEEKS GESTATION OF PREGNANCY: ICD-10-CM

## 2020-01-08 DIAGNOSIS — Z34.93 NORMAL PREGNANCY IN THIRD TRIMESTER: Primary | ICD-10-CM

## 2020-01-08 PROCEDURE — 99999 PR PBB SHADOW E&M-EST. PATIENT-LVL III: ICD-10-PCS | Mod: PBBFAC,,, | Performed by: OBSTETRICS & GYNECOLOGY

## 2020-01-08 PROCEDURE — 0502F PR SUBSEQUENT PRENATAL CARE: ICD-10-PCS | Mod: CPTII,S$GLB,, | Performed by: OBSTETRICS & GYNECOLOGY

## 2020-01-08 PROCEDURE — 99999 PR PBB SHADOW E&M-EST. PATIENT-LVL III: CPT | Mod: PBBFAC,,, | Performed by: OBSTETRICS & GYNECOLOGY

## 2020-01-08 PROCEDURE — 0502F SUBSEQUENT PRENATAL CARE: CPT | Mod: CPTII,S$GLB,, | Performed by: OBSTETRICS & GYNECOLOGY

## 2020-01-08 RX ORDER — ACETAMINOPHEN 500 MG
1000 TABLET ORAL EVERY 6 HOURS PRN
COMMUNITY
End: 2021-10-13

## 2020-01-08 NOTE — PROGRESS NOTES
Holly Nichols is a 24 y.o. female with Estimated Date of Delivery: 3/11/20   OB History    Para Term  AB Living   2 1 1 0 0 1   SAB TAB Ectopic Multiple Live Births   0 0 0 0 1        AT 31w0d  Here today on 2020 for   Chief Complaint   Patient presents with    Routine Prenatal Visit    having palpatations when resting    feels like baby is low       Current Outpatient Medications   Medication Sig Dispense Refill    acetaminophen (TYLENOL EXTRA STRENGTH) 500 MG tablet Take 1,000 mg by mouth every 6 (six) hours as needed for Pain.      citalopram (CELEXA) 10 MG tablet Take 1 tablet (10 mg total) by mouth once daily. 30 tablet 2    levalbuterol (XOPENEX HFA) 45 mcg/actuation inhaler Inhale into the lungs.      levalbuterol (XOPENEX) 1.25 mg/3 mL nebulizer solution Take 3 mLs (1.25 mg total) by nebulization 3 (three) times daily as needed for Wheezing. Rescue 75 mL 0    prenatal 25/iron fum/folic/dha (PRENATAL-1 ORAL) Take 1 tablet by mouth once daily.      doxylamine-pyridoxine, vit B6, (DICLEGIS) 10-10 mg TbEC Take 2 tablets by mouth every evening. (Patient not taking: Reported on 2019) 14 tablet 0     No current facility-administered medications for this visit.      Review of patient's allergies indicates:   Allergen Reactions    Toradol [ketorolac] Other (See Comments)     paralysis    Tramadol Other (See Comments)     paralysis    Penicillins Hives and Rash    Sulfa (sulfonamide antibiotics) Hives and Rash       Past Medical History:   Diagnosis Date    Abnormal Pap smear of cervix     Allergy     Asthma     last used inhaler 2018    Constipation     Endometriosis     Fibromyalgia     Gastritis     HLA B27 (HLA B27 positive)     Migraine     Scoliosis     Smoker     stopped in 2018       Past Surgical History:   Procedure Laterality Date    ADENOIDECTOMY      APPENDECTOMY  2014    endometriosis    CHOLECYSTECTOMY  2017    [facility]LL[facility], [facility]      COLONOSCOPY N/A 2016    Procedure: COLONOSCOPY;  Surgeon: Jarret Zhao Jr., MD;  Location: [facility] END[facility];  Service: Endoscopy;  Laterality: N/A;    MOUTH SURGERY      x2    PELVIC LAPAROSCOPY      TONSILLECTOMY  2016    UPPER GASTROINTESTINAL ENDOSCOPY  2016    Dr. Zhao       OB History    Para Term  AB Living   2 1 1     1   SAB TAB Ectopic Multiple Live Births         0 1      # Outcome Date GA Lbr Beny/2nd Weight Sex Delivery Anes PTL Lv   2 Current            1 Term 18 39w1d 12:11 :20 3.459 kg (7 lb 10 oz) F Vag-Spont EPI N RACHELE       Social History     Socioeconomic History    Marital status:      Spouse name: Not on file    Number of children: 0    Years of education: Not on file    Highest education level: Not on file   Occupational History    Occupation: [employer] management   Social Needs    Financial resource strain: Not on file    Food insecurity:     Worry: Not on file     Inability: Not on file    Transportation needs:     Medical: Not on file     Non-medical: Not on file   Tobacco Use    Smoking status: Former Smoker     Packs/day: 0.50     Types: Cigarettes     Last attempt to quit: 2018     Years since quittin.0    Smokeless tobacco: Never Used   Substance and Sexual Activity    Alcohol use: No     Alcohol/week: 0.0 standard drinks     Comment: social    Drug use: No    Sexual activity: Yes     Partners: Male   Lifestyle    Physical activity:     Days per week: Not on file     Minutes per session: Not on file    Stress: Not on file   Relationships    Social connections:     Talks on phone: Patient refused     Gets together: Patient refused     Attends Protestant service: Not on file     Active member of club or organization: Patient refused     Attends meetings of clubs or organizations: Patient refused     Relationship status:    Other Topics Concern    Not on file   Social History Narrative    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   ABDOMEN: No abdominal pain, constipation, diarrhea, nausea, vomiting or rectal bleeding.   URINARY: No frequency, urgency, dysuria, or hematuria      O POS    ASSESSMENT    Encounter Diagnoses   Name Primary?    Normal pregnancy in third trimester Yes    31 weeks gestation of pregnancy        PLAN  1.  RTC 3 weeks     I have reviewed the blood pressure, urine results, fetal heart rate check, fundal height and agree.

## 2020-01-10 ENCOUNTER — TELEPHONE (OUTPATIENT)
Dept: FAMILY MEDICINE | Facility: CLINIC | Age: 25
End: 2020-01-10

## 2020-01-10 NOTE — TELEPHONE ENCOUNTER
----- Message from Toño Morales sent at 1/10/2020  9:52 AM CST -----  Contact: same  Patient called in and insisted a message be sent to Shania.  Patient stated she saw Shania last on 11/23/18.  Patient stated she was seen by Doretha Fleming but is not getting any better & thinks she may possibly have pneumonia.     Patient wants Shania to call her back before she schedules and appt.  Call back number is [phone]

## 2020-01-10 NOTE — TELEPHONE ENCOUNTER
LOV 12/31/2019    Patient is still not feeling well. Has been sick for three weeks, wants to talk to Traic before making appointment

## 2020-01-31 ENCOUNTER — ROUTINE PRENATAL (OUTPATIENT)
Dept: OBSTETRICS AND GYNECOLOGY | Facility: CLINIC | Age: 25
End: 2020-01-31
Payer: COMMERCIAL

## 2020-01-31 ENCOUNTER — HOSPITAL ENCOUNTER (OUTPATIENT)
Dept: RADIOLOGY | Facility: HOSPITAL | Age: 25
Discharge: HOME | End: 2020-01-31
Attending: OBSTETRICS & GYNECOLOGY
Payer: COMMERCIAL

## 2020-01-31 VITALS
BODY MASS INDEX: 27.62 KG/M2 | SYSTOLIC BLOOD PRESSURE: 110 MMHG | DIASTOLIC BLOOD PRESSURE: 70 MMHG | WEIGHT: 181.69 LBS

## 2020-01-31 DIAGNOSIS — Z34.93 NORMAL PREGNANCY IN THIRD TRIMESTER: ICD-10-CM

## 2020-01-31 DIAGNOSIS — Z3A.34 34 WEEKS GESTATION OF PREGNANCY: Primary | ICD-10-CM

## 2020-01-31 PROCEDURE — 0502F SUBSEQUENT PRENATAL CARE: CPT | Mod: CPTII,S$GLB,, | Performed by: OBSTETRICS & GYNECOLOGY

## 2020-01-31 PROCEDURE — 76816 US OB FOLLOW UP EA GESTATION TRANSABDOMINAL: ICD-10-PCS | Mod: 26,,, | Performed by: RADIOLOGY

## 2020-01-31 PROCEDURE — 99999 PR PBB SHADOW E&M-EST. PATIENT-LVL III: ICD-10-PCS | Mod: PBBFAC,,, | Performed by: OBSTETRICS & GYNECOLOGY

## 2020-01-31 PROCEDURE — 0502F PR SUBSEQUENT PRENATAL CARE: ICD-10-PCS | Mod: CPTII,S$GLB,, | Performed by: OBSTETRICS & GYNECOLOGY

## 2020-01-31 PROCEDURE — 76816 OB US FOLLOW-UP PER FETUS: CPT | Mod: 26,,, | Performed by: RADIOLOGY

## 2020-01-31 PROCEDURE — 99999 PR PBB SHADOW E&M-EST. PATIENT-LVL III: CPT | Mod: PBBFAC,,, | Performed by: OBSTETRICS & GYNECOLOGY

## 2020-01-31 PROCEDURE — 76816 OB US FOLLOW-UP PER FETUS: CPT | Mod: TC,PN

## 2020-01-31 NOTE — PROGRESS NOTES
Holly Nichols is a 24 y.o. female with Estimated Date of Delivery: 3/11/20   OB History    Para Term  AB Living   2 1 1 0 0 1   SAB TAB Ectopic Multiple Live Births   0 0 0 0 1        AT 34w2d  Here today on 2020 for   Chief Complaint   Patient presents with    Routine Prenatal Visit       Current Outpatient Medications   Medication Sig Dispense Refill    acetaminophen (TYLENOL EXTRA STRENGTH) 500 MG tablet Take 1,000 mg by mouth every 6 (six) hours as needed for Pain.      citalopram (CELEXA) 10 MG tablet Take 1 tablet (10 mg total) by mouth once daily. 30 tablet 2    levalbuterol (XOPENEX HFA) 45 mcg/actuation inhaler Inhale into the lungs.      levalbuterol (XOPENEX) 1.25 mg/3 mL nebulizer solution Take 3 mLs (1.25 mg total) by nebulization 3 (three) times daily as needed for Wheezing. Rescue 75 mL 0    prenatal 25/iron fum/folic/dha (PRENATAL-1 ORAL) Take 1 tablet by mouth once daily.      doxylamine-pyridoxine, vit B6, (DICLEGIS) 10-10 mg TbEC Take 2 tablets by mouth every evening. (Patient not taking: Reported on 2019) 14 tablet 0     No current facility-administered medications for this visit.      Review of patient's allergies indicates:   Allergen Reactions    Toradol [ketorolac] Other (See Comments)     paralysis    Tramadol Other (See Comments)     paralysis    Penicillins Hives and Rash    Sulfa (sulfonamide antibiotics) Hives and Rash       Past Medical History:   Diagnosis Date    Abnormal Pap smear of cervix     Allergy     Asthma     last used inhaler 2018    Constipation     Endometriosis     Fibromyalgia     Gastritis     HLA B27 (HLA B27 positive)     Migraine     Scoliosis     Smoker     stopped in 2018       Past Surgical History:   Procedure Laterality Date    ADENOIDECTOMY      APPENDECTOMY  2014    endometriosis    CHOLECYSTECTOMY  2017    Dr. MAR[facility]LLA Anne, [facility]     COLONOSCOPY N/A 2016    Procedure: COLONOSCOPY;   Surgeon: Jarret Zhao Jr., MD;  Location: [facility];  Service: Endoscopy;  Laterality: N/A;    MOUTH SURGERY      x2    PELVIC LAPAROSCOPY      TONSILLECTOMY  2016    UPPER GASTROINTESTINAL ENDOSCOPY  2016    Dr. Zhao       OB History    Para Term  AB Living   2 1 1     1   SAB TAB Ectopic Multiple Live Births         0 1      # Outcome Date GA Lbr Beny/2nd Weight Sex Delivery Anes PTL Lv   2 Current            1 Term 18 39w1d 12::20 3.459 kg (7 lb 10 oz) F Vag-Spont EPI N RACHELE       Social History     Socioeconomic History    Marital status:      Spouse name: Not on file    Number of children: 0    Years of education: Not on file    Highest education level: Not on file   Occupational History    Occupation: [employer]   Social Needs    Financial resource strain: Not on file    Food insecurity:     Worry: Not on file     Inability: Not on file    Transportation needs:     Medical: Not on file     Non-medical: Not on file   Tobacco Use    Smoking status: Former Smoker     Packs/day: 0.50     Types: Cigarettes     Last attempt to quit: 2018     Years since quittin.0    Smokeless tobacco: Never Used   Substance and Sexual Activity    Alcohol use: No     Alcohol/week: 0.0 standard drinks     Comment: social    Drug use: No    Sexual activity: Yes     Partners: Male   Lifestyle    Physical activity:     Days per week: Not on file     Minutes per session: Not on file    Stress: Not on file   Relationships    Social connections:     Talks on phone: Patient refused     Gets together: Patient refused     Attends Tenriism service: Not on file     Active member of club or organization: Patient refused     Attends meetings of clubs or organizations: Patient refused     Relationship status:    Other Topics Concern    Not on file   Social History Narrative    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   ABDOMEN: No abdominal pain, constipation, diarrhea, nausea, vomiting or rectal bleeding.   URINARY: No frequency, urgency, dysuria, or hematuria      O POS    ASSESSMENT    Encounter Diagnoses   Name Primary?    34 weeks gestation of pregnancy Yes    Normal pregnancy in third trimester        PLAN  1.  RTC 2 weeks     I have reviewed the blood pressure, urine results, fetal heart rate check, fundal height and agree.

## 2020-02-08 PROBLEM — M54.9 BACK PAIN AFFECTING PREGNANCY: Status: ACTIVE | Noted: 2020-02-08

## 2020-02-08 PROBLEM — O99.891 BACK PAIN AFFECTING PREGNANCY: Status: ACTIVE | Noted: 2020-02-08

## 2020-02-10 ENCOUNTER — TELEPHONE (OUTPATIENT)
Dept: OBSTETRICS AND GYNECOLOGY | Facility: CLINIC | Age: 25
End: 2020-02-10

## 2020-02-10 PROBLEM — R10.9 FLANK PAIN IN PREGNANT PATIENT: Status: ACTIVE | Noted: 2020-02-10

## 2020-02-10 PROBLEM — O26.899 FLANK PAIN IN PREGNANT PATIENT: Status: ACTIVE | Noted: 2020-02-10

## 2020-02-10 NOTE — TELEPHONE ENCOUNTER
----- Message from Doc Corado sent at 2/10/2020  9:44 AM CST -----  Contact: Patient  Type: Needs Medical Advice    Who Called:  Patient  Best Call Back Number: [phone]  Additional Information: Caller would like to discuss previous hospital visit and potential lab work, symptoms include pain and difficulty walking. Please call to advise. Thanks!

## 2020-02-10 NOTE — TELEPHONE ENCOUNTER
Seen in hospital for UTI.   She was given antibiotics by mouth  Pain is getting worse.   Urine getting darker.  Pain in kidneys.  She thinks she needs IV antibiotics  Please advise.

## 2020-02-14 ENCOUNTER — ROUTINE PRENATAL (OUTPATIENT)
Dept: OBSTETRICS AND GYNECOLOGY | Facility: CLINIC | Age: 25
End: 2020-02-14
Payer: COMMERCIAL

## 2020-02-14 VITALS — BODY MASS INDEX: 29 KG/M2 | DIASTOLIC BLOOD PRESSURE: 60 MMHG | SYSTOLIC BLOOD PRESSURE: 120 MMHG | WEIGHT: 190.69 LBS

## 2020-02-14 DIAGNOSIS — Z3A.36 36 WEEKS GESTATION OF PREGNANCY: ICD-10-CM

## 2020-02-14 DIAGNOSIS — Z36.85 ANTENATAL SCREENING FOR STREPTOCOCCUS B: Primary | ICD-10-CM

## 2020-02-14 LAB
BILIRUB SERPL-MCNC: NEGATIVE MG/DL
BLOOD URINE, POC: NEGATIVE
COLOR, POC UA: NORMAL
GLUCOSE UR QL STRIP: NORMAL
KETONES UR QL STRIP: NEGATIVE
LEUKOCYTE ESTERASE URINE, POC: NEGATIVE
NITRITE, POC UA: NEGATIVE
PH, POC UA: 5
PROTEIN, POC: NORMAL
SPECIFIC GRAVITY, POC UA: NORMAL
UROBILINOGEN, POC UA: NORMAL

## 2020-02-14 PROCEDURE — 0502F SUBSEQUENT PRENATAL CARE: CPT | Mod: CPTII,S$GLB,, | Performed by: OBSTETRICS & GYNECOLOGY

## 2020-02-14 PROCEDURE — 0502F PR SUBSEQUENT PRENATAL CARE: ICD-10-PCS | Mod: CPTII,S$GLB,, | Performed by: OBSTETRICS & GYNECOLOGY

## 2020-02-14 PROCEDURE — 81002 POCT URINE DIPSTICK WITHOUT MICROSCOPE: ICD-10-PCS | Mod: S$GLB,,, | Performed by: OBSTETRICS & GYNECOLOGY

## 2020-02-14 PROCEDURE — 87081 CULTURE SCREEN ONLY: CPT

## 2020-02-14 PROCEDURE — 99999 PR PBB SHADOW E&M-EST. PATIENT-LVL II: ICD-10-PCS | Mod: PBBFAC,,, | Performed by: OBSTETRICS & GYNECOLOGY

## 2020-02-14 PROCEDURE — 81002 URINALYSIS NONAUTO W/O SCOPE: CPT | Mod: S$GLB,,, | Performed by: OBSTETRICS & GYNECOLOGY

## 2020-02-14 PROCEDURE — 99999 PR PBB SHADOW E&M-EST. PATIENT-LVL II: CPT | Mod: PBBFAC,,, | Performed by: OBSTETRICS & GYNECOLOGY

## 2020-02-14 NOTE — PROGRESS NOTES
Holly Nichols is a 24 y.o. female with Estimated Date of Delivery: 3/11/20   OB History    Para Term  AB Living   2 1 1 0 0 1   SAB TAB Ectopic Multiple Live Births   0 0 0 0 1        AT 36w2d  Here today on 2020 for   Chief Complaint   Patient presents with    Routine Prenatal Visit       Current Outpatient Medications   Medication Sig Dispense Refill    acetaminophen (TYLENOL EXTRA STRENGTH) 500 MG tablet Take 1,000 mg by mouth every 6 (six) hours as needed for Pain.      citalopram (CELEXA) 10 MG tablet Take 1 tablet (10 mg total) by mouth once daily. 30 tablet 2    levalbuterol (XOPENEX HFA) 45 mcg/actuation inhaler Inhale into the lungs.      levalbuterol (XOPENEX) 1.25 mg/3 mL nebulizer solution Take 3 mLs (1.25 mg total) by nebulization 3 (three) times daily as needed for Wheezing. Rescue 75 mL 0    nitrofurantoin, macrocrystal-monohydrate, (MACROBID) 100 MG capsule Take 1 capsule (100 mg total) by mouth 2 (two) times daily. for 10 days 20 capsule 0    oxyCODONE-acetaminophen (PERCOCET) 5-325 mg per tablet Take 1 tablet by mouth every 4 (four) hours as needed. 20 tablet 0    prenatal 25/iron fum/folic/dha (PRENATAL-1 ORAL) Take 1 tablet by mouth once daily.      doxylamine-pyridoxine, vit B6, (DICLEGIS) 10-10 mg TbEC Take 2 tablets by mouth every evening. (Patient not taking: Reported on 2019) 14 tablet 0     No current facility-administered medications for this visit.      Review of patient's allergies indicates:   Allergen Reactions    Toradol [ketorolac] Other (See Comments)     paralysis    Tramadol Other (See Comments)     paralysis    Penicillins Hives and Rash    Sulfa (sulfonamide antibiotics) Hives and Rash       Past Medical History:   Diagnosis Date    Abnormal Pap smear of cervix     Allergy     Asthma     last used inhaler 2018    Constipation     Endometriosis     Fibromyalgia     Gastritis     HLA B27 (HLA B27 positive)     Migraine      Scoliosis     Smoker     stopped in 2018       Past Surgical History:   Procedure Laterality Date    ADENOIDECTOMY      APPENDECTOMY  2014    endometriosis    CHOLECYSTECTOMY  2017    [facility]     COLONOSCOPY N/A 2016    Procedure: COLONOSCOPY;  Surgeon: Jarret Zhao Jr., MD;  Location: [facility];  Service: Endoscopy;  Laterality: N/A;    MOUTH SURGERY      x2    PELVIC LAPAROSCOPY      TONSILLECTOMY  2016    UPPER GASTROINTESTINAL ENDOSCOPY  2016    Dr. Zhao       OB History    Para Term  AB Living   2 1 1     1   SAB TAB Ectopic Multiple Live Births         0 1      # Outcome Date GA Lbr Beny/2nd Weight Sex Delivery Anes PTL Lv   2 Current            1 Term 18 39w1d 12:11 / 00:20 3.459 kg (7 lb 10 oz) F Vag-Spont EPI N RACHELE       Social History     Socioeconomic History    Marital status:      Spouse name: Not on file    Number of children: 0    Years of education: Not on file    Highest education level: Not on file   Occupational History    Occupation: hotel management   Social Needs    Financial resource strain: Not on file    Food insecurity:     Worry: Not on file     Inability: Not on file    Transportation needs:     Medical: Not on file     Non-medical: Not on file   Tobacco Use    Smoking status: Former Smoker     Packs/day: 0.50     Types: Cigarettes     Last attempt to quit: 2018     Years since quittin.1    Smokeless tobacco: Never Used   Substance and Sexual Activity    Alcohol use: No     Alcohol/week: 0.0 standard drinks     Comment: social    Drug use: No    Sexual activity: Yes     Partners: Male   Lifestyle    Physical activity:     Days per week: Not on file     Minutes per session: Not on file    Stress: Not on file   Relationships    Social connections:     Talks on phone: Patient refused     Gets together: Patient refused     Attends Church service: Not on file     Active member of club or  organization: Patient refused     Attends meetings of clubs or organizations: Patient refused     Relationship status:    Other Topics Concern    Not on file   Social History Narrative    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   ABDOMEN: No abdominal pain, constipation, diarrhea, nausea, vomiting or rectal bleeding.   URINARY: No frequency, urgency, dysuria, or hematuria      O POS    ASSESSMENT    Encounter Diagnoses   Name Primary?     screening for streptococcus B Yes    36 weeks gestation of pregnancy        PLAN  1.  RTC 1 weeks     I have reviewed the blood pressure, urine results, fetal heart rate check, fundal height and agree.

## 2020-02-17 LAB — BACTERIA SPEC AEROBE CULT: NORMAL

## 2020-02-20 ENCOUNTER — ROUTINE PRENATAL (OUTPATIENT)
Dept: OBSTETRICS AND GYNECOLOGY | Facility: CLINIC | Age: 25
End: 2020-02-20
Payer: COMMERCIAL

## 2020-02-20 VITALS
SYSTOLIC BLOOD PRESSURE: 124 MMHG | DIASTOLIC BLOOD PRESSURE: 66 MMHG | WEIGHT: 190.94 LBS | BODY MASS INDEX: 29.03 KG/M2

## 2020-02-20 DIAGNOSIS — Z3A.37 37 WEEKS GESTATION OF PREGNANCY: Primary | ICD-10-CM

## 2020-02-20 PROCEDURE — 99999 PR PBB SHADOW E&M-EST. PATIENT-LVL III: CPT | Mod: PBBFAC,,, | Performed by: OBSTETRICS & GYNECOLOGY

## 2020-02-20 PROCEDURE — 99999 PR PBB SHADOW E&M-EST. PATIENT-LVL III: ICD-10-PCS | Mod: PBBFAC,,, | Performed by: OBSTETRICS & GYNECOLOGY

## 2020-02-20 PROCEDURE — 0502F SUBSEQUENT PRENATAL CARE: CPT | Mod: CPTII,S$GLB,, | Performed by: OBSTETRICS & GYNECOLOGY

## 2020-02-20 PROCEDURE — 0502F PR SUBSEQUENT PRENATAL CARE: ICD-10-PCS | Mod: CPTII,S$GLB,, | Performed by: OBSTETRICS & GYNECOLOGY

## 2020-02-20 NOTE — PROGRESS NOTES
Holly Nichols is a 24 y.o. female with Estimated Date of Delivery: 3/11/20   OB History    Para Term  AB Living   2 1 1 0 0 1   SAB TAB Ectopic Multiple Live Births   0 0 0 0 1        AT 37w1d  Here today on 2020 for   Chief Complaint   Patient presents with    Routine Prenatal Visit       Current Outpatient Medications   Medication Sig Dispense Refill    citalopram (CELEXA) 10 MG tablet Take 1 tablet (10 mg total) by mouth once daily. 30 tablet 2    prenatal 25/iron fum/folic/dha (PRENATAL-1 ORAL) Take 1 tablet by mouth once daily.      acetaminophen (TYLENOL EXTRA STRENGTH) 500 MG tablet Take 1,000 mg by mouth every 6 (six) hours as needed for Pain.      doxylamine-pyridoxine, vit B6, (DICLEGIS) 10-10 mg TbEC Take 2 tablets by mouth every evening. (Patient not taking: Reported on 2019) 14 tablet 0    levalbuterol (XOPENEX HFA) 45 mcg/actuation inhaler Inhale into the lungs.      levalbuterol (XOPENEX) 1.25 mg/3 mL nebulizer solution Take 3 mLs (1.25 mg total) by nebulization 3 (three) times daily as needed for Wheezing. Rescue (Patient not taking: Reported on 2020) 75 mL 0    oxyCODONE-acetaminophen (PERCOCET) 5-325 mg per tablet Take 1 tablet by mouth every 4 (four) hours as needed. (Patient not taking: Reported on 2020) 20 tablet 0     No current facility-administered medications for this visit.      Review of patient's allergies indicates:   Allergen Reactions    Toradol [ketorolac] Other (See Comments)     paralysis    Tramadol Other (See Comments)     paralysis    Penicillins Hives and Rash    Sulfa (sulfonamide antibiotics) Hives and Rash       Past Medical History:   Diagnosis Date    Abnormal Pap smear of cervix     Allergy     Asthma     last used inhaler 2018    Constipation     Endometriosis     Fibromyalgia     Gastritis     HLA B27 (HLA B27 positive)     Migraine     Scoliosis     Smoker     stopped in 2018       Past Surgical  History:   Procedure Laterality Date    ADENOIDECTOMY      APPENDECTOMY  2014    endometriosis    CHOLECYSTECTOMY  2017    [facility], [facility]     COLONOSCOPY N/A 2016    Procedure: COLONOSCOPY;  Surgeon: Jarret Zhao Jr., MD;  Location[facility];  Service: Endoscopy;  Laterality: N/A;    MOUTH SURGERY      x2    PELVIC LAPAROSCOPY      TONSILLECTOMY  2016    UPPER GASTROINTESTINAL ENDOSCOPY  2016    Dr. Zhao       OB History    Para Term  AB Living   2 1 1     1   SAB TAB Ectopic Multiple Live Births         0 1      # Outcome Date GA Lbr Beny/2nd Weight Sex Delivery Anes PTL Lv   2 Current            1 Term 18 39w1d 12:11 / 00:20 3.459 kg (7 lb 10 oz) F Vag-Spont EPI N RACHELE       Social History     Socioeconomic History    Marital status:      Spouse name: Not on file    Number of children: 0    Years of education: Not on file    Highest education level: Not on file   Occupational History    Occupation: [employer]   Social Needs    Financial resource strain: Not on file    Food insecurity:     Worry: Not on file     Inability: Not on file    Transportation needs:     Medical: Not on file     Non-medical: Not on file   Tobacco Use    Smoking status: Former Smoker     Packs/day: 0.50     Types: Cigarettes     Last attempt to quit: 2018     Years since quittin.1    Smokeless tobacco: Never Used   Substance and Sexual Activity    Alcohol use: No     Alcohol/week: 0.0 standard drinks     Comment: social    Drug use: No    Sexual activity: Yes     Partners: Male   Lifestyle    Physical activity:     Days per week: Not on file     Minutes per session: Not on file    Stress: Not on file   Relationships    Social connections:     Talks on phone: Patient refused     Gets together: Patient refused     Attends Islam service: Not on file     Active member of club or organization: Patient refused     Attends meetings of clubs or  organizations: Patient refused     Relationship status:    Other Topics Concern    Not on file   Social History Narrative    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   ABDOMEN: No abdominal pain, constipation, diarrhea, nausea, vomiting or rectal bleeding.   URINARY: No frequency, urgency, dysuria, or hematuria      O POS    ASSESSMENT    Encounter Diagnoses   Name Primary?    37 weeks gestation of pregnancy Yes       PLAN  1.  RTC 1 weeks     I have reviewed the blood pressure, urine results, fetal heart rate check, fundal height and agree.

## 2020-02-27 ENCOUNTER — ROUTINE PRENATAL (OUTPATIENT)
Dept: OBSTETRICS AND GYNECOLOGY | Facility: CLINIC | Age: 25
End: 2020-02-27
Payer: COMMERCIAL

## 2020-02-27 VITALS
BODY MASS INDEX: 29.33 KG/M2 | SYSTOLIC BLOOD PRESSURE: 120 MMHG | WEIGHT: 192.88 LBS | DIASTOLIC BLOOD PRESSURE: 82 MMHG

## 2020-02-27 DIAGNOSIS — Z3A.38 38 WEEKS GESTATION OF PREGNANCY: Primary | ICD-10-CM

## 2020-02-27 PROCEDURE — 99499 UNLISTED E&M SERVICE: CPT | Mod: S$GLB,,, | Performed by: OBSTETRICS & GYNECOLOGY

## 2020-02-27 PROCEDURE — 99999 PR PBB SHADOW E&M-EST. PATIENT-LVL III: ICD-10-PCS | Mod: PBBFAC,,, | Performed by: OBSTETRICS & GYNECOLOGY

## 2020-02-27 PROCEDURE — 0502F SUBSEQUENT PRENATAL CARE: CPT | Mod: CPTII,S$GLB,, | Performed by: OBSTETRICS & GYNECOLOGY

## 2020-02-27 PROCEDURE — 0502F PR SUBSEQUENT PRENATAL CARE: ICD-10-PCS | Mod: CPTII,S$GLB,, | Performed by: OBSTETRICS & GYNECOLOGY

## 2020-02-27 PROCEDURE — 99999 PR PBB SHADOW E&M-EST. PATIENT-LVL III: CPT | Mod: PBBFAC,,, | Performed by: OBSTETRICS & GYNECOLOGY

## 2020-02-27 PROCEDURE — 99499 POCT URINE DIPSTICK WITHOUT MICROSCOPE: ICD-10-PCS | Mod: S$GLB,,, | Performed by: OBSTETRICS & GYNECOLOGY

## 2020-02-27 NOTE — PROGRESS NOTES
Holly Nichols is a 24 y.o. female with Estimated Date of Delivery: 3/11/20   OB History    Para Term  AB Living   2 1 1 0 0 1   SAB TAB Ectopic Multiple Live Births   0 0 0 0 1        AT 38w1d  Here today on 2020 for   Chief Complaint   Patient presents with    Routine Prenatal Visit       Current Outpatient Medications   Medication Sig Dispense Refill    citalopram (CELEXA) 10 MG tablet Take 1 tablet (10 mg total) by mouth once daily. 30 tablet 2    levalbuterol (XOPENEX HFA) 45 mcg/actuation inhaler Inhale into the lungs.      oxyCODONE-acetaminophen (PERCOCET) 5-325 mg per tablet Take 1 tablet by mouth every 4 (four) hours as needed. 20 tablet 0    prenatal 25/iron fum/folic/dha (PRENATAL-1 ORAL) Take 1 tablet by mouth once daily.      acetaminophen (TYLENOL EXTRA STRENGTH) 500 MG tablet Take 1,000 mg by mouth every 6 (six) hours as needed for Pain.      doxylamine-pyridoxine, vit B6, (DICLEGIS) 10-10 mg TbEC Take 2 tablets by mouth every evening. (Patient not taking: Reported on 2019) 14 tablet 0    levalbuterol (XOPENEX) 1.25 mg/3 mL nebulizer solution Take 3 mLs (1.25 mg total) by nebulization 3 (three) times daily as needed for Wheezing. Rescue (Patient not taking: Reported on 2020) 75 mL 0     No current facility-administered medications for this visit.      Review of patient's allergies indicates:   Allergen Reactions    Toradol [ketorolac] Other (See Comments)     paralysis    Tramadol Other (See Comments)     paralysis    Penicillins Hives and Rash    Sulfa (sulfonamide antibiotics) Hives and Rash       Past Medical History:   Diagnosis Date    Abnormal Pap smear of cervix     Allergy     Asthma     last used inhaler 2018    Constipation     Endometriosis     Fibromyalgia     Gastritis     HLA B27 (HLA B27 positive)     Migraine     Scoliosis     Smoker     stopped in 2018       Past Surgical History:   Procedure Laterality Date     ADENOIDECTOMY      APPENDECTOMY  2014    endometriosis    CHOLECYSTECTOMY  2017    [facility], [facility]     COLONOSCOPY N/A 2016    Procedure: COLONOSCOPY;  Surgeon: Jarret Zhao Jr., MD;  Location: [facility];  Service: Endoscopy;  Laterality: N/A;    MOUTH SURGERY      x2    PELVIC LAPAROSCOPY      TONSILLECTOMY  2016    UPPER GASTROINTESTINAL ENDOSCOPY  2016    Dr. Zhao       OB History    Para Term  AB Living   2 1 1     1   SAB TAB Ectopic Multiple Live Births         0 1      # Outcome Date GA Lbr Beny/2nd Weight Sex Delivery Anes PTL Lv   2 Current            1 Term 18 39w1d 12:11 / 00:20 3.459 kg (7 lb 10 oz) F Vag-Spont EPI N RACHELE       Social History     Socioeconomic History    Marital status:      Spouse name: Not on file    Number of children: 0    Years of education: Not on file    Highest education level: Not on file   Occupational History    Occupation: hotel management   Social Needs    Financial resource strain: Not on file    Food insecurity:     Worry: Not on file     Inability: Not on file    Transportation needs:     Medical: Not on file     Non-medical: Not on file   Tobacco Use    Smoking status: Former Smoker     Packs/day: 0.50     Types: Cigarettes     Last attempt to quit: 2018     Years since quittin.1    Smokeless tobacco: Never Used   Substance and Sexual Activity    Alcohol use: No     Alcohol/week: 0.0 standard drinks     Comment: social    Drug use: No    Sexual activity: Yes     Partners: Male   Lifestyle    Physical activity:     Days per week: Not on file     Minutes per session: Not on file    Stress: Not on file   Relationships    Social connections:     Talks on phone: Patient refused     Gets together: Patient refused     Attends Alevism service: Not on file     Active member of club or organization: Patient refused     Attends meetings of clubs or organizations: Patient refused     Relationship  status:    Other Topics Concern    Not on file   Social History Narrative    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   ABDOMEN: No abdominal pain, constipation, diarrhea, nausea, vomiting or rectal bleeding.   URINARY: No frequency, urgency, dysuria, or hematuria      O POS    ASSESSMENT    Encounter Diagnoses   Name Primary?    38 weeks gestation of pregnancy Yes       PLAN  1.  cytotec 25 ug vaginally then PIT 4 hours later      I have reviewed the blood pressure, urine results, fetal heart rate check, fundal height and agree.

## 2020-03-02 PROBLEM — Z34.90 ENCOUNTER FOR ELECTIVE INDUCTION OF LABOR: Status: ACTIVE | Noted: 2020-03-02

## 2020-03-06 ENCOUNTER — TELEPHONE (OUTPATIENT)
Dept: OBSTETRICS AND GYNECOLOGY | Facility: CLINIC | Age: 25
End: 2020-03-06

## 2020-03-06 NOTE — TELEPHONE ENCOUNTER
----- Message from Gladys Desai sent at 3/6/2020 12:43 PM CST -----  Contact: pt  Type:  Patient Returning Call    Who Called:  pt  Does the patient know what this is regarding?:  Pt needs to know status of shorterm and leave of absence paperwork. Laurence was handling.  Best Call Back Number:    Additional Information:  Please give pt a call back as soon as possible. Thank you

## 2020-03-06 NOTE — TELEPHONE ENCOUNTER
Pt notified [facility] paperwork is done and faxed to company. Pt notified original copy will be up at the  for pt to  at her convenience.

## 2020-03-23 ENCOUNTER — PATIENT MESSAGE (OUTPATIENT)
Dept: ADMINISTRATIVE | Facility: OTHER | Age: 25
End: 2020-03-23

## 2020-04-02 DIAGNOSIS — F41.9 ANXIETY: ICD-10-CM

## 2020-04-02 RX ORDER — CITALOPRAM 10 MG/1
10 TABLET ORAL DAILY
Qty: 30 TABLET | Refills: 2 | Status: SHIPPED | OUTPATIENT
Start: 2020-04-02 | End: 2020-05-06

## 2020-04-07 ENCOUNTER — PATIENT MESSAGE (OUTPATIENT)
Dept: OBSTETRICS AND GYNECOLOGY | Facility: CLINIC | Age: 25
End: 2020-04-07

## 2020-04-08 RX ORDER — BUTALBITAL, ACETAMINOPHEN AND CAFFEINE 50; 325; 40 MG/1; MG/1; MG/1
1 TABLET ORAL EVERY 4 HOURS PRN
Qty: 20 TABLET | Refills: 0 | Status: SHIPPED | OUTPATIENT
Start: 2020-04-08 | End: 2020-05-08

## 2020-05-06 ENCOUNTER — OFFICE VISIT (OUTPATIENT)
Dept: OBSTETRICS AND GYNECOLOGY | Facility: CLINIC | Age: 25
End: 2020-05-06
Payer: COMMERCIAL

## 2020-05-06 DIAGNOSIS — F32.A DEPRESSION, UNSPECIFIED DEPRESSION TYPE: Primary | ICD-10-CM

## 2020-05-06 DIAGNOSIS — Z30.011 ENCOUNTER FOR INITIAL PRESCRIPTION OF CONTRACEPTIVE PILLS: ICD-10-CM

## 2020-05-06 PROCEDURE — 99213 OFFICE O/P EST LOW 20 MIN: CPT | Mod: 95,24,, | Performed by: OBSTETRICS & GYNECOLOGY

## 2020-05-06 PROCEDURE — 99213 PR OFFICE/OUTPT VISIT, EST, LEVL III, 20-29 MIN: ICD-10-PCS | Mod: 95,24,, | Performed by: OBSTETRICS & GYNECOLOGY

## 2020-05-06 RX ORDER — NORETHINDRONE ACETATE AND ETHINYL ESTRADIOL .02; 1 MG/1; MG/1
1 TABLET ORAL DAILY
Qty: 30 TABLET | Refills: 11 | Status: SHIPPED | OUTPATIENT
Start: 2020-05-06 | End: 2020-11-16

## 2020-05-06 RX ORDER — CITALOPRAM 20 MG/1
20 TABLET, FILM COATED ORAL DAILY
Qty: 30 TABLET | Refills: 2 | Status: SHIPPED | OUTPATIENT
Start: 2020-05-06 | End: 2020-08-11

## 2020-05-06 NOTE — PROGRESS NOTES
The patient location is: home  The chief complaint leading to consultation is:   Chief Complaint   Patient presents with    Depression     Visit type: Virtual visit with synchronous audio and video  Total time spent with patient: 15  Each patient to whom he or she provides medical services by telemedicine is:  (1) informed of the relationship between the physician and patient and the respective role of any other health care provider with respect to management of the patient; and (2) notified that he or she may decline to receive medical services by telemedicine and may withdraw from such care at any time.    15 minutes spent today with this patient. Greater than half spent in counseling today.     Chief Complaint   Patient presents with    Depression       History of Present Illness: Holly Nichols is a 24 y.o. female that presents today 5/6/2020 for   Chief Complaint   Patient presents with    Depression     She reports uterine pain all the time.  She reports that she wants to do OCPS until she can get in for nexplanon.  She says she has endo pain al the time. 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  She reports desire for hysterectomy.  She also reports that she feels down all the time.  She is sleep deprived and feels sad.  She has no SI or HI.     Past Medical History:   Diagnosis Date    Abnormal Pap smear of cervix     Allergy     Asthma     last used inhaler 8/2018    Constipation     Endometriosis     Fibromyalgia     Gastritis     HLA B27 (HLA B27 positive)     Migraine     Scoliosis     Smoker     stopped in January 2018       Past Surgical History:   Procedure Laterality Date    ADENOIDECTOMY      APPENDECTOMY  09/2014    endometriosis    CHOLECYSTECTOMY  12/20/2017    Dr. MARBELLA Anne, [facility]     COLONOSCOPY N/A 5/19/2016    Procedure: COLONOSCOPY;  Surgeon: Jarret Zhao Jr., MD;  Location: [facility];  Service: Endoscopy;  Laterality: N/A;    MOUTH SURGERY      x2    PELVIC  LAPAROSCOPY      TONSILLECTOMY  07/2016    UPPER GASTROINTESTINAL ENDOSCOPY  05/2016    Dr. Zhao       Current Outpatient Medications   Medication Sig Dispense Refill    acetaminophen (TYLENOL EXTRA STRENGTH) 500 MG tablet Take 1,000 mg by mouth every 6 (six) hours as needed for Pain.      butalbital-acetaminophen-caffeine -40 mg (FIORICET, ESGIC) -40 mg per tablet Take 1 tablet by mouth every 4 (four) hours as needed for Pain. 20 tablet 0    citalopram (CELEXA) 20 MG tablet Take 1 tablet (20 mg total) by mouth once daily. 30 tablet 2    doxylamine-pyridoxine, vit B6, (DICLEGIS) 10-10 mg TbEC Take 2 tablets by mouth every evening. (Patient not taking: Reported on 12/17/2019) 14 tablet 0    esomeprazole (NEXIUM) 20 MG capsule Take 1 capsule (20 mg total) by mouth before breakfast. 30 capsule 11    levalbuterol (XOPENEX HFA) 45 mcg/actuation inhaler Inhale into the lungs.      levalbuterol (XOPENEX) 1.25 mg/3 mL nebulizer solution Take 3 mLs (1.25 mg total) by nebulization 3 (three) times daily as needed for Wheezing. Rescue (Patient not taking: Reported on 2/20/2020) 75 mL 0    norethindrone-ethinyl estradiol (MICROGESTIN 1/20) 1-20 mg-mcg per tablet Take 1 tablet by mouth once daily. 30 tablet 11    prenatal 25/iron fum/folic/dha (PRENATAL-1 ORAL) Take 1 tablet by mouth once daily.       No current facility-administered medications for this visit.        Review of patient's allergies indicates:   Allergen Reactions    Latex, natural rubber      burning    Toradol [ketorolac] Other (See Comments)     paralysis    Tramadol Other (See Comments)     paralysis    Penicillins Hives and Rash    Sulfa (sulfonamide antibiotics) Hives and Rash       Family History   Problem Relation Age of Onset    Fibromyalgia Mother     Depression Mother     Diabetes Father     Depression Father     Mental illness Father         bipolar disorder    Multiple sclerosis Maternal Aunt     Crohn's disease  Maternal Grandmother     Colon cancer Neg Hx     Colon polyps Neg Hx     Ulcerative colitis Neg Hx     Stomach cancer Neg Hx     Esophageal cancer Neg Hx     Breast cancer Neg Hx     Ovarian cancer Neg Hx        Social History     Tobacco Use    Smoking status: Former Smoker     Packs/day: 0.50     Types: Cigarettes     Last attempt to quit: 2018     Years since quittin.3    Smokeless tobacco: Never Used   Substance Use Topics    Alcohol use: No     Alcohol/week: 0.0 standard drinks     Comment: social    Drug use: No       OB History    Para Term  AB Living   2 2 2     2   SAB TAB Ectopic Multiple Live Births         0 2      # Outcome Date GA Lbr Beny/2nd Weight Sex Delivery Anes PTL Lv   2 Term 20 38w4d / 00:04 3.305 kg (7 lb 4.6 oz) F Vag-Spont EPI N RACHELE   1 Term 18 39w1d 12:11 / 00:20 3.459 kg (7 lb 10 oz) F Vag-Spont EPI N RACHELE       Review of Symptoms:  GENERAL: Denies weight gain or weight loss. Feeling well overall.   SKIN: Denies rash or lesions.   HEAD: Denies head injury or headache.   NODES: Denies enlarged lymph nodes.   CHEST: Denies chest pain or shortness of breath.   CARDIOVASCULAR: Denies palpitations or left sided chest pain.   ABDOMEN: No abdominal pain, constipation, diarrhea, nausea, vomiting or rectal bleeding.   URINARY: No frequency, dysuria, hematuria, or burning on urination.  HEMATOLOGIC: No easy bruisability or excessive bleeding.   MUSCULOSKELETAL: Denies joint pain or swelling.     LMP 2019     Physical Exam   Constitutional: No distress.   Skin: She is not diaphoretic.   Psychiatric: She has a normal mood and affect. Her behavior is normal. Judgment and thought content normal.         ASSESSMENT/PLAN:  Depression, unspecified depression type  -     citalopram (CELEXA) 20 MG tablet; Take 1 tablet (20 mg total) by mouth once daily.  Dispense: 30 tablet; Refill: 2    Encounter for initial prescription of contraceptive pills  -      norethindrone-ethinyl estradiol (MICROGESTIN 1/20) 1-20 mg-mcg per tablet; Take 1 tablet by mouth once daily.  Dispense: 30 tablet; Refill: 11  -     Device Authorization Order    increased celexa from 10 mg to 20 mg daily    nexplanon at next office visit     9/2020 pap needed         COVID-19 Discussion:     I had long discussion with patient and any applicable family about the COVID-19 coronavirus epidemic and the recommended precautions. I have re-iterated the CDC recommendations for adequate hand washing, use of hand -like products, and coughing into elbow, etc. In addition, especially for immunocompromised patients, I have recommended avoidance of crowds, including movie theaters, restaurants, churches, etc. I have recommended avoidance of any sick or symptomatic family members and/or friends. The patient has been asked to call us immediately with any symptom developments, issues, questions or other general concerns.

## 2020-08-17 ENCOUNTER — TELEPHONE (OUTPATIENT)
Dept: GASTROENTEROLOGY | Facility: CLINIC | Age: 25
End: 2020-08-17

## 2020-08-17 ENCOUNTER — OFFICE VISIT (OUTPATIENT)
Dept: GASTROENTEROLOGY | Facility: CLINIC | Age: 25
End: 2020-08-17
Payer: COMMERCIAL

## 2020-08-17 VITALS — WEIGHT: 170 LBS | HEIGHT: 68 IN | BODY MASS INDEX: 25.76 KG/M2

## 2020-08-17 DIAGNOSIS — Z01.818 PREOP TESTING: ICD-10-CM

## 2020-08-17 DIAGNOSIS — R12 HEARTBURN: ICD-10-CM

## 2020-08-17 DIAGNOSIS — R19.4 CHANGE IN BOWEL HABITS: Primary | ICD-10-CM

## 2020-08-17 DIAGNOSIS — Z90.49 S/P CHOLECYSTECTOMY: ICD-10-CM

## 2020-08-17 DIAGNOSIS — Z87.42 HISTORY OF ENDOMETRIOSIS: ICD-10-CM

## 2020-08-17 DIAGNOSIS — Z87.19 HISTORY OF IBS: ICD-10-CM

## 2020-08-17 DIAGNOSIS — R19.8 IRREGULAR BOWEL HABITS: ICD-10-CM

## 2020-08-17 DIAGNOSIS — Z87.19 HISTORY OF COLITIS: ICD-10-CM

## 2020-08-17 DIAGNOSIS — R10.9 ABDOMINAL CRAMPING: ICD-10-CM

## 2020-08-17 PROCEDURE — 99214 PR OFFICE/OUTPT VISIT, EST, LEVL IV, 30-39 MIN: ICD-10-PCS | Mod: 95,,, | Performed by: NURSE PRACTITIONER

## 2020-08-17 PROCEDURE — 99214 OFFICE O/P EST MOD 30 MIN: CPT | Mod: 95,,, | Performed by: NURSE PRACTITIONER

## 2020-08-17 RX ORDER — OMEPRAZOLE 40 MG/1
40 CAPSULE, DELAYED RELEASE ORAL
Qty: 30 CAPSULE | Refills: 3 | Status: SHIPPED | OUTPATIENT
Start: 2020-08-17 | End: 2020-09-14 | Stop reason: SDUPTHER

## 2020-08-17 RX ORDER — DICYCLOMINE HYDROCHLORIDE 10 MG/1
10 CAPSULE ORAL EVERY 6 HOURS PRN
Qty: 120 CAPSULE | Refills: 1 | Status: SHIPPED | OUTPATIENT
Start: 2020-08-17 | End: 2020-09-16

## 2020-08-17 NOTE — PATIENT INSTRUCTIONS
Eating a High-Fiber Diet  Fiber is what gives strength and structure to plants. Most grains, beans, vegetables, and fruits contain fiber. Foods rich in fiber are often low in calories and fat, and they fill you up more. They may also reduce your risks for certain health problems. To find out the amount of fiber in canned, packaged, or frozen foods, read the Nutrition Facts label. It tells you how much fiber is in a serving.    Types of fiber and their benefits  There are two types of fiber: insoluble and soluble. They both aid digestion and help you maintain a healthy weight.  · Insoluble fiber. This is found in whole grains, cereals, certain fruits and vegetables such as apple skin, corn, and carrots. Insoluble fiber may prevent constipation and reduce the risk for certain types of cancer.  · Soluble fiber. This type of fiber is in oats, beans, and certain fruits and vegetables such as strawberries and peas. Soluble fiber can reduce cholesterol, which may help lower the risk for heart disease. It also helps control blood sugar levels.  Look for high-fiber foods  Try these foods to add fiber to your diet:  · Whole-grain breads and cereals. Try to eat 6 to 8 ounces a day. Include wheat and oat bran cereals, whole-wheat muffins or toast, and corn tortillas in your meals.  · Fruits. Try to eat 2 cups a day. Apples, oranges, strawberries, pears, and bananas are good sources. (Note: Fruit juice is low in fiber.)  · Vegetables. Try to eat at least 2.5 cups a day. Add asparagus, carrots, broccoli, peas, and corn to your meals.  · Beans. One cup of cooked lentils gives you over 15 grams of fiber. Try navy beans, lentils, and chickpeas.  · Seeds. A small handful of seeds gives you about 3 grams of fiber. Try sunflower seeds.  Keep track of your fiber  Keep track of how much fiber you eat. Start by reading food labels. Then eat a variety of foods high in fiber. As you begin to eat more fiber, ask your healthcare provider  how much water you should be drinking to keep your digestive system working smoothly.  You should aim for a certain amount of fiber in your diet each day. If you are a woman, that amount is between 25 and 28 grams per day. Men should aim for 30 to 33 grams per day. After age 50, your daily fiber needs drop to 22 grams for women and 28 grams for men.  Before you reach for the fiber supplements, think about this. Fiber is found naturally in healthy whole foods. It gives you that feeling of fullness after you eat. Taking fiber supplements or eating fiber-enriched foods will not give you this full feeling.  Your fiber intake is a good measure for the quality of your overall diet. If you are missing out on your daily amount of fiber, you may be lacking other important nutrients as well.  Date Last Reviewed: 5/11/2015 © 2000-2017 [employer]. [address] [place] [address]. All rights reserved. This information is not intended as a substitute for professional medical care. Always follow your healthcare professional's instructions.            Abdominal Pain    Abdominal pain is pain in the stomach or belly area. Everyone has this pain from time to time. In many cases it goes away on its own. But abdominal pain can sometimes be due to a serious problem, such as appendicitis. So its important to know when to seek help.  Causes of abdominal pain  There are many possible causes of abdominal pain. Common causes in adults include:  · Constipation, diarrhea, or gas  · Stomach acid flowing back up into the esophagus (acid reflux or heartburn)  · Severe acid reflux, called GERD (gastroesophageal reflux disease)  · A sore in the lining of the stomach or small intestine (peptic ulcer)  · Inflammation of the gallbladder, liver, or pancreas  · Gallstones or kidney stones  · Appendicitis   · Intestinal blockage   · An internal organ pushing through a muscle or other tissue (hernia)  · Urinary tract  infections  · In women, menstrual cramps, fibroids, or endometriosis  · Inflammation or infection of the intestines  Diagnosing the cause of abdominal pain  Your healthcare provider will do a physical exam help find the cause of your pain. If needed, tests will be ordered. Belly pain has many possible causes. So it can be hard to find the reason for your pain. Giving details about your pain can help. Tell your provider where and when you feel the pain, and what makes it better or worse. Also let your provider know if you have other symptoms such as:  · Fever  · Tiredness  · Upset stomach (nausea)  · Vomiting  · Changes in bathroom habits  Treating abdominal pain  Some causes of pain need emergency medical treatment right away. These include appendicitis or a bowel blockage. Other problems can be treated with rest, fluids, or medicines. Your healthcare provider can give you specific instructions for treatment or self-care based on what is causing your pain.  If you have vomiting or diarrhea, sip water or other clear fluids. When you are ready to eat solid foods again, start with small amounts of easy-to-digest, low-fat foods. These include apple sauce, toast, or crackers.   When to seek medical care  Call 911 or go to the hospital right away if you:  · Cant pass stool and are vomiting  · Are vomiting blood or have bloody diarrhea or black, tarry diarrhea  · Have chest, neck, or shoulder pain  · Feel like you might pass out  · Have pain in your shoulder blades with nausea  · Have sudden, severe belly pain  · Have new, severe pain unlike any you have felt before  · Have a belly that is rigid, hard, and tender to touch  Call your healthcare provider if you have:  · Pain for more than 5 days  · Bloating for more than 2 days  · Diarrhea for more than 5 days  · A fever of 100.4°F (38.0°C) or higher, or as directed by your provider  · Pain that gets worse  · Weight loss for no reason  · Continued lack of appetite  · Blood  in your stool  How to prevent abdominal pain  Here are some tips to help prevent abdominal pain:  · Eat smaller amounts of food at one time.  · Avoid greasy, fried, or other high-fat foods.  · Avoid foods that give you gas.  · Exercise regularly.  · Drink plenty of fluids.  To help prevent GERD symptoms:  · Quit smoking.  · Reduce alcohol and certain foods that increase stomach acid.  · Avoid aspirin and over-the-counter pain and fever medicines (NSAIDS or nonsteroidal anti-inflammatory drugs), if possible  · Lose extra weight.  · Finish eating at least 2 hours before you go to bed or lie down.  · Raise the head of your bed.  Date Last Reviewed: 7/1/2016  © 5901-1466 [employer]. [address] [place] [address], [place] [address]. All rights reserved. This information is not intended as a substitute for professional medical care. Always follow your healthcare professional's instructions.          GERD (Adult)    The esophagus is a tube that carries food from the mouth to the stomach. A valve at the lower end of the esophagus prevents stomach acid from flowing upward. When this valve doesn't work properly, stomach contents may repeatedly flow back up (reflux) into the esophagus. This is called gastroesophageal reflux disease (GERD). GERD can irritate the esophagus. It can cause problems with swallowing or breathing. In severe cases, GERD can cause recurrent pneumonia or other serious problems.  Symptoms of reflux include burning, pressure or sharp pain in the upper abdomen or mid to lower chest. The pain can spread to the neck, back, or shoulder. There may be belching, an acid taste in the back of the throat, chronic cough, or sore throat or hoarseness. GERD symptoms often occur during the day after a big meal. They can also occur at night when lying down.   Home care  Lifestyle changes can help reduce symptoms. If needed, medicines may be prescribed. Symptoms often improve with treatment, but if treatment is  "stopped, the symptoms often return after a few months. So most persons with GERD will need to continue treatment.  Lifestyle changes  · Limit or avoid fatty, fried, and spicy foods, as well as coffee, chocolate, mint, and foods with high acid content such as tomatoes and citrus fruit and juices (orange, grapefruit, lemon).  · Dont eat large meals, especially at night. Frequent, smaller meals are best. Do not lie down right after eating. And dont eat anything 3 hours before going to bed.  · Avoid drinking alcohol and smoking. As much as possible, stay away from second hand smoke.  · If you are overweight, losing weight will reduce symptoms.   · Avoid wearing tight clothing around your stomach area.  · If your symptoms occur during sleep, use a foam wedge to elevate your upper body (not just your head.) Or, place 4" blocks under the head of your bed.  Medicines  If needed, medicines can help relieve the symptoms of GERD and prevent damage to the esophagus. Discuss a medicine plan with your healthcare provider. This may include one or more of the following medicines:  · Antacids to help neutralize the normal acids in your stomach.  · Acid blockers (H2 blockers) to decrease acid production.  · Acid inhibitors (PPIs) to decrease acid production in a different way than the blockers. They may work better, but can take a little longer to take effect.  Take an antacid 30-60 minutes after eating and at bedtime, but not at the same time as an acid blocker.  Try not to take medicines such as ibuprofen and aspirin. If you are taking aspirin for your heart or other medical reasons, talk to your healthcare provider about stopping it.  Follow-up care  Follow up with your healthcare provider or as advised by our staff.  When to seek medical advice  Call your healthcare provider if any of the following occur:  · Stomach pain gets worse or moves to the lower right abdomen (appendix area)  · Chest pain appears or gets worse, or " spreads to the back, neck, shoulder, or arm  · Frequent vomiting (cant keep down liquids)  · Blood in the stool or vomit (red or black in color)  · Feeling weak or dizzy  · Fever of 100.4ºF (38ºC) or higher, or as directed by your healthcare provider  Date Last Reviewed: 6/23/2015 © 2000-2017 [employer]]. [address] [place] [address], [place] [address]. All rights reserved. This information is not intended as a substitute for professional medical care. Always follow your healthcare professional's instructions.          Irritable Bowel Syndrome    Irritable bowel syndrome (IBS) is a disorder of the intestines. It is not a disease, but a group of symptoms caused by changes in the way the intestines work. It is fairly common, but the cause is not well understood.  Symptoms of IBS include:  · Abdominal pain, discomfort, and cramping  · Diarrhea  · Constipation or dry, hard stools  · Mucous stool  · Bloating  · Feeling of incomplete bowel movements  It usually results in one of 3 patterns of symptoms:  · Chronic abdominal pain and constipation  · Recurring episodes of diarrhea, with or without pain  · Alternating diarrhea and constipation  Home care  The goal of treatment is to control and relieve your symptoms, so you can lead a full and active life. There is no cure for IBS. But it can be managed.  Diet  Your diet did not cause your IBS, but it can affect it. No one diet works for everyone. Finding the best foods for you may take trial and error. Keep a food log to help find what foods made your symptoms worse. Below are some tips that may help you.  · Eat more slowly. Eat smaller amounts at a time, but more often. Remember, you can always eat more, but cannot eat less once youve eaten too much.  · High-fiber foods are complicated. While they may help relieve constipation, they can make your bloating, cramping, gas, and diarrhea worse.  · Eat less sugar.  · Try cutting out dairy products. Sometimes this  helps.  · Try cutting out foods that are high in fat and fatty meats.  · You can control bloating or passing excess gas. Be careful with gassy vegetables and fruits like beans, cabbage, broccoli, and cauliflower.  · Be careful with carbonated beverages and fruit juices. They can make your bloating and diarrhea worse.  · Caffeine, alcohol, and stimulants can make symptoms worse. These include coffee, tea, sodas, energy drinks, and chocolate.  Lifestyle  · Look for factors that seem to worsen your symptoms. These include stress and emotions.   · Although stress does not cause IBS, it may trigger flare-ups. Counseling can help you learn to handle stress. So can self-help measures like exercise, yoga, and meditation.  · Depression can occur along with IBS. Your healthcare provider may prescribe antidepressant medicine. This may help with diarrhea, constipation, and cramping, as well as with symptoms of depression.  · Smoking doesn't cause IBS, but can make the symptoms worse.  Medicines  Your healthcare provider may prescribe medicines. Take them as directed. For acute flare-ups of your illness, your provider may give you prescription medicines.  · Check with your healthcare provider before taking any medicines for diarrhea.  · Avoid anti-inflammatory medicines like ibuprofen or naproxen.  · Consider nutritional supplements. This is especially true if your diarrhea is prolonged, or you aren't eating or are losing weight  Follow-up care  Follow up with your healthcare provider, or as advised. If a stool sample was taken or cultures were done, you will be told if your treatment needs to change. You can call as directed for the results.  When to seek medical advice  Call your healthcare provider right away if any of these occur:  · Abdominal pain gets worse  · Constant abdominal pain moves to the right-lower abdomen  · You can't keep liquids down because of vomiting  · You have severe diarrhea  · You have blood (red or  black color) or mucus in your stool  · You feel very weak or dizzy, faint, or have extreme thirst  · You have a fever of 100.4ºF (38.0ºC) or higher, or as directed by your healthcare provider  Date Last Reviewed: 8/31/2015  © 6041-4584 [employer]. [address] [place] [address]. All rights reserved. This information is not intended as a substitute for professional medical care. Always follow your healthcare professional's instructions.

## 2020-08-17 NOTE — TELEPHONE ENCOUNTER
Spoke with pt and scheduled procedures per Anya Bedoya NP. Confirmation letter and prep instructions sent to portal/address on file. Pt agreed and verbalized understanding.

## 2020-08-17 NOTE — TELEPHONE ENCOUNTER
----- Message from Amber  sent at 8/17/2020  2:51 PM CDT -----  Regarding: advice  Contact: pt  Type: Needs Medical Advice    Who Called:  pt    Best Call Back Number:     Additional Information: Requesting a call back from Nurse, regarding what are the days for the procedures ,please call back with advice

## 2020-08-17 NOTE — PROGRESS NOTES
Subjective:       Patient ID: Holly Nichols is a 25 y.o. female Body mass index is 25.85 kg/m².    Chief Complaint: Other (change in bowel habits)  This is an established patient of Dr. Zhao and myself.    The patient location is: HOME in [place]. I verified patient name and date of birth. Patient provided current height and weight measurements.  The chief complaint leading to consultation is: BOWEL ISSUES AND CRAMPING    Visit type: audiovisual    Face to Face time with patient: 22 MINUTES  40 minutes of total time spent on the encounter, which includes face to face time and non-face to face time preparing to see the patient (eg, review of tests), Obtaining and/or reviewing separately obtained history, Documenting clinical information in the electronic or other health record, Independently interpreting results (not separately reported) and communicating results to the patient/family/caregiver, or Care coordination (not separately reported).     Each patient to whom he or she provides medical services by telemedicine is:  (1) informed of the relationship between the physician and patient and the respective role of any other health care provider with respect to management of the patient; and (2) notified that he or she may decline to receive medical services by telemedicine and may withdraw from such care at any time.    GI Problem  The primary symptoms include weight loss (lost weight since she delivered her baby in 3/2020; stable lately), abdominal pain and diarrhea (having this currently). Primary symptoms do not include fever, fatigue, nausea, vomiting, melena, hematemesis, hematochezia or dysuria. The illness began more than 7 days ago (irregular bowel habits for several years). The problem has been gradually worsening (since ~5/2020).   The abdominal pain began more than 2 days ago. The abdominal pain is generalized (described as abdominal cramping). The severity of the abdominal pain is 5/10. Relieved  by: after a bowel movement; triggered after eating (especially heavy meals) & by needing to have a bowel movement; worse with stress.   The diarrhea began more than 1 week ago (started ~5/2020). Daily occurrences: not daily; intermittent, occurs once every 2 weeks, lasts 2-3 days, otherwise, bowel movements once every 3 days of firm stool.   The illness is also significant for constipation. The illness does not include chills, dysphagia, odynophagia or back pain. Associated symptoms comments: Eating a bland diet. Significant associated medical issues include GERD (daily; nexium 20 mg once daily (restarted ~3/2020); PAST: prilosec- similar relief with nexium, protonix- no relief) and irritable bowel syndrome (PAST TREATMENT: bentyl helped).     Review of Systems   Constitutional: Positive for appetite change (decreased appetite; eating 1 big meal a day) and weight loss (lost weight since she delivered her baby in 3/2020; stable lately). Negative for chills, diaphoresis, fatigue and fever.   HENT: Negative for sore throat and trouble swallowing.    Respiratory: Negative for cough, choking, chest tightness and shortness of breath.    Cardiovascular: Negative for chest pain.   Gastrointestinal: Positive for abdominal pain, constipation and diarrhea (having this currently). Negative for abdominal distention, anal bleeding, blood in stool, dysphagia, hematemesis, hematochezia, melena, nausea, rectal pain and vomiting.   Genitourinary: Negative for difficulty urinating, dysuria, flank pain and pelvic pain.   Musculoskeletal: Negative for back pain.   Neurological: Negative for weakness.       Past Medical History:   Diagnosis Date    Abnormal Pap smear of cervix     Allergy     Asthma     last used inhaler 8/2018    Constipation     Endometriosis     Fibromyalgia     Gastritis     HLA B27 (HLA B27 positive)     Migraine     Scoliosis     Smoker     stopped in January 2018     Past Surgical History:   Procedure  Laterality Date    ADENOIDECTOMY      APPENDECTOMY  09/2014    endometriosis    CHOLECYSTECTOMY  12/20/2017    Dr. MAR[facility]LLA Anne, [facility]     COLONOSCOPY N/A 5/19/2016    Procedure: COLONOSCOPY;  Surgeon: Jarret Zhao Jr., MD;  Location[facility];  Service: Endoscopy;  Laterality: N/A;    MOUTH SURGERY      x2    PELVIC LAPAROSCOPY      TONSILLECTOMY  07/2016    UPPER GASTROINTESTINAL ENDOSCOPY  05/2016    Dr. Zhao     Family History   Problem Relation Age of Onset    Fibromyalgia Mother     Depression Mother     Irritable bowel syndrome Mother     Diabetes Father     Depression Father     Mental illness Father         bipolar disorder    Multiple sclerosis Maternal Aunt     Crohn's disease Maternal Grandmother     Colon cancer Neg Hx     Colon polyps Neg Hx     Ulcerative colitis Neg Hx     Stomach cancer Neg Hx     Esophageal cancer Neg Hx     Breast cancer Neg Hx     Ovarian cancer Neg Hx     Celiac disease Neg Hx      Wt Readings from Last 10 Encounters:   08/17/20 77.1 kg (170 lb)   03/02/20 87.1 kg (192 lb)   02/27/20 87.5 kg (192 lb 14.4 oz)   02/20/20 86.6 kg (190 lb 14.7 oz)   02/14/20 86.5 kg (190 lb 11.2 oz)   02/10/20 85.3 kg (188 lb)   02/08/20 82.4 kg (181 lb 10.5 oz)   01/31/20 82.4 kg (181 lb 10.5 oz)   01/08/20 80.6 kg (177 lb 11.1 oz)   12/31/19 79.5 kg (175 lb 4.3 oz)   patient had a baby on 3/2/2020  Lab Results   Component Value Date    WBC 12.36 03/03/2020    HGB 12.6 03/03/2020    HCT 37.0 03/03/2020    MCV 94 03/03/2020     (L) 03/03/2020     CMP  Sodium   Date Value Ref Range Status   08/05/2019 136 136 - 145 mmol/L Final     Potassium   Date Value Ref Range Status   08/05/2019 3.7 3.5 - 5.1 mmol/L Final     Chloride   Date Value Ref Range Status   08/05/2019 100 95 - 110 mmol/L Final     CO2   Date Value Ref Range Status   08/05/2019 25 22 - 31 mmol/L Final     Glucose   Date Value Ref Range Status   08/05/2019 95 70 - 110 mg/dL Final     Comment:     The ADA  "recommends the following guidelines for fasting glucose:  Normal:       less than 100 mg/dL  Prediabetes:  100 mg/dL to 125 mg/dL  Diabetes:     126 mg/dL or higher       BUN, Bld   Date Value Ref Range Status   08/05/2019 9 7 - 18 mg/dL Final     Creatinine   Date Value Ref Range Status   08/05/2019 0.46 (L) 0.50 - 1.40 mg/dL Final     Calcium   Date Value Ref Range Status   08/05/2019 9.3 8.4 - 10.2 mg/dL Final     Total Protein   Date Value Ref Range Status   08/05/2019 7.4 6.0 - 8.4 g/dL Final     Albumin   Date Value Ref Range Status   08/05/2019 4.3 3.5 - 5.2 g/dL Final     Total Bilirubin   Date Value Ref Range Status   08/05/2019 0.4 0.2 - 1.3 mg/dL Final     Alkaline Phosphatase   Date Value Ref Range Status   08/05/2019 63 38 - 145 U/L Final     AST ([place])   Date Value Ref Range Status   03/29/2016 19 14 - 36 U/L Final     AST   Date Value Ref Range Status   08/05/2019 16 14 - 36 U/L Final     ALT   Date Value Ref Range Status   08/05/2019 16 10 - 44 U/L Final     Anion Gap   Date Value Ref Range Status   08/05/2019 11 8 - 16 mmol/L Final     eGFR if    Date Value Ref Range Status   08/05/2019 >60 >60 mL/min/1.73 m^2 Final     eGFR if non    Date Value Ref Range Status   08/05/2019 >60 >60 mL/min/1.73 m^2 Final     Comment:     Calculation used to obtain the estimated glomerular filtration  rate (eGFR) is the CKD-EPI equation.        Lab Results   Component Value Date    LIPASE 165 12/19/2017     Lab Results   Component Value Date    TSH 0.499 08/21/2019     Reviewed prior medical records including radiology report of 12/20/17 MRI MRCP; 12/19/17 limited abdominal ultrasound; 12/15/17 abdominal ultrasound; 7/9/16 ct renal stone abdomen pelvis; 6/23/16 UGI; & endoscopy history (see surgical history).    5/19/16 stool studies reviewed: negative  5/19/16 EGD was reviewed and procedure report states:   " Impression:          - Normal oropharynx.                       - " "Normal esophagus.                       - Z-line regular, 38 cm from the incisors.                       - Non-erosive esophageal reflux (NERD) disease?.                       - Normal stomach. Biopsied.                       - Normal examined duodenum. Biopsied.  Recommendation:      - Perform a colonoscopy as previously scheduled.                       - Await pathology results.                       - Follow an antireflux regimen.                       - Continue present medications.                       - Use Protonix (pantoprazole) 40 mg PO daily.                       - Use sucralfate tablets 1 gram PO QID                       - Call the G.I. clinic in 2 weeks for reports (if                        you haven't heard from us sooner) [phone].                       - Return to GI clinic in 6-8 weeks.".  Biopsy results:   "1. STOMACH (BIOPSY):  -Antrum with features of mild reactive/chemical gastropathy  -Negative for active inflammation  -Negative for Helicobacter pylori organisms on H&E stain  2. DUODENUM (BIOPSY):  -Duodenal mucosa with no significant pathologic changes  -Negative for active inflammation  -No features of sprue  -No organisms"    5/19/2016 Colonoscopy was reviewed and procedure report states:   " Impression:          - Internal hemorrhoids.                       - Erythematous mucosa in the rectum and in the                        recto-sigmoid colon (Prep Proctitis?). Fluid                        aspiration performed. Biopsied.                       - The examination was otherwise normal. Biopsied.                       - The examined portion of the ileum was normal.                        Biopsied.                       - Irritable bowel syndrome?.  Recommendation:      - Discharge patient to home.                       - Await pathology and culture results.                       - High fiber diet.                       - Take a PROBIOTIC, such as ALIGN or CULTURELLE or                      " "  AXEL-Q (all non-prescription), every day for a                        month.                       - Use Bentyl (dicyclomine) 10-20 mg PO QID 30 min AC.                       - Call the G.I. clinic in 2 weeks for reports (if                        you haven't heard from us sooner) [phone].                       - Return to GI clinic in 6-8 weeks.                       - Continue present medications.                       - Patient has a contact number available for                        emergencies. The signs and symptoms of potential                        delayed complications were discussed with the                        patient. Return to normal activities tomorrow.                        Written discharge instructions were provided to the                        patient.                       - Return to normal activities tomorrow.".  Biopsy results:   "3. TERMINAL ILEUM (BIOPSY):  -Small intestine with no significant pathologic changes  4-7. COLON, CECUM, HEPATIC FLEXURE, SPLENIC FLEXURE, LEFT (BIOPSIES):  -Colonic mucosa with no significant pathologic changes  -Negative for active inflammation  -No features of microscopic colitis  8. COLON, RECTOSIGMOID (BIOPSY):  -Patchy mild acute colitis  -No features of chronicity"    Objective:      Physical Exam  Constitutional:       General: She is not in acute distress.     Appearance: She is well-developed.   Pulmonary:      Effort: Pulmonary effort is normal. No respiratory distress.   Skin:     Coloration: Skin is not pale.   Neurological:      Mental Status: She is alert and oriented to person, place, and time.   Psychiatric:         Speech: Speech normal.         Behavior: Behavior normal.         Thought Content: Thought content normal.         Judgment: Judgment normal.         Assessment:       1. Change in bowel habits    2. Irregular bowel habits    3. S/P cholecystectomy    4. Abdominal cramping    5. History of colitis    6. History of IBS    7. " Heartburn    8. History of endometriosis        Plan:       Change in bowel habits & Irregular bowel habits  -  RESTART   dicyclomine (BENTYL) 10 MG capsule; Take 1 capsule (10 mg total) by mouth every 6 (six) hours as needed.  Dispense: 120 capsule; Refill: 1  -     Pancreatic elastase, fecal; Future; Expected date: 08/17/2020  -     Stool Exam-Ova,Cysts,Parasites; Future; Expected date: 08/17/2020  -     Fecal fat, qualitative; Future; Expected date: 08/17/2020  -     Giardia / Cryptosporidum, EIA; Future; Expected date: 08/17/2020  -     Occult blood x 1, stool; Future; Expected date: 08/17/2020  -     pH, stool; Future; Expected date: 08/17/2020  -     Rotavirus antigen, stool; Future; Expected date: 08/17/2020  -     WBC, Stool; Future; Expected date: 08/17/2020  -     Stool culture; Future; Expected date: 08/17/2020  -     Clostridium difficile EIA; Future; Expected date: 08/17/2020  -     Adenovirus Molecular Detection, PCR, Non-Blood Stool; Future; Expected date: 08/17/2020  - schedule Colonoscopy, discussed procedure with the patient, including risks and benefits, patient verbalized understanding  - recommended OTC probiotic, such as Align or Culturelle, as directed  - avoid lactose & caffeine  - avoid known triggers  Recommend high fiber diet (20-30 grams of fiber daily)/OTC fiber supplements daily as directed, such as Benefiber or Metamucil.    S/P cholecystectomy  - discussed about possible trial of a bile acid sequestrant if diarrhea becomes persistent; patient verbalized understanding and patient agrees to hold off on this medication at this time    Abdominal cramping  -     CBC Without Differential; Future; Expected date: 08/17/2020  -     Lipase; Future; Expected date: 08/17/2020  -     Hepatic function panel; Future; Expected date: 09/17/2020  - schedule EGD, discussed procedure with patient, including risks and benefits, patient verbalized understanding  - schedule Colonoscopy, discussed procedure  with the patient, including risks and benefits, patient verbalized understanding  - Possible abdominal imaging pending results of testing and if symptoms persist/worsen; patient verbalized understanding and patient agreed with management plan    History of colitis (noted on last colonoscopy, Dr. Zhao reported possible prep proctitis)  - schedule Colonoscopy, discussed procedure with the patient, including risks and benefits, patient verbalized understanding  - avoid/minimize use of NSAIDs- since they can cause GI upset, bleeding and/or ulcers. If NSAID must be taken, recommend take with food.    History of IBS  - schedule Colonoscopy, discussed procedure with the patient, including risks and benefits, patient verbalized understanding  - recommended OTC probiotic, such as Align or Culturelle, as directed  - avoid lactose  - drink adequate water intake  -smaller, more frequent meals  -avoid trigger foods    Heartburn  - DISCONTINUE NEXIUM DUE TO ALTERNATE THERAPY  -  START   omeprazole (PRILOSEC) 40 MG capsule; Take 1 capsule (40 mg total) by mouth before breakfast. Take 30-60 minutes before breakfast  Dispense: 30 capsule; Refill: 3  - schedule EGD, discussed procedure with patient, including risks and benefits, patient verbalized understanding    History of endometriosis  - follow-up with GYN for continued evaluation and management    Follow up in about 1 month (around 9/17/2020), or if symptoms worsen or fail to improve.    If no improvement in symptoms or symptoms worsen, call/follow-up at clinic or go to ER.

## 2020-08-24 ENCOUNTER — LAB VISIT (OUTPATIENT)
Dept: FAMILY MEDICINE | Facility: CLINIC | Age: 25
End: 2020-08-24
Payer: COMMERCIAL

## 2020-08-24 ENCOUNTER — PATIENT MESSAGE (OUTPATIENT)
Dept: GASTROENTEROLOGY | Facility: CLINIC | Age: 25
End: 2020-08-24

## 2020-08-24 DIAGNOSIS — Z01.818 PREOP TESTING: ICD-10-CM

## 2020-08-24 PROCEDURE — U0003 INFECTIOUS AGENT DETECTION BY NUCLEIC ACID (DNA OR RNA); SEVERE ACUTE RESPIRATORY SYNDROME CORONAVIRUS 2 (SARS-COV-2) (CORONAVIRUS DISEASE [COVID-19]), AMPLIFIED PROBE TECHNIQUE, MAKING USE OF HIGH THROUGHPUT TECHNOLOGIES AS DESCRIBED BY CMS-2020-01-R: HCPCS

## 2020-08-25 LAB — SARS-COV-2 RNA RESP QL NAA+PROBE: NOT DETECTED

## 2020-08-27 ENCOUNTER — ANESTHESIA (OUTPATIENT)
Dept: ENDOSCOPY | Facility: HOSPITAL | Age: 25
End: 2020-08-27
Payer: COMMERCIAL

## 2020-08-27 ENCOUNTER — HOSPITAL ENCOUNTER (OUTPATIENT)
Facility: HOSPITAL | Age: 25
Discharge: HOME | End: 2020-08-27
Attending: INTERNAL MEDICINE | Admitting: INTERNAL MEDICINE
Payer: COMMERCIAL

## 2020-08-27 ENCOUNTER — ANESTHESIA EVENT (OUTPATIENT)
Dept: ENDOSCOPY | Facility: HOSPITAL | Age: 25
End: 2020-08-27
Payer: COMMERCIAL

## 2020-08-27 DIAGNOSIS — R19.4 CHANGE IN BOWEL HABITS: ICD-10-CM

## 2020-08-27 LAB
B-HCG UR QL: NEGATIVE
C DIFF GDH STL QL: NEGATIVE
C DIFF TOX A+B STL QL IA: NEGATIVE
CTP QC/QA: YES

## 2020-08-27 PROCEDURE — 45380 PR COLONOSCOPY,BIOPSY: ICD-10-PCS | Mod: ,,, | Performed by: INTERNAL MEDICINE

## 2020-08-27 PROCEDURE — 87209 SMEAR COMPLEX STAIN: CPT

## 2020-08-27 PROCEDURE — 27201012 HC FORCEPS, HOT/COLD, DISP: Mod: PO | Performed by: INTERNAL MEDICINE

## 2020-08-27 PROCEDURE — 63600175 PHARM REV CODE 636 W HCPCS: Mod: PO | Performed by: INTERNAL MEDICINE

## 2020-08-27 PROCEDURE — 88305 TISSUE EXAM BY PATHOLOGIST: CPT | Mod: 59 | Performed by: PATHOLOGY

## 2020-08-27 PROCEDURE — D9220A PRA ANESTHESIA: ICD-10-PCS | Mod: ANES,,, | Performed by: ANESTHESIOLOGY

## 2020-08-27 PROCEDURE — 87046 STOOL CULTR AEROBIC BACT EA: CPT | Mod: 59

## 2020-08-27 PROCEDURE — 37000009 HC ANESTHESIA EA ADD 15 MINS: Mod: PO | Performed by: INTERNAL MEDICINE

## 2020-08-27 PROCEDURE — 87324 CLOSTRIDIUM AG IA: CPT

## 2020-08-27 PROCEDURE — 81025 URINE PREGNANCY TEST: CPT | Mod: PO | Performed by: INTERNAL MEDICINE

## 2020-08-27 PROCEDURE — 63600175 PHARM REV CODE 636 W HCPCS: Mod: PO | Performed by: NURSE ANESTHETIST, CERTIFIED REGISTERED

## 2020-08-27 PROCEDURE — 25000003 PHARM REV CODE 250: Mod: PO | Performed by: NURSE ANESTHETIST, CERTIFIED REGISTERED

## 2020-08-27 PROCEDURE — 87449 NOS EACH ORGANISM AG IA: CPT

## 2020-08-27 PROCEDURE — 88305 TISSUE EXAM BY PATHOLOGIST: ICD-10-PCS | Mod: 26,,, | Performed by: PATHOLOGY

## 2020-08-27 PROCEDURE — D9220A PRA ANESTHESIA: Mod: ANES,,, | Performed by: ANESTHESIOLOGY

## 2020-08-27 PROCEDURE — D9220A PRA ANESTHESIA: ICD-10-PCS | Mod: CRNA,,, | Performed by: NURSE ANESTHETIST, CERTIFIED REGISTERED

## 2020-08-27 PROCEDURE — 88305 TISSUE EXAM BY PATHOLOGIST: CPT | Mod: 26,,, | Performed by: PATHOLOGY

## 2020-08-27 PROCEDURE — 87427 SHIGA-LIKE TOXIN AG IA: CPT

## 2020-08-27 PROCEDURE — 87045 FECES CULTURE AEROBIC BACT: CPT

## 2020-08-27 PROCEDURE — 45380 COLONOSCOPY AND BIOPSY: CPT | Mod: PO | Performed by: INTERNAL MEDICINE

## 2020-08-27 PROCEDURE — 82962 GLUCOSE BLOOD TEST: CPT | Mod: PO | Performed by: INTERNAL MEDICINE

## 2020-08-27 PROCEDURE — D9220A PRA ANESTHESIA: Mod: CRNA,,, | Performed by: NURSE ANESTHETIST, CERTIFIED REGISTERED

## 2020-08-27 PROCEDURE — 37000008 HC ANESTHESIA 1ST 15 MINUTES: Mod: PO | Performed by: INTERNAL MEDICINE

## 2020-08-27 PROCEDURE — 45380 COLONOSCOPY AND BIOPSY: CPT | Mod: ,,, | Performed by: INTERNAL MEDICINE

## 2020-08-27 RX ORDER — PROPOFOL 10 MG/ML
VIAL (ML) INTRAVENOUS
Status: DISCONTINUED | OUTPATIENT
Start: 2020-08-27 | End: 2020-08-27

## 2020-08-27 RX ORDER — LIDOCAINE HCL/PF 100 MG/5ML
SYRINGE (ML) INTRAVENOUS
Status: DISCONTINUED | OUTPATIENT
Start: 2020-08-27 | End: 2020-08-27

## 2020-08-27 RX ORDER — SODIUM CHLORIDE, SODIUM LACTATE, POTASSIUM CHLORIDE, CALCIUM CHLORIDE 600; 310; 30; 20 MG/100ML; MG/100ML; MG/100ML; MG/100ML
INJECTION, SOLUTION INTRAVENOUS CONTINUOUS
Status: DISCONTINUED | OUTPATIENT
Start: 2020-08-27 | End: 2020-08-27 | Stop reason: HOSPADM

## 2020-08-27 RX ADMIN — PROPOFOL 50 MG: 10 INJECTION, EMULSION INTRAVENOUS at 08:08

## 2020-08-27 RX ADMIN — PROPOFOL 25 MG: 10 INJECTION, EMULSION INTRAVENOUS at 08:08

## 2020-08-27 RX ADMIN — PROPOFOL 100 MG: 10 INJECTION, EMULSION INTRAVENOUS at 08:08

## 2020-08-27 RX ADMIN — SODIUM CHLORIDE, SODIUM LACTATE, POTASSIUM CHLORIDE, AND CALCIUM CHLORIDE: .6; .31; .03; .02 INJECTION, SOLUTION INTRAVENOUS at 07:08

## 2020-08-27 RX ADMIN — LIDOCAINE HYDROCHLORIDE 100 MG: 20 INJECTION, SOLUTION INTRAVENOUS at 08:08

## 2020-08-27 NOTE — DISCHARGE INSTRUCTIONS
Recovery After Procedural Sedation (Adult)   You have been given medicine by vein to make you sleep during your surgery. This may have included both a pain medicine and sleeping medicine. Most of the effects have worn off. But you may still have some drowsiness for the next 6 to 8 hours.  Home care  Follow these guidelines when you get home:  · For the next 8 hours, you should be watched by a responsible adult. This person should make sure your condition is not getting worse.  · Don't drink any alcohol for the next 24 hours.  · Don't drive, operate dangerous machinery, or make important business or personal decisions during the next 24 hours.  · To prevent injury or falls, use caution when standing and walking for at least 24 hours after your procedure.  Note: Your healthcare provider may tell you not to take any medicine by mouth for pain or sleep in the next 4 hours. These medicines may react with the medicines you were given in the hospital. This could cause a much stronger response than usual.  Follow-up care  Follow up with your healthcare provider if you are not alert and back to your usual level of activity within 12 hours.  When to seek medical advice  Call your healthcare provider right away if any of these occur:  · Drowsiness gets worse  · Weakness or dizziness gets worse  · Repeated vomiting  · You can't be awakened  · Fever  · New rash  [employer] Interventions last reviewed this educational content on 9/1/2019  © 3808-6965 [employer]. [address] [place] [address]. All rights reserved. This information is not intended as a substitute for professional medical care. Always follow your healthcare professional's instructions.        High-Fiber Diet  Fiber is in fruits, vegetables, cereals, and grains. Fiber passes through your body undigested. A high-fiber diet helps food move through your intestinal tract. The added bulk is helpful in preventing constipation. In people with diverticulosis,  fiber helps clean out the pouches along the colon wall. It also prevents new pouches from forming. A high-fiber diet reduces the risk of colon cancer. It also lowers blood cholesterol and prevents high blood sugar in people with diabetes.    The fiber-rich foods listed below should be part of your diet. If you are not used to high-fiber foods, start with 1 or 2 foods from this list. Every 3 to 4 days add a new one to your diet. Do this until you are eating 4 high-fiber foods per day. This should give you 20 to 35 grams of fiber a day. It is also important to drink a lot of water when you are on this diet. You should have 6 to 8 glasses of water a day. Water makes the fiber swell and increases the benefit.  Foods high in dietary fiber  The following foods are high in dietary fiber:  · Breads. Breads made with 100% whole-wheat flour; tobin, wheat, or rye crackers; whole-grain tortillas, bran muffins.  · Cereals. Whole-grain and bran cereals with bran (shredded wheat, wheat flakes, raisin bran, corn bran); oatmeal, rolled oats, granola, and brown rice.  · Fruits. Fresh fruits and their edible skins (pears, prunes, raisins, berries, apples, and apricots); bananas, citrus fruit, mangoes, pineapple; and prune juice.  · Nuts. Any nuts and seeds.  · Vegetables. Best served raw or lightly cooked. All types, especially: green peas, celery, eggplant, potatoes, spinach, broccoli, [place] sprouts, winter squash, carrots, cauliflower, soybeans, lentils, and fresh and dried beans of all kinds.  · Other. Popcorn, any spices.  Date Last Reviewed: 8/1/2016 © 2000-2017 [employer]. [address] [place] [address], [place] [address]. All rights reserved. This information is not intended as a substitute for professional medical care. Always follow your healthcare professional's instructions.

## 2020-08-27 NOTE — ANESTHESIA POSTPROCEDURE EVALUATION
Anesthesia Post Evaluation    Patient: Holly Nichols    Procedure(s) Performed: Procedure(s) (LRB):  COLONOSCOPY (N/A)    Final Anesthesia Type: general    Patient location during evaluation: PACU  Patient participation: Yes- Able to Participate  Level of consciousness: sedated and awake  Post-procedure vital signs: reviewed and stable  Pain management: adequate  Airway patency: patent    PONV status at discharge: No PONV  Anesthetic complications: no      Cardiovascular status: blood pressure returned to baseline  Respiratory status: spontaneous ventilation  Hydration status: euvolemic  Follow-up not needed.          Vitals Value Taken Time   /70 08/27/20 0916   Temp 36.4 °C (97.5 °F) 08/27/20 0906   Pulse 76 08/27/20 0916   Resp 16 08/27/20 0916   SpO2 98 % 08/27/20 0916         No case tracking events are documented in the log.      Pain/Diamond Score: Diamond Score: 9 (8/27/2020  9:06 AM)

## 2020-08-27 NOTE — H&P
History & Physical - Short Stay  Gastroenterology      SUBJECTIVE:     Procedure: Colonoscopy    Chief Complaint/Indication for Procedure: Change in bowel habits    History of Present Illness:  Office Visit    8/17/2020  [place] - Gastroenterology     JUDD Brewster  Gastroenterology  Change in bowel habits +7 more  Dx  Other    Reason for Visit   Progress Notes  JUDD Brewster (Nurse Practitioner)   Gastroenterology   8/17/2020 11:00 AM   Signed     Subjective:       Patient ID: Holly Nichols is a 25 y.o. female Body mass index is 25.85 kg/m².     Chief Complaint: Other (change in bowel habits)  This is an established patient of Dr. Zhao and myself.      Visit type: audiovisual     GI Problem  The primary symptoms include weight loss (lost weight since she delivered her baby in 3/2020; stable lately), abdominal pain and diarrhea (having this currently). Primary symptoms do not include fever, fatigue, nausea, vomiting, melena, hematemesis, hematochezia or dysuria. The illness began more than 7 days ago (irregular bowel habits for several years). The problem has been gradually worsening (since ~5/2020).   The abdominal pain began more than 2 days ago. The abdominal pain is generalized (described as abdominal cramping). The severity of the abdominal pain is 5/10. Relieved by: after a bowel movement; triggered after eating (especially heavy meals) & by needing to have a bowel movement; worse with stress.   The diarrhea began more than 1 week ago (started ~5/2020). Daily occurrences: not daily; intermittent, occurs once every 2 weeks, lasts 2-3 days, otherwise, bowel movements once every 3 days of firm stool.   The illness is also significant for constipation. The illness does not include chills, dysphagia, odynophagia or back pain. Associated symptoms comments: Eating a bland diet. Significant associated medical issues include GERD (daily; nexium 20 mg once daily (restarted ~3/2020);  "PAST: prilosec- similar relief with nexium, protonix- no relief) and irritable bowel syndrome (PAST TREATMENT: bentyl helped).      Review of Systems   Constitutional: Positive for appetite change (decreased appetite; eating 1 big meal a day) and weight loss (lost weight since she delivered her baby in 3/2020; stable lately). Negative for chills, diaphoresis, fatigue and fever.   HENT: Negative for sore throat and trouble swallowing.    Respiratory: Negative for cough, choking, chest tightness and shortness of breath.    Cardiovascular: Negative for chest pain.   Gastrointestinal: Positive for abdominal pain, constipation and diarrhea (having this currently). Negative for abdominal distention, anal bleeding, blood in stool, dysphagia, hematemesis, hematochezia, melena, nausea, rectal pain and vomiting.     Wt Readings from Last 20 Encounters:   08/27/20 79.4 kg (175 lb)   08/17/20 77.1 kg (170 lb)   03/02/20 87.1 kg (192 lb)   02/27/20 87.5 kg (192 lb 14.4 oz)   02/20/20 86.6 kg (190 lb 14.7 oz)   02/14/20 86.5 kg (190 lb 11.2 oz)   02/10/20 85.3 kg (188 lb)   02/08/20 82.4 kg (181 lb 10.5 oz)   01/31/20 82.4 kg (181 lb 10.5 oz)   01/08/20 80.6 kg (177 lb 11.1 oz)   12/31/19 79.5 kg (175 lb 4.3 oz)   12/17/19 80.3 kg (177 lb 0.5 oz)   11/22/19 75.8 kg (167 lb 1.7 oz)   11/17/19 70.3 kg (155 lb)   10/16/19 72.2 kg (159 lb 2.8 oz)   10/09/19 71.9 kg (158 lb 8.2 oz)   09/18/19 69.5 kg (153 lb 3.5 oz)   08/21/19 68 kg (149 lb 14.6 oz)   08/05/19 66.7 kg (147 lb 0.8 oz)   07/09/19 67.9 kg (149 lb 11.1 oz)         5/19/2016 Colonoscopy was reviewed and procedure report states:   " Impression:          - Internal hemorrhoids.                       - Erythematous mucosa in the rectum and in the                        recto-sigmoid colon (Prep Proctitis?). Fluid                        aspiration performed. Biopsied.                       - The examination was otherwise normal. Biopsied.                       - The examined " "portion of the ileum was normal.                        Biopsied.                       - Irritable bowel syndrome?.  Recommendation:      - Discharge patient to home.                       - Await pathology and culture results.                       - High fiber diet.                       - Take a PROBIOTIC, such as ALIGN or CULTURELLE or                        AXEL-Q (all non-prescription), every day for a                        month.                       - Use Bentyl (dicyclomine) 10-20 mg PO QID 30 min AC.                       - Call the G.I. clinic in 2 weeks for reports (if                        you haven't heard from us sooner) [phone].                       - Return to GI clinic in 6-8 weeks.                       - Continue present medications.                       - Patient has a contact number available for                        emergencies. The signs and symptoms of potential                        delayed complications were discussed with the                        patient. Return to normal activities tomorrow.                        Written discharge instructions were provided to the                        patient.                       - Return to normal activities tomorrow.".  Biopsy results:   "3. TERMINAL ILEUM (BIOPSY):  -Small intestine with no significant pathologic changes  4-7. COLON, CECUM, HEPATIC FLEXURE, SPLENIC FLEXURE, LEFT (BIOPSIES):  -Colonic mucosa with no significant pathologic changes  -Negative for active inflammation  -No features of microscopic colitis  8. COLON, RECTOSIGMOID (BIOPSY):  -Patchy mild acute colitis  -No features of chronicity"    Assessment:       1. Change in bowel habits    2. Irregular bowel habits    3. S/P cholecystectomy    4. Abdominal cramping    5. History of colitis    6. History of IBS    7. Heartburn    8. History of endometriosis        Plan:       Change in bowel habits & Irregular bowel habits  -  RESTART   dicyclomine (BENTYL) 10 MG " capsule; Take 1 capsule (10 mg total) by mouth every 6 (six) hours as needed.  Dispense: 120 capsule; Refill: 1  -     Pancreatic elastase, fecal; Future; Expected date: 08/17/2020  -     Stool Exam-Ova,Cysts,Parasites; Future; Expected date: 08/17/2020  -     Fecal fat, qualitative; Future; Expected date: 08/17/2020  -     Giardia / Cryptosporidum, EIA; Future; Expected date: 08/17/2020  -     Occult blood x 1, stool; Future; Expected date: 08/17/2020  -     pH, stool; Future; Expected date: 08/17/2020  -     Rotavirus antigen, stool; Future; Expected date: 08/17/2020  -     WBC, Stool; Future; Expected date: 08/17/2020  -     Stool culture; Future; Expected date: 08/17/2020  -     Clostridium difficile EIA; Future; Expected date: 08/17/2020  -     Adenovirus Molecular Detection, PCR, Non-Blood Stool; Future; Expected date: 08/17/2020  - schedule Colonoscopy, discussed procedure with the patient, including risks and benefits, patient verbalized understanding  - recommended OTC probiotic, such as Align or Culturelle, as directed  - avoid lactose & caffeine  - avoid known triggers  Recommend high fiber diet (20-30 grams of fiber daily)/OTC fiber supplements daily as directed, such as Benefiber or Metamucil.     S/P cholecystectomy  - discussed about possible trial of a bile acid sequestrant if diarrhea becomes persistent; patient verbalized understanding and patient agrees to hold off on this medication at this time     Abdominal cramping  -     CBC Without Differential; Future; Expected date: 08/17/2020  -     Lipase; Future; Expected date: 08/17/2020  -     Hepatic function panel; Future; Expected date: 09/17/2020  - schedule EGD, discussed procedure with patient, including risks and benefits, patient verbalized understanding  - schedule Colonoscopy, discussed procedure with the patient, including risks and benefits, patient verbalized understanding  - Possible abdominal imaging pending results of testing and if  symptoms persist/worsen; patient verbalized understanding and patient agreed with management plan     History of colitis (noted on last colonoscopy, Dr. Zhao reported possible prep proctitis)  - schedule Colonoscopy, discussed procedure with the patient, including risks and benefits, patient verbalized understanding  - avoid/minimize use of NSAIDs- since they can cause GI upset, bleeding and/or ulcers. If NSAID must be taken, recommend take with food.     History of IBS  - schedule Colonoscopy, discussed procedure with the patient, including risks and benefits, patient verbalized understanding  - recommended OTC probiotic, such as Align or Culturelle, as directed  - avoid lactose  - drink adequate water intake  -smaller, more frequent meals  -avoid trigger foods     Heartburn  - DISCONTINUE NEXIUM DUE TO ALTERNATE THERAPY  -  START   omeprazole (PRILOSEC) 40 MG capsule; Take 1 capsule (40 mg total) by mouth before breakfast. Take 30-60 minutes before breakfast  Dispense: 30 capsule; Refill: 3  - schedule EGD, discussed procedure with patient, including risks and benefits, patient verbalized understanding     History of endometriosis  - follow-up with GYN for continued evaluation and management     Follow up in about 1 month (around 9/17/2020), or if symptoms worsen or fail to improve.    If no improvement in symptoms or symptoms worsen, call/follow-up at clinic or go to ER.              PTA Medications   Medication Sig    Bifidobacterium infantis (ALIGN ORAL) Take by mouth once daily.    citalopram (CELEXA) 20 MG tablet TAKE 1 TABLET(20 MG) BY MOUTH EVERY DAY    dicyclomine (BENTYL) 10 MG capsule Take 1 capsule (10 mg total) by mouth every 6 (six) hours as needed.    norethindrone-ethinyl estradiol (MICROGESTIN 1/20) 1-20 mg-mcg per tablet Take 1 tablet by mouth once daily.    omeprazole (PRILOSEC) 40 MG capsule Take 1 capsule (40 mg total) by mouth before breakfast. Take 30-60 minutes before breakfast     prenatal 25/iron fum/folic/dha (PRENATAL-1 ORAL) Take 1 tablet by mouth once daily.    acetaminophen (TYLENOL EXTRA STRENGTH) 500 MG tablet Take 1,000 mg by mouth every 6 (six) hours as needed for Pain.    levalbuterol (XOPENEX HFA) 45 mcg/actuation inhaler Inhale into the lungs.    levalbuterol (XOPENEX) 1.25 mg/3 mL nebulizer solution Take 3 mLs (1.25 mg total) by nebulization 3 (three) times daily as needed for Wheezing. Rescue       Review of patient's allergies indicates:   Allergen Reactions    Latex, natural rubber      burning    Sulfur Hives    Toradol [ketorolac] Other (See Comments)     paralysis    Tramadol Other (See Comments)     paralysis    Penicillins Hives and Rash    Sulfa (sulfonamide antibiotics) Hives and Rash        Past Medical History:   Diagnosis Date    Abnormal Pap smear of cervix     Allergy     Asthma     last used inhaler 8/2018    Constipation     Endometriosis     Fibromyalgia     Gastritis     HLA B27 (HLA B27 positive)     Migraine     Scoliosis     Smoker     stopped in January 2018     Past Surgical History:   Procedure Laterality Date    ADENOIDECTOMY      APPENDECTOMY  09/2014    endometriosis    CHOLECYSTECTOMY  12/20/2017    [facility]LLA Anne, [facility]     COLONOSCOPY N/A 5/19/2016    Procedure: COLONOSCOPY;  Surgeon: Jarret Zhao Jr., MD;  Location: [facility];  Service: Endoscopy;  Laterality: N/A;    MOUTH SURGERY      x2    PELVIC LAPAROSCOPY      TONSILLECTOMY  07/2016    UPPER GASTROINTESTINAL ENDOSCOPY  05/2016    Dr. Zhao     Family History   Problem Relation Age of Onset    Fibromyalgia Mother     Depression Mother     Irritable bowel syndrome Mother     Diabetes Father     Depression Father     Mental illness Father         bipolar disorder    Multiple sclerosis Maternal Aunt     Crohn's disease Maternal Grandmother     Colon cancer Neg Hx     Colon polyps Neg Hx     Ulcerative colitis Neg Hx     Stomach cancer Neg Hx      "Esophageal cancer Neg Hx     Breast cancer Neg Hx     Ovarian cancer Neg Hx     Celiac disease Neg Hx      Social History     Tobacco Use    Smoking status: Former Smoker     Packs/day: 0.50     Types: Cigarettes     Quit date: 2018     Years since quittin.6    Smokeless tobacco: Never Used   Substance Use Topics    Alcohol use: No     Alcohol/week: 0.0 standard drinks     Comment: quit since     Drug use: No         OBJECTIVE:     Vital Signs (Most Recent)  Temp: 98.2 °F (36.8 °C) (20)  Pulse: 71 (20)  Resp: 16 (20)  BP: 117/62 (20)  SpO2: 97 % (20)    Physical Exam:  :Ht 5' 8" (1.727 m)   Wt 77.1 kg (170 lb)  BMI 25.85 kg/m²                                                        GENERAL:  Comfortable, in no acute distress.                                 HEENT EXAM:  Nonicteric.  No adenopathy.  Oropharynx is clear.               NECK:  Supple.                                                               LUNGS:  Clear.                                                               CARDIAC:  Regular rate and rhythm.  S1, S2.  No murmur.                      ABDOMEN:  Soft, positive bowel sounds, nontender.  No hepatosplenomegaly or masses.  No rebound or guarding.                                             EXTREMITIES:  No edema.     MENTAL STATUS:  Alert and oriented.    ASSESSMENT/PLAN:     Assessment: Change in bowel habits'    Plan: Colonoscopy    Anesthesia Plan:   MAC / General Anaesthesia    ASA Grade: ASA 2 - Patient with mild systemic disease with no functional limitations    MALLAMPATI SCORE: I (soft palate, uvula, fauces, and tonsillar pillars visible)    "

## 2020-08-27 NOTE — BRIEF OP NOTE
Discharge Note  Short Stay      SUMMARY     Admit Date: 8/27/2020    Attending Physician: Jarret Zhao Jr., MD     Discharge Physician: Jarret Zhao Jr., MD    Discharge Date: 8/27/2020 9:25 AM    Final Diagnosis: Change in bowel habits [R19.4]  Hx of ulcerative colitis [Z87.19]  Alternating constipation and diarrhea [R19.8]  Impression:          - Erythematous and ulcerated mucosa in the                        descending colon, at the splenic flexure and in the                        distal transverse colon. Biopsied (r/o UC, r/o                        ischemia, r/o vasculiti, etc.).                        - Mucosal changes were found in the descending colon                        (secondary to left-sided colitis, to ischemic                        colitis(?), to vasculitis(?).                        - Non-bleeding internal hemorrhoids.                        - The examination was otherwise normal.                        - The examined portion of the ileum was normal.                        Biopsied.                        - The rectum and recto-sigmoid colon are normal.                        Fluid aspiration performed.   Recommendation:      - Discharge patient to home.                        - Await pathology and culture results.                        - High fiber diet, gluten free diet and lactose free                        diet.                        - Call the G.I. clinic in 2 weeks for reports (if                        you haven't heard from us sooner) [phone].                        - Return to GI clinic in 4-6 weeks.                        - Repeat colonoscopy (date not yet determined).                        - Continue present medications.                        - Patient has a contact number available for                        emergencies. The signs and symptoms of potential                        delayed complications were discussed with the                        patient. Return to normal  activities tomorrow.                        Written discharge instructions were provided to the                        patient.                        - Return to normal activities tomorrow.   Jarret Zhao MD   8/27/2020   Disposition: HOME OR SELF CARE    Patient Instructions:   Current Discharge Medication List      CONTINUE these medications which have NOT CHANGED    Details   Bifidobacterium infantis (ALIGN ORAL) Take by mouth once daily.      citalopram (CELEXA) 20 MG tablet TAKE 1 TABLET(20 MG) BY MOUTH EVERY DAY  Qty: 30 tablet, Refills: 2    Associated Diagnoses: Depression, unspecified depression type      dicyclomine (BENTYL) 10 MG capsule Take 1 capsule (10 mg total) by mouth every 6 (six) hours as needed.  Qty: 120 capsule, Refills: 1    Associated Diagnoses: Irregular bowel habits      norethindrone-ethinyl estradiol (MICROGESTIN 1/20) 1-20 mg-mcg per tablet Take 1 tablet by mouth once daily.  Qty: 30 tablet, Refills: 11    Associated Diagnoses: Encounter for initial prescription of contraceptive pills      omeprazole (PRILOSEC) 40 MG capsule Take 1 capsule (40 mg total) by mouth before breakfast. Take 30-60 minutes before breakfast  Qty: 30 capsule, Refills: 3    Associated Diagnoses: Heartburn      prenatal 25/iron fum/folic/dha (PRENATAL-1 ORAL) Take 1 tablet by mouth once daily.      acetaminophen (TYLENOL EXTRA STRENGTH) 500 MG tablet Take 1,000 mg by mouth every 6 (six) hours as needed for Pain.      levalbuterol (XOPENEX HFA) 45 mcg/actuation inhaler Inhale into the lungs.      levalbuterol (XOPENEX) 1.25 mg/3 mL nebulizer solution Take 3 mLs (1.25 mg total) by nebulization 3 (three) times daily as needed for Wheezing. Rescue  Qty: 75 mL, Refills: 0    Associated Diagnoses: Wheezing             Discharge Procedure Orders (must include Diet, Follow-up, Activity)    Follow Up:  Follow up with PCP as per your routine.  Please follow a high fiber diet, gluten free, lactose free  diet.  Activity as tolerated.    No driving day of procedure.

## 2020-08-27 NOTE — TRANSFER OF CARE
"Anesthesia Transfer of Care Note    Patient: Holly Nichols    Procedure(s) Performed: Procedure(s) (LRB):  COLONOSCOPY (N/A)    Patient location: PACU    Anesthesia Type: general    Transport from OR: Transported from OR on room air with adequate spontaneous ventilation    Post pain: adequate analgesia    Post assessment: no apparent anesthetic complications and tolerated procedure well    Post vital signs: stable    Level of consciousness: awake    Nausea/Vomiting: no nausea/vomiting    Complications: none    Transfer of care protocol was followed      Last vitals:   Visit Vitals  /62 (BP Location: Right arm, Patient Position: Sitting)   Pulse 71   Temp 36.8 °C (98.2 °F) (Skin)   Resp 16   Ht 5' 8" (1.727 m)   Wt 79.4 kg (175 lb)   LMP 08/03/2020 (Exact Date)   SpO2 97%   Breastfeeding No   BMI 26.61 kg/m²     "

## 2020-08-27 NOTE — ANESTHESIA PREPROCEDURE EVALUATION
08/27/2020  Holly Nichols is a 25 y.o., female.      Holly Nichols is a      BMI: There is no height or weight on file to calculate BMI.      Patient Active Problem List   Diagnosis    Unspecified asthma, with status asthmaticus    Pain in thoracic spine    Scoliosis (and kyphoscoliosis), idiopathic    Abdominal pain    Constipation    Allergy    Ankylosing spondylitis    Insomnia    Fibromyalgia    Fatigue    Vitamin D insufficiency    HLA B27 (HLA B27 positive)    Asthma exacerbation    MDD (major depressive disorder), recurrent episode, mild    Anxiety disorder    Tobacco dependence    Nausea and vomiting    Recurrent tonsillitis    Migraine    Gallbladder disease    Abdominal pain affecting pregnancy    Back pain affecting pregnancy    Flank pain in pregnant patient    Encounter for elective induction of labor    Change in bowel habits       Past Surgical History:   Procedure Laterality Date    ADENOIDECTOMY      APPENDECTOMY  09/2014    endometriosis    CHOLECYSTECTOMY  12/20/2017    Dr. MAR[facility]LLA Anne, [facility]     COLONOSCOPY N/A 5/19/2016    Procedure: COLONOSCOPY;  Surgeon: Jarret Zhao Jr., MD;  Location: [facility];  Service: Endoscopy;  Laterality: N/A;    MOUTH SURGERY      x2    PELVIC LAPAROSCOPY      TONSILLECTOMY  07/2016    UPPER GASTROINTESTINAL ENDOSCOPY  05/2016    Dr. Zhao       (Not in a hospital admission)      Review of patient's allergies indicates:   Allergen Reactions    Toradol [ketorolac]     Tramadol     Penicillins Rash    Sulfa (sulfonamide antibiotics) Hives and Rash         Vital Signs Range (Last 24H):           Labs (most recent):    CBC:   No results for input(s): WBC, RBC, HGB, HCT, PLT, MCV, MCH, MCHC in the last 72 hours.    CMP:   No results for input(s): NA, K, CL, CO2, BUN, CREATININE, GLU, MG, PHOS, CALCIUM, ALBUMIN, PROT,  ALKPHOS, ALT, AST, BILITOT in the last 72 hours.    No results found for: HGBA1C    No results found for: INR, PROTIME      Diagnostic Studies:      EKG:  Normal sinus rhythm  Right atrial enlargement  Rightward axis  Borderline ECG      Specimen Collected: 12/19/17 20:12 Last Resulted: 12/20/17 13:27                    Pre-op Assessment    I have reviewed the Patient Summary Reports.     I have reviewed the Nursing Notes. I have reviewed the NPO Status.   I have reviewed the Medications.     Review of Systems  Anesthesia Hx:  No problems with previous Anesthesia Denies Hx of Anesthetic complications  History of prior surgery of interest to airway management or planning: Previous anesthesia: General Airway issues documented on chart review include easy direct laryngoscopy , view on direct laryngoscopy Grade I  Denies Family Hx of Anesthesia complications.   Denies Personal Hx of Anesthesia complications.   Social:  Smoker    Cardiovascular:  Cardiovascular Normal  ECG has been reviewed.    Pulmonary:   Asthma asymptomatic    Musculoskeletal:   Scoliosis, Fibromyalgia   Neurological:   Headaches Migraines   Psych:   anxiety depression          Physical Exam   Airway/Jaw/Neck:  Airway Findings: Mouth Opening: Normal Mallampati: II  TM Distance: Normal, at least 6 cm  Jaw/Neck Findings:  Neck ROM: Normal ROM      Dental:  Dental Findings: In tact   Chest/Lungs:  Chest/Lungs Findings: Normal Respiratory Rate, Clear to auscultation     Heart/Vascular:  Heart Findings: Rhythm: Regular Rhythm        Mental Status:  Mental Status Findings:  Alert and Oriented         Anesthesia Plan  Type of Anesthesia, risks & benefits discussed:  Anesthesia Type:  general  Patient's Preference:   Intra-op Monitoring Plan: standard ASA monitors  Intra-op Monitoring Plan Comments:   Post Op Pain Control Plan:   Post Op Pain Control Plan Comments:   Induction:   IV  Beta Blocker:  Patient is not currently on a Beta-Blocker (No further  documentation required).       Informed Consent: Patient understands risks and agrees with Anesthesia plan.  Questions answered. Anesthesia consent signed with patient.  ASA Score: 2     Day of Surgery Review of History & Physical: I have interviewed and examined the patient. I have reviewed the patient's H&P dated:  There are no significant changes.          Ready For Surgery From Anesthesia Perspective.

## 2020-08-27 NOTE — [employer] PATIENT INSTRUCTIONS
Discharge Summary/Instructions for after Colonoscopy with   Biopsy/Polypectomy  Holly Nichols    Thursday, August 27, 2020  Jarret Zhao MD  RESTRICTIONS ON ACTIVITY:  - Do not drive a car or operate machinery until the day after the procedure.      - The following day: return to full activity including work.  - For  3 days: No heavy lifting, straining or running.  - Diet: You can have solid foods, but no gassy foods (i.e. beans, broccoli,   cabbage, etc).  TREATMENT FOR COMMON SIDE EFFECTS:  - Mild abdominal pain and bloating or excessive gas: rest, eat lightly and   use a heating pad.  SYMPTOMS TO WATCH FOR AND REPORT TO YOUR PHYSICIAN:  1. Severe abdominal pain.  2. Fever within 24 hours after a procedure.  3. A large amount of rectal bleeding. (A small amount of blood from the   rectum is not serious, especially if hemorrhoids are present.  3.  Because air was put into your colon during the procedure, expelling   large amounts of air from your rectum is normal.  4.  You may not have a bowel movement for 1-3 days because of the   colonoscopy prep.  This is normal.  5.  Call immediately if you notice any of the following:   Chills and/or fever over 101   Persistent vomiting   Severe abdominal pain, other than gas cramps   Severe chest pain   Black, tarry stools   Any bleeding - exceeding one tablespoon  Your doctor recommends these additional instructions:  We are waiting for your pathology and culture results.   Eat a high fiber diet, eat a gluten free diet and eat a lactose free diet.   Return to GI clinic in 4-6 weeks.   None  If you have any questions or problems, please call your physician.  EMERGENCY PHONE NUMBER: [phone]  LAB RESULTS: Call in two (2) weeks for lab results, [phone]  ___________________________________________  Nurse Signature  ___________________________________________  Patient/Designated Responsible Party Signature  Jarret Zhao MD  8/27/2020 9:24:29 AM  This  report has been verified and signed electronically.  [employer]

## 2020-08-28 VITALS
OXYGEN SATURATION: 98 % | TEMPERATURE: 98 F | WEIGHT: 175 LBS | HEIGHT: 68 IN | DIASTOLIC BLOOD PRESSURE: 75 MMHG | BODY MASS INDEX: 26.52 KG/M2 | SYSTOLIC BLOOD PRESSURE: 122 MMHG | HEART RATE: 60 BPM | RESPIRATION RATE: 16 BRPM

## 2020-08-29 LAB
E COLI SXT1 STL QL IA: NEGATIVE
E COLI SXT2 STL QL IA: NEGATIVE

## 2020-08-31 LAB
BACTERIA STL CULT: NORMAL
O+P STL MICRO: NORMAL

## 2020-09-02 LAB
COMMENT: NORMAL
FINAL PATHOLOGIC DIAGNOSIS: NORMAL
GROSS: NORMAL

## 2020-09-04 ENCOUNTER — TELEPHONE (OUTPATIENT)
Dept: OBSTETRICS AND GYNECOLOGY | Facility: CLINIC | Age: 25
End: 2020-09-04

## 2020-09-04 NOTE — TELEPHONE ENCOUNTER
----- Message from Arcelia Justice sent at 9/4/2020  2:19 PM CDT -----  Contact: SELF [phone]  Patient would like to speak with you about birth control. Please advise

## 2020-09-14 ENCOUNTER — OFFICE VISIT (OUTPATIENT)
Dept: GASTROENTEROLOGY | Facility: CLINIC | Age: 25
End: 2020-09-14
Payer: COMMERCIAL

## 2020-09-14 ENCOUNTER — DOCUMENTATION ONLY (OUTPATIENT)
Dept: GASTROENTEROLOGY | Facility: CLINIC | Age: 25
End: 2020-09-14

## 2020-09-14 VITALS — HEIGHT: 68 IN | BODY MASS INDEX: 25.76 KG/M2 | WEIGHT: 170 LBS

## 2020-09-14 DIAGNOSIS — R63.4 WEIGHT LOSS: ICD-10-CM

## 2020-09-14 DIAGNOSIS — Z87.42 HISTORY OF ENDOMETRIOSIS: ICD-10-CM

## 2020-09-14 DIAGNOSIS — R12 HEARTBURN: ICD-10-CM

## 2020-09-14 DIAGNOSIS — R19.7 DIARRHEA, UNSPECIFIED TYPE: ICD-10-CM

## 2020-09-14 DIAGNOSIS — R11.0 NAUSEA: ICD-10-CM

## 2020-09-14 DIAGNOSIS — K52.9 NONSPECIFIC COLITIS: ICD-10-CM

## 2020-09-14 DIAGNOSIS — Z90.49 S/P CHOLECYSTECTOMY: ICD-10-CM

## 2020-09-14 DIAGNOSIS — R63.0 DECREASED APPETITE: ICD-10-CM

## 2020-09-14 DIAGNOSIS — R10.84 GENERALIZED ABDOMINAL PAIN: ICD-10-CM

## 2020-09-14 DIAGNOSIS — Z86.59 HISTORY OF ANXIETY: ICD-10-CM

## 2020-09-14 DIAGNOSIS — Z98.890 HISTORY OF COLONOSCOPY: Primary | ICD-10-CM

## 2020-09-14 PROCEDURE — 99214 PR OFFICE/OUTPT VISIT, EST, LEVL IV, 30-39 MIN: ICD-10-PCS | Mod: 95,,, | Performed by: NURSE PRACTITIONER

## 2020-09-14 PROCEDURE — 99214 OFFICE O/P EST MOD 30 MIN: CPT | Mod: 95,,, | Performed by: NURSE PRACTITIONER

## 2020-09-14 RX ORDER — MESALAMINE 1.2 G/1
2.4 TABLET, DELAYED RELEASE ORAL
Qty: 60 TABLET | Refills: 3 | Status: SHIPPED | OUTPATIENT
Start: 2020-09-14 | End: 2021-02-05 | Stop reason: ALTCHOICE

## 2020-09-14 RX ORDER — OMEPRAZOLE 40 MG/1
40 CAPSULE, DELAYED RELEASE ORAL
Qty: 60 CAPSULE | Refills: 1 | Status: SHIPPED | OUTPATIENT
Start: 2020-09-14 | End: 2021-02-05 | Stop reason: ALTCHOICE

## 2020-09-14 RX ORDER — ONDANSETRON 4 MG/1
4 TABLET, ORALLY DISINTEGRATING ORAL 3 TIMES DAILY PRN
Qty: 30 TABLET | Refills: 1 | Status: SHIPPED | OUTPATIENT
Start: 2020-09-14 | End: 2021-02-05 | Stop reason: ALTCHOICE

## 2020-09-14 NOTE — Clinical Note
Please schedule patient for CT scan and urine pregnancy test as ordered and patient needs a follow-up appointment with Dr. Zhao in 1-2 months.  Thanks  SUDHIR

## 2020-09-14 NOTE — PROGRESS NOTES
"Subjective:       Patient ID: Holly Nichols is a 25 y.o. female Body mass index is 25.85 kg/m².    Chief Complaint: GI Problem (follow-up)  This is an established patient of Dr. Zhao and myself.    The patient location is: in [place]. I verified patient name and date of birth. Patient provided current height and weight measurements.  The chief complaint leading to consultation is: BOWEL ISSUES AND CRAMPING follow-up    Visit type: audiovisual    Face to Face time with patient: 24 minutes  39 minutes of total time spent on the encounter, which includes face to face time and non-face to face time preparing to see the patient (eg, review of tests), Obtaining and/or reviewing separately obtained history, Documenting clinical information in the electronic or other health record, Independently interpreting results (not separately reported) and communicating results to the patient/family/caregiver, or Care coordination (not separately reported).     Each patient to whom he or she provides medical services by telemedicine is:  (1) informed of the relationship between the physician and patient and the respective role of any other health care provider with respect to management of the patient; and (2) notified that he or she may decline to receive medical services by telemedicine and may withdraw from such care at any time.    GI Problem  The primary symptoms include weight loss (lost weight since she delivered her baby in 3/2020; lost ~5lbs since our last visit), abdominal pain, nausea (had it today after eating) and diarrhea (probiotic align daily). Primary symptoms do not include fever, fatigue, vomiting, melena, hematemesis, hematochezia or dysuria. The illness began more than 7 days ago (irregular bowel habits for several years). The problem has been gradually worsening (had improved after our last visit with having "bouts" maybe once a week; worsened since 9/10/2020).   The abdominal pain began more than 2 days " ago. The abdominal pain is generalized (generalized but mostly on left side of abdomen; described as abdominal cramping). The severity of the abdominal pain is 5/10. Relieved by: after a bowel movement; triggered after eating (especially heavy meals) & by needing to have a bowel movement; worse with stress.   The diarrhea began more than 1 week ago (started ~5/2020). Daily occurrences: daily, 4-5 times a day; worse with stress at times. Risk factors: denies risks factors.   The illness does not include chills, dysphagia, odynophagia, constipation or back pain. Associated symptoms comments: Eating a bland diet, on low lactose diet. Significant associated medical issues include GERD (daily; prilosec 40 mg once daily; PAST: prilosec- similar relief with nexium, protonix- no relief, nexium) and irritable bowel syndrome (bentyl helped in the past, taking 20 mg qid prn with minimal relief currently).     Review of Systems   Constitutional: Positive for appetite change (decreased appetite; eating 1 big meal a day, eating more salads with baked chicken) and weight loss (lost weight since she delivered her baby in 3/2020; lost ~5lbs since our last visit). Negative for chills, diaphoresis, fatigue and fever.   HENT: Negative for sore throat and trouble swallowing.    Respiratory: Negative for cough, choking, chest tightness and shortness of breath.    Cardiovascular: Negative for chest pain.   Gastrointestinal: Positive for abdominal pain, diarrhea (probiotic align daily) and nausea (had it today after eating). Negative for abdominal distention, anal bleeding, blood in stool, constipation, dysphagia, hematemesis, hematochezia, melena, rectal pain and vomiting.   Genitourinary: Negative for difficulty urinating, dysuria, flank pain and pelvic pain.   Musculoskeletal: Negative for back pain.   Neurological: Negative for weakness.   Psychiatric/Behavioral: The patient is nervous/anxious (history of anxious).        Patient's  last menstrual period was 09/07/2020 (approximate).  Past Medical History:   Diagnosis Date    Abnormal Pap smear of cervix     Allergy     Asthma     last used inhaler 8/2018    Constipation     Endometriosis     Fibromyalgia     Gastritis     HLA B27 (HLA B27 positive)     Migraine     Scoliosis     Smoker     stopped in January 2018     Past Surgical History:   Procedure Laterality Date    ADENOIDECTOMY      APPENDECTOMY  09/2014    endometriosis    CHOLECYSTECTOMY  12/20/2017    [facility]LLA [facility], [facility]     COLONOSCOPY N/A 5/19/2016    Procedure: COLONOSCOPY;  Surgeon: Jarret Zhao Jr., MD;  Location: [facility];  Service: Endoscopy;  Laterality: N/A;    COLONOSCOPY N/A 8/27/2020    Dr. Zhao: Erythema & ulcerated mucosa in descending, splenic flexure & distal transverse (r/o UC, r/o ischemia, r/o vasculiti, etc.), Mucosal changes in descending colon (secondary to left-sided colitis, to ischemic colitis(?), to vasculitis(?). hemorrhoids; biopsy: TI & random biopsies- WNL, transverse/hepatic/descending- mild active colitis without definitive features of chronicity    MOUTH SURGERY      x2    PELVIC LAPAROSCOPY      TONSILLECTOMY  07/2016    UPPER GASTROINTESTINAL ENDOSCOPY  05/2016    Dr. Zhao     Family History   Problem Relation Age of Onset    Fibromyalgia Mother     Depression Mother     Irritable bowel syndrome Mother     Diabetes Father     Depression Father     Mental illness Father         bipolar disorder    Multiple sclerosis Maternal Aunt     Crohn's disease Maternal Grandmother     Colon cancer Neg Hx     Ulcerative colitis Neg Hx     Stomach cancer Neg Hx     Esophageal cancer Neg Hx     Breast cancer Neg Hx     Ovarian cancer Neg Hx     Celiac disease Neg Hx      Wt Readings from Last 10 Encounters:   09/14/20 77.1 kg (170 lb)   08/27/20 79.4 kg (175 lb)   08/17/20 77.1 kg (170 lb)   03/02/20 87.1 kg (192 lb)   02/27/20 87.5 kg (192 lb 14.4 oz)   02/20/20 86.6 kg (190  lb 14.7 oz)   02/14/20 86.5 kg (190 lb 11.2 oz)   02/10/20 85.3 kg (188 lb)   02/08/20 82.4 kg (181 lb 10.5 oz)   01/31/20 82.4 kg (181 lb 10.5 oz)   patient had a baby on 3/2/2020    Lab Results   Component Value Date    WBC 12.36 03/03/2020    HGB 12.6 03/03/2020    HCT 37.0 03/03/2020    MCV 94 03/03/2020     (L) 03/03/2020     CMP  Sodium   Date Value Ref Range Status   08/05/2019 136 136 - 145 mmol/L Final     Potassium   Date Value Ref Range Status   08/05/2019 3.7 3.5 - 5.1 mmol/L Final     Chloride   Date Value Ref Range Status   08/05/2019 100 95 - 110 mmol/L Final     CO2   Date Value Ref Range Status   08/05/2019 25 22 - 31 mmol/L Final     Glucose   Date Value Ref Range Status   08/05/2019 95 70 - 110 mg/dL Final     Comment:     The ADA recommends the following guidelines for fasting glucose:  Normal:       less than 100 mg/dL  Prediabetes:  100 mg/dL to 125 mg/dL  Diabetes:     126 mg/dL or higher       BUN, Bld   Date Value Ref Range Status   08/05/2019 9 7 - 18 mg/dL Final     Creatinine   Date Value Ref Range Status   08/05/2019 0.46 (L) 0.50 - 1.40 mg/dL Final     Calcium   Date Value Ref Range Status   08/05/2019 9.3 8.4 - 10.2 mg/dL Final     Total Protein   Date Value Ref Range Status   08/05/2019 7.4 6.0 - 8.4 g/dL Final     Albumin   Date Value Ref Range Status   08/05/2019 4.3 3.5 - 5.2 g/dL Final     Total Bilirubin   Date Value Ref Range Status   08/05/2019 0.4 0.2 - 1.3 mg/dL Final     Alkaline Phosphatase   Date Value Ref Range Status   08/05/2019 63 38 - 145 U/L Final     AST ([place])   Date Value Ref Range Status   03/29/2016 19 14 - 36 U/L Final     AST   Date Value Ref Range Status   08/05/2019 16 14 - 36 U/L Final     ALT   Date Value Ref Range Status   08/05/2019 16 10 - 44 U/L Final     Anion Gap   Date Value Ref Range Status   08/05/2019 11 8 - 16 mmol/L Final     eGFR if    Date Value Ref Range Status   08/05/2019 >60 >60 mL/min/1.73 m^2 Final  "    eGFR if non    Date Value Ref Range Status   08/05/2019 >60 >60 mL/min/1.73 m^2 Final     Comment:     Calculation used to obtain the estimated glomerular filtration  rate (eGFR) is the CKD-EPI equation.        Lab Results   Component Value Date    LIPASE 165 12/19/2017     Lab Results   Component Value Date    TSH 0.499 08/21/2019     Reviewed prior medical records including radiology report of 12/20/17 MRI MRCP; 12/19/17 limited abdominal ultrasound; 12/15/17 abdominal ultrasound; 7/9/16 ct renal stone abdomen pelvis; 6/23/16 UGI; & endoscopy history (see surgical history).    8/27/2020 stool studies reviewed: negative  5/19/16 EGD was reviewed and procedure report states:   " Impression:          - Normal oropharynx.                       - Normal esophagus.                       - Z-line regular, 38 cm from the incisors.                       - Non-erosive esophageal reflux (NERD) disease?.                       - Normal stomach. Biopsied.                       - Normal examined duodenum. Biopsied.  Recommendation:      - Perform a colonoscopy as previously scheduled.                       - Await pathology results.                       - Follow an antireflux regimen.                       - Continue present medications.                       - Use Protonix (pantoprazole) 40 mg PO daily.                       - Use sucralfate tablets 1 gram PO QID                       - Call the G.I. clinic in 2 weeks for reports (if                        you haven't heard from us sooner) [phone].                       - Return to GI clinic in 6-8 weeks.".  Biopsy results:   "1. STOMACH (BIOPSY):  -Antrum with features of mild reactive/chemical gastropathy  -Negative for active inflammation  -Negative for Helicobacter pylori organisms on H&E stain  2. DUODENUM (BIOPSY):  -Duodenal mucosa with no significant pathologic changes  -Negative for active inflammation  -No features of sprue  -No " "organisms"    Objective:      Physical Exam  Constitutional:       General: She is not in acute distress.     Appearance: She is well-developed.   Pulmonary:      Effort: Pulmonary effort is normal. No respiratory distress.   Skin:     Coloration: Skin is not pale.   Neurological:      Mental Status: She is alert and oriented to person, place, and time.   Psychiatric:         Speech: Speech normal.         Behavior: Behavior normal.         Thought Content: Thought content normal.         Judgment: Judgment normal.         Assessment:       1. History of colonoscopy    2. Nonspecific colitis    3. Diarrhea, unspecified type    4. S/P cholecystectomy    5. Weight loss    6. Decreased appetite    7. Nausea    8. Heartburn    9. Generalized abdominal pain    10. History of endometriosis    11. History of anxiety        Plan:     discussed case and results with Dr. Zhao; Dr. Zhao recommends starting patient on lialda    History of colonoscopy & Nonspecific colitis  -     Calprotectin; Future; Expected date: 09/14/2020  -   START  mesalamine (LIALDA) 1.2 gram TbEC; Take 2 tablets (2.4 g total) by mouth daily with breakfast.  Dispense: 60 tablet; Refill: 3  -     CT Enterography Abd_Pelvis With Contrast; Future; Expected date: 09/16/2020  - avoid use of NSAIDs- since they can cause GI upset, bleeding and/or ulcers.  Recommend follow-up with Dr. Zhao for continued evaluation and management.    Diarrhea, unspecified type  -     CT Enterography Abd_Pelvis With Contrast; Future; Expected date: 09/16/2020  - CONTINUE OTC probiotic as directed on packaging  - avoid lactose  - drink adequate water intake  -smaller, more frequent meals  -avoid trigger foods  - COMPLETE STOOL STUDIES AS PREVIOUSLY ORDERED:  -     Pancreatic elastase, fecal; Future; Expected date: 08/17/2020  -     Stool Exam-Ova,Cysts,Parasites; Future; Expected date: 08/17/2020  -     Fecal fat, qualitative; Future; Expected date: 08/17/2020  -     Giardia / " Cryptosporidum, EIA; Future; Expected date: 08/17/2020  -     Occult blood x 1, stool; Future; Expected date: 08/17/2020  -     pH, stool; Future; Expected date: 08/17/2020  -     Rotavirus antigen, stool; Future; Expected date: 08/17/2020  -     WBC, Stool; Future; Expected date: 08/17/2020  -     Stool culture; Future; Expected date: 08/17/2020  -     Clostridium difficile EIA; Future; Expected date: 08/17/2020  -     Adenovirus Molecular Detection, PCR, Non-Blood Stool; Future; Expected date: 08/17/2020  - CONTINUE WITH EGD AS SCHEDULED FOR 10/1/2020  -  CONTINUE   dicyclomine (BENTYL) 10 MG capsule; Take 1 capsule (10 mg total) by mouth every 6 (six) hours as needed.    S/P cholecystectomy  -     CT Enterography Abd_Pelvis With Contrast; Future; Expected date: 09/16/2020  -  possible trial of a bile acid sequestrant if diarrhea persist    Weight loss & Decreased appetite  -     CT Enterography Abd_Pelvis With Contrast; Future; Expected date: 09/16/2020  - encouraged PO intake and daily calorie counts to ensure adequate nutrition is taken in, recommend at least 2,000 calories a day  - recommend nutritional drinks, such as Boost, Ensure or Glucerna, to supplement nutrition needs  - CONTINUE WITH EGD AS SCHEDULED FOR 10/1/2020    Nausea  -  START   ondansetron (ZOFRAN-ODT) 4 MG TbDL; Take 1 tablet (4 mg total) by mouth 3 (three) times daily as needed (nausea).  Dispense: 30 tablet; Refill: 1  -     CT Enterography Abd_Pelvis With Contrast; Future; Expected date: 09/16/2020  -     Pregnancy, urine rapid; Future; Expected date: 09/16/2020  - CONTINUE WITH EGD AS SCHEDULED FOR 10/1/2020    Heartburn  - INCREASE TO    omeprazole (PRILOSEC) 40 MG capsule; Take 1 capsule (40 mg total) by mouth 2 (two) times daily before meals. Take 30-60 minutes before breakfast  Dispense: 60 capsule; Refill: 1  - CONTINUE WITH EGD AS SCHEDULED FOR 10/1/2020    Generalized abdominal pain  -     CT Enterography Abd_Pelvis With Contrast;  Future; Expected date: 09/16/2020  -     Pregnancy, urine rapid; Future; Expected date: 09/16/2020  - CONTINUE WITH EGD AS SCHEDULED FOR 10/1/2020  -     CBC Without Differential; Future; Expected date: 08/17/2020  -     Lipase; Future; Expected date: 08/17/2020  -     Hepatic function panel; Future; Expected date: 09/17/2020    History of endometriosis  - follow-up with GYN for continued evaluation and management    History of anxiety  Recommend follow-up with Primary Care Provider/psych for continued evaluation and management.    Follow up in about 1 month (around 10/14/2020), or if symptoms worsen or fail to improve, for follow-up with Dr. Zhao for continued evaluation & management.    If no improvement in symptoms or symptoms worsen, call/follow-up at clinic or go to ER.

## 2020-09-14 NOTE — PROGRESS NOTES
I spoke with Ms. Barrera and verified her allergies, pharmacy, medications and weight.  Her weight is 170 lb.

## 2020-09-15 ENCOUNTER — OFFICE VISIT (OUTPATIENT)
Dept: FAMILY MEDICINE | Facility: CLINIC | Age: 25
End: 2020-09-15
Payer: COMMERCIAL

## 2020-09-15 ENCOUNTER — LAB VISIT (OUTPATIENT)
Dept: LAB | Facility: HOSPITAL | Age: 25
End: 2020-09-15
Attending: NURSE PRACTITIONER
Payer: COMMERCIAL

## 2020-09-15 DIAGNOSIS — G43.809 OTHER MIGRAINE WITHOUT STATUS MIGRAINOSUS, NOT INTRACTABLE: ICD-10-CM

## 2020-09-15 DIAGNOSIS — R53.83 OTHER FATIGUE: ICD-10-CM

## 2020-09-15 DIAGNOSIS — F41.9 ANXIETY: Primary | ICD-10-CM

## 2020-09-15 DIAGNOSIS — R19.8 IRREGULAR BOWEL HABITS: ICD-10-CM

## 2020-09-15 DIAGNOSIS — K52.9 NONSPECIFIC COLITIS: ICD-10-CM

## 2020-09-15 DIAGNOSIS — R10.9 ABDOMINAL CRAMPING: ICD-10-CM

## 2020-09-15 LAB
ALBUMIN SERPL BCP-MCNC: 3.8 G/DL (ref 3.5–5.2)
ALP SERPL-CCNC: 73 U/L (ref 55–135)
ALT SERPL W/O P-5'-P-CCNC: 14 U/L (ref 10–44)
ANION GAP SERPL CALC-SCNC: 9 MMOL/L (ref 8–16)
AST SERPL-CCNC: 14 U/L (ref 10–40)
BILIRUB DIRECT SERPL-MCNC: 0.1 MG/DL (ref 0.1–0.3)
BILIRUB SERPL-MCNC: 0.3 MG/DL (ref 0.1–1)
BUN SERPL-MCNC: 11 MG/DL (ref 6–20)
CALCIUM SERPL-MCNC: 8.9 MG/DL (ref 8.7–10.5)
CHLORIDE SERPL-SCNC: 102 MMOL/L (ref 95–110)
CO2 SERPL-SCNC: 26 MMOL/L (ref 23–29)
CREAT SERPL-MCNC: 0.7 MG/DL (ref 0.5–1.4)
ERYTHROCYTE [DISTWIDTH] IN BLOOD BY AUTOMATED COUNT: 12.4 % (ref 11.5–14.5)
EST. GFR  (AFRICAN AMERICAN): >60 ML/MIN/1.73 M^2
EST. GFR  (NON AFRICAN AMERICAN): >60 ML/MIN/1.73 M^2
GLUCOSE SERPL-MCNC: 118 MG/DL (ref 70–110)
HCT VFR BLD AUTO: 44.3 % (ref 37–48.5)
HGB BLD-MCNC: 14.2 G/DL (ref 12–16)
LIPASE SERPL-CCNC: 39 U/L (ref 4–60)
MCH RBC QN AUTO: 30.4 PG (ref 27–31)
MCHC RBC AUTO-ENTMCNC: 32.1 G/DL (ref 32–36)
MCV RBC AUTO: 95 FL (ref 82–98)
PLATELET # BLD AUTO: 202 K/UL (ref 150–350)
PMV BLD AUTO: 11.6 FL (ref 9.2–12.9)
POTASSIUM SERPL-SCNC: 3.8 MMOL/L (ref 3.5–5.1)
PROT SERPL-MCNC: 6.9 G/DL (ref 6–8.4)
RBC # BLD AUTO: 4.67 M/UL (ref 4–5.4)
SODIUM SERPL-SCNC: 137 MMOL/L (ref 136–145)
TSH SERPL DL<=0.005 MIU/L-ACNC: 0.7 UIU/ML (ref 0.4–4)
WBC # BLD AUTO: 6.76 K/UL (ref 3.9–12.7)

## 2020-09-15 PROCEDURE — 36415 COLL VENOUS BLD VENIPUNCTURE: CPT | Mod: PO

## 2020-09-15 PROCEDURE — 99214 OFFICE O/P EST MOD 30 MIN: CPT | Mod: 95,,, | Performed by: FAMILY MEDICINE

## 2020-09-15 PROCEDURE — 83690 ASSAY OF LIPASE: CPT

## 2020-09-15 PROCEDURE — 80048 BASIC METABOLIC PNL TOTAL CA: CPT

## 2020-09-15 PROCEDURE — 85027 COMPLETE CBC AUTOMATED: CPT

## 2020-09-15 PROCEDURE — 84443 ASSAY THYROID STIM HORMONE: CPT

## 2020-09-15 PROCEDURE — 80076 HEPATIC FUNCTION PANEL: CPT

## 2020-09-15 PROCEDURE — 99214 PR OFFICE/OUTPT VISIT, EST, LEVL IV, 30-39 MIN: ICD-10-PCS | Mod: 95,,, | Performed by: FAMILY MEDICINE

## 2020-09-15 RX ORDER — CITALOPRAM 40 MG/1
40 TABLET, FILM COATED ORAL DAILY
Qty: 30 TABLET | Refills: 11 | Status: SHIPPED | OUTPATIENT
Start: 2020-09-15 | End: 2021-01-08 | Stop reason: ALTCHOICE

## 2020-09-15 RX ORDER — ALPRAZOLAM 0.5 MG/1
0.5 TABLET ORAL NIGHTLY PRN
Qty: 30 TABLET | Refills: 0 | Status: SHIPPED | OUTPATIENT
Start: 2020-09-15 | End: 2020-11-16

## 2020-09-15 RX ORDER — RIZATRIPTAN BENZOATE 10 MG/1
10 TABLET ORAL
Qty: 9 TABLET | Refills: 2 | Status: SHIPPED | OUTPATIENT
Start: 2020-09-15 | End: 2020-11-16

## 2020-09-15 NOTE — PROGRESS NOTES
Subjective:       Patient ID: Holly Nichols is a 25 y.o. female.    Chief Complaint:  Anxiety.    HPI     The patient location is:  [place]  The chief complaint leading to consultation is:  Anxiety    Visit type:  Audiovisual    Face to Face time with patient:  20  Twenty minutes of total time spent on the encounter, which includes face to face time and non-face to face time preparing to see the patient (eg, review of tests), Obtaining and/or reviewing separately obtained history, Documenting clinical information in the electronic or other health record, Independently interpreting results (not separately reported) and communicating results to the patient/family/caregiver, or Care coordination (not separately reported).         Each patient to whom he or she provides medical services by telemedicine is:  (1) informed of the relationship between the physician and patient and the respective role of any other health care provider with respect to management of the patient; and (2) notified that he or she may decline to receive medical services by telemedicine and may withdraw from such care at any time.    Notes:     Anxiety:  The patient complains of worsening symptoms of anxiety, currently she is taking Celexa 20 mg, she tried multiple medicines in the past, but the patient stated Celexa is working.  In the last few months she has been having issues with inflammatory bowel disease and was having more flare-ups secondary to worsening anxiety,  her gastroenterologist recommend to see her primary care physician and improved her anxiety medication.  The patient is been seen today in that regard.  The patient try Lexapro, hydroxyzine, sertraline, Wellbutrin, BuSpar without improvement of the symptoms.    Fatigue:  The patient also complains of feeling fatigued most of the time with no energy.  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  She also is studying at this time.    Migraines headaches:  The patient complains of worsening migraine headaches, she was taking Maxalt and seemed to help some, she needs a new prescription and also wants a referral to see the neurologist.    Past medical history, past social history was reviewed and discussed with the patient.    Review of Systems   Constitutional: Negative for activity change and unexpected weight change.   HENT: Negative for hearing loss, rhinorrhea and trouble swallowing.    Eyes: Negative for discharge and visual disturbance.   Respiratory: Negative for chest tightness and wheezing.    Cardiovascular: Negative for chest pain and palpitations.   Gastrointestinal: Positive for diarrhea. Negative for blood in stool, constipation and vomiting.   Endocrine: Negative for polydipsia and polyuria.   Genitourinary: Positive for menstrual problem. Negative for difficulty urinating, dysuria and hematuria.   Musculoskeletal: Positive for arthralgias. Negative for joint swelling and neck pain.   Neurological: Positive for headaches. Negative for weakness.   Psychiatric/Behavioral: Negative for confusion and dysphoric mood.       Objective:      Physical Exam  Constitutional:       Appearance: Normal appearance. She is normal weight.   Neurological:      Mental Status: She is alert.   Psychiatric:         Mood and Affect: Mood normal.         Behavior: Behavior normal.         Thought Content: Thought content normal.         Judgment: Judgment normal.         Assessment:       1. Anxiety    2. Other migraine without status migrainosus, not intractable    3. Other fatigue        Plan:       Anxiety:  Worsening  -     ALPRAZolam (XANAX) 0.5 MG tablet; Take 1 tablet (0.5 mg total) by mouth nightly as needed for Anxiety.  Dispense: 30 tablet; Refill: 0  -     citalopram (CELEXA) 40 MG tablet; Take 1 tablet (40 mg total) by mouth once daily.  Dispense: 30 tablet; Refill: 11    Other migraine without  status migrainosus, not intractable:  Worsening  -     rizatriptan (MAXALT) 10 MG tablet; Take 1 tablet (10 mg total) by mouth as needed for Migraine.  Dispense: 9 tablet; Refill: 2  -     Ambulatory referral/consult to Neurology; Future; Expected date: 09/22/2020    Other fatigue:  Worsening  -     TSH; Future; Expected date: 09/15/2020  -     Basic metabolic panel; Future; Expected date: 09/15/2020      Will call the patient after we have the results of the test.  [place] prescription monitoring program was checked and anam trazodone was prescribed, will increase the dosage of the Celexa to 40 mg daily.  Maxalt was prescribed.  Will refer the patient to a neurologist.  Will add a TSH and BMP levels.   Patient agreed with assessment and plan. Patient verbalized understanding.

## 2020-09-15 NOTE — PATIENT INSTRUCTIONS
Eating Heart-Healthy Food: Using the DASH Plan    Eating for your heart doesnt have to be hard or boring. You just need to know how to make healthier choices. The DASH eating plan has been developed to help you do just that. DASH stands for Dietary Approaches to Stop Hypertension. It is a plan that has been proven to be healthier for your heart and to lower your risk for high blood pressure. It can also help lower your risk for cancer, heart disease, osteoporosis, and diabetes.  Choosing from each food group  Choose foods from each of the food groups below each day. Try to get the recommended number of servings for each food group. The serving numbers are based on a diet of 2,000 calories a day. Talk to your doctor if youre unsure about your calorie needs. Along with getting the correct servings, the DASH plan also recommends a sodium intake less than 2,300 mg per day.        Grains  Servings: 6 to 8 a day  A serving is:  · 1 slice bread  · 1 ounce dry cereal  · Half a cup cooked rice, pasta or cereal  Best choices: Whole grains and any grains high in fiber. Vegetables  Servings: 4 to 5 a day  A serving is:  · 1 cup raw leafy vegetable  · Half a cup cut-up raw or cooked vegetable  · Half a cup vegetable juice  Best choices: Fresh or frozen vegetables prepared without added salt or fat.   Fruits  Servings: 4 to 5 a day  A serving is:  · 1 medium fruit  · One-quarter cup dried fruit  · Half a cup fresh, frozen, or canned fruit  · Half a cup of 100% fruit juices  Best choices: A variety of fresh fruits of different colors. Whole fruits are a better choice than fruit juices. Low-fat or fat-free dairy  Servings: 2 to 3 a day  A serving is:  · 1 cup milk  · 1 cup yogurt  · One and a half ounces cheese  Best choices: Skim or 1% milk, low-fat or fat-free yogurt or buttermilk, and low-fat cheeses.         Lean meats, poultry, fish  Servings: 6 or fewer a day  A serving is:  · 1 ounce cooked meats, poultry, or fish  · 1  egg  Best choices: Lean poultry and fish. Trim away visible fat. Broil, grill, roast, or boil instead of frying. Remove skin from poultry before eating. Limit how much red meat you eat.  Nuts, seeds, beans  Servings: 4 to 5 a week  A serving is:  · One-third cup nuts (one and a half ounces)  · 2 tablespoons nut butter or seeds  · Half a cup cooked dry beans or legumes  Best choices: Dry roasted nuts with no salt added, lentils, kidney beans, garbanzo beans, and whole valladares beans.   Fats and oils  Servings: 2 to 3 a day  A serving is:  · 1 teaspoon vegetable oil  · 1 teaspoon soft margarine  · 1 tablespoon mayonnaise  · 2 tablespoons salad dressing  Best choices: Nut and vegetable oils (nontropical vegetable oils), such as olive and canola oil. Sweets  Servings: 5 a week or fewer  A serving is:  · 1 tablespoon sugar, maple syrup, or honey  · 1 tablespoon jam or jelly  · 1 half-ounce jelly beans (about 15)  · 1 cup lemonade  Best choices: Dried fruit can be a satisfying sweet. Choose low-fat sweets. And watch your serving sizes!      For more on the DASH eating plan, visit:  www.nhlbi.nih.gov/health/health-topics/topics/dash   Date Last Reviewed: 6/1/2016  © 2080-7864 [employer]. [address] [place] [address], [place] [address]. All rights reserved. This information is not intended as a substitute for professional medical care. Always follow your healthcare professional's instructions.

## 2020-09-16 ENCOUNTER — TELEPHONE (OUTPATIENT)
Dept: GASTROENTEROLOGY | Facility: CLINIC | Age: 25
End: 2020-09-16

## 2020-09-16 NOTE — TELEPHONE ENCOUNTER
Spoke with pt. Scheduled CT scan & appointment with Dr. Zhao. Pt will do UPT when she drops off stool studies. Pt verbalized understanding to all.

## 2020-09-16 NOTE — TELEPHONE ENCOUNTER
----- Message from JUDD Brewster sent at 9/16/2020  8:00 AM CDT -----  Please call to inform & review the results with the patient- Lab results are normal: blood counts showed no anemia & normal pancreatic and liver function enzymes.  Continue with previous recommendations.  Thanks,  Nina WHALEY-ERWIN

## 2020-09-16 NOTE — TELEPHONE ENCOUNTER
----- Message from JUDD Brewster sent at 9/16/2020 12:31 PM CDT -----  Please schedule patient for CT scan and urine pregnancy test as ordered and patient needs a follow-up appointment with Dr. Zhao in 1-2 months.ThanksKTP

## 2020-09-17 ENCOUNTER — PATIENT OUTREACH (OUTPATIENT)
Dept: ADMINISTRATIVE | Facility: OTHER | Age: 25
End: 2020-09-17

## 2020-09-17 NOTE — PROGRESS NOTES
Health Maintenance Due   Topic Date Due    Pneumococcal Vaccine (Medium Risk) (1 of 1 - PPSV23) 08/07/2013    Influenza Vaccine (1) 08/01/2020     Updates were requested from care everywhere.  Chart was reviewed for overdue Proactive [facility] Encounters (MOLLY) topics (CRS, Breast Cancer Screening, Eye exam)  Health Maintenance has been updated.  LINKS immunization registry triggered.  Immunizations were reconciled.

## 2020-09-18 ENCOUNTER — OFFICE VISIT (OUTPATIENT)
Dept: OBSTETRICS AND GYNECOLOGY | Facility: CLINIC | Age: 25
End: 2020-09-18
Payer: COMMERCIAL

## 2020-09-18 DIAGNOSIS — N80.9 ENDOMETRIOSIS: ICD-10-CM

## 2020-09-18 DIAGNOSIS — N92.1 METRORRHAGIA: Primary | ICD-10-CM

## 2020-09-18 DIAGNOSIS — N94.6 DYSMENORRHEA: ICD-10-CM

## 2020-09-18 DIAGNOSIS — N94.10 DYSPAREUNIA IN FEMALE: ICD-10-CM

## 2020-09-18 PROCEDURE — 99213 PR OFFICE/OUTPT VISIT, EST, LEVL III, 20-29 MIN: ICD-10-PCS | Mod: 95,,, | Performed by: OBSTETRICS & GYNECOLOGY

## 2020-09-18 PROCEDURE — 99213 OFFICE O/P EST LOW 20 MIN: CPT | Mod: 95,,, | Performed by: OBSTETRICS & GYNECOLOGY

## 2020-09-18 NOTE — PROGRESS NOTES
Chief Complaint   Patient presents with    Endometriosis       History of Present Illness: Holly Nichols is a 25 y.o. female that presents today 9/18/2020 for   Chief Complaint   Patient presents with    Endometriosis   she reports painful irregular periods.  She reports that she bleeding 7 days every 14 days.  She reports taking her OCPs this is still happening. She uses a whole pack of pad in 1 weeks.  She reports during painful cramping during intercourse, her period and between her periods.  She reports sharp cervix stabbing pain.        Past Medical History:   Diagnosis Date    Abnormal Pap smear of cervix     Allergy     Asthma     last used inhaler 8/2018    Constipation     Endometriosis     Fibromyalgia     Gastritis     HLA B27 (HLA B27 positive)     Migraine     Scoliosis     Smoker     stopped in January 2018       Past Surgical History:   Procedure Laterality Date    ADENOIDECTOMY      APPENDECTOMY  09/2014    endometriosis    CHOLECYSTECTOMY  12/20/2017    Dr. [facility]LL[facility] Anne, [facility]     COLONOSCOPY N/A 5/19/2016    Procedure: COLONOSCOPY;  Surgeon: Jarret Zhao Jr., MD;  Location: [facility];  Service: Endoscopy;  Laterality: N/A;    COLONOSCOPY N/A 8/27/2020    Dr. Zhao: Erythema & ulcerated mucosa in descending, splenic flexure & distal transverse (r/o UC, r/o ischemia, r/o vasculiti, etc.), Mucosal changes in descending colon (secondary to left-sided colitis, to ischemic colitis(?), to vasculitis(?). hemorrhoids; biopsy: TI & random biopsies- WNL, transverse/hepatic/descending- mild active colitis without definitive features of chronicity    MOUTH SURGERY      x2    PELVIC LAPAROSCOPY      TONSILLECTOMY  07/2016    UPPER GASTROINTESTINAL ENDOSCOPY  05/2016    Dr. Zhao       Current Outpatient Medications   Medication Sig Dispense Refill    acetaminophen (TYLENOL EXTRA STRENGTH) 500 MG tablet Take 1,000 mg by mouth every 6 (six) hours as needed for Pain.      ALPRAZolam  (XANAX) 0.5 MG tablet Take 1 tablet (0.5 mg total) by mouth nightly as needed for Anxiety. 30 tablet 0    Bifidobacterium infantis (ALIGN ORAL) Take by mouth once daily.      citalopram (CELEXA) 40 MG tablet Take 1 tablet (40 mg total) by mouth once daily. 30 tablet 11    levalbuterol (XOPENEX HFA) 45 mcg/actuation inhaler Inhale into the lungs.      levalbuterol (XOPENEX) 1.25 mg/3 mL nebulizer solution Take 3 mLs (1.25 mg total) by nebulization 3 (three) times daily as needed for Wheezing. Rescue (Patient not taking: Reported on 9/14/2020) 75 mL 0    mesalamine (LIALDA) 1.2 gram TbEC Take 2 tablets (2.4 g total) by mouth daily with breakfast. 60 tablet 3    norethindrone-ethinyl estradiol (MICROGESTIN 1/20) 1-20 mg-mcg per tablet Take 1 tablet by mouth once daily. 30 tablet 11    omeprazole (PRILOSEC) 40 MG capsule Take 1 capsule (40 mg total) by mouth 2 (two) times daily before meals. Take 30-60 minutes before breakfast 60 capsule 1    ondansetron (ZOFRAN-ODT) 4 MG TbDL Take 1 tablet (4 mg total) by mouth 3 (three) times daily as needed (nausea). 30 tablet 1    prenatal 25/iron fum/folic/dha (PRENATAL-1 ORAL) Take 1 tablet by mouth once daily.      rizatriptan (MAXALT) 10 MG tablet Take 1 tablet (10 mg total) by mouth as needed for Migraine. 9 tablet 2     No current facility-administered medications for this visit.        Review of patient's allergies indicates:   Allergen Reactions    Latex, natural rubber      burning    Sulfur Hives    Toradol [ketorolac] Other (See Comments)     Metallic taste only once in 2013; has taken since without problems    Tramadol Other (See Comments)     paralysis    Penicillins Hives and Rash    Sulfa (sulfonamide antibiotics) Hives and Rash       Family History   Problem Relation Age of Onset    Fibromyalgia Mother     Depression Mother     Irritable bowel syndrome Mother     Diabetes Father     Depression Father     Mental illness Father         bipolar  disorder    Multiple sclerosis Maternal Aunt     Crohn's disease Maternal Grandmother     Colon cancer Neg Hx     Ulcerative colitis Neg Hx     Stomach cancer Neg Hx     Esophageal cancer Neg Hx     Breast cancer Neg Hx     Ovarian cancer Neg Hx     Celiac disease Neg Hx        Social History     Tobacco Use    Smoking status: Former Smoker     Packs/day: 0.50     Types: Cigarettes     Quit date: 2018     Years since quittin.7    Smokeless tobacco: Never Used   Substance Use Topics    Alcohol use: No     Alcohol/week: 0.0 standard drinks     Comment: quit since     Drug use: No       OB History    Para Term  AB Living   2 2 2     2   SAB TAB Ectopic Multiple Live Births         0 2      # Outcome Date GA Lbr Beny/2nd Weight Sex Delivery Anes PTL Lv   2 Term 20 38w4d / 00:04 3.305 kg (7 lb 4.6 oz) F Vag-Spont EPI N RACHELE   1 Term 18 39w1d 12:11 / 00:20 3.459 kg (7 lb 10 oz) F Vag-Spont EPI N RACHELE       Review of Symptoms:  GENERAL: Denies weight gain or weight loss. Feeling well overall.   SKIN: Denies rash or lesions.   HEAD: Denies head injury or headache.   NODES: Denies enlarged lymph nodes.   CHEST: Denies chest pain or shortness of breath.   CARDIOVASCULAR: Denies palpitations or left sided chest pain.   ABDOMEN: No abdominal pain, constipation, diarrhea, nausea, vomiting or rectal bleeding.   URINARY: No frequency, dysuria, hematuria, or burning on urination.  HEMATOLOGIC: No easy bruisability or excessive bleeding.   MUSCULOSKELETAL: Denies joint pain or swelling.     LMP 2020 (Approximate)     ASSESSMENT/PLAN:  Metrorrhagia    Endometriosis    Dysmenorrhea    Dyspareunia in female         We discussed TLH for her pain and irregular periods.        15 minutes spent today with this patient. Greater than half spent in counseling today.

## 2020-09-28 ENCOUNTER — LAB VISIT (OUTPATIENT)
Dept: FAMILY MEDICINE | Facility: CLINIC | Age: 25
End: 2020-09-28
Payer: COMMERCIAL

## 2020-09-28 DIAGNOSIS — N92.1 METRORRHAGIA: Primary | ICD-10-CM

## 2020-09-28 DIAGNOSIS — N80.9 ENDOMETRIOSIS: ICD-10-CM

## 2020-09-28 DIAGNOSIS — N94.6 DYSMENORRHEA: ICD-10-CM

## 2020-09-28 DIAGNOSIS — Z01.818 PREOP TESTING: ICD-10-CM

## 2020-09-28 DIAGNOSIS — N94.10 DYSPAREUNIA IN FEMALE: ICD-10-CM

## 2020-09-28 PROCEDURE — U0003 INFECTIOUS AGENT DETECTION BY NUCLEIC ACID (DNA OR RNA); SEVERE ACUTE RESPIRATORY SYNDROME CORONAVIRUS 2 (SARS-COV-2) (CORONAVIRUS DISEASE [COVID-19]), AMPLIFIED PROBE TECHNIQUE, MAKING USE OF HIGH THROUGHPUT TECHNOLOGIES AS DESCRIBED BY CMS-2020-01-R: HCPCS

## 2020-09-29 LAB — SARS-COV-2 RNA RESP QL NAA+PROBE: NOT DETECTED

## 2020-09-30 RX ORDER — TOPIRAMATE 50 MG/1
50 TABLET, FILM COATED ORAL 2 TIMES DAILY
COMMUNITY
End: 2021-02-05 | Stop reason: ALTCHOICE

## 2020-10-01 ENCOUNTER — ANESTHESIA (OUTPATIENT)
Dept: ENDOSCOPY | Facility: HOSPITAL | Age: 25
End: 2020-10-01
Payer: COMMERCIAL

## 2020-10-01 ENCOUNTER — ANESTHESIA EVENT (OUTPATIENT)
Dept: ENDOSCOPY | Facility: HOSPITAL | Age: 25
End: 2020-10-01
Payer: COMMERCIAL

## 2020-10-01 ENCOUNTER — HOSPITAL ENCOUNTER (OUTPATIENT)
Facility: HOSPITAL | Age: 25
Discharge: HOME | End: 2020-10-01
Attending: INTERNAL MEDICINE | Admitting: INTERNAL MEDICINE
Payer: COMMERCIAL

## 2020-10-01 DIAGNOSIS — R10.13 EPIGASTRIC PAIN: ICD-10-CM

## 2020-10-01 LAB
B-HCG UR QL: NEGATIVE
CTP QC/QA: YES
H PYLORI INDEX VALUE: NEGATIVE

## 2020-10-01 PROCEDURE — 43239 EGD BIOPSY SINGLE/MULTIPLE: CPT | Mod: PO | Performed by: INTERNAL MEDICINE

## 2020-10-01 PROCEDURE — 37000009 HC ANESTHESIA EA ADD 15 MINS: Mod: PO | Performed by: INTERNAL MEDICINE

## 2020-10-01 PROCEDURE — D9220A PRA ANESTHESIA: Mod: CRNA,,, | Performed by: NURSE ANESTHETIST, CERTIFIED REGISTERED

## 2020-10-01 PROCEDURE — 63600175 PHARM REV CODE 636 W HCPCS: Mod: PO | Performed by: INTERNAL MEDICINE

## 2020-10-01 PROCEDURE — 81025 URINE PREGNANCY TEST: CPT | Mod: PO | Performed by: INTERNAL MEDICINE

## 2020-10-01 PROCEDURE — 25000003 PHARM REV CODE 250: Mod: PO | Performed by: NURSE ANESTHETIST, CERTIFIED REGISTERED

## 2020-10-01 PROCEDURE — 43239 PR EGD, FLEX, W/BIOPSY, SGL/MULTI: ICD-10-PCS | Mod: ,,, | Performed by: INTERNAL MEDICINE

## 2020-10-01 PROCEDURE — 43239 EGD BIOPSY SINGLE/MULTIPLE: CPT | Mod: ,,, | Performed by: INTERNAL MEDICINE

## 2020-10-01 PROCEDURE — 63600175 PHARM REV CODE 636 W HCPCS: Mod: PO | Performed by: NURSE ANESTHETIST, CERTIFIED REGISTERED

## 2020-10-01 PROCEDURE — 88305 TISSUE EXAM BY PATHOLOGIST: CPT | Mod: 26,,, | Performed by: PATHOLOGY

## 2020-10-01 PROCEDURE — D9220A PRA ANESTHESIA: ICD-10-PCS | Mod: CRNA,,, | Performed by: NURSE ANESTHETIST, CERTIFIED REGISTERED

## 2020-10-01 PROCEDURE — 87449 NOS EACH ORGANISM AG IA: CPT | Mod: PO | Performed by: INTERNAL MEDICINE

## 2020-10-01 PROCEDURE — 88305 TISSUE EXAM BY PATHOLOGIST: CPT | Mod: 59 | Performed by: PATHOLOGY

## 2020-10-01 PROCEDURE — 88305 TISSUE EXAM BY PATHOLOGIST: ICD-10-PCS | Mod: 26,,, | Performed by: PATHOLOGY

## 2020-10-01 PROCEDURE — 27201012 HC FORCEPS, HOT/COLD, DISP: Mod: PO | Performed by: INTERNAL MEDICINE

## 2020-10-01 PROCEDURE — D9220A PRA ANESTHESIA: Mod: ANES,,, | Performed by: ANESTHESIOLOGY

## 2020-10-01 PROCEDURE — D9220A PRA ANESTHESIA: ICD-10-PCS | Mod: ANES,,, | Performed by: ANESTHESIOLOGY

## 2020-10-01 PROCEDURE — 37000008 HC ANESTHESIA 1ST 15 MINUTES: Mod: PO | Performed by: INTERNAL MEDICINE

## 2020-10-01 RX ORDER — FENTANYL CITRATE 50 UG/ML
INJECTION, SOLUTION INTRAMUSCULAR; INTRAVENOUS
Status: DISCONTINUED | OUTPATIENT
Start: 2020-10-01 | End: 2020-10-01

## 2020-10-01 RX ORDER — SODIUM CHLORIDE, SODIUM LACTATE, POTASSIUM CHLORIDE, CALCIUM CHLORIDE 600; 310; 30; 20 MG/100ML; MG/100ML; MG/100ML; MG/100ML
INJECTION, SOLUTION INTRAVENOUS CONTINUOUS
Status: DISCONTINUED | OUTPATIENT
Start: 2020-10-01 | End: 2020-10-01 | Stop reason: HOSPADM

## 2020-10-01 RX ORDER — DICYCLOMINE HYDROCHLORIDE 10 MG/1
10 CAPSULE ORAL
Qty: 120 CAPSULE | Refills: 11 | Status: SHIPPED | OUTPATIENT
Start: 2020-10-01 | End: 2020-10-12

## 2020-10-01 RX ORDER — LIDOCAINE HCL/PF 100 MG/5ML
SYRINGE (ML) INTRAVENOUS
Status: DISCONTINUED | OUTPATIENT
Start: 2020-10-01 | End: 2020-10-01

## 2020-10-01 RX ORDER — PROPOFOL 10 MG/ML
VIAL (ML) INTRAVENOUS
Status: DISCONTINUED | OUTPATIENT
Start: 2020-10-01 | End: 2020-10-01

## 2020-10-01 RX ADMIN — PROPOFOL 50 MG: 10 INJECTION, EMULSION INTRAVENOUS at 09:10

## 2020-10-01 RX ADMIN — SODIUM CHLORIDE, SODIUM LACTATE, POTASSIUM CHLORIDE, AND CALCIUM CHLORIDE: .6; .31; .03; .02 INJECTION, SOLUTION INTRAVENOUS at 08:10

## 2020-10-01 RX ADMIN — FENTANYL CITRATE 50 MCG: 50 INJECTION, SOLUTION INTRAMUSCULAR; INTRAVENOUS at 09:10

## 2020-10-01 RX ADMIN — LIDOCAINE HYDROCHLORIDE 100 MG: 20 INJECTION, SOLUTION INTRAVENOUS at 09:10

## 2020-10-01 RX ADMIN — PROPOFOL 100 MG: 10 INJECTION, EMULSION INTRAVENOUS at 09:10

## 2020-10-01 NOTE — [employer] PATIENT INSTRUCTIONS
Discharge Summary/Instructions after an Endoscopic Procedure  Patient Name: Holly Nichols  Patient MRN: [id number]  Patient YOB: 1995 Thursday, October 1, 2020  Jarret Zhao MD  RESTRICTIONS:  During your procedure today, you received medications for sedation.  These   medications may affect your judgment, balance and coordination.  Therefore,   for 24 hours, you have the following restrictions:   - DO NOT drive a car, operate machinery, make legal/financial decisions,   sign important papers or drink alcohol.    ACTIVITY:  Today: no heavy lifting, straining or running due to procedural   sedation/anesthesia.  The following day: return to full activity including work.  DIET:  Eat and drink normally unless instructed otherwise.     TREATMENT FOR COMMON SIDE EFFECTS:  - Mild abdominal pain, nausea, belching, bloating or excessive gas:  rest,   eat lightly and use a heating pad.  - Sore Throat: treat with throat lozenges and/or gargle with warm salt   water.  - Because air was used during the procedure, expelling large amounts of air   from your rectum or belching is normal.  - If a bowel prep was taken, you may not have a bowel movement for 1-3 days.    This is normal.  SYMPTOMS TO WATCH FOR AND REPORT TO YOUR PHYSICIAN:  1. Abdominal pain or bloating, other than gas cramps.  2. Chest pain.  3. Back pain.  4. Signs of infection such as: chills or fever occurring within 24 hours   after the procedure.  5. Rectal bleeding, which would show as bright red, maroon, or black stools.   (A tablespoon of blood from the rectum is not serious, especially if   hemorrhoids are present.)  6. Vomiting.  7. Weakness or dizziness.  GO DIRECTLY TO THE NEAREST EMERGENCY ROOM IF YOU HAVE ANY OF THE FOLLOWING:      Difficulty breathing              Chills and/or fever over 101 F   Persistent vomiting and/or vomiting blood   Severe abdominal pain   Severe chest pain   Black, tarry stools   Bleeding- more than one  tablespoon   Any other symptom or condition that you feel may need urgent attention  Your doctor recommends these additional instructions:  If any biopsies were taken, your doctors clinic will contact you in 1 to 2   weeks with any results.  Continue your present medications.   Take Prilosec (omeprazole) 40 mg by mouth twice a day.   Take Bentyl (dicyclomine) 10 mg by mouth four times a day, 30 minutes before   meals and at bedtime, as needed,  to ease cramps and lessen diarrhea.   Your physician has recommended a computed tomographic (CT scan) enterography   as previously scheduled.   Return to GI clinic in 6-8 weeks.  For questions, problems or results please call your physician - Jarret Zhao MD at Work:  [phone].  EMERGENCY PHONE NUMBER: [phone], LAB RESULTS: [phone]  IF A COMPLICATION OR EMERGENCY SITUATION ARISES AND YOU ARE UNABLE TO REACH   YOUR PHYSICIAN - GO DIRECTLY TO THE EMERGENCY ROOM.  ___________________________________________  Nurse Signature  ___________________________________________  Patient/Designated Responsible Party Signature  Jarret Zhao MD  10/1/2020 9:55:20 AM  This report has been verified and signed electronically.  [employer]

## 2020-10-01 NOTE — ANESTHESIA POSTPROCEDURE EVALUATION
Anesthesia Post Evaluation    Patient: Holly Nichols    Procedure(s) Performed: Procedure(s) (LRB):  EGD (ESOPHAGOGASTRODUODENOSCOPY) (N/A)    Final Anesthesia Type: general    Patient location during evaluation: PACU  Patient participation: Yes- Able to Participate  Level of consciousness: awake and alert and oriented  Post-procedure vital signs: reviewed and stable  Pain management: adequate  Airway patency: patent    PONV status at discharge: No PONV  Anesthetic complications: no      Cardiovascular status: blood pressure returned to baseline  Respiratory status: unassisted, spontaneous ventilation and room air  Hydration status: euvolemic  Follow-up not needed.          Vitals Value Taken Time   /65 10/01/20 1000   Temp 36.5 °C (97.7 °F) 10/01/20 1000   Pulse 57 10/01/20 1000   Resp 10 10/01/20 1000   SpO2 100 % 10/01/20 1000         Event Time   Out of Recovery 10:10:23         Pain/Diamond Score: Diamond Score: 9 (10/1/2020  9:55 AM)

## 2020-10-01 NOTE — BRIEF OP NOTE
Discharge Note  Short Stay      SUMMARY     Admit Date: 10/1/2020    Attending Physician: Jarret Zhao Jr., MD     Discharge Physician: Jarret Zhao Jr., MD    Discharge Date: 10/1/2020 9:58 AM    Final Diagnosis: Heartburn [R12]  Impression:          - Normal oropharynx.                        - Normal upper third of esophagus and middle third                        of esophagus.                        - Erythema in the lower third of the esophagus.                        Biopsied.                        - Z-line regular, 39-40 cm from the incisors.                        - Non-erosive esophageal reflux (NERD) disease(?).                        - Normal cardia.                        - Normal gastric fundus, gastric body and antrum.                        - Minimal antritis. Biopsied.                        - Normal pylorus.                        - Duodenal bulb erosions without bleeding. Biopsied.                        - Normal examined duodenum otherwise. Biopsied.   Recommendation:      - Discharge patient to home.                        - Await pathology results.                        - Continue present medications.                        - Use Prilosec (omeprazole) 40 mg PO BID.                        - Use Bentyl (dicyclomine) 10 mg PO QID; 30 min AC                        and HS.                        - Call the G.I. clinic in 2 weeks for reports (if                        you haven't heard from us sooner) [phone].                        - Perform a computed tomographic (CT scan)                        enterography as previously scheduled.                        - Return to GI clinic in 6-8 weeks.   Jarret Zhao MD   10/1/2020  Disposition: HOME OR SELF CARE    Patient Instructions:   Current Discharge Medication List      START taking these medications    Details   dicyclomine (BENTYL) 10 MG capsule Take 1 capsule (10 mg total) by mouth before meals and at bedtime as needed. ,to ease  cramps & lessen diarrhea.  Qty: 120 capsule, Refills: 11         CONTINUE these medications which have NOT CHANGED    Details   Bifidobacterium infantis (ALIGN ORAL) Take by mouth once daily.      citalopram (CELEXA) 40 MG tablet Take 1 tablet (40 mg total) by mouth once daily.  Qty: 30 tablet, Refills: 11    Associated Diagnoses: Anxiety      levalbuterol (XOPENEX HFA) 45 mcg/actuation inhaler Inhale into the lungs.      mesalamine (LIALDA) 1.2 gram TbEC Take 2 tablets (2.4 g total) by mouth daily with breakfast.  Qty: 60 tablet, Refills: 3    Associated Diagnoses: Nonspecific colitis      norethindrone-ethinyl estradiol (MICROGESTIN 1/20) 1-20 mg-mcg per tablet Take 1 tablet by mouth once daily.  Qty: 30 tablet, Refills: 11    Associated Diagnoses: Encounter for initial prescription of contraceptive pills      omeprazole (PRILOSEC) 40 MG capsule Take 1 capsule (40 mg total) by mouth 2 (two) times daily before meals. Take 30-60 minutes before breakfast  Qty: 60 capsule, Refills: 1    Associated Diagnoses: Heartburn      ondansetron (ZOFRAN-ODT) 4 MG TbDL Take 1 tablet (4 mg total) by mouth 3 (three) times daily as needed (nausea).  Qty: 30 tablet, Refills: 1    Associated Diagnoses: Nausea      prenatal 25/iron fum/folic/dha (PRENATAL-1 ORAL) Take 1 tablet by mouth once daily.      topiramate (TOPAMAX) 50 MG tablet Take 50 mg by mouth 2 (two) times daily.      acetaminophen (TYLENOL EXTRA STRENGTH) 500 MG tablet Take 1,000 mg by mouth every 6 (six) hours as needed for Pain.      ALPRAZolam (XANAX) 0.5 MG tablet Take 1 tablet (0.5 mg total) by mouth nightly as needed for Anxiety.  Qty: 30 tablet, Refills: 0    Associated Diagnoses: Anxiety      levalbuterol (XOPENEX) 1.25 mg/3 mL nebulizer solution Take 3 mLs (1.25 mg total) by nebulization 3 (three) times daily as needed for Wheezing. Rescue  Qty: 75 mL, Refills: 0    Associated Diagnoses: Wheezing      rizatriptan (MAXALT) 10 MG tablet Take 1 tablet (10 mg total)  by mouth as needed for Migraine.  Qty: 9 tablet, Refills: 2    Associated Diagnoses: Other migraine without status migrainosus, not intractable             Discharge Procedure Orders (must include Diet, Follow-up, Activity)    Follow Up:  Follow up with PCP as per your routine.  Please follow an anti reflux diet and a high fiber diet.  Activity as tolerated.    No driving day of procedure.

## 2020-10-01 NOTE — ANESTHESIA PREPROCEDURE EVALUATION
10/01/2020  Holly Nichols is a 25 y.o., female.      Holly Nichols is a      BMI: There is no height or weight on file to calculate BMI.      Patient Active Problem List   Diagnosis    Unspecified asthma, with status asthmaticus    Pain in thoracic spine    Scoliosis (and kyphoscoliosis), idiopathic    Abdominal pain    Constipation    Allergy    Ankylosing spondylitis    Insomnia    Fibromyalgia    Fatigue    Vitamin D insufficiency    HLA B27 (HLA B27 positive)    Asthma exacerbation    MDD (major depressive disorder), recurrent episode, mild    Anxiety disorder    Tobacco dependence    Nausea and vomiting    Recurrent tonsillitis    Migraine    Gallbladder disease    Abdominal pain affecting pregnancy    Back pain affecting pregnancy    Flank pain in pregnant patient    Encounter for elective induction of labor    Change in bowel habits       Past Surgical History:   Procedure Laterality Date    ADENOIDECTOMY      APPENDECTOMY  09/2014    endometriosis    CHOLECYSTECTOMY  12/20/2017    Dr. MARBELLA Anne, [facility]     COLONOSCOPY N/A 5/19/2016    Procedure: COLONOSCOPY;  Surgeon: Jarret Zhao Jr., MD;  Location: [facility];  Service: Endoscopy;  Laterality: N/A;    COLONOSCOPY N/A 8/27/2020    Dr. Zhao: Erythema & ulcerated mucosa in descending, splenic flexure & distal transverse (r/o UC, r/o ischemia, r/o vasculiti, etc.), Mucosal changes in descending colon (secondary to left-sided colitis, to ischemic colitis(?), to vasculitis(?). hemorrhoids; biopsy: TI & random biopsies- WNL, transverse/hepatic/descending- mild active colitis without definitive features of chronicity    MOUTH SURGERY      x2    PELVIC LAPAROSCOPY      TONSILLECTOMY  07/2016    UPPER GASTROINTESTINAL ENDOSCOPY  05/2016    Dr. Zhao       (Not in a hospital admission)      Review of patient's  allergies indicates:   Allergen Reactions    Toradol [ketorolac]     Tramadol     Penicillins Rash    Sulfa (sulfonamide antibiotics) Hives and Rash         Vital Signs Range (Last 24H):           Labs (most recent):    CBC:   No results for input(s): WBC, RBC, HGB, HCT, PLT, MCV, MCH, MCHC in the last 72 hours.    CMP:   No results for input(s): NA, K, CL, CO2, BUN, CREATININE, GLU, MG, PHOS, CALCIUM, ALBUMIN, PROT, ALKPHOS, ALT, AST, BILITOT in the last 72 hours.    No results found for: HGBA1C    No results found for: INR, PROTIME      Diagnostic Studies:      EKG:  Normal sinus rhythm  Right atrial enlargement  Rightward axis  Borderline ECG      Specimen Collected: 12/19/17 20:12 Last Resulted: 12/20/17 13:27                    Pre-op Assessment    I have reviewed the Patient Summary Reports.     I have reviewed the Nursing Notes. I have reviewed the NPO Status.   I have reviewed the Medications.     Review of Systems  Anesthesia Hx:  No problems with previous Anesthesia Denies Hx of Anesthetic complications  History of prior surgery of interest to airway management or planning: Previous anesthesia: General Airway issues documented on chart review include easy direct laryngoscopy , view on direct laryngoscopy Grade I  Denies Family Hx of Anesthesia complications.   Denies Personal Hx of Anesthesia complications.   Social:  Smoker    Cardiovascular:  Cardiovascular Normal  ECG has been reviewed.    Pulmonary:   Asthma asymptomatic    Hepatic/GI:   dysphagia   Musculoskeletal:   Scoliosis, Fibromyalgia   Neurological:   Neuromuscular Disease, Headaches Migraines   Endocrine:  Endocrine Normal    Psych:   Psychiatric History anxiety depression          Physical Exam   Airway/Jaw/Neck:  Airway Findings: Mouth Opening: Normal Mallampati: II  TM Distance: Normal, at least 6 cm  Jaw/Neck Findings:  Neck ROM: Normal ROM      Dental:  Dental Findings: In tact   Chest/Lungs:  Chest/Lungs Findings: Normal Respiratory  Rate, Clear to auscultation     Heart/Vascular:  Heart Findings: Rhythm: Regular Rhythm        Mental Status:  Mental Status Findings:  Alert and Oriented         Anesthesia Plan  Type of Anesthesia, risks & benefits discussed:  Anesthesia Type:  general  Patient's Preference:   Intra-op Monitoring Plan: standard ASA monitors  Intra-op Monitoring Plan Comments:   Post Op Pain Control Plan:   Post Op Pain Control Plan Comments:   Induction:   IV  Beta Blocker:  Patient is not currently on a Beta-Blocker (No further documentation required).       Informed Consent: Patient understands risks and agrees with Anesthesia plan.  Questions answered. Anesthesia consent signed with patient.  ASA Score: 2     Day of Surgery Review of History & Physical: I have interviewed and examined the patient. I have reviewed the patient's H&P dated:  There are no significant changes.          Ready For Surgery From Anesthesia Perspective.

## 2020-10-01 NOTE — TRANSFER OF CARE
"Anesthesia Transfer of Care Note    Patient: Holly Nichols    Procedure(s) Performed: Procedure(s) (LRB):  EGD (ESOPHAGOGASTRODUODENOSCOPY) (N/A)    Patient location: PACU    Anesthesia Type: general    Transport from OR: Transported from OR on 2-3 L/min O2 by NC with adequate spontaneous ventilation    Post pain: adequate analgesia    Post assessment: no apparent anesthetic complications and tolerated procedure well    Post vital signs: stable    Level of consciousness: sedated    Nausea/Vomiting: no nausea/vomiting    Complications: none    Transfer of care protocol was followed      Last vitals:   Visit Vitals  /80 (BP Location: Left arm, Patient Position: Lying)   Pulse 78   Resp 16   Ht 5' 8" (1.727 m)   Wt 77.1 kg (170 lb)   LMP 09/07/2020 (Approximate)   SpO2 98%   Breastfeeding No   BMI 25.85 kg/m²     "

## 2020-10-01 NOTE — H&P
History & Physical - Short Stay  Gastroenterology      SUBJECTIVE:     Procedure: Gastroscopy    Chief Complaint/Indication for Procedure:  Epig/LUQ abdo main.  Nausea.    History of Present Illness:  Office Visit    9/14/2020  [facility] - Gastroenterology     JUDD Brewster  Gastroenterology  History of colonoscopy +10 more  Dx  GI Problem    Reason for Visit   Progress Notes  JUDD Brewster (Nurse Practitioner)   Gastroenterology   9/14/2020  1:00 PM   Signed     Subjective:       Patient ID: Holly Nichols is a 25 y.o. female Body mass index is 25.85 kg/m².     Chief Complaint: GI Problem (follow-up)  This is an established patient of Dr. Zhao and myself.     The patient location is: in [place]. I verified patient name and date of birth. Patient provided current height and weight measurements.  The chief complaint leading to consultation is: BOWEL ISSUES AND CRAMPING follow-up     Visit type: audiovisual     Face to Face time with patient: 24 minutes  39 minutes of total time spent on the encounter, which includes face to face time and non-face to face time preparing to see the patient (eg, review of tests), Obtaining and/or reviewing separately obtained history, Documenting clinical information in the electronic or other health record, Independently interpreting results (not separately reported) and communicating results to the patient/family/caregiver, or Care coordination (not separately reported).      GI Problem  The primary symptoms include weight loss (lost weight since she delivered her baby in 3/2020; lost ~5lbs since our last visit), abdominal pain, nausea (had it today after eating) and diarrhea (probiotic align daily). Primary symptoms do not include fever, fatigue, vomiting, melena, hematemesis, hematochezia or dysuria. The illness began more than 7 days ago (irregular bowel habits for several years). The problem has been gradually worsening (had improved after our  "last visit with having "bouts" maybe once a week; worsened since 9/10/2020).   The abdominal pain began more than 2 days ago. The abdominal pain is generalized (generalized but mostly on left side of abdomen; described as abdominal cramping). The severity of the abdominal pain is 5/10. Relieved by: after a bowel movement; triggered after eating (especially heavy meals) & by needing to have a bowel movement; worse with stress.   The diarrhea began more than 1 week ago (started ~5/2020). Daily occurrences: daily, 4-5 times a day; worse with stress at times. Risk factors: denies risks factors.   The illness does not include chills, dysphagia, odynophagia, constipation or back pain. Associated symptoms comments: Eating a bland diet, on low lactose diet. Significant associated medical issues include GERD (daily; prilosec 40 mg once daily; PAST: prilosec- similar relief with nexium, protonix- no relief, nexium) and irritable bowel syndrome (bentyl helped in the past, taking 20 mg qid prn with minimal relief currently).      Review of Systems   Constitutional: Positive for appetite change (decreased appetite; eating 1 big meal a day, eating more salads with baked chicken) and weight loss (lost weight since she delivered her baby in 3/2020; lost ~5lbs since our last visit). Negative for chills, diaphoresis, fatigue and fever.   HENT: Negative for sore throat and trouble swallowing.    Respiratory: Negative for cough, choking, chest tightness and shortness of breath.    Cardiovascular: Negative for chest pain.   Gastrointestinal: Positive for abdominal pain, diarrhea (probiotic align daily) and nausea (had it today after eating). Negative for abdominal distention, anal bleeding, blood in stool, constipation, dysphagia, hematemesis, hematochezia, melena, rectal pain and vomiting.     8/27/2020 stool studies reviewed: negative  5/19/16 EGD was reviewed and procedure report states:   " Impression:          - Normal " "oropharynx.                       - Normal esophagus.                       - Z-line regular, 38 cm from the incisors.                       - Non-erosive esophageal reflux (NERD) disease?.                       - Normal stomach. Biopsied.                       - Normal examined duodenum. Biopsied.  Recommendation:      - Perform a colonoscopy as previously scheduled.                       - Await pathology results.                       - Follow an antireflux regimen.                       - Continue present medications.                       - Use Protonix (pantoprazole) 40 mg PO daily.                       - Use sucralfate tablets 1 gram PO QID                       - Call the G.I. clinic in 2 weeks for reports (if                        you haven't heard from us sooner) [phone].                       - Return to GI clinic in 6-8 weeks.".  Biopsy results:   "1. STOMACH (BIOPSY):  -Antrum with features of mild reactive/chemical gastropathy  -Negative for active inflammation  -Negative for Helicobacter pylori organisms on H&E stain  2. DUODENUM (BIOPSY):  -Duodenal mucosa with no significant pathologic changes  -Negative for active inflammation  -No features of sprue  -No organisms"     Assessment:       1. History of colonoscopy    2. Nonspecific colitis    3. Diarrhea, unspecified type    4. S/P cholecystectomy    5. Weight loss    6. Decreased appetite    7. Nausea    8. Heartburn    9. Generalized abdominal pain    10. History of endometriosis    11. History of anxiety        Plan:     discussed case and results with Dr. Zhao; Dr. Zhao recommends starting patient on lialda     History of colonoscopy & Nonspecific colitis  -     Calprotectin; Future; Expected date: 09/14/2020  -   START  mesalamine (LIALDA) 1.2 gram TbEC; Take 2 tablets (2.4 g total) by mouth daily with breakfast.  Dispense: 60 tablet; Refill: 3  -     CT Enterography Abd_Pelvis With Contrast; Future; Expected date: 09/16/2020  - avoid use " of NSAIDs- since they can cause GI upset, bleeding and/or ulcers.  Recommend follow-up with Dr. Zhao for continued evaluation and management.     Diarrhea, unspecified type  -     CT Enterography Abd_Pelvis With Contrast; Future; Expected date: 09/16/2020  - CONTINUE OTC probiotic as directed on packaging  - avoid lactose  - drink adequate water intake  -smaller, more frequent meals  -avoid trigger foods  - COMPLETE STOOL STUDIES AS PREVIOUSLY ORDERED:  -     Pancreatic elastase, fecal; Future; Expected date: 08/17/2020  -     Stool Exam-Ova,Cysts,Parasites; Future; Expected date: 08/17/2020  -     Fecal fat, qualitative; Future; Expected date: 08/17/2020  -     Giardia / Cryptosporidum, EIA; Future; Expected date: 08/17/2020  -     Occult blood x 1, stool; Future; Expected date: 08/17/2020  -     pH, stool; Future; Expected date: 08/17/2020  -     Rotavirus antigen, stool; Future; Expected date: 08/17/2020  -     WBC, Stool; Future; Expected date: 08/17/2020  -     Stool culture; Future; Expected date: 08/17/2020  -     Clostridium difficile EIA; Future; Expected date: 08/17/2020  -     Adenovirus Molecular Detection, PCR, Non-Blood Stool; Future; Expected date: 08/17/2020  - CONTINUE WITH EGD AS SCHEDULED FOR 10/1/2020  -  CONTINUE   dicyclomine (BENTYL) 10 MG capsule; Take 1 capsule (10 mg total) by mouth every 6 (six) hours as needed.     S/P cholecystectomy  -     CT Enterography Abd_Pelvis With Contrast; Future; Expected date: 09/16/2020  -  possible trial of a bile acid sequestrant if diarrhea persist     Weight loss & Decreased appetite  -     CT Enterography Abd_Pelvis With Contrast; Future; Expected date: 09/16/2020  - encouraged PO intake and daily calorie counts to ensure adequate nutrition is taken in, recommend at least 2,000 calories a day  - recommend nutritional drinks, such as Boost, Ensure or Glucerna, to supplement nutrition needs  - CONTINUE WITH EGD AS SCHEDULED FOR 10/1/2020     Nausea  -   START   ondansetron (ZOFRAN-ODT) 4 MG TbDL; Take 1 tablet (4 mg total) by mouth 3 (three) times daily as needed (nausea).  Dispense: 30 tablet; Refill: 1  -     CT Enterography Abd_Pelvis With Contrast; Future; Expected date: 09/16/2020  -     Pregnancy, urine rapid; Future; Expected date: 09/16/2020  - CONTINUE WITH EGD AS SCHEDULED FOR 10/1/2020     Heartburn  - INCREASE TO    omeprazole (PRILOSEC) 40 MG capsule; Take 1 capsule (40 mg total) by mouth 2 (two) times daily before meals. Take 30-60 minutes before breakfast  Dispense: 60 capsule; Refill: 1  - CONTINUE WITH EGD AS SCHEDULED FOR 10/1/2020     Generalized abdominal pain  -     CT Enterography Abd_Pelvis With Contrast; Future; Expected date: 09/16/2020  -     Pregnancy, urine rapid; Future; Expected date: 09/16/2020  - CONTINUE WITH EGD AS SCHEDULED FOR 10/1/2020  -     CBC Without Differential; Future; Expected date: 08/17/2020  -     Lipase; Future; Expected date: 08/17/2020  -     Hepatic function panel; Future; Expected date: 09/17/2020     History of endometriosis  - follow-up with GYN for continued evaluation and management     History of anxiety  Recommend follow-up with Primary Care Provider/psych for continued evaluation and management.     Follow up in about 1 month (around 10/14/2020), or if symptoms worsen or fail to improve, for follow-up with Dr. Zhao             Wt Readings from Last 20 Encounters:   09/30/20 77.1 kg (170 lb)   09/14/20 77.1 kg (170 lb)   08/27/20 79.4 kg (175 lb)   08/17/20 77.1 kg (170 lb)   03/02/20 87.1 kg (192 lb)   02/27/20 87.5 kg (192 lb 14.4 oz)   02/20/20 86.6 kg (190 lb 14.7 oz)   02/14/20 86.5 kg (190 lb 11.2 oz)   02/10/20 85.3 kg (188 lb)   02/08/20 82.4 kg (181 lb 10.5 oz)   01/31/20 82.4 kg (181 lb 10.5 oz)   01/08/20 80.6 kg (177 lb 11.1 oz)   12/31/19 79.5 kg (175 lb 4.3 oz)   12/17/19 80.3 kg (177 lb 0.5 oz)   11/22/19 75.8 kg (167 lb 1.7 oz)   11/17/19 70.3 kg (155 lb)   10/16/19 72.2 kg (159 lb 2.8 oz)    10/09/19 71.9 kg (158 lb 8.2 oz)   09/18/19 69.5 kg (153 lb 3.5 oz)   08/21/19 68 kg (149 lb 14.6 oz)   (she had a baby 3/2/2020)      PTA Medications   Medication Sig    Bifidobacterium infantis (ALIGN ORAL) Take by mouth once daily.    citalopram (CELEXA) 40 MG tablet Take 1 tablet (40 mg total) by mouth once daily.    levalbuterol (XOPENEX HFA) 45 mcg/actuation inhaler Inhale into the lungs.    mesalamine (LIALDA) 1.2 gram TbEC Take 2 tablets (2.4 g total) by mouth daily with breakfast.    norethindrone-ethinyl estradiol (MICROGESTIN 1/20) 1-20 mg-mcg per tablet Take 1 tablet by mouth once daily.    omeprazole (PRILOSEC) 40 MG capsule Take 1 capsule (40 mg total) by mouth 2 (two) times daily before meals. Take 30-60 minutes before breakfast    ondansetron (ZOFRAN-ODT) 4 MG TbDL Take 1 tablet (4 mg total) by mouth 3 (three) times daily as needed (nausea).    prenatal 25/iron fum/folic/dha (PRENATAL-1 ORAL) Take 1 tablet by mouth once daily.    topiramate (TOPAMAX) 50 MG tablet Take 50 mg by mouth 2 (two) times daily.    acetaminophen (TYLENOL EXTRA STRENGTH) 500 MG tablet Take 1,000 mg by mouth every 6 (six) hours as needed for Pain.    ALPRAZolam (XANAX) 0.5 MG tablet Take 1 tablet (0.5 mg total) by mouth nightly as needed for Anxiety.    levalbuterol (XOPENEX) 1.25 mg/3 mL nebulizer solution Take 3 mLs (1.25 mg total) by nebulization 3 (three) times daily as needed for Wheezing. Rescue (Patient not taking: Reported on 9/14/2020)    rizatriptan (MAXALT) 10 MG tablet Take 1 tablet (10 mg total) by mouth as needed for Migraine. (Patient not taking: Reported on 9/30/2020)       Review of patient's allergies indicates:   Allergen Reactions    Latex, natural rubber      burning    Sulfur Hives    Toradol [ketorolac] Other (See Comments)     Metallic taste only once in 2013; has taken since without problems    Tramadol Other (See Comments)     paralysis    Penicillins Hives and Rash    Sulfa  (sulfonamide antibiotics) Hives and Rash        Past Medical History:   Diagnosis Date    Abnormal Pap smear of cervix     Allergy     Asthma     last used inhaler 2018    Constipation     Endometriosis     Fibromyalgia     Gastritis     HLA B27 (HLA B27 positive)     Migraine     Scoliosis     Smoker     stopped in 2018     Past Surgical History:   Procedure Laterality Date    ADENOIDECTOMY      APPENDECTOMY  2014    endometriosis    CHOLECYSTECTOMY  2017    Dr. MARBELLA Anne, [facility], [place]     COLONOSCOPY N/A 2016    Procedure: COLONOSCOPY;  Surgeon: Jarret Zhao Jr., MD;  Location: [facility];  Service: Endoscopy;  Laterality: N/A;    COLONOSCOPY N/A 2020    Dr. Zhao: Erythema & ulcerated mucosa in descending, splenic flexure & distal transverse (r/o UC, r/o ischemia, r/o vasculiti, etc.), Mucosal changes in descending colon (secondary to left-sided colitis, to ischemic colitis(?), to vasculitis(?). hemorrhoids; biopsy: TI & random biopsies- WNL, transverse/hepatic/descending- mild active colitis without definitive features of chronicity    MOUTH SURGERY      x2    PELVIC LAPAROSCOPY      TONSILLECTOMY  2016    UPPER GASTROINTESTINAL ENDOSCOPY  2016    Dr. Zhao     Family History   Problem Relation Age of Onset    Fibromyalgia Mother     Depression Mother     Irritable bowel syndrome Mother     Diabetes Father     Depression Father     Mental illness Father         bipolar disorder    Multiple sclerosis Maternal Aunt     Crohn's disease Maternal Grandmother     Colon cancer Neg Hx     Ulcerative colitis Neg Hx     Stomach cancer Neg Hx     Esophageal cancer Neg Hx     Breast cancer Neg Hx     Ovarian cancer Neg Hx     Celiac disease Neg Hx      Social History     Tobacco Use    Smoking status: Former Smoker     Packs/day: 0.50     Types: Cigarettes     Quit date: 2018     Years since quittin.7    Smokeless tobacco: Never Used   Substance Use  "Topics    Alcohol use: No     Alcohol/week: 0.0 standard drinks     Comment: quit since 2018    Drug use: No         OBJECTIVE:     Vital Signs (Most Recent)       Physical Exam:  :Ht: 5' 8" (172.7 cm)    Wt: 77.1 kg (170 lb)   BMI: 25.85 kg/m²                                                          GENERAL:  Comfortable, in no acute distress.                                 HEENT EXAM:  Nonicteric.  No adenopathy.  Oropharynx is clear.               NECK:  Supple.                                                               LUNGS:  Clear.                                                               CARDIAC:  Regular rate and rhythm.  S1, S2.  No murmur.                      ABDOMEN:  Soft, positive bowel sounds, nontender.  No hepatosplenomegaly or masses.  No rebound or guarding.                                             EXTREMITIES:  No edema.     MENTAL STATUS:  Alert and oriented.    ASSESSMENT/PLAN:     Assessment:     Plan: Gastroscopy    Anesthesia Plan:   MAC / General Anaesthesia    ASA Grade: ASA 2 - Patient with mild systemic disease with no functional limitations    MALLAMPATI SCORE: I (soft palate, uvula, fauces, and tonsillar pillars visible)    "

## 2020-10-01 NOTE — DISCHARGE INSTRUCTIONS
Recovery After Procedural Sedation (Adult)   You have been given medicine by vein to make you sleep during your surgery. This may have included both a pain medicine and sleeping medicine. Most of the effects have worn off. But you may still have some drowsiness for the next 6 to 8 hours.  Home care  Follow these guidelines when you get home:  · For the next 8 hours, you should be watched by a responsible adult. This person should make sure your condition is not getting worse.  · Don't drink any alcohol for the next 24 hours.  · Don't drive, operate dangerous machinery, or make important business or personal decisions during the next 24 hours.  · To prevent injury or falls, use caution when standing and walking for at least 24 hours after your procedure.  Note: Your healthcare provider may tell you not to take any medicine by mouth for pain or sleep in the next 4 hours. These medicines may react with the medicines you were given in the hospital. This could cause a much stronger response than usual.  Follow-up care  Follow up with your healthcare provider if you are not alert and back to your usual level of activity within 12 hours.  When to seek medical advice  Call your healthcare provider right away if any of these occur:  · Drowsiness gets worse  · Weakness or dizziness gets worse  · Repeated vomiting  · You can't be awakened  · Fever  · New rash  [employer] last reviewed this educational content on 9/1/2019  © 7043-7318 [employer]. [address] [place] [address]. All rights reserved. This information is not intended as a substitute for professional medical care. Always follow your healthcare professional's instructions.        Tips to Control Acid Reflux    To control acid reflux, youll need to make some basic diet and lifestyle changes. The simple steps outlined below may be all youll need to ease discomfort.  Watch what you eat  · Avoid fatty foods and spicy foods.  · Eat fewer  acidic foods, such as citrus and tomato-based foods. These can increase symptoms.  · Limit drinking alcohol, caffeine, and fizzy beverages. All increase acid reflux.  · Try limiting chocolate, peppermint, and spearmint. These can worsen acid reflux in some people.  Watch when you eat  · Avoid lying down for 3 hours after eating.  · Do not snack before going to bed.  Raise your head  Raising your head and upper body by 4 to 6 inches helps limit reflux when youre lying down. Put blocks under the head of your bed frame to raise it.  Other changes  · Lose weight, if you need to  · Dont exercise near bedtime  · Avoid tight-fitting clothes  · Limit aspirin and ibuprofen  · Stop smoking   Date Last Reviewed: 7/1/2016 © 2000-2017 [employer]. [address] [place] [address], [place] [address]. All rights reserved. This information is not intended as a substitute for professional medical care. Always follow your healthcare professional's instructions.        High-Fiber Diet  Fiber is in fruits, vegetables, cereals, and grains. Fiber passes through your body undigested. A high-fiber diet helps food move through your intestinal tract. The added bulk is helpful in preventing constipation. In people with diverticulosis, fiber helps clean out the pouches along the colon wall. It also prevents new pouches from forming. A high-fiber diet reduces the risk of colon cancer. It also lowers blood cholesterol and prevents high blood sugar in people with diabetes.    The fiber-rich foods listed below should be part of your diet. If you are not used to high-fiber foods, start with 1 or 2 foods from this list. Every 3 to 4 days add a new one to your diet. Do this until you are eating 4 high-fiber foods per day. This should give you 20 to 35 grams of fiber a day. It is also important to drink a lot of water when you are on this diet. You should have 6 to 8 glasses of water a day. Water makes the fiber swell and increases the  benefit.  Foods high in dietary fiber  The following foods are high in dietary fiber:  · Breads. Breads made with 100% whole-wheat flour; tobin, wheat, or rye crackers; whole-grain tortillas, bran muffins.  · Cereals. Whole-grain and bran cereals with bran (shredded wheat, wheat flakes, raisin bran, corn bran); oatmeal, rolled oats, granola, and brown rice.  · Fruits. Fresh fruits and their edible skins (pears, prunes, raisins, berries, apples, and apricots); bananas, citrus fruit, mangoes, pineapple; and prune juice.  · Nuts. Any nuts and seeds.  · Vegetables. Best served raw or lightly cooked. All types, especially: green peas, celery, eggplant, potatoes, spinach, broccoli, [place] sprouts, winter squash, carrots, cauliflower, soybeans, lentils, and fresh and dried beans of all kinds.  · Other. Popcorn, any spices.  Date Last Reviewed: 8/1/2016  © 2919-0575 [employer]. [address] [place] [address], [place] [address]. All rights reserved. This information is not intended as a substitute for professional medical care. Always follow your healthcare professional's instructions.

## 2020-10-02 VITALS
HEIGHT: 68 IN | TEMPERATURE: 98 F | RESPIRATION RATE: 10 BRPM | SYSTOLIC BLOOD PRESSURE: 106 MMHG | OXYGEN SATURATION: 100 % | BODY MASS INDEX: 25.76 KG/M2 | DIASTOLIC BLOOD PRESSURE: 65 MMHG | WEIGHT: 170 LBS | HEART RATE: 57 BPM

## 2020-10-05 LAB
FINAL PATHOLOGIC DIAGNOSIS: NORMAL
GROSS: NORMAL
Lab: NORMAL

## 2020-10-13 ENCOUNTER — TELEPHONE (OUTPATIENT)
Dept: GASTROENTEROLOGY | Facility: CLINIC | Age: 25
End: 2020-10-13

## 2020-10-13 NOTE — TELEPHONE ENCOUNTER
"All stool studies that I previously ordered were canceled by the lab. Patient is scheduled for follow-up with Dr. Zhao on 10/27/2020. If diarrhea has persisted then recommend completing stool studies which we can reorder if needed.  Thanks  SUDHIR    ----- Message from [employer] sent at 10/13/2020  9:52 AM CDT -----  Regarding: Cancellation of Order # [id number]  Order number [id number] for the procedure ADENOVIRUS MOLECULAR   DETECTION, PCR [[id number]] has been canceled by [facility] [[id number]]. This procedure was ordered by JUDD Brewster [[id number]] on Sep 15, 2020 for the patient Holly Nichols [[id number]]. The reason for cancellation was "Other".  "  Canceled Other [facility], [facility] 10/13/20 0952   Adenovirus, Molecular Detection, PCR was cancelled on 10/13/2020 at 09:52 by [facility]; stool not submitted          "

## 2020-10-22 ENCOUNTER — PATIENT OUTREACH (OUTPATIENT)
Dept: ADMINISTRATIVE | Facility: OTHER | Age: 25
End: 2020-10-22

## 2020-10-22 NOTE — PROGRESS NOTES
Health Maintenance Due   Topic Date Due    Pneumococcal Vaccine (Medium Risk) (1 of 1 - PPSV23) 08/07/2013     Updates were requested from care everywhere.  Chart was reviewed for overdue Proactive [facility] Encounters (MOLLY) topics (CRS, Breast Cancer Screening, Eye exam)  Health Maintenance has been updated.  LINKS immunization registry triggered.  Immunizations were reconciled.

## 2020-11-03 ENCOUNTER — TELEPHONE (OUTPATIENT)
Dept: GASTROENTEROLOGY | Facility: CLINIC | Age: 25
End: 2020-11-03

## 2020-11-03 NOTE — TELEPHONE ENCOUNTER
----- Message from Toño Morales sent at 11/3/2020 10:16 AM CST -----  Contact: patient  Patient called in and updated insurance information and wanted to reschedule her EGD/Colonoscopy follow up that she had to cancel on 10/27/20.  Decision tree would not allow us to schedule an EP. Kept kicking it back to NP?    Patient call bck number is [phone]

## 2020-11-06 ENCOUNTER — HOSPITAL ENCOUNTER (OUTPATIENT)
Dept: RADIOLOGY | Facility: HOSPITAL | Age: 25
Discharge: HOME | End: 2020-11-06
Attending: CHIROPRACTOR
Payer: COMMERCIAL

## 2020-11-06 DIAGNOSIS — G44.201 INTRACTABLE TENSION-TYPE HEADACHE: ICD-10-CM

## 2020-11-06 DIAGNOSIS — M41.9 SCOLIOSIS: ICD-10-CM

## 2020-11-06 PROCEDURE — 72050 X-RAY EXAM NECK SPINE 4/5VWS: CPT | Mod: 26,59,, | Performed by: RADIOLOGY

## 2020-11-06 PROCEDURE — 72082 XR SCOLIOSIS COMPLETE: ICD-10-PCS | Mod: 26,,, | Performed by: RADIOLOGY

## 2020-11-06 PROCEDURE — 72050 XR CERVICAL SPINE COMPLETE 5 VIEW: ICD-10-PCS | Mod: 26,59,, | Performed by: RADIOLOGY

## 2020-11-06 PROCEDURE — 72050 X-RAY EXAM NECK SPINE 4/5VWS: CPT | Mod: TC,FY,PO,59

## 2020-11-06 PROCEDURE — 72082 X-RAY EXAM ENTIRE SPI 2/3 VW: CPT | Mod: TC,FY,PO

## 2020-11-06 PROCEDURE — 72082 X-RAY EXAM ENTIRE SPI 2/3 VW: CPT | Mod: 26,,, | Performed by: RADIOLOGY

## 2020-11-12 ENCOUNTER — TELEPHONE (OUTPATIENT)
Dept: GASTROENTEROLOGY | Facility: CLINIC | Age: 25
End: 2020-11-12

## 2020-11-12 ENCOUNTER — HOSPITAL ENCOUNTER (OUTPATIENT)
Dept: RADIOLOGY | Facility: HOSPITAL | Age: 25
Discharge: HOME | End: 2020-11-12
Attending: NURSE PRACTITIONER
Payer: COMMERCIAL

## 2020-11-12 DIAGNOSIS — K52.9 NONSPECIFIC COLITIS: ICD-10-CM

## 2020-11-12 DIAGNOSIS — R63.4 WEIGHT LOSS: ICD-10-CM

## 2020-11-12 DIAGNOSIS — R11.0 NAUSEA: ICD-10-CM

## 2020-11-12 DIAGNOSIS — R19.7 DIARRHEA, UNSPECIFIED TYPE: Primary | ICD-10-CM

## 2020-11-12 DIAGNOSIS — R19.7 DIARRHEA, UNSPECIFIED TYPE: ICD-10-CM

## 2020-11-12 DIAGNOSIS — Z90.49 S/P CHOLECYSTECTOMY: ICD-10-CM

## 2020-11-12 DIAGNOSIS — R10.84 GENERALIZED ABDOMINAL PAIN: ICD-10-CM

## 2020-11-12 PROCEDURE — A9698 NON-RAD CONTRAST MATERIALNOC: HCPCS | Mod: PO | Performed by: NURSE PRACTITIONER

## 2020-11-12 PROCEDURE — 74177 CT ABD & PELVIS W/CONTRAST: CPT | Mod: TC,PO

## 2020-11-12 PROCEDURE — 74177 CT ABD & PELVIS W/CONTRAST: CPT | Mod: 26,,, | Performed by: RADIOLOGY

## 2020-11-12 PROCEDURE — 25500020 PHARM REV CODE 255: Mod: PO | Performed by: NURSE PRACTITIONER

## 2020-11-12 PROCEDURE — 74177 CT ENTEROGRAPHY ABD_PELVIS WITH CONTRAST: ICD-10-PCS | Mod: 26,,, | Performed by: RADIOLOGY

## 2020-11-12 RX ADMIN — BARIUM SULFATE 1350 ML: 1 SUSPENSION ORAL at 04:11

## 2020-11-12 RX ADMIN — IOHEXOL 75 ML: 350 INJECTION, SOLUTION INTRAVENOUS at 04:11

## 2020-11-12 NOTE — TELEPHONE ENCOUNTER
----- Message from Annelise Szymanski sent at 11/12/2020  2:47 PM CST -----  Patient is at the lab desk and says she would like to leave a stool sample but there is no orders. Can you please put order in since she has an upcoming appt with Dr. Zhao.

## 2020-11-13 ENCOUNTER — TELEPHONE (OUTPATIENT)
Dept: GASTROENTEROLOGY | Facility: CLINIC | Age: 25
End: 2020-11-13

## 2020-11-13 NOTE — TELEPHONE ENCOUNTER
Spoke with pt. Informed of results & recommendations per Dr. Chavez. Pt verbalized understanding.

## 2020-11-13 NOTE — TELEPHONE ENCOUNTER
----- Message from Marcial Chavez MD sent at 11/13/2020  8:09 AM CST -----  Tell pt CT enterography unremarkable (small bowel appears normal without inflammation or obstruction); Stool in colon.  F/U Dr Zhao as scheduled.

## 2020-11-16 ENCOUNTER — OFFICE VISIT (OUTPATIENT)
Dept: OBSTETRICS AND GYNECOLOGY | Facility: CLINIC | Age: 25
End: 2020-11-16
Payer: COMMERCIAL

## 2020-11-16 VITALS
SYSTOLIC BLOOD PRESSURE: 110 MMHG | BODY MASS INDEX: 25.07 KG/M2 | HEIGHT: 68 IN | DIASTOLIC BLOOD PRESSURE: 60 MMHG | WEIGHT: 165.38 LBS

## 2020-11-16 DIAGNOSIS — Z01.419 ROUTINE GYNECOLOGICAL EXAMINATION: Primary | ICD-10-CM

## 2020-11-16 PROCEDURE — 3008F BODY MASS INDEX DOCD: CPT | Mod: CPTII,S$GLB,, | Performed by: OBSTETRICS & GYNECOLOGY

## 2020-11-16 PROCEDURE — 99999 PR PBB SHADOW E&M-EST. PATIENT-LVL III: CPT | Mod: PBBFAC,,, | Performed by: OBSTETRICS & GYNECOLOGY

## 2020-11-16 PROCEDURE — 88175 CYTOPATH C/V AUTO FLUID REDO: CPT

## 2020-11-16 PROCEDURE — 1126F PR PAIN SEVERITY QUANTIFIED, NO PAIN PRESENT: ICD-10-PCS | Mod: S$GLB,,, | Performed by: OBSTETRICS & GYNECOLOGY

## 2020-11-16 PROCEDURE — 1126F AMNT PAIN NOTED NONE PRSNT: CPT | Mod: S$GLB,,, | Performed by: OBSTETRICS & GYNECOLOGY

## 2020-11-16 PROCEDURE — 99999 PR PBB SHADOW E&M-EST. PATIENT-LVL III: ICD-10-PCS | Mod: PBBFAC,,, | Performed by: OBSTETRICS & GYNECOLOGY

## 2020-11-16 PROCEDURE — 3008F PR BODY MASS INDEX (BMI) DOCUMENTED: ICD-10-PCS | Mod: CPTII,S$GLB,, | Performed by: OBSTETRICS & GYNECOLOGY

## 2020-11-16 PROCEDURE — 99395 PREV VISIT EST AGE 18-39: CPT | Mod: S$GLB,,, | Performed by: OBSTETRICS & GYNECOLOGY

## 2020-11-16 PROCEDURE — 99395 PR PREVENTIVE VISIT,EST,18-39: ICD-10-PCS | Mod: S$GLB,,, | Performed by: OBSTETRICS & GYNECOLOGY

## 2020-11-18 ENCOUNTER — OFFICE VISIT (OUTPATIENT)
Dept: FAMILY MEDICINE | Facility: CLINIC | Age: 25
End: 2020-11-18
Payer: COMMERCIAL

## 2020-11-18 VITALS
HEART RATE: 71 BPM | DIASTOLIC BLOOD PRESSURE: 74 MMHG | OXYGEN SATURATION: 97 % | BODY MASS INDEX: 25.24 KG/M2 | TEMPERATURE: 98 F | WEIGHT: 166 LBS | SYSTOLIC BLOOD PRESSURE: 118 MMHG

## 2020-11-18 DIAGNOSIS — M41.9 SCOLIOSIS, UNSPECIFIED SCOLIOSIS TYPE, UNSPECIFIED SPINAL REGION: Primary | ICD-10-CM

## 2020-11-18 DIAGNOSIS — K58.9 IRRITABLE BOWEL SYNDROME, UNSPECIFIED TYPE: ICD-10-CM

## 2020-11-18 DIAGNOSIS — F41.1 GAD (GENERALIZED ANXIETY DISORDER): Primary | ICD-10-CM

## 2020-11-18 DIAGNOSIS — F33.0 MDD (MAJOR DEPRESSIVE DISORDER), RECURRENT EPISODE, MILD: ICD-10-CM

## 2020-11-18 DIAGNOSIS — G43.809 OTHER MIGRAINE WITHOUT STATUS MIGRAINOSUS, NOT INTRACTABLE: ICD-10-CM

## 2020-11-18 PROCEDURE — 1126F PR PAIN SEVERITY QUANTIFIED, NO PAIN PRESENT: ICD-10-PCS | Mod: S$GLB,,, | Performed by: PHYSICIAN ASSISTANT

## 2020-11-18 PROCEDURE — 3008F PR BODY MASS INDEX (BMI) DOCUMENTED: ICD-10-PCS | Mod: CPTII,S$GLB,, | Performed by: PHYSICIAN ASSISTANT

## 2020-11-18 PROCEDURE — 1126F AMNT PAIN NOTED NONE PRSNT: CPT | Mod: S$GLB,,, | Performed by: PHYSICIAN ASSISTANT

## 2020-11-18 PROCEDURE — 99214 PR OFFICE/OUTPT VISIT, EST, LEVL IV, 30-39 MIN: ICD-10-PCS | Mod: S$GLB,,, | Performed by: PHYSICIAN ASSISTANT

## 2020-11-18 PROCEDURE — 3008F BODY MASS INDEX DOCD: CPT | Mod: CPTII,S$GLB,, | Performed by: PHYSICIAN ASSISTANT

## 2020-11-18 PROCEDURE — 99999 PR PBB SHADOW E&M-EST. PATIENT-LVL IV: ICD-10-PCS | Mod: PBBFAC,,, | Performed by: PHYSICIAN ASSISTANT

## 2020-11-18 PROCEDURE — 99999 PR PBB SHADOW E&M-EST. PATIENT-LVL IV: CPT | Mod: PBBFAC,,, | Performed by: PHYSICIAN ASSISTANT

## 2020-11-18 PROCEDURE — 99214 OFFICE O/P EST MOD 30 MIN: CPT | Mod: S$GLB,,, | Performed by: PHYSICIAN ASSISTANT

## 2020-11-18 RX ORDER — CYCLOBENZAPRINE HCL 10 MG
10 TABLET ORAL NIGHTLY PRN
Qty: 30 TABLET | Refills: 2 | Status: SHIPPED | OUTPATIENT
Start: 2020-11-18 | End: 2020-11-28

## 2020-11-18 NOTE — TELEPHONE ENCOUNTER
Patient saw me today, she is doing well, takint 0.5 of Xanax twice a week, it is helping her a lot

## 2020-11-18 NOTE — PROGRESS NOTES
Subjective:       Patient ID: Holly Nichols is a 25 y.o. female    Chief Complaint: Follow-up (6wks)    HPI  The patient is familiar to me, PCP is Dr Berumen.  She was seen two month ago with Dr Berumen and she increased her Celexa because of worsening anxiety symptoms.  She feels the medication is working well for her.  It has even helped her control some for IBS symptoms.    Migraines: her headaches have been worsening, gets some relief with Maxalt. She saw her neurologist in September and Topamax was started.  She also had x-rays done of her cervical spine which shows worsening of her scoliosis.  The neurologist recommended that she talk with her primary care doctor about starting a muscle relaxer.    Review of Systems   Constitutional: Negative for activity change, chills and fever.   HENT: Negative for congestion and sore throat.    Eyes: Negative for visual disturbance.   Respiratory: Negative for cough and shortness of breath.    Cardiovascular: Negative for chest pain and palpitations.   Gastrointestinal: Negative for abdominal pain, diarrhea, nausea and vomiting.   Endocrine: Negative for polydipsia and polyuria.   Musculoskeletal: Negative for myalgias.   Skin: Negative for rash.   Neurological: Negative for headaches.   Psychiatric/Behavioral: The patient is not nervous/anxious.         Objective:   Physical Exam  Constitutional:       General: She is not in acute distress.     Appearance: She is well-developed. She is not diaphoretic.   HENT:      Head: Normocephalic and atraumatic.   Eyes:      Pupils: Pupils are equal, round, and reactive to light.   Neck:      Musculoskeletal: Normal range of motion and neck supple.   Cardiovascular:      Rate and Rhythm: Normal rate and regular rhythm.      Heart sounds: Normal heart sounds. No murmur. No friction rub. No gallop.    Pulmonary:      Effort: Pulmonary effort is normal. No respiratory distress.      Breath sounds: Normal breath sounds. No wheezing or  rales.   Chest:      Chest wall: No tenderness.   Abdominal:      General: Bowel sounds are normal. There is no distension.      Palpations: Abdomen is soft. There is no mass.      Tenderness: There is no abdominal tenderness. There is no guarding.   Musculoskeletal: Normal range of motion.   Skin:     General: Skin is warm and dry.      Findings: No rash.   Neurological:      Mental Status: She is alert and oriented to person, place, and time.   Psychiatric:         Behavior: Behavior normal.         Thought Content: Thought content normal.         Judgment: Judgment normal.          Assessment:       1. Scoliosis, unspecified scoliosis type, unspecified spinal region  cyclobenzaprine (FLEXERIL) 10 MG tablet   2. Other migraine without status migrainosus, not intractable     3. MDD (major depressive disorder), recurrent episode, mild          Plan:       Scoliosis, unspecified scoliosis type, unspecified spinal region  -     cyclobenzaprine (FLEXERIL) 10 MG tablet; Take 1 tablet (10 mg total) by mouth nightly as needed for Muscle spasms (headache).  Dispense: 30 tablet; Refill: 2  - continue chiropractor    Other migraine without status migrainosus, not intractable  - continue neurology    MDD (major depressive disorder), recurrent episode, mild  - continue Celexa  - message sent to Dr. Berumen to refill Xanax    Irritable bowel syndrome, unspecified type  - continue current

## 2020-11-20 ENCOUNTER — TELEPHONE (OUTPATIENT)
Dept: GASTROENTEROLOGY | Facility: CLINIC | Age: 25
End: 2020-11-20

## 2020-11-20 NOTE — TELEPHONE ENCOUNTER
"Please call to inform & review the results with the patient- stool studies received back showed negative/unremarkable findings.  "C difficile Toxin, Antigen was cancelled on 11/13/2020 at 01:15 by SXD; Formed stool specimen received for testing. Testing is only performed on liquid/semi-formed specimens." If diarrhea persist, recommend completing C. Diff stool study.    Continue with previous recommendations. If no improvement in symptoms or symptoms worsen, call/follow-up at clinic or go to ER.  Please release results to patient's mychart once you have discussed results and recommendations with patient.  Thanks,        "

## 2020-11-22 RX ORDER — ALPRAZOLAM 0.5 MG/1
0.5 TABLET ORAL DAILY PRN
Qty: 30 TABLET | Refills: 0 | Status: SHIPPED | OUTPATIENT
Start: 2020-11-22 | End: 2021-02-05

## 2020-11-30 LAB
FINAL PATHOLOGIC DIAGNOSIS: NORMAL
Lab: NORMAL

## 2020-12-16 ENCOUNTER — PATIENT MESSAGE (OUTPATIENT)
Dept: OBSTETRICS AND GYNECOLOGY | Facility: CLINIC | Age: 25
End: 2020-12-16

## 2021-01-05 ENCOUNTER — PATIENT OUTREACH (OUTPATIENT)
Dept: ADMINISTRATIVE | Facility: OTHER | Age: 26
End: 2021-01-05

## 2021-01-08 ENCOUNTER — LAB VISIT (OUTPATIENT)
Dept: LAB | Facility: HOSPITAL | Age: 26
End: 2021-01-08
Attending: OBSTETRICS & GYNECOLOGY
Payer: COMMERCIAL

## 2021-01-08 ENCOUNTER — OFFICE VISIT (OUTPATIENT)
Dept: OBSTETRICS AND GYNECOLOGY | Facility: CLINIC | Age: 26
End: 2021-01-08
Payer: COMMERCIAL

## 2021-01-08 VITALS — SYSTOLIC BLOOD PRESSURE: 118 MMHG | DIASTOLIC BLOOD PRESSURE: 72 MMHG

## 2021-01-08 DIAGNOSIS — N91.2 ABSENT MENSES: ICD-10-CM

## 2021-01-08 DIAGNOSIS — Z32.00 POSSIBLE PREGNANCY, NOT YET CONFIRMED: Primary | ICD-10-CM

## 2021-01-08 DIAGNOSIS — Z32.01 POSITIVE PREGNANCY TEST: ICD-10-CM

## 2021-01-08 DIAGNOSIS — Z34.90 PREGNANCY, UNSPECIFIED GESTATIONAL AGE: ICD-10-CM

## 2021-01-08 DIAGNOSIS — Z32.00 POSSIBLE PREGNANCY, NOT YET CONFIRMED: ICD-10-CM

## 2021-01-08 LAB
ABO + RH BLD: NORMAL
BASOPHILS # BLD AUTO: 0.08 K/UL (ref 0–0.2)
BASOPHILS NFR BLD: 0.9 % (ref 0–1.9)
BLD GP AB SCN CELLS X3 SERPL QL: NORMAL
DIFFERENTIAL METHOD: ABNORMAL
EOSINOPHIL # BLD AUTO: 0.3 K/UL (ref 0–0.5)
EOSINOPHIL NFR BLD: 3.2 % (ref 0–8)
ERYTHROCYTE [DISTWIDTH] IN BLOOD BY AUTOMATED COUNT: 12.1 % (ref 11.5–14.5)
HCT VFR BLD AUTO: 44.1 % (ref 37–48.5)
HGB BLD-MCNC: 14.4 G/DL (ref 12–16)
IMM GRANULOCYTES # BLD AUTO: 0.08 K/UL (ref 0–0.04)
IMM GRANULOCYTES NFR BLD AUTO: 0.9 % (ref 0–0.5)
LYMPHOCYTES # BLD AUTO: 2.1 K/UL (ref 1–4.8)
LYMPHOCYTES NFR BLD: 22.8 % (ref 18–48)
MCH RBC QN AUTO: 30.6 PG (ref 27–31)
MCHC RBC AUTO-ENTMCNC: 32.7 G/DL (ref 32–36)
MCV RBC AUTO: 94 FL (ref 82–98)
MONOCYTES # BLD AUTO: 0.5 K/UL (ref 0.3–1)
MONOCYTES NFR BLD: 5.5 % (ref 4–15)
NEUTROPHILS # BLD AUTO: 6.1 K/UL (ref 1.8–7.7)
NEUTROPHILS NFR BLD: 66.7 % (ref 38–73)
NRBC BLD-RTO: 0 /100 WBC
PLATELET # BLD AUTO: 212 K/UL (ref 150–350)
PMV BLD AUTO: 11.4 FL (ref 9.2–12.9)
RBC # BLD AUTO: 4.7 M/UL (ref 4–5.4)
TSH SERPL DL<=0.005 MIU/L-ACNC: 0.83 UIU/ML (ref 0.4–4)
WBC # BLD AUTO: 9.08 K/UL (ref 3.9–12.7)

## 2021-01-08 PROCEDURE — 86592 SYPHILIS TEST NON-TREP QUAL: CPT

## 2021-01-08 PROCEDURE — 99214 PR OFFICE/OUTPT VISIT, EST, LEVL IV, 30-39 MIN: ICD-10-PCS | Mod: 25,S$GLB,, | Performed by: OBSTETRICS & GYNECOLOGY

## 2021-01-08 PROCEDURE — 87088 URINE BACTERIA CULTURE: CPT

## 2021-01-08 PROCEDURE — 99999 PR PBB SHADOW E&M-EST. PATIENT-LVL III: CPT | Mod: PBBFAC,,, | Performed by: OBSTETRICS & GYNECOLOGY

## 2021-01-08 PROCEDURE — 76817 PR US, OB, TRANSVAG APPROACH: ICD-10-PCS | Mod: S$GLB,,, | Performed by: OBSTETRICS & GYNECOLOGY

## 2021-01-08 PROCEDURE — 87340 HEPATITIS B SURFACE AG IA: CPT

## 2021-01-08 PROCEDURE — 99214 OFFICE O/P EST MOD 30 MIN: CPT | Mod: 25,S$GLB,, | Performed by: OBSTETRICS & GYNECOLOGY

## 2021-01-08 PROCEDURE — 87086 URINE CULTURE/COLONY COUNT: CPT

## 2021-01-08 PROCEDURE — 99999 PR PBB SHADOW E&M-EST. PATIENT-LVL III: ICD-10-PCS | Mod: PBBFAC,,, | Performed by: OBSTETRICS & GYNECOLOGY

## 2021-01-08 PROCEDURE — 87186 SC STD MICRODIL/AGAR DIL: CPT

## 2021-01-08 PROCEDURE — 86762 RUBELLA ANTIBODY: CPT

## 2021-01-08 PROCEDURE — 86900 BLOOD TYPING SEROLOGIC ABO: CPT

## 2021-01-08 PROCEDURE — 84443 ASSAY THYROID STIM HORMONE: CPT

## 2021-01-08 PROCEDURE — 86703 HIV-1/HIV-2 1 RESULT ANTBDY: CPT

## 2021-01-08 PROCEDURE — 36415 COLL VENOUS BLD VENIPUNCTURE: CPT | Mod: PO

## 2021-01-08 PROCEDURE — 87077 CULTURE AEROBIC IDENTIFY: CPT

## 2021-01-08 PROCEDURE — 85025 COMPLETE CBC W/AUTO DIFF WBC: CPT

## 2021-01-08 PROCEDURE — 76817 TRANSVAGINAL US OBSTETRIC: CPT | Mod: S$GLB,,, | Performed by: OBSTETRICS & GYNECOLOGY

## 2021-01-08 RX ORDER — CITALOPRAM 40 MG/1
40 TABLET, FILM COATED ORAL NIGHTLY
COMMUNITY
End: 2021-09-30

## 2021-01-09 LAB — RPR SER QL: NORMAL

## 2021-01-11 LAB
HBV SURFACE AG SERPL QL IA: NEGATIVE
HIV 1+2 AB+HIV1 P24 AG SERPL QL IA: NEGATIVE
RUBV IGG SER-ACNC: 32.5 IU/ML
RUBV IGG SER-IMP: REACTIVE

## 2021-01-12 ENCOUNTER — PATIENT MESSAGE (OUTPATIENT)
Dept: OBSTETRICS AND GYNECOLOGY | Facility: CLINIC | Age: 26
End: 2021-01-12

## 2021-01-12 ENCOUNTER — PATIENT MESSAGE (OUTPATIENT)
Dept: GASTROENTEROLOGY | Facility: CLINIC | Age: 26
End: 2021-01-12

## 2021-01-12 LAB — BACTERIA UR CULT: ABNORMAL

## 2021-01-12 RX ORDER — NITROFURANTOIN 25; 75 MG/1; MG/1
100 CAPSULE ORAL 2 TIMES DAILY
Qty: 14 CAPSULE | Refills: 0 | Status: SHIPPED | OUTPATIENT
Start: 2021-01-12 | End: 2021-01-19

## 2021-01-21 ENCOUNTER — PATIENT MESSAGE (OUTPATIENT)
Dept: OBSTETRICS AND GYNECOLOGY | Facility: CLINIC | Age: 26
End: 2021-01-21

## 2021-01-21 DIAGNOSIS — Z13.79 GENETIC TESTING: Primary | ICD-10-CM

## 2021-01-25 ENCOUNTER — LAB VISIT (OUTPATIENT)
Dept: LAB | Facility: HOSPITAL | Age: 26
End: 2021-01-25
Attending: OBSTETRICS & GYNECOLOGY
Payer: COMMERCIAL

## 2021-01-25 DIAGNOSIS — Z13.79 GENETIC TESTING: ICD-10-CM

## 2021-01-25 LAB — MISCELLANEOUS GENETIC TEST NAME: NORMAL

## 2021-01-25 PROCEDURE — 36415 COLL VENOUS BLD VENIPUNCTURE: CPT | Mod: PO

## 2021-02-01 ENCOUNTER — TELEPHONE (OUTPATIENT)
Dept: OBSTETRICS AND GYNECOLOGY | Facility: CLINIC | Age: 26
End: 2021-02-01

## 2021-02-05 ENCOUNTER — PATIENT OUTREACH (OUTPATIENT)
Dept: ADMINISTRATIVE | Facility: OTHER | Age: 26
End: 2021-02-05

## 2021-02-05 ENCOUNTER — ROUTINE PRENATAL (OUTPATIENT)
Dept: OBSTETRICS AND GYNECOLOGY | Facility: CLINIC | Age: 26
End: 2021-02-05
Payer: COMMERCIAL

## 2021-02-05 VITALS
SYSTOLIC BLOOD PRESSURE: 110 MMHG | DIASTOLIC BLOOD PRESSURE: 60 MMHG | WEIGHT: 179.25 LBS | BODY MASS INDEX: 27.25 KG/M2

## 2021-02-05 DIAGNOSIS — Z3A.11 11 WEEKS GESTATION OF PREGNANCY: ICD-10-CM

## 2021-02-05 DIAGNOSIS — Z34.91 NORMAL PREGNANCY IN FIRST TRIMESTER: Primary | ICD-10-CM

## 2021-02-05 LAB
BILIRUB SERPL-MCNC: NEGATIVE MG/DL
BLOOD URINE, POC: NEGATIVE
CLARITY, POC UA: NORMAL
COLOR, POC UA: NORMAL
GLUCOSE UR QL STRIP: NORMAL
KETONES UR QL STRIP: NEGATIVE
LEUKOCYTE ESTERASE URINE, POC: NEGATIVE
NITRITE, POC UA: NEGATIVE
PH, POC UA: 5
PROTEIN, POC: NEGATIVE
SPECIFIC GRAVITY, POC UA: NORMAL
UROBILINOGEN, POC UA: NORMAL

## 2021-02-05 PROCEDURE — 0502F PR SUBSEQUENT PRENATAL CARE: ICD-10-PCS | Mod: CPTII,S$GLB,, | Performed by: OBSTETRICS & GYNECOLOGY

## 2021-02-05 PROCEDURE — 99999 PR PBB SHADOW E&M-EST. PATIENT-LVL III: ICD-10-PCS | Mod: PBBFAC,,, | Performed by: OBSTETRICS & GYNECOLOGY

## 2021-02-05 PROCEDURE — 0502F SUBSEQUENT PRENATAL CARE: CPT | Mod: CPTII,S$GLB,, | Performed by: OBSTETRICS & GYNECOLOGY

## 2021-02-05 PROCEDURE — 99999 PR PBB SHADOW E&M-EST. PATIENT-LVL III: CPT | Mod: PBBFAC,,, | Performed by: OBSTETRICS & GYNECOLOGY

## 2021-02-09 ENCOUNTER — TELEPHONE (OUTPATIENT)
Dept: OBSTETRICS AND GYNECOLOGY | Facility: CLINIC | Age: 26
End: 2021-02-09

## 2021-02-10 ENCOUNTER — PATIENT MESSAGE (OUTPATIENT)
Dept: ADMINISTRATIVE | Facility: OTHER | Age: 26
End: 2021-02-10

## 2021-02-25 DIAGNOSIS — J45.22 MILD INTERMITTENT ASTHMA WITH STATUS ASTHMATICUS: Primary | ICD-10-CM

## 2021-02-25 DIAGNOSIS — J45.20 MILD INTERMITTENT ASTHMA WITHOUT COMPLICATION: ICD-10-CM

## 2021-02-25 RX ORDER — LEVALBUTEROL TARTRATE 45 UG/1
2 AEROSOL, METERED ORAL EVERY 6 HOURS PRN
Qty: 15 G | Refills: 1 | Status: SHIPPED | OUTPATIENT
Start: 2021-02-25 | End: 2021-02-25 | Stop reason: SDUPTHER

## 2021-02-25 RX ORDER — LEVALBUTEROL TARTRATE 45 UG/1
2 AEROSOL, METERED ORAL EVERY 6 HOURS PRN
Qty: 15 G | Refills: 1 | Status: SHIPPED | OUTPATIENT
Start: 2021-02-25 | End: 2021-08-18

## 2021-03-05 ENCOUNTER — ROUTINE PRENATAL (OUTPATIENT)
Dept: OBSTETRICS AND GYNECOLOGY | Facility: CLINIC | Age: 26
End: 2021-03-05
Payer: COMMERCIAL

## 2021-03-05 VITALS
SYSTOLIC BLOOD PRESSURE: 110 MMHG | BODY MASS INDEX: 27.39 KG/M2 | WEIGHT: 180.13 LBS | DIASTOLIC BLOOD PRESSURE: 64 MMHG

## 2021-03-05 DIAGNOSIS — Z34.92 NORMAL PREGNANCY IN SECOND TRIMESTER: Primary | ICD-10-CM

## 2021-03-05 DIAGNOSIS — Z3A.16 16 WEEKS GESTATION OF PREGNANCY: ICD-10-CM

## 2021-03-05 LAB
BILIRUB SERPL-MCNC: NEGATIVE MG/DL
BLOOD URINE, POC: NEGATIVE
CLARITY, POC UA: NORMAL
COLOR, POC UA: NORMAL
GLUCOSE UR QL STRIP: NORMAL
KETONES UR QL STRIP: NEGATIVE
LEUKOCYTE ESTERASE URINE, POC: NEGATIVE
NITRITE, POC UA: NEGATIVE
PH, POC UA: 8
PROTEIN, POC: NORMAL
SPECIFIC GRAVITY, POC UA: NORMAL
UROBILINOGEN, POC UA: NORMAL

## 2021-03-05 PROCEDURE — 99999 PR PBB SHADOW E&M-EST. PATIENT-LVL III: ICD-10-PCS | Mod: PBBFAC,,, | Performed by: OBSTETRICS & GYNECOLOGY

## 2021-03-05 PROCEDURE — 0502F SUBSEQUENT PRENATAL CARE: CPT | Mod: CPTII,S$GLB,, | Performed by: OBSTETRICS & GYNECOLOGY

## 2021-03-05 PROCEDURE — 99999 PR PBB SHADOW E&M-EST. PATIENT-LVL III: CPT | Mod: PBBFAC,,, | Performed by: OBSTETRICS & GYNECOLOGY

## 2021-03-05 PROCEDURE — 0502F PR SUBSEQUENT PRENATAL CARE: ICD-10-PCS | Mod: CPTII,S$GLB,, | Performed by: OBSTETRICS & GYNECOLOGY

## 2021-03-05 RX ORDER — CITALOPRAM 20 MG/1
20 TABLET, FILM COATED ORAL DAILY
Qty: 30 TABLET | Refills: 2 | Status: SHIPPED | OUTPATIENT
Start: 2021-03-05 | End: 2021-06-01

## 2021-04-05 ENCOUNTER — PATIENT MESSAGE (OUTPATIENT)
Dept: OBSTETRICS AND GYNECOLOGY | Facility: CLINIC | Age: 26
End: 2021-04-05

## 2021-04-05 RX ORDER — BUTALBITAL, ACETAMINOPHEN AND CAFFEINE 50; 325; 40 MG/1; MG/1; MG/1
1 TABLET ORAL EVERY 4 HOURS PRN
Qty: 10 TABLET | Refills: 0 | Status: SHIPPED | OUTPATIENT
Start: 2021-04-05 | End: 2021-04-08 | Stop reason: SDUPTHER

## 2021-04-08 ENCOUNTER — HOSPITAL ENCOUNTER (OUTPATIENT)
Dept: RADIOLOGY | Facility: HOSPITAL | Age: 26
Discharge: HOME | End: 2021-04-08
Attending: OBSTETRICS & GYNECOLOGY
Payer: COMMERCIAL

## 2021-04-08 ENCOUNTER — ROUTINE PRENATAL (OUTPATIENT)
Dept: OBSTETRICS AND GYNECOLOGY | Facility: CLINIC | Age: 26
End: 2021-04-08
Payer: COMMERCIAL

## 2021-04-08 VITALS
DIASTOLIC BLOOD PRESSURE: 82 MMHG | SYSTOLIC BLOOD PRESSURE: 118 MMHG | BODY MASS INDEX: 28.19 KG/M2 | WEIGHT: 185.44 LBS

## 2021-04-08 DIAGNOSIS — Z34.92 NORMAL PREGNANCY IN SECOND TRIMESTER: Primary | ICD-10-CM

## 2021-04-08 DIAGNOSIS — Z34.92 NORMAL PREGNANCY IN SECOND TRIMESTER: ICD-10-CM

## 2021-04-08 DIAGNOSIS — Z3A.21 21 WEEKS GESTATION OF PREGNANCY: ICD-10-CM

## 2021-04-08 LAB
BILIRUB SERPL-MCNC: NEGATIVE MG/DL
BLOOD URINE, POC: NEGATIVE
CLARITY, POC UA: NORMAL
COLOR, POC UA: NORMAL
GLUCOSE UR QL STRIP: NORMAL
KETONES UR QL STRIP: NEGATIVE
LEUKOCYTE ESTERASE URINE, POC: NEGATIVE
NITRITE, POC UA: NEGATIVE
PH, POC UA: 6
PROTEIN, POC: NORMAL
SPECIFIC GRAVITY, POC UA: NORMAL
UROBILINOGEN, POC UA: NORMAL

## 2021-04-08 PROCEDURE — 0502F PR SUBSEQUENT PRENATAL CARE: ICD-10-PCS | Mod: CPTII,S$GLB,, | Performed by: OBSTETRICS & GYNECOLOGY

## 2021-04-08 PROCEDURE — 76805 US OB 14+ WEEKS, TRANSABDOM, SINGLE GESTATION: ICD-10-PCS | Mod: 26,,, | Performed by: RADIOLOGY

## 2021-04-08 PROCEDURE — 76805 OB US >/= 14 WKS SNGL FETUS: CPT | Mod: TC,PN

## 2021-04-08 PROCEDURE — 0502F SUBSEQUENT PRENATAL CARE: CPT | Mod: CPTII,S$GLB,, | Performed by: OBSTETRICS & GYNECOLOGY

## 2021-04-08 PROCEDURE — 99999 PR PBB SHADOW E&M-EST. PATIENT-LVL III: ICD-10-PCS | Mod: PBBFAC,,, | Performed by: OBSTETRICS & GYNECOLOGY

## 2021-04-08 PROCEDURE — 76805 OB US >/= 14 WKS SNGL FETUS: CPT | Mod: 26,,, | Performed by: RADIOLOGY

## 2021-04-08 PROCEDURE — 99999 PR PBB SHADOW E&M-EST. PATIENT-LVL III: CPT | Mod: PBBFAC,,, | Performed by: OBSTETRICS & GYNECOLOGY

## 2021-04-08 RX ORDER — BUTALBITAL, ACETAMINOPHEN AND CAFFEINE 50; 325; 40 MG/1; MG/1; MG/1
1 TABLET ORAL EVERY 4 HOURS PRN
Qty: 30 TABLET | Refills: 0 | Status: SHIPPED | OUTPATIENT
Start: 2021-04-08 | End: 2021-05-08

## 2021-05-01 DIAGNOSIS — R12 HEARTBURN: ICD-10-CM

## 2021-05-03 ENCOUNTER — PATIENT MESSAGE (OUTPATIENT)
Dept: GASTROENTEROLOGY | Facility: CLINIC | Age: 26
End: 2021-05-03

## 2021-05-03 RX ORDER — OMEPRAZOLE 40 MG/1
CAPSULE, DELAYED RELEASE ORAL
Qty: 60 CAPSULE | Refills: 1 | OUTPATIENT
Start: 2021-05-03

## 2021-05-05 ENCOUNTER — ROUTINE PRENATAL (OUTPATIENT)
Dept: OBSTETRICS AND GYNECOLOGY | Facility: CLINIC | Age: 26
End: 2021-05-05
Payer: COMMERCIAL

## 2021-05-05 ENCOUNTER — PATIENT MESSAGE (OUTPATIENT)
Dept: ADMINISTRATIVE | Facility: OTHER | Age: 26
End: 2021-05-05

## 2021-05-05 VITALS — SYSTOLIC BLOOD PRESSURE: 114 MMHG | WEIGHT: 190.69 LBS | BODY MASS INDEX: 29 KG/M2 | DIASTOLIC BLOOD PRESSURE: 70 MMHG

## 2021-05-05 DIAGNOSIS — Z3A.25 25 WEEKS GESTATION OF PREGNANCY: Primary | ICD-10-CM

## 2021-05-05 DIAGNOSIS — Z34.92 NORMAL PREGNANCY IN SECOND TRIMESTER: ICD-10-CM

## 2021-05-05 DIAGNOSIS — M54.41 ACUTE RIGHT-SIDED LOW BACK PAIN WITH RIGHT-SIDED SCIATICA: ICD-10-CM

## 2021-05-05 LAB
BILIRUB SERPL-MCNC: NEGATIVE MG/DL
BLOOD URINE, POC: NEGATIVE
CLARITY, POC UA: NORMAL
COLOR, POC UA: NORMAL
GLUCOSE UR QL STRIP: NORMAL
KETONES UR QL STRIP: NEGATIVE
LEUKOCYTE ESTERASE URINE, POC: NEGATIVE
NITRITE, POC UA: NEGATIVE
PH, POC UA: 8
PROTEIN, POC: NEGATIVE
SPECIFIC GRAVITY, POC UA: NORMAL
UROBILINOGEN, POC UA: NEGATIVE

## 2021-05-05 PROCEDURE — 0502F SUBSEQUENT PRENATAL CARE: CPT | Mod: CPTII,S$GLB,, | Performed by: OBSTETRICS & GYNECOLOGY

## 2021-05-05 PROCEDURE — 99999 PR PBB SHADOW E&M-EST. PATIENT-LVL III: CPT | Mod: PBBFAC,,, | Performed by: OBSTETRICS & GYNECOLOGY

## 2021-05-05 PROCEDURE — 0502F PR SUBSEQUENT PRENATAL CARE: ICD-10-PCS | Mod: CPTII,S$GLB,, | Performed by: OBSTETRICS & GYNECOLOGY

## 2021-05-05 PROCEDURE — 99999 PR PBB SHADOW E&M-EST. PATIENT-LVL III: ICD-10-PCS | Mod: PBBFAC,,, | Performed by: OBSTETRICS & GYNECOLOGY

## 2021-05-05 RX ORDER — CYCLOBENZAPRINE HCL 5 MG
5 TABLET ORAL NIGHTLY
Qty: 30 TABLET | Refills: 0 | Status: SHIPPED | OUTPATIENT
Start: 2021-05-05 | End: 2021-05-15

## 2021-05-22 ENCOUNTER — OFFICE VISIT (OUTPATIENT)
Dept: URGENT CARE | Facility: CLINIC | Age: 26
End: 2021-05-22
Payer: COMMERCIAL

## 2021-05-22 VITALS
HEART RATE: 92 BPM | TEMPERATURE: 98 F | DIASTOLIC BLOOD PRESSURE: 90 MMHG | SYSTOLIC BLOOD PRESSURE: 136 MMHG | HEIGHT: 68 IN | WEIGHT: 190 LBS | OXYGEN SATURATION: 98 % | RESPIRATION RATE: 18 BRPM | BODY MASS INDEX: 28.79 KG/M2

## 2021-05-22 DIAGNOSIS — J32.9 CLINICAL SINUSITIS: Primary | ICD-10-CM

## 2021-05-22 DIAGNOSIS — J02.9 SORE THROAT: ICD-10-CM

## 2021-05-22 LAB
CTP QC/QA: YES
S PYO RRNA THROAT QL PROBE: NEGATIVE

## 2021-05-22 PROCEDURE — 3008F PR BODY MASS INDEX (BMI) DOCUMENTED: ICD-10-PCS | Mod: CPTII,S$GLB,, | Performed by: PHYSICIAN ASSISTANT

## 2021-05-22 PROCEDURE — 99214 PR OFFICE/OUTPT VISIT, EST, LEVL IV, 30-39 MIN: ICD-10-PCS | Mod: 25,S$GLB,, | Performed by: PHYSICIAN ASSISTANT

## 2021-05-22 PROCEDURE — 87880 POCT RAPID STREP A: ICD-10-PCS | Mod: QW,S$GLB,, | Performed by: PHYSICIAN ASSISTANT

## 2021-05-22 PROCEDURE — 87880 STREP A ASSAY W/OPTIC: CPT | Mod: QW,S$GLB,, | Performed by: PHYSICIAN ASSISTANT

## 2021-05-22 PROCEDURE — 3008F BODY MASS INDEX DOCD: CPT | Mod: CPTII,S$GLB,, | Performed by: PHYSICIAN ASSISTANT

## 2021-05-22 PROCEDURE — 99214 OFFICE O/P EST MOD 30 MIN: CPT | Mod: 25,S$GLB,, | Performed by: PHYSICIAN ASSISTANT

## 2021-05-22 RX ORDER — CEPHALEXIN 500 MG/1
500 CAPSULE ORAL EVERY 8 HOURS
Qty: 21 CAPSULE | Refills: 0 | Status: SHIPPED | OUTPATIENT
Start: 2021-05-22 | End: 2021-05-29

## 2021-05-22 RX ORDER — FLUTICASONE PROPIONATE 50 MCG
1 SPRAY, SUSPENSION (ML) NASAL DAILY
Qty: 16 G | Refills: 0 | Status: SHIPPED | OUTPATIENT
Start: 2021-05-22 | End: 2021-08-18 | Stop reason: ALTCHOICE

## 2021-06-01 ENCOUNTER — PATIENT MESSAGE (OUTPATIENT)
Dept: OBSTETRICS AND GYNECOLOGY | Facility: CLINIC | Age: 26
End: 2021-06-01

## 2021-06-05 ENCOUNTER — LAB VISIT (OUTPATIENT)
Dept: LAB | Facility: HOSPITAL | Age: 26
End: 2021-06-05
Attending: OBSTETRICS & GYNECOLOGY
Payer: COMMERCIAL

## 2021-06-05 DIAGNOSIS — Z34.92 NORMAL PREGNANCY IN SECOND TRIMESTER: ICD-10-CM

## 2021-06-05 LAB — GLUCOSE SERPL-MCNC: 132 MG/DL (ref 70–140)

## 2021-06-05 PROCEDURE — 36415 COLL VENOUS BLD VENIPUNCTURE: CPT | Mod: PO | Performed by: OBSTETRICS & GYNECOLOGY

## 2021-06-05 PROCEDURE — 82950 GLUCOSE TEST: CPT | Performed by: OBSTETRICS & GYNECOLOGY

## 2021-06-09 ENCOUNTER — ROUTINE PRENATAL (OUTPATIENT)
Dept: OBSTETRICS AND GYNECOLOGY | Facility: CLINIC | Age: 26
End: 2021-06-09
Payer: COMMERCIAL

## 2021-06-09 VITALS
SYSTOLIC BLOOD PRESSURE: 118 MMHG | BODY MASS INDEX: 30.17 KG/M2 | DIASTOLIC BLOOD PRESSURE: 70 MMHG | WEIGHT: 198.44 LBS

## 2021-06-09 DIAGNOSIS — Z34.92 NORMAL PREGNANCY IN SECOND TRIMESTER: Primary | ICD-10-CM

## 2021-06-09 DIAGNOSIS — Z3A.30 30 WEEKS GESTATION OF PREGNANCY: ICD-10-CM

## 2021-06-09 DIAGNOSIS — O26.00 ABNORMAL WEIGHT GAIN DURING PREGNANCY: ICD-10-CM

## 2021-06-09 LAB
BILIRUB SERPL-MCNC: NEGATIVE MG/DL
BLOOD URINE, POC: NEGATIVE
CLARITY, POC UA: NORMAL
COLOR, POC UA: NORMAL
GLUCOSE UR QL STRIP: NORMAL
KETONES UR QL STRIP: NEGATIVE
LEUKOCYTE ESTERASE URINE, POC: NEGATIVE
NITRITE, POC UA: NEGATIVE
PH, POC UA: 7
PROTEIN, POC: NEGATIVE
SPECIFIC GRAVITY, POC UA: NORMAL
UROBILINOGEN, POC UA: NORMAL

## 2021-06-09 PROCEDURE — 90715 TDAP VACCINE 7 YRS/> IM: CPT | Mod: S$GLB,,, | Performed by: OBSTETRICS & GYNECOLOGY

## 2021-06-09 PROCEDURE — 99999 PR PBB SHADOW E&M-EST. PATIENT-LVL III: CPT | Mod: PBBFAC,,, | Performed by: OBSTETRICS & GYNECOLOGY

## 2021-06-09 PROCEDURE — 90471 TDAP VACCINE GREATER THAN OR EQUAL TO 7YO IM: ICD-10-PCS | Mod: S$GLB,,, | Performed by: OBSTETRICS & GYNECOLOGY

## 2021-06-09 PROCEDURE — 90471 IMMUNIZATION ADMIN: CPT | Mod: S$GLB,,, | Performed by: OBSTETRICS & GYNECOLOGY

## 2021-06-09 PROCEDURE — 0502F SUBSEQUENT PRENATAL CARE: CPT | Mod: CPTII,S$GLB,, | Performed by: OBSTETRICS & GYNECOLOGY

## 2021-06-09 PROCEDURE — 90715 TDAP VACCINE GREATER THAN OR EQUAL TO 7YO IM: ICD-10-PCS | Mod: S$GLB,,, | Performed by: OBSTETRICS & GYNECOLOGY

## 2021-06-09 PROCEDURE — 0502F PR SUBSEQUENT PRENATAL CARE: ICD-10-PCS | Mod: CPTII,S$GLB,, | Performed by: OBSTETRICS & GYNECOLOGY

## 2021-06-09 PROCEDURE — 99999 PR PBB SHADOW E&M-EST. PATIENT-LVL III: ICD-10-PCS | Mod: PBBFAC,,, | Performed by: OBSTETRICS & GYNECOLOGY

## 2021-06-17 ENCOUNTER — LAB VISIT (OUTPATIENT)
Dept: LAB | Facility: HOSPITAL | Age: 26
End: 2021-06-17
Attending: FAMILY MEDICINE
Payer: COMMERCIAL

## 2021-06-17 DIAGNOSIS — Z01.84 IMMUNITY STATUS TESTING: ICD-10-CM

## 2021-06-17 PROCEDURE — 86787 VARICELLA-ZOSTER ANTIBODY: CPT | Performed by: FAMILY MEDICINE

## 2021-06-17 PROCEDURE — 86735 MUMPS ANTIBODY: CPT | Performed by: FAMILY MEDICINE

## 2021-06-17 PROCEDURE — 86762 RUBELLA ANTIBODY: CPT | Performed by: FAMILY MEDICINE

## 2021-06-17 PROCEDURE — 86765 RUBEOLA ANTIBODY: CPT | Performed by: FAMILY MEDICINE

## 2021-06-17 PROCEDURE — 36415 COLL VENOUS BLD VENIPUNCTURE: CPT | Mod: PO | Performed by: FAMILY MEDICINE

## 2021-06-18 LAB
RUBV IGG SER-ACNC: 23.7 IU/ML
RUBV IGG SER-IMP: REACTIVE

## 2021-06-21 ENCOUNTER — PATIENT MESSAGE (OUTPATIENT)
Dept: FAMILY MEDICINE | Facility: CLINIC | Age: 26
End: 2021-06-21

## 2021-06-21 DIAGNOSIS — Z01.84 IMMUNITY STATUS TESTING: Primary | ICD-10-CM

## 2021-06-21 LAB
RUBEOLA IGG ANTIBODY: 1.37 ISR (ref 0–0.9)
RUBEOLA INTERPRETATION: POSITIVE
VARICELLA INTERPRETATION: NEGATIVE
VARICELLA ZOSTER IGG: 0.75 ISR (ref 0–0.9)

## 2021-06-23 ENCOUNTER — TELEPHONE (OUTPATIENT)
Dept: OBSTETRICS AND GYNECOLOGY | Facility: CLINIC | Age: 26
End: 2021-06-23

## 2021-06-23 ENCOUNTER — PATIENT MESSAGE (OUTPATIENT)
Dept: ADMINISTRATIVE | Facility: OTHER | Age: 26
End: 2021-06-23

## 2021-06-23 PROBLEM — O47.9 IRREGULAR UTERINE CONTRACTIONS: Status: ACTIVE | Noted: 2021-06-23

## 2021-06-23 LAB
MUMPS IGG INTERPRETATION: ABNORMAL
MUMPS IGG SCREEN: 1.06 ISR (ref 0–0.9)

## 2021-06-24 DIAGNOSIS — Z01.84 IMMUNITY STATUS TESTING: Primary | ICD-10-CM

## 2021-06-25 ENCOUNTER — TELEPHONE (OUTPATIENT)
Dept: OBSTETRICS AND GYNECOLOGY | Facility: CLINIC | Age: 26
End: 2021-06-25

## 2021-06-30 ENCOUNTER — ROUTINE PRENATAL (OUTPATIENT)
Dept: OBSTETRICS AND GYNECOLOGY | Facility: CLINIC | Age: 26
End: 2021-06-30
Payer: COMMERCIAL

## 2021-06-30 ENCOUNTER — HOSPITAL ENCOUNTER (OUTPATIENT)
Dept: RADIOLOGY | Facility: HOSPITAL | Age: 26
Discharge: HOME | End: 2021-06-30
Attending: OBSTETRICS & GYNECOLOGY
Payer: COMMERCIAL

## 2021-06-30 VITALS
WEIGHT: 201.94 LBS | DIASTOLIC BLOOD PRESSURE: 72 MMHG | SYSTOLIC BLOOD PRESSURE: 116 MMHG | BODY MASS INDEX: 30.71 KG/M2

## 2021-06-30 DIAGNOSIS — Z34.92 NORMAL PREGNANCY IN SECOND TRIMESTER: Primary | ICD-10-CM

## 2021-06-30 DIAGNOSIS — Z3A.33 33 WEEKS GESTATION OF PREGNANCY: ICD-10-CM

## 2021-06-30 DIAGNOSIS — Z34.92 NORMAL PREGNANCY IN SECOND TRIMESTER: ICD-10-CM

## 2021-06-30 LAB
BILIRUB SERPL-MCNC: NEGATIVE MG/DL
BLOOD URINE, POC: NORMAL
CLARITY, POC UA: NORMAL
COLOR, POC UA: NORMAL
GLUCOSE UR QL STRIP: NORMAL
KETONES UR QL STRIP: NORMAL
LEUKOCYTE ESTERASE URINE, POC: NEGATIVE
NITRITE, POC UA: NEGATIVE
PH, POC UA: 7
PROTEIN, POC: NORMAL
SPECIFIC GRAVITY, POC UA: NORMAL
UROBILINOGEN, POC UA: NORMAL

## 2021-06-30 PROCEDURE — 87088 URINE BACTERIA CULTURE: CPT | Performed by: OBSTETRICS & GYNECOLOGY

## 2021-06-30 PROCEDURE — 87086 URINE CULTURE/COLONY COUNT: CPT | Performed by: OBSTETRICS & GYNECOLOGY

## 2021-06-30 PROCEDURE — 87186 SC STD MICRODIL/AGAR DIL: CPT | Performed by: OBSTETRICS & GYNECOLOGY

## 2021-06-30 PROCEDURE — 76816 OB US FOLLOW-UP PER FETUS: CPT | Mod: 26,,, | Performed by: RADIOLOGY

## 2021-06-30 PROCEDURE — 76816 OB US FOLLOW-UP PER FETUS: CPT | Mod: TC,PN

## 2021-06-30 PROCEDURE — 76816 US OB FOLLOW UP EA GESTATION TRANSABDOMINAL: ICD-10-PCS | Mod: 26,,, | Performed by: RADIOLOGY

## 2021-06-30 PROCEDURE — 99999 PR PBB SHADOW E&M-EST. PATIENT-LVL III: CPT | Mod: PBBFAC,,, | Performed by: OBSTETRICS & GYNECOLOGY

## 2021-06-30 PROCEDURE — 99999 PR PBB SHADOW E&M-EST. PATIENT-LVL III: ICD-10-PCS | Mod: PBBFAC,,, | Performed by: OBSTETRICS & GYNECOLOGY

## 2021-06-30 PROCEDURE — 87077 CULTURE AEROBIC IDENTIFY: CPT | Performed by: OBSTETRICS & GYNECOLOGY

## 2021-06-30 PROCEDURE — 0502F PR SUBSEQUENT PRENATAL CARE: ICD-10-PCS | Mod: CPTII,S$GLB,, | Performed by: OBSTETRICS & GYNECOLOGY

## 2021-06-30 PROCEDURE — 0502F SUBSEQUENT PRENATAL CARE: CPT | Mod: CPTII,S$GLB,, | Performed by: OBSTETRICS & GYNECOLOGY

## 2021-07-02 ENCOUNTER — PATIENT MESSAGE (OUTPATIENT)
Dept: OBSTETRICS AND GYNECOLOGY | Facility: CLINIC | Age: 26
End: 2021-07-02

## 2021-07-02 RX ORDER — NITROFURANTOIN 25; 75 MG/1; MG/1
100 CAPSULE ORAL 2 TIMES DAILY
Qty: 14 CAPSULE | Refills: 0 | Status: SHIPPED | OUTPATIENT
Start: 2021-07-02 | End: 2021-07-09

## 2021-07-03 LAB — BACTERIA UR CULT: ABNORMAL

## 2021-07-14 ENCOUNTER — ROUTINE PRENATAL (OUTPATIENT)
Dept: OBSTETRICS AND GYNECOLOGY | Facility: CLINIC | Age: 26
End: 2021-07-14
Payer: COMMERCIAL

## 2021-07-14 VITALS — SYSTOLIC BLOOD PRESSURE: 130 MMHG | BODY MASS INDEX: 30.94 KG/M2 | DIASTOLIC BLOOD PRESSURE: 76 MMHG | WEIGHT: 203.5 LBS

## 2021-07-14 DIAGNOSIS — Z3A.35 35 WEEKS GESTATION OF PREGNANCY: ICD-10-CM

## 2021-07-14 DIAGNOSIS — Z34.93 NORMAL PREGNANCY IN THIRD TRIMESTER: Primary | ICD-10-CM

## 2021-07-14 LAB
BILIRUB SERPL-MCNC: NEGATIVE MG/DL
BLOOD URINE, POC: NEGATIVE
CLARITY, POC UA: CLEAR
COLOR, POC UA: YELLOW
GLUCOSE UR QL STRIP: 50
KETONES UR QL STRIP: NEGATIVE
LEUKOCYTE ESTERASE URINE, POC: NORMAL
NITRITE, POC UA: NEGATIVE
PH, POC UA: 7
PROTEIN, POC: NEGATIVE
SPECIFIC GRAVITY, POC UA: NORMAL
UROBILINOGEN, POC UA: NEGATIVE

## 2021-07-14 PROCEDURE — 87081 CULTURE SCREEN ONLY: CPT | Performed by: OBSTETRICS & GYNECOLOGY

## 2021-07-14 PROCEDURE — 87147 CULTURE TYPE IMMUNOLOGIC: CPT | Performed by: OBSTETRICS & GYNECOLOGY

## 2021-07-14 PROCEDURE — 0502F PR SUBSEQUENT PRENATAL CARE: ICD-10-PCS | Mod: CPTII,S$GLB,, | Performed by: OBSTETRICS & GYNECOLOGY

## 2021-07-14 PROCEDURE — 87184 SC STD DISK METHOD PER PLATE: CPT | Performed by: OBSTETRICS & GYNECOLOGY

## 2021-07-14 PROCEDURE — 99999 PR PBB SHADOW E&M-EST. PATIENT-LVL III: ICD-10-PCS | Mod: PBBFAC,,, | Performed by: OBSTETRICS & GYNECOLOGY

## 2021-07-14 PROCEDURE — 99999 PR PBB SHADOW E&M-EST. PATIENT-LVL III: CPT | Mod: PBBFAC,,, | Performed by: OBSTETRICS & GYNECOLOGY

## 2021-07-14 PROCEDURE — 0502F SUBSEQUENT PRENATAL CARE: CPT | Mod: CPTII,S$GLB,, | Performed by: OBSTETRICS & GYNECOLOGY

## 2021-07-19 ENCOUNTER — TELEPHONE (OUTPATIENT)
Dept: OBSTETRICS AND GYNECOLOGY | Facility: CLINIC | Age: 26
End: 2021-07-19

## 2021-07-19 LAB — BACTERIA SPEC AEROBE CULT: ABNORMAL

## 2021-07-20 PROBLEM — R10.9 ABDOMINAL PAIN DURING PREGNANCY: Status: ACTIVE | Noted: 2021-07-20

## 2021-07-20 PROBLEM — O26.899 ABDOMINAL PAIN DURING PREGNANCY: Status: ACTIVE | Noted: 2021-07-20

## 2021-07-21 ENCOUNTER — ROUTINE PRENATAL (OUTPATIENT)
Dept: OBSTETRICS AND GYNECOLOGY | Facility: CLINIC | Age: 26
End: 2021-07-21
Payer: COMMERCIAL

## 2021-07-21 VITALS
DIASTOLIC BLOOD PRESSURE: 68 MMHG | SYSTOLIC BLOOD PRESSURE: 118 MMHG | BODY MASS INDEX: 31.41 KG/M2 | WEIGHT: 206.56 LBS

## 2021-07-21 DIAGNOSIS — Z3A.36 36 WEEKS GESTATION OF PREGNANCY: Primary | ICD-10-CM

## 2021-07-21 LAB
BILIRUB SERPL-MCNC: NORMAL MG/DL
BLOOD URINE, POC: NORMAL
CLARITY, POC UA: NORMAL
COLOR, POC UA: NORMAL
GLUCOSE UR QL STRIP: NORMAL
KETONES UR QL STRIP: NORMAL
LEUKOCYTE ESTERASE URINE, POC: NORMAL
NITRITE, POC UA: NORMAL
PH, POC UA: 7
PROTEIN, POC: NORMAL
SPECIFIC GRAVITY, POC UA: NORMAL
UROBILINOGEN, POC UA: NORMAL

## 2021-07-21 PROCEDURE — 99999 PR PBB SHADOW E&M-EST. PATIENT-LVL II: CPT | Mod: PBBFAC,,, | Performed by: SPECIALIST

## 2021-07-21 PROCEDURE — 0502F PR SUBSEQUENT PRENATAL CARE: ICD-10-PCS | Mod: CPTII,S$GLB,, | Performed by: SPECIALIST

## 2021-07-21 PROCEDURE — 0502F SUBSEQUENT PRENATAL CARE: CPT | Mod: CPTII,S$GLB,, | Performed by: SPECIALIST

## 2021-07-21 PROCEDURE — 99999 PR PBB SHADOW E&M-EST. PATIENT-LVL II: ICD-10-PCS | Mod: PBBFAC,,, | Performed by: SPECIALIST

## 2021-07-27 ENCOUNTER — TELEPHONE (OUTPATIENT)
Dept: OBSTETRICS AND GYNECOLOGY | Facility: CLINIC | Age: 26
End: 2021-07-27

## 2021-07-27 PROBLEM — O26.899 ABDOMINAL CRAMPING AFFECTING PREGNANCY: Status: ACTIVE | Noted: 2021-07-27

## 2021-07-27 PROBLEM — R10.9 ABDOMINAL CRAMPING AFFECTING PREGNANCY: Status: ACTIVE | Noted: 2021-07-27

## 2021-07-29 PROBLEM — O47.9 UTERINE CONTRACTIONS DURING PREGNANCY: Status: ACTIVE | Noted: 2021-07-29

## 2021-07-30 ENCOUNTER — ROUTINE PRENATAL (OUTPATIENT)
Dept: OBSTETRICS AND GYNECOLOGY | Facility: CLINIC | Age: 26
End: 2021-07-30
Payer: COMMERCIAL

## 2021-07-30 VITALS
SYSTOLIC BLOOD PRESSURE: 138 MMHG | WEIGHT: 207.25 LBS | DIASTOLIC BLOOD PRESSURE: 70 MMHG | BODY MASS INDEX: 31.51 KG/M2

## 2021-07-30 DIAGNOSIS — Z3A.37 37 WEEKS GESTATION OF PREGNANCY: ICD-10-CM

## 2021-07-30 DIAGNOSIS — Z34.93 NORMAL PREGNANCY IN THIRD TRIMESTER: Primary | ICD-10-CM

## 2021-07-30 LAB
BILIRUB SERPL-MCNC: NEGATIVE MG/DL
BLOOD URINE, POC: NEGATIVE
CLARITY, POC UA: NORMAL
COLOR, POC UA: NORMAL
GLUCOSE UR QL STRIP: NORMAL
KETONES UR QL STRIP: NORMAL
LEUKOCYTE ESTERASE URINE, POC: NEGATIVE
NITRITE, POC UA: NEGATIVE
PH, POC UA: 6
PROTEIN, POC: NEGATIVE
SPECIFIC GRAVITY, POC UA: NORMAL
UROBILINOGEN, POC UA: NORMAL

## 2021-07-30 PROCEDURE — 99999 PR PBB SHADOW E&M-EST. PATIENT-LVL III: ICD-10-PCS | Mod: PBBFAC,,, | Performed by: OBSTETRICS & GYNECOLOGY

## 2021-07-30 PROCEDURE — 0502F SUBSEQUENT PRENATAL CARE: CPT | Mod: CPTII,S$GLB,, | Performed by: OBSTETRICS & GYNECOLOGY

## 2021-07-30 PROCEDURE — 0502F PR SUBSEQUENT PRENATAL CARE: ICD-10-PCS | Mod: CPTII,S$GLB,, | Performed by: OBSTETRICS & GYNECOLOGY

## 2021-07-30 PROCEDURE — 99999 PR PBB SHADOW E&M-EST. PATIENT-LVL III: CPT | Mod: PBBFAC,,, | Performed by: OBSTETRICS & GYNECOLOGY

## 2021-08-01 ENCOUNTER — PATIENT MESSAGE (OUTPATIENT)
Dept: OBSTETRICS AND GYNECOLOGY | Facility: CLINIC | Age: 26
End: 2021-08-01

## 2021-08-02 ENCOUNTER — TELEPHONE (OUTPATIENT)
Dept: OBSTETRICS AND GYNECOLOGY | Facility: CLINIC | Age: 26
End: 2021-08-02

## 2021-08-03 ENCOUNTER — PATIENT OUTREACH (OUTPATIENT)
Dept: ADMINISTRATIVE | Facility: OTHER | Age: 26
End: 2021-08-03

## 2021-08-04 PROBLEM — Z34.90 ENCOUNTER FOR ELECTIVE INDUCTION OF LABOR: Status: RESOLVED | Noted: 2020-03-02 | Resolved: 2021-08-04

## 2021-08-04 PROBLEM — R10.9 ABDOMINAL CRAMPING AFFECTING PREGNANCY: Status: RESOLVED | Noted: 2021-07-27 | Resolved: 2021-08-04

## 2021-08-04 PROBLEM — O47.9 IRREGULAR UTERINE CONTRACTIONS: Status: RESOLVED | Noted: 2021-06-23 | Resolved: 2021-08-04

## 2021-08-04 PROBLEM — M54.9 BACK PAIN AFFECTING PREGNANCY: Status: RESOLVED | Noted: 2020-02-08 | Resolved: 2021-08-04

## 2021-08-04 PROBLEM — O99.891 BACK PAIN AFFECTING PREGNANCY: Status: RESOLVED | Noted: 2020-02-08 | Resolved: 2021-08-04

## 2021-08-04 PROBLEM — R10.13 EPIGASTRIC PAIN: Status: RESOLVED | Noted: 2020-10-01 | Resolved: 2021-08-04

## 2021-08-04 PROBLEM — R10.9 ABDOMINAL PAIN DURING PREGNANCY: Status: RESOLVED | Noted: 2021-07-20 | Resolved: 2021-08-04

## 2021-08-04 PROBLEM — O26.899 ABDOMINAL CRAMPING AFFECTING PREGNANCY: Status: RESOLVED | Noted: 2021-07-27 | Resolved: 2021-08-04

## 2021-08-04 PROBLEM — O26.899 ABDOMINAL PAIN DURING PREGNANCY: Status: RESOLVED | Noted: 2021-07-20 | Resolved: 2021-08-04

## 2021-08-04 PROBLEM — K82.9 GALLBLADDER DISEASE: Status: RESOLVED | Noted: 2017-12-19 | Resolved: 2021-08-04

## 2021-08-18 ENCOUNTER — POSTPARTUM VISIT (OUTPATIENT)
Dept: OBSTETRICS AND GYNECOLOGY | Facility: CLINIC | Age: 26
End: 2021-08-18
Payer: COMMERCIAL

## 2021-08-18 VITALS
WEIGHT: 187.38 LBS | SYSTOLIC BLOOD PRESSURE: 132 MMHG | BODY MASS INDEX: 28.4 KG/M2 | DIASTOLIC BLOOD PRESSURE: 72 MMHG | HEIGHT: 68 IN

## 2021-08-18 DIAGNOSIS — Z30.9 ENCOUNTER FOR CONTRACEPTIVE MANAGEMENT, UNSPECIFIED TYPE: ICD-10-CM

## 2021-08-18 PROCEDURE — 99999 PR PBB SHADOW E&M-EST. PATIENT-LVL III: ICD-10-PCS | Mod: PBBFAC,,, | Performed by: OBSTETRICS & GYNECOLOGY

## 2021-08-18 PROCEDURE — 99999 PR PBB SHADOW E&M-EST. PATIENT-LVL III: CPT | Mod: PBBFAC,,, | Performed by: OBSTETRICS & GYNECOLOGY

## 2021-08-18 PROCEDURE — 0503F POSTPARTUM CARE VISIT: CPT | Mod: CPTII,S$GLB,, | Performed by: OBSTETRICS & GYNECOLOGY

## 2021-08-18 PROCEDURE — 0503F PR POSTPARTUM CARE VISIT: ICD-10-PCS | Mod: CPTII,S$GLB,, | Performed by: OBSTETRICS & GYNECOLOGY

## 2021-08-18 RX ORDER — ACETAMINOPHEN AND CODEINE PHOSPHATE 120; 12 MG/5ML; MG/5ML
1 SOLUTION ORAL DAILY
Qty: 30 TABLET | Refills: 11 | Status: SHIPPED | OUTPATIENT
Start: 2021-08-18 | End: 2021-11-29

## 2021-08-26 ENCOUNTER — PATIENT MESSAGE (OUTPATIENT)
Dept: OBSTETRICS AND GYNECOLOGY | Facility: CLINIC | Age: 26
End: 2021-08-26

## 2021-09-23 ENCOUNTER — TELEPHONE (OUTPATIENT)
Dept: OBSTETRICS AND GYNECOLOGY | Facility: CLINIC | Age: 26
End: 2021-09-23

## 2021-09-30 RX ORDER — CITALOPRAM 40 MG/1
40 TABLET, FILM COATED ORAL NIGHTLY
Qty: 90 TABLET | Refills: 0 | Status: SHIPPED | OUTPATIENT
Start: 2021-09-30 | End: 2021-11-16

## 2021-10-06 ENCOUNTER — PATIENT MESSAGE (OUTPATIENT)
Dept: FAMILY MEDICINE | Facility: CLINIC | Age: 26
End: 2021-10-06

## 2021-10-06 ENCOUNTER — PATIENT MESSAGE (OUTPATIENT)
Dept: OBSTETRICS AND GYNECOLOGY | Facility: CLINIC | Age: 26
End: 2021-10-06

## 2021-10-13 ENCOUNTER — OFFICE VISIT (OUTPATIENT)
Dept: FAMILY MEDICINE | Facility: CLINIC | Age: 26
End: 2021-10-13
Payer: COMMERCIAL

## 2021-10-13 ENCOUNTER — IMMUNIZATION (OUTPATIENT)
Dept: FAMILY MEDICINE | Facility: CLINIC | Age: 26
End: 2021-10-13
Payer: COMMERCIAL

## 2021-10-13 ENCOUNTER — LAB VISIT (OUTPATIENT)
Dept: LAB | Facility: HOSPITAL | Age: 26
End: 2021-10-13
Attending: FAMILY MEDICINE
Payer: COMMERCIAL

## 2021-10-13 VITALS
OXYGEN SATURATION: 95 % | HEIGHT: 68 IN | WEIGHT: 183.63 LBS | SYSTOLIC BLOOD PRESSURE: 118 MMHG | HEART RATE: 70 BPM | DIASTOLIC BLOOD PRESSURE: 80 MMHG | BODY MASS INDEX: 27.83 KG/M2

## 2021-10-13 DIAGNOSIS — Z23 NEED FOR VACCINATION: Primary | ICD-10-CM

## 2021-10-13 DIAGNOSIS — Z00.01 ANNUAL VISIT FOR GENERAL ADULT MEDICAL EXAMINATION WITH ABNORMAL FINDINGS: Primary | ICD-10-CM

## 2021-10-13 DIAGNOSIS — Z00.01 ANNUAL VISIT FOR GENERAL ADULT MEDICAL EXAMINATION WITH ABNORMAL FINDINGS: ICD-10-CM

## 2021-10-13 DIAGNOSIS — Z01.84 IMMUNITY STATUS TESTING: ICD-10-CM

## 2021-10-13 DIAGNOSIS — R30.0 DYSURIA: ICD-10-CM

## 2021-10-13 DIAGNOSIS — N39.0 UTI (URINARY TRACT INFECTION), BACTERIAL: ICD-10-CM

## 2021-10-13 DIAGNOSIS — A49.9 UTI (URINARY TRACT INFECTION), BACTERIAL: ICD-10-CM

## 2021-10-13 LAB
ALBUMIN SERPL BCP-MCNC: 4.3 G/DL (ref 3.5–5.2)
ALP SERPL-CCNC: 122 U/L (ref 55–135)
ALT SERPL W/O P-5'-P-CCNC: 31 U/L (ref 10–44)
ANION GAP SERPL CALC-SCNC: 9 MMOL/L (ref 8–16)
AST SERPL-CCNC: 20 U/L (ref 10–40)
BASOPHILS # BLD AUTO: 0.09 K/UL (ref 0–0.2)
BASOPHILS NFR BLD: 1.3 % (ref 0–1.9)
BILIRUB SERPL-MCNC: 0.4 MG/DL (ref 0.1–1)
BUN SERPL-MCNC: 8 MG/DL (ref 6–20)
CALCIUM SERPL-MCNC: 9.9 MG/DL (ref 8.7–10.5)
CHLORIDE SERPL-SCNC: 106 MMOL/L (ref 95–110)
CHOLEST SERPL-MCNC: 236 MG/DL (ref 120–199)
CHOLEST/HDLC SERPL: 4.1 {RATIO} (ref 2–5)
CO2 SERPL-SCNC: 26 MMOL/L (ref 23–29)
CREAT SERPL-MCNC: 0.7 MG/DL (ref 0.5–1.4)
DIFFERENTIAL METHOD: NORMAL
EOSINOPHIL # BLD AUTO: 0.4 K/UL (ref 0–0.5)
EOSINOPHIL NFR BLD: 5.3 % (ref 0–8)
ERYTHROCYTE [DISTWIDTH] IN BLOOD BY AUTOMATED COUNT: 12.5 % (ref 11.5–14.5)
EST. GFR  (AFRICAN AMERICAN): >60 ML/MIN/1.73 M^2
EST. GFR  (NON AFRICAN AMERICAN): >60 ML/MIN/1.73 M^2
GLUCOSE SERPL-MCNC: 88 MG/DL (ref 70–110)
HCT VFR BLD AUTO: 42.7 % (ref 37–48.5)
HDLC SERPL-MCNC: 57 MG/DL (ref 40–75)
HDLC SERPL: 24.2 % (ref 20–50)
HGB BLD-MCNC: 14.3 G/DL (ref 12–16)
IMM GRANULOCYTES # BLD AUTO: 0.03 K/UL (ref 0–0.04)
IMM GRANULOCYTES NFR BLD AUTO: 0.4 % (ref 0–0.5)
LDLC SERPL CALC-MCNC: 154.4 MG/DL (ref 63–159)
LYMPHOCYTES # BLD AUTO: 2.5 K/UL (ref 1–4.8)
LYMPHOCYTES NFR BLD: 34.3 % (ref 18–48)
MCH RBC QN AUTO: 30.2 PG (ref 27–31)
MCHC RBC AUTO-ENTMCNC: 33.5 G/DL (ref 32–36)
MCV RBC AUTO: 90 FL (ref 82–98)
MONOCYTES # BLD AUTO: 0.5 K/UL (ref 0.3–1)
MONOCYTES NFR BLD: 6.4 % (ref 4–15)
NEUTROPHILS # BLD AUTO: 3.7 K/UL (ref 1.8–7.7)
NEUTROPHILS NFR BLD: 52.3 % (ref 38–73)
NONHDLC SERPL-MCNC: 179 MG/DL
NRBC BLD-RTO: 0 /100 WBC
PLATELET # BLD AUTO: 220 K/UL (ref 150–450)
PMV BLD AUTO: 11 FL (ref 9.2–12.9)
POTASSIUM SERPL-SCNC: 4.4 MMOL/L (ref 3.5–5.1)
PROT SERPL-MCNC: 7.4 G/DL (ref 6–8.4)
RBC # BLD AUTO: 4.74 M/UL (ref 4–5.4)
SODIUM SERPL-SCNC: 141 MMOL/L (ref 136–145)
TRIGL SERPL-MCNC: 123 MG/DL (ref 30–150)
TSH SERPL DL<=0.005 MIU/L-ACNC: 0.69 UIU/ML (ref 0.4–4)
WBC # BLD AUTO: 7.14 K/UL (ref 3.9–12.7)

## 2021-10-13 PROCEDURE — 86735 MUMPS ANTIBODY: CPT | Performed by: FAMILY MEDICINE

## 2021-10-13 PROCEDURE — 3074F PR MOST RECENT SYSTOLIC BLOOD PRESSURE < 130 MM HG: ICD-10-PCS | Mod: CPTII,S$GLB,, | Performed by: FAMILY MEDICINE

## 2021-10-13 PROCEDURE — 3074F SYST BP LT 130 MM HG: CPT | Mod: CPTII,S$GLB,, | Performed by: FAMILY MEDICINE

## 2021-10-13 PROCEDURE — 80053 COMPREHEN METABOLIC PANEL: CPT | Performed by: FAMILY MEDICINE

## 2021-10-13 PROCEDURE — 1160F PR REVIEW ALL MEDS BY PRESCRIBER/CLIN PHARMACIST DOCUMENTED: ICD-10-PCS | Mod: CPTII,S$GLB,, | Performed by: FAMILY MEDICINE

## 2021-10-13 PROCEDURE — 3079F PR MOST RECENT DIASTOLIC BLOOD PRESSURE 80-89 MM HG: ICD-10-PCS | Mod: CPTII,S$GLB,, | Performed by: FAMILY MEDICINE

## 2021-10-13 PROCEDURE — 99999 PR PBB SHADOW E&M-EST. PATIENT-LVL IV: ICD-10-PCS | Mod: PBBFAC,,, | Performed by: FAMILY MEDICINE

## 2021-10-13 PROCEDURE — 84443 ASSAY THYROID STIM HORMONE: CPT | Performed by: FAMILY MEDICINE

## 2021-10-13 PROCEDURE — 86706 HEP B SURFACE ANTIBODY: CPT | Performed by: FAMILY MEDICINE

## 2021-10-13 PROCEDURE — 1160F RVW MEDS BY RX/DR IN RCRD: CPT | Mod: CPTII,S$GLB,, | Performed by: FAMILY MEDICINE

## 2021-10-13 PROCEDURE — 90686 IIV4 VACC NO PRSV 0.5 ML IM: CPT | Mod: S$GLB,,, | Performed by: FAMILY MEDICINE

## 2021-10-13 PROCEDURE — 1159F MED LIST DOCD IN RCRD: CPT | Mod: CPTII,S$GLB,, | Performed by: FAMILY MEDICINE

## 2021-10-13 PROCEDURE — 0001A COVID-19, MRNA, LNP-S, PF, 30 MCG/0.3 ML DOSE VACCINE: CPT | Mod: PBBFAC,PO | Performed by: INTERNAL MEDICINE

## 2021-10-13 PROCEDURE — 3008F PR BODY MASS INDEX (BMI) DOCUMENTED: ICD-10-PCS | Mod: CPTII,S$GLB,, | Performed by: FAMILY MEDICINE

## 2021-10-13 PROCEDURE — 80061 LIPID PANEL: CPT | Performed by: FAMILY MEDICINE

## 2021-10-13 PROCEDURE — 90686 FLU VACCINE (QUAD) GREATER THAN OR EQUAL TO 3YO PRESERVATIVE FREE IM: ICD-10-PCS | Mod: S$GLB,,, | Performed by: FAMILY MEDICINE

## 2021-10-13 PROCEDURE — 3008F BODY MASS INDEX DOCD: CPT | Mod: CPTII,S$GLB,, | Performed by: FAMILY MEDICINE

## 2021-10-13 PROCEDURE — 36415 COLL VENOUS BLD VENIPUNCTURE: CPT | Mod: PO | Performed by: FAMILY MEDICINE

## 2021-10-13 PROCEDURE — 3079F DIAST BP 80-89 MM HG: CPT | Mod: CPTII,S$GLB,, | Performed by: FAMILY MEDICINE

## 2021-10-13 PROCEDURE — 99999 PR PBB SHADOW E&M-EST. PATIENT-LVL IV: CPT | Mod: PBBFAC,,, | Performed by: FAMILY MEDICINE

## 2021-10-13 PROCEDURE — 99395 PR PREVENTIVE VISIT,EST,18-39: ICD-10-PCS | Mod: 25,S$GLB,, | Performed by: FAMILY MEDICINE

## 2021-10-13 PROCEDURE — 90471 FLU VACCINE (QUAD) GREATER THAN OR EQUAL TO 3YO PRESERVATIVE FREE IM: ICD-10-PCS | Mod: S$GLB,,, | Performed by: FAMILY MEDICINE

## 2021-10-13 PROCEDURE — 90471 IMMUNIZATION ADMIN: CPT | Mod: S$GLB,,, | Performed by: FAMILY MEDICINE

## 2021-10-13 PROCEDURE — 85025 COMPLETE CBC W/AUTO DIFF WBC: CPT | Performed by: FAMILY MEDICINE

## 2021-10-13 PROCEDURE — 1159F PR MEDICATION LIST DOCUMENTED IN MEDICAL RECORD: ICD-10-PCS | Mod: CPTII,S$GLB,, | Performed by: FAMILY MEDICINE

## 2021-10-13 PROCEDURE — 99395 PREV VISIT EST AGE 18-39: CPT | Mod: 25,S$GLB,, | Performed by: FAMILY MEDICINE

## 2021-10-13 RX ORDER — NITROFURANTOIN 25; 75 MG/1; MG/1
100 CAPSULE ORAL 2 TIMES DAILY
Qty: 14 CAPSULE | Refills: 0 | Status: SHIPPED | OUTPATIENT
Start: 2021-10-13 | End: 2021-10-20

## 2021-10-17 LAB
HBV SURFACE AB SER QL IA: POSITIVE
HBV SURFACE AB SERPL IA-ACNC: 10 MIU/ML

## 2021-10-18 LAB
MUMPS IGG INTERPRETATION: ABNORMAL
MUMPS IGG INTERPRETATION: ABNORMAL
MUMPS IGG SCREEN: 1.03 ISR (ref 0–0.9)
MUMPS IGG SCREEN: 1.03 ISR (ref 0–0.9)

## 2021-10-19 ENCOUNTER — PATIENT MESSAGE (OUTPATIENT)
Dept: OBSTETRICS AND GYNECOLOGY | Facility: CLINIC | Age: 26
End: 2021-10-19

## 2021-10-19 DIAGNOSIS — R10.2 PELVIC PAIN: Primary | ICD-10-CM

## 2021-10-23 DIAGNOSIS — F32.A DEPRESSION, UNSPECIFIED DEPRESSION TYPE: ICD-10-CM

## 2021-10-25 RX ORDER — CITALOPRAM 20 MG/1
20 TABLET, FILM COATED ORAL DAILY
Qty: 30 TABLET | Refills: 2 | Status: SHIPPED | OUTPATIENT
Start: 2021-10-25 | End: 2021-11-16

## 2021-10-26 ENCOUNTER — PATIENT MESSAGE (OUTPATIENT)
Dept: OBSTETRICS AND GYNECOLOGY | Facility: CLINIC | Age: 26
End: 2021-10-26
Payer: COMMERCIAL

## 2021-10-26 ENCOUNTER — HOSPITAL ENCOUNTER (OUTPATIENT)
Dept: RADIOLOGY | Facility: HOSPITAL | Age: 26
Discharge: HOME | End: 2021-10-26
Attending: OBSTETRICS & GYNECOLOGY
Payer: COMMERCIAL

## 2021-10-26 DIAGNOSIS — R10.2 PELVIC PAIN: ICD-10-CM

## 2021-10-26 PROCEDURE — 76830 US PELVIS COMP WITH TRANSVAG NON-OB (XPD): ICD-10-PCS | Mod: 26,,, | Performed by: RADIOLOGY

## 2021-10-26 PROCEDURE — 76856 US EXAM PELVIC COMPLETE: CPT | Mod: 26,,, | Performed by: RADIOLOGY

## 2021-10-26 PROCEDURE — 76856 US EXAM PELVIC COMPLETE: CPT | Mod: TC,PO

## 2021-10-26 PROCEDURE — 76856 US PELVIS COMP WITH TRANSVAG NON-OB (XPD): ICD-10-PCS | Mod: 26,,, | Performed by: RADIOLOGY

## 2021-10-26 PROCEDURE — 76830 TRANSVAGINAL US NON-OB: CPT | Mod: 26,,, | Performed by: RADIOLOGY

## 2021-11-03 ENCOUNTER — IMMUNIZATION (OUTPATIENT)
Dept: FAMILY MEDICINE | Facility: CLINIC | Age: 26
End: 2021-11-03
Payer: COMMERCIAL

## 2021-11-03 DIAGNOSIS — Z23 NEED FOR VACCINATION: Primary | ICD-10-CM

## 2021-11-03 PROCEDURE — 0002A COVID-19, MRNA, LNP-S, PF, 30 MCG/0.3 ML DOSE VACCINE: CPT | Mod: PBBFAC | Performed by: FAMILY MEDICINE

## 2021-11-03 PROCEDURE — 91300 COVID-19, MRNA, LNP-S, PF, 30 MCG/0.3 ML DOSE VACCINE: CPT | Mod: PBBFAC | Performed by: FAMILY MEDICINE

## 2021-11-08 ENCOUNTER — PATIENT MESSAGE (OUTPATIENT)
Dept: OBSTETRICS AND GYNECOLOGY | Facility: CLINIC | Age: 26
End: 2021-11-08
Payer: COMMERCIAL

## 2021-11-09 RX ORDER — FLUCONAZOLE 150 MG/1
150 TABLET ORAL
Qty: 3 TABLET | Refills: 0 | Status: SHIPPED | OUTPATIENT
Start: 2021-11-09 | End: 2021-11-16 | Stop reason: ALTCHOICE

## 2021-11-16 ENCOUNTER — TELEPHONE (OUTPATIENT)
Dept: OBSTETRICS AND GYNECOLOGY | Facility: CLINIC | Age: 26
End: 2021-11-16

## 2021-11-16 ENCOUNTER — OFFICE VISIT (OUTPATIENT)
Dept: OBSTETRICS AND GYNECOLOGY | Facility: CLINIC | Age: 26
End: 2021-11-16
Payer: COMMERCIAL

## 2021-11-16 VITALS
SYSTOLIC BLOOD PRESSURE: 116 MMHG | DIASTOLIC BLOOD PRESSURE: 72 MMHG | WEIGHT: 182.31 LBS | BODY MASS INDEX: 27.72 KG/M2

## 2021-11-16 DIAGNOSIS — Z12.4 SCREENING FOR MALIGNANT NEOPLASM OF CERVIX: ICD-10-CM

## 2021-11-16 DIAGNOSIS — F41.1 GENERALIZED ANXIETY DISORDER: ICD-10-CM

## 2021-11-16 DIAGNOSIS — Z01.419 ROUTINE GYNECOLOGICAL EXAMINATION: Primary | ICD-10-CM

## 2021-11-16 DIAGNOSIS — R10.2 PELVIC PAIN: ICD-10-CM

## 2021-11-16 PROCEDURE — 88175 CYTOPATH C/V AUTO FLUID REDO: CPT | Performed by: OBSTETRICS & GYNECOLOGY

## 2021-11-16 PROCEDURE — 99395 PREV VISIT EST AGE 18-39: CPT | Mod: S$GLB,,, | Performed by: OBSTETRICS & GYNECOLOGY

## 2021-11-16 PROCEDURE — 99395 PR PREVENTIVE VISIT,EST,18-39: ICD-10-PCS | Mod: S$GLB,,, | Performed by: OBSTETRICS & GYNECOLOGY

## 2021-11-16 PROCEDURE — 99999 PR PBB SHADOW E&M-EST. PATIENT-LVL IV: ICD-10-PCS | Mod: PBBFAC,,, | Performed by: OBSTETRICS & GYNECOLOGY

## 2021-11-16 PROCEDURE — 3078F DIAST BP <80 MM HG: CPT | Mod: CPTII,S$GLB,, | Performed by: OBSTETRICS & GYNECOLOGY

## 2021-11-16 PROCEDURE — 3008F BODY MASS INDEX DOCD: CPT | Mod: CPTII,S$GLB,, | Performed by: OBSTETRICS & GYNECOLOGY

## 2021-11-16 PROCEDURE — 3078F PR MOST RECENT DIASTOLIC BLOOD PRESSURE < 80 MM HG: ICD-10-PCS | Mod: CPTII,S$GLB,, | Performed by: OBSTETRICS & GYNECOLOGY

## 2021-11-16 PROCEDURE — 3074F SYST BP LT 130 MM HG: CPT | Mod: CPTII,S$GLB,, | Performed by: OBSTETRICS & GYNECOLOGY

## 2021-11-16 PROCEDURE — 3008F PR BODY MASS INDEX (BMI) DOCUMENTED: ICD-10-PCS | Mod: CPTII,S$GLB,, | Performed by: OBSTETRICS & GYNECOLOGY

## 2021-11-16 PROCEDURE — 3074F PR MOST RECENT SYSTOLIC BLOOD PRESSURE < 130 MM HG: ICD-10-PCS | Mod: CPTII,S$GLB,, | Performed by: OBSTETRICS & GYNECOLOGY

## 2021-11-16 PROCEDURE — 99999 PR PBB SHADOW E&M-EST. PATIENT-LVL IV: CPT | Mod: PBBFAC,,, | Performed by: OBSTETRICS & GYNECOLOGY

## 2021-11-16 RX ORDER — CITALOPRAM 40 MG/1
60 TABLET, FILM COATED ORAL DAILY
Qty: 45 TABLET | Refills: 11 | Status: SHIPPED | OUTPATIENT
Start: 2021-11-16 | End: 2022-12-05

## 2021-11-29 ENCOUNTER — OFFICE VISIT (OUTPATIENT)
Dept: OBSTETRICS AND GYNECOLOGY | Facility: CLINIC | Age: 26
End: 2021-11-29
Payer: COMMERCIAL

## 2021-11-29 VITALS — SYSTOLIC BLOOD PRESSURE: 114 MMHG | WEIGHT: 179 LBS | BODY MASS INDEX: 27.22 KG/M2 | DIASTOLIC BLOOD PRESSURE: 70 MMHG

## 2021-11-29 DIAGNOSIS — R10.2 PELVIC PAIN: Primary | ICD-10-CM

## 2021-11-29 PROCEDURE — 99999 PR PBB SHADOW E&M-EST. PATIENT-LVL III: ICD-10-PCS | Mod: PBBFAC,,, | Performed by: OBSTETRICS & GYNECOLOGY

## 2021-11-29 PROCEDURE — 99999 PR PBB SHADOW E&M-EST. PATIENT-LVL III: CPT | Mod: PBBFAC,,, | Performed by: OBSTETRICS & GYNECOLOGY

## 2021-11-29 PROCEDURE — 99499 UNLISTED E&M SERVICE: CPT | Mod: S$GLB,,, | Performed by: OBSTETRICS & GYNECOLOGY

## 2021-11-29 PROCEDURE — 99499 NO LOS: ICD-10-PCS | Mod: S$GLB,,, | Performed by: OBSTETRICS & GYNECOLOGY

## 2021-11-29 RX ORDER — SODIUM CHLORIDE, SODIUM LACTATE, POTASSIUM CHLORIDE, CALCIUM CHLORIDE 600; 310; 30; 20 MG/100ML; MG/100ML; MG/100ML; MG/100ML
INJECTION, SOLUTION INTRAVENOUS CONTINUOUS
Status: CANCELLED | OUTPATIENT
Start: 2021-11-29

## 2021-12-01 PROBLEM — R10.2 PELVIC PAIN: Status: ACTIVE | Noted: 2021-12-01

## 2021-12-01 PROBLEM — Z90.710 STATUS POST LAPAROSCOPIC HYSTERECTOMY: Status: ACTIVE | Noted: 2021-12-01

## 2021-12-03 ENCOUNTER — PATIENT MESSAGE (OUTPATIENT)
Dept: FAMILY MEDICINE | Facility: CLINIC | Age: 26
End: 2021-12-03
Payer: COMMERCIAL

## 2021-12-03 DIAGNOSIS — Z11.59 ENCOUNTER FOR HEPATITIS C SCREENING TEST FOR LOW RISK PATIENT: Primary | ICD-10-CM

## 2021-12-09 ENCOUNTER — PATIENT MESSAGE (OUTPATIENT)
Dept: OBSTETRICS AND GYNECOLOGY | Facility: CLINIC | Age: 26
End: 2021-12-09
Payer: COMMERCIAL

## 2021-12-15 ENCOUNTER — OFFICE VISIT (OUTPATIENT)
Dept: OBSTETRICS AND GYNECOLOGY | Facility: CLINIC | Age: 26
End: 2021-12-15
Payer: COMMERCIAL

## 2021-12-15 ENCOUNTER — TELEPHONE (OUTPATIENT)
Dept: OBSTETRICS AND GYNECOLOGY | Facility: CLINIC | Age: 26
End: 2021-12-15
Payer: COMMERCIAL

## 2021-12-15 VITALS
DIASTOLIC BLOOD PRESSURE: 82 MMHG | WEIGHT: 180.75 LBS | BODY MASS INDEX: 27.09 KG/M2 | SYSTOLIC BLOOD PRESSURE: 116 MMHG

## 2021-12-15 DIAGNOSIS — Z09 POSTOP CHECK: Primary | ICD-10-CM

## 2021-12-15 PROCEDURE — 99999 PR PBB SHADOW E&M-EST. PATIENT-LVL II: ICD-10-PCS | Mod: PBBFAC,,, | Performed by: OBSTETRICS & GYNECOLOGY

## 2021-12-15 PROCEDURE — 99999 PR PBB SHADOW E&M-EST. PATIENT-LVL II: CPT | Mod: PBBFAC,,, | Performed by: OBSTETRICS & GYNECOLOGY

## 2021-12-15 PROCEDURE — 99024 PR POST-OP FOLLOW-UP VISIT: ICD-10-PCS | Mod: S$GLB,,, | Performed by: OBSTETRICS & GYNECOLOGY

## 2021-12-15 PROCEDURE — 99024 POSTOP FOLLOW-UP VISIT: CPT | Mod: S$GLB,,, | Performed by: OBSTETRICS & GYNECOLOGY

## 2021-12-21 ENCOUNTER — OFFICE VISIT (OUTPATIENT)
Dept: FAMILY MEDICINE | Facility: CLINIC | Age: 26
End: 2021-12-21
Payer: COMMERCIAL

## 2021-12-21 ENCOUNTER — TELEPHONE (OUTPATIENT)
Dept: FAMILY MEDICINE | Facility: CLINIC | Age: 26
End: 2021-12-21
Payer: COMMERCIAL

## 2021-12-21 ENCOUNTER — PATIENT MESSAGE (OUTPATIENT)
Dept: FAMILY MEDICINE | Facility: CLINIC | Age: 26
End: 2021-12-21

## 2021-12-21 DIAGNOSIS — J45.21 MILD INTERMITTENT ASTHMA WITH EXACERBATION: Primary | ICD-10-CM

## 2021-12-21 PROBLEM — O47.9 UTERINE CONTRACTIONS DURING PREGNANCY: Status: RESOLVED | Noted: 2021-07-29 | Resolved: 2021-12-21

## 2021-12-21 PROBLEM — R10.9 FLANK PAIN IN PREGNANT PATIENT: Status: RESOLVED | Noted: 2020-02-10 | Resolved: 2021-12-21

## 2021-12-21 PROBLEM — R10.2 PELVIC PAIN: Status: RESOLVED | Noted: 2021-12-01 | Resolved: 2021-12-21

## 2021-12-21 PROBLEM — R19.4 CHANGE IN BOWEL HABITS: Status: RESOLVED | Noted: 2020-08-27 | Resolved: 2021-12-21

## 2021-12-21 PROBLEM — O26.899 ABDOMINAL PAIN AFFECTING PREGNANCY: Status: RESOLVED | Noted: 2019-11-17 | Resolved: 2021-12-21

## 2021-12-21 PROBLEM — R10.9 ABDOMINAL PAIN AFFECTING PREGNANCY: Status: RESOLVED | Noted: 2019-11-17 | Resolved: 2021-12-21

## 2021-12-21 PROBLEM — O26.899 FLANK PAIN IN PREGNANT PATIENT: Status: RESOLVED | Noted: 2020-02-10 | Resolved: 2021-12-21

## 2021-12-21 PROCEDURE — 99214 OFFICE O/P EST MOD 30 MIN: CPT | Mod: 95,,, | Performed by: FAMILY MEDICINE

## 2021-12-21 PROCEDURE — 99214 PR OFFICE/OUTPT VISIT, EST, LEVL IV, 30-39 MIN: ICD-10-PCS | Mod: 95,,, | Performed by: FAMILY MEDICINE

## 2021-12-21 RX ORDER — PREDNISONE 20 MG/1
TABLET ORAL
Qty: 18 TABLET | Refills: 0 | Status: SHIPPED | OUTPATIENT
Start: 2021-12-21 | End: 2021-12-30

## 2021-12-21 RX ORDER — ALBUTEROL SULFATE 90 UG/1
2 AEROSOL, METERED RESPIRATORY (INHALATION) EVERY 6 HOURS PRN
Qty: 18 G | Refills: 6 | Status: SHIPPED | OUTPATIENT
Start: 2021-12-21 | End: 2023-01-20 | Stop reason: ALTCHOICE

## 2021-12-22 ENCOUNTER — TELEPHONE (OUTPATIENT)
Dept: FAMILY MEDICINE | Facility: CLINIC | Age: 26
End: 2021-12-22
Payer: COMMERCIAL

## 2021-12-27 ENCOUNTER — PATIENT MESSAGE (OUTPATIENT)
Dept: FAMILY MEDICINE | Facility: CLINIC | Age: 26
End: 2021-12-27
Payer: COMMERCIAL

## 2022-01-03 ENCOUNTER — PATIENT MESSAGE (OUTPATIENT)
Dept: FAMILY MEDICINE | Facility: CLINIC | Age: 27
End: 2022-01-03
Payer: COMMERCIAL

## 2022-01-11 ENCOUNTER — OFFICE VISIT (OUTPATIENT)
Dept: OBSTETRICS AND GYNECOLOGY | Facility: CLINIC | Age: 27
End: 2022-01-11
Payer: COMMERCIAL

## 2022-01-11 VITALS
DIASTOLIC BLOOD PRESSURE: 64 MMHG | SYSTOLIC BLOOD PRESSURE: 108 MMHG | WEIGHT: 185.44 LBS | BODY MASS INDEX: 27.78 KG/M2

## 2022-01-11 DIAGNOSIS — Z09 POSTOP CHECK: Primary | ICD-10-CM

## 2022-01-11 PROCEDURE — 3074F PR MOST RECENT SYSTOLIC BLOOD PRESSURE < 130 MM HG: ICD-10-PCS | Mod: CPTII,S$GLB,, | Performed by: OBSTETRICS & GYNECOLOGY

## 2022-01-11 PROCEDURE — 3074F SYST BP LT 130 MM HG: CPT | Mod: CPTII,S$GLB,, | Performed by: OBSTETRICS & GYNECOLOGY

## 2022-01-11 PROCEDURE — 3078F DIAST BP <80 MM HG: CPT | Mod: CPTII,S$GLB,, | Performed by: OBSTETRICS & GYNECOLOGY

## 2022-01-11 PROCEDURE — 99999 PR PBB SHADOW E&M-EST. PATIENT-LVL III: CPT | Mod: PBBFAC,,, | Performed by: OBSTETRICS & GYNECOLOGY

## 2022-01-11 PROCEDURE — 1159F PR MEDICATION LIST DOCUMENTED IN MEDICAL RECORD: ICD-10-PCS | Mod: CPTII,S$GLB,, | Performed by: OBSTETRICS & GYNECOLOGY

## 2022-01-11 PROCEDURE — 3008F PR BODY MASS INDEX (BMI) DOCUMENTED: ICD-10-PCS | Mod: CPTII,S$GLB,, | Performed by: OBSTETRICS & GYNECOLOGY

## 2022-01-11 PROCEDURE — 1159F MED LIST DOCD IN RCRD: CPT | Mod: CPTII,S$GLB,, | Performed by: OBSTETRICS & GYNECOLOGY

## 2022-01-11 PROCEDURE — 3008F BODY MASS INDEX DOCD: CPT | Mod: CPTII,S$GLB,, | Performed by: OBSTETRICS & GYNECOLOGY

## 2022-01-11 PROCEDURE — 99024 PR POST-OP FOLLOW-UP VISIT: ICD-10-PCS | Mod: S$GLB,,, | Performed by: OBSTETRICS & GYNECOLOGY

## 2022-01-11 PROCEDURE — 3078F PR MOST RECENT DIASTOLIC BLOOD PRESSURE < 80 MM HG: ICD-10-PCS | Mod: CPTII,S$GLB,, | Performed by: OBSTETRICS & GYNECOLOGY

## 2022-01-11 PROCEDURE — 99999 PR PBB SHADOW E&M-EST. PATIENT-LVL III: ICD-10-PCS | Mod: PBBFAC,,, | Performed by: OBSTETRICS & GYNECOLOGY

## 2022-01-11 PROCEDURE — 99024 POSTOP FOLLOW-UP VISIT: CPT | Mod: S$GLB,,, | Performed by: OBSTETRICS & GYNECOLOGY

## 2022-01-11 NOTE — PROGRESS NOTES
POST-OP NOTE    1/11/2022    HPI: no complaints    Past Medical History:   Diagnosis Date    Abnormal Pap smear of cervix     Allergy     Anxiety     Asthma     rare inhaler use    Constipation     Depression     Endometriosis     Fibromyalgia     Gastritis     HLA B27 (HLA B27 positive)     Migraine     Postpartum depression     Scoliosis     Smoker     stopped in January 2018     Past Surgical History:   Procedure Laterality Date    ADENOIDECTOMY      APPENDECTOMY  09/2014    endometriosis    CHOLECYSTECTOMY  12/20/2017    Dr. MARBELLA Anne, [facility]     COLONOSCOPY N/A 5/19/2016    Procedure: COLONOSCOPY;  Surgeon: Jarret Zhao Jr., MD;  Location: [facility];  Service: Endoscopy;  Laterality: N/A;    COLONOSCOPY N/A 8/27/2020    Dr. Zhao: Erythema & ulcerated mucosa in descending, splenic flexure & distal transverse (r/o UC, r/o ischemia, r/o vasculiti, etc.), Mucosal changes in descending colon (secondary to left-sided colitis, to ischemic colitis(?), to vasculitis(?). hemorrhoids; biopsy: TI & random biopsies- WNL, transverse/hepatic/descending- mild active colitis without definitive features of chronicity    CYSTOSCOPY  12/1/2021    Procedure: CYSTOSCOPY;  Surgeon: Ana María Easley MD;  Location: [facility] OR;  Service: OB/GYN;;    ESOPHAGOGASTRODUODENOSCOPY N/A 10/1/2020    Procedure: EGD (ESOPHAGOGASTRODUODENOSCOPY);  Surgeon: Jarret Zhao Jr., MD;  Location: [facility];  Service: Endoscopy;  Laterality: N/A;    LAPAROSCOPIC OOPHORECTOMY Left 12/1/2021    Procedure: OOPHORECTOMY, LAPAROSCOPIC;  Surgeon: Ana María Easley MD;  Location: [facility] OR;  Service: OB/GYN;  Laterality: Left;    LAPAROSCOPIC SALPINGECTOMY Bilateral 12/1/2021    Procedure: SALPINGECTOMY, LAPAROSCOPIC;  Surgeon: Ana María Easley MD;  Location: [facility] OR;  Service: OB/GYN;  Laterality: Bilateral;    LAPAROSCOPIC TOTAL HYSTERECTOMY N/A 12/1/2021    Procedure: HYSTERECTOMY, TOTAL, LAPAROSCOPIC;  Surgeon: Ana María  Ruth Easley MD;  Location: [facility];  Service: OB/GYN;  Laterality: N/A;    MOUTH SURGERY      x2    PELVIC LAPAROSCOPY      SINUS SURGERY  2021    FESS, turbinate red    TONSILLECTOMY  2016    UPPER GASTROINTESTINAL ENDOSCOPY  2016    Dr. Zhao     Current Outpatient Medications   Medication Sig Dispense Refill    albuterol (PROVENTIL/VENTOLIN HFA) 90 mcg/actuation inhaler Inhale 2 puffs into the lungs every 6 (six) hours as needed for Wheezing. Rescue 18 g 6    Bifidobacterium infantis (ALIGN ORAL) Take by mouth once daily.      citalopram (CELEXA) 40 MG tablet Take 1.5 tablets (60 mg total) by mouth once daily. (Patient taking differently: Take 60 mg by mouth every evening.) 45 tablet 11    doxycycline (VIBRAMYCIN) 100 MG Cap Take 100 mg by mouth once daily.      fluticasone propionate (FLONASE) 50 mcg/actuation nasal spray 1 spray by Each Nostril route 2 (two) times daily.      prenatal 25/iron fum/folic/dha (PRENATAL-1 ORAL) Take 1 tablet by mouth once daily.       No current facility-administered medications for this visit.     Review of patient's allergies indicates:   Allergen Reactions    Latex, natural rubber      burning    Sulfur Hives    Toradol [ketorolac] Other (See Comments)     Metallic taste only once in ; has taken since without problems    Tramadol Other (See Comments)     paralysis    Penicillins Hives and Rash    Sulfa (sulfonamide antibiotics) Hives and Rash     Social History     Tobacco Use    Smoking status: Former Smoker     Packs/day: 0.50     Types: Cigarettes     Quit date: 2018     Years since quittin.0    Smokeless tobacco: Never Used   Substance Use Topics    Alcohol use: No     Alcohol/week: 0.0 standard drinks     Comment: quit since 2018    Drug use: No     /64   Wt 84.1 kg (185 lb 6.5 oz)   LMP 2020 (LMP Unknown)         PE:   APPEARANCE: Well nourished, well developed, in no acute distress.  SKIN: Normal skin turgor,  no lesions.  ABDOMEN: Incision healing fine. Soft. No tenderness or masses. No hepatosplenomegaly. No hernias.  PELVIC: Normal external female genitalia without lesions. Normal hair distribution. Adequate perineal body, normal urethral meatus. Normal palpable bladder and urethra. Vagina moist and well rugated without lesions or discharge.   EXTREMITIES: NO clubbing cyanosis or edema    ASSESSMENT  Encounter Diagnoses   Name Primary?    Postop check Yes       PLAN  1. RTC:2 weeks

## 2022-01-23 ENCOUNTER — PATIENT MESSAGE (OUTPATIENT)
Dept: OBSTETRICS AND GYNECOLOGY | Facility: CLINIC | Age: 27
End: 2022-01-23
Payer: COMMERCIAL

## 2022-01-24 ENCOUNTER — OFFICE VISIT (OUTPATIENT)
Dept: FAMILY MEDICINE | Facility: CLINIC | Age: 27
End: 2022-01-24
Payer: COMMERCIAL

## 2022-01-24 VITALS
OXYGEN SATURATION: 97 % | WEIGHT: 187.81 LBS | BODY MASS INDEX: 28.46 KG/M2 | HEART RATE: 78 BPM | DIASTOLIC BLOOD PRESSURE: 70 MMHG | HEIGHT: 68 IN | SYSTOLIC BLOOD PRESSURE: 110 MMHG

## 2022-01-24 DIAGNOSIS — E78.49 OTHER HYPERLIPIDEMIA: ICD-10-CM

## 2022-01-24 DIAGNOSIS — F41.9 ANXIETY: Primary | ICD-10-CM

## 2022-01-24 DIAGNOSIS — J45.20 MILD INTERMITTENT ASTHMA WITHOUT COMPLICATION: ICD-10-CM

## 2022-01-24 PROCEDURE — 3078F DIAST BP <80 MM HG: CPT | Mod: CPTII,S$GLB,, | Performed by: FAMILY MEDICINE

## 2022-01-24 PROCEDURE — 99214 PR OFFICE/OUTPT VISIT, EST, LEVL IV, 30-39 MIN: ICD-10-PCS | Mod: S$GLB,,, | Performed by: FAMILY MEDICINE

## 2022-01-24 PROCEDURE — 3008F BODY MASS INDEX DOCD: CPT | Mod: CPTII,S$GLB,, | Performed by: FAMILY MEDICINE

## 2022-01-24 PROCEDURE — 99214 OFFICE O/P EST MOD 30 MIN: CPT | Mod: S$GLB,,, | Performed by: FAMILY MEDICINE

## 2022-01-24 PROCEDURE — 3074F SYST BP LT 130 MM HG: CPT | Mod: CPTII,S$GLB,, | Performed by: FAMILY MEDICINE

## 2022-01-24 PROCEDURE — 99999 PR PBB SHADOW E&M-EST. PATIENT-LVL IV: CPT | Mod: PBBFAC,,, | Performed by: FAMILY MEDICINE

## 2022-01-24 PROCEDURE — 3078F PR MOST RECENT DIASTOLIC BLOOD PRESSURE < 80 MM HG: ICD-10-PCS | Mod: CPTII,S$GLB,, | Performed by: FAMILY MEDICINE

## 2022-01-24 PROCEDURE — 1159F MED LIST DOCD IN RCRD: CPT | Mod: CPTII,S$GLB,, | Performed by: FAMILY MEDICINE

## 2022-01-24 PROCEDURE — 3074F PR MOST RECENT SYSTOLIC BLOOD PRESSURE < 130 MM HG: ICD-10-PCS | Mod: CPTII,S$GLB,, | Performed by: FAMILY MEDICINE

## 2022-01-24 PROCEDURE — 1159F PR MEDICATION LIST DOCUMENTED IN MEDICAL RECORD: ICD-10-PCS | Mod: CPTII,S$GLB,, | Performed by: FAMILY MEDICINE

## 2022-01-24 PROCEDURE — 99999 PR PBB SHADOW E&M-EST. PATIENT-LVL IV: ICD-10-PCS | Mod: PBBFAC,,, | Performed by: FAMILY MEDICINE

## 2022-01-24 PROCEDURE — 3008F PR BODY MASS INDEX (BMI) DOCUMENTED: ICD-10-PCS | Mod: CPTII,S$GLB,, | Performed by: FAMILY MEDICINE

## 2022-01-24 RX ORDER — ALPRAZOLAM 0.25 MG/1
0.25 TABLET ORAL DAILY PRN
Qty: 30 TABLET | Refills: 0 | Status: SHIPPED | OUTPATIENT
Start: 2022-01-24 | End: 2024-03-20

## 2022-01-24 RX ORDER — BUPROPION HYDROCHLORIDE 150 MG/1
150 TABLET ORAL DAILY
Qty: 30 TABLET | Refills: 11 | Status: SHIPPED | OUTPATIENT
Start: 2022-01-24 | End: 2022-04-07

## 2022-01-24 RX ORDER — METHYLPREDNISOLONE 4 MG/1
TABLET ORAL
Qty: 1 EACH | Refills: 0 | Status: SHIPPED | OUTPATIENT
Start: 2022-01-24 | End: 2022-02-14

## 2022-01-24 NOTE — PATIENT INSTRUCTIONS
Patient Education       Anxiety Discharge Instructions, Adult   About this topic   Anxiety can cause you to feel very worried. It can also cause physical symptoms like chest pain, stomach aches, or trouble sleeping. While mild anxiety is a normal response to stress, it can cause you problems in your everyday life. You may need follow-up care to help manage your anxiety. Anxiety happens in many forms, like:  · Being scared all the time that something bad is going to happen. This is generalized anxiety.  · Strong bursts of fear where your body has signs that may feel like a heart attack. This is called a panic attack.  · Upsetting thoughts that happen often. There is a need to repeat doing certain things to help get rid of the anxiety caused by these thoughts. The thoughts or actions may be about checking on things, touching things, or worry about germs. This is an obsessive-compulsive disorder.  · Strong fear of an object, place, or condition. This is a phobia.  · Fear that others think bad things about you or being put down by other people. This is social anxiety.  · Nightmares, flashbacks, staying away from people, or having panic attacks when reminded of a shocking or hurtful time or place from the past. This is post-traumatic stress.  Anxiety disorder may be treated in many ways. Some kinds of treatment have you talk about your beliefs, fears, and worries. You may learn how certain thoughts or feelings can raise anxiety. You may also learn what steps to take to lower anxiety. Other kinds of treatment may have you look back on a hurtful event, sad memory, or something you are afraid of. The doctor will help you deal with the feelings that you may have. You may learn ways to cope with unwanted events or thoughts by looking at your fears in a way that feels safe.  What care is needed at home?   · Ask your doctor what you need to do when you go home. Make sure you ask questions if you do not understand what the  doctor says.  · Set a time to talk with a counselor about your worries and feelings. This can help you with your anxiety.  · Take care to follow all instructions when you take your medicines.  · Limit alcohol and caffeine.  · Learn ways to manage stress. Relaxation methods like reflection, deep breathing, and muscle relaxation may be helpful. Things like yoga, exercise, and vanessa chi are also good.  · Talk about your feelings with family members and friends you trust. Talk to someone who can help you see how your thoughts at certain times may raise your anxiety.     What follow-up care is needed?   Your doctor may ask you to make visits to the office to check on your progress. Be sure to keep these visits.  What drugs may be needed?   The doctor may order drugs to help the physical signs of anxiety. Make sure that you take the drugs as taught to you by the doctor. Talk with your doctor about any side effects and ask how long you should take the drug.  Will physical activity be limited?   You may take part in physical activities. Some people are limited because of their anxiety or fear. Talk with your doctor about the right amount of activity for you.  What changes to diet are needed?   Eat a variety of healthy foods and limit drinks with caffeine. You should avoid alcohol, energy drinks, and over-the-counter stimulants.  What problems could happen?   If your anxiety is not treated, it can result in:  · Staying away from work or social events  · Not being able to do everyday tasks  · Keeping away from family and friends  What can be done to prevent this health problem?   · Learn what events, people, or things upset you. Limit your contact with them.  · Talk about your feelings. Talk to someone who can help you see how your thoughts at certain times may raise your anxiety.  · Seek support from your friends and family. Find someone who calms you down. Ask if you can call them when you are getting anxious.  When do I need  to call the doctor?   · You feel you may harm yourself or someone else.  · You can also call a mental health hotline for help.  · You have any physical symptoms, such as chest pain, trouble breathing, or severe belly pain, that could be a sign of a serious problem.  · If you are short of breath.  · If you do not feel like you can be alone.  Teach Back: Helping You Understand   The Teach Back Method helps you understand the information we are giving you. After you talk with the staff, tell them in your own words what you learned. This helps to make sure the staff has described each thing clearly. It also helps to explain things that may have been confusing. Before going home, make sure you can do these:  · I can tell you about my condition and the drugs I need to take.  · I can tell you what may help lower my anxiety.  · I can tell you what I will do if it is hard to breathe or I have chest pain.  · I can tell you what I will do if I do not feel safe or cannot be alone.  Where can I learn more?   NICK  https://www.nick.org/Learn-More/Mental-Health-Conditions/Anxiety-Disorders   National Health Service  https://www.nhs.uk/conditions/generalised-anxiety-disorder/symptoms/   National [place] of Health ? [facility]  https://www.kristal.nih.gov/health/relieving-stress-anxiety-xkmapiyvr-wdtpuifzsn-byfyvtxcbt   [place] of Mental Health  http://www.nimh.nih.gov/health/publications/anxiety-disorders/complete-index.shtml   Last Reviewed Date   2021-06-08  Consumer Information Use and Disclaimer   This information is not specific medical advice and does not replace information you receive from your health care provider. This is only a brief summary of general information. It does NOT include all information about conditions, illnesses, injuries, tests, procedures, treatments, therapies, discharge instructions or life-style choices that may apply to you. You must talk with your health care provider for complete  information about your health and treatment options. This information should not be used to decide whether or not to accept your health care providers advice, instructions or recommendations. Only your health care provider has the knowledge and training to provide advice that is right for you.  Copyright   Copyright © 2021 [employer] and its affiliates and/or licensors. All rights reserved.

## 2022-01-29 NOTE — PROGRESS NOTES
Subjective:       Patient ID: Holly Nichols is a 26 y.o. female.    Chief Complaint: Anxiety    HPI     Anxiety:  The patient is coming here today for a follow-up visit, the patient stated the symptoms of anxiety and getting worse.  The patient is taking Celexa.  The patient is coming here for assessment.    Hyperlipidemia:  The last cholesterol levels were improved from previous, the LDL levels went down but still elevated, the patient is currently not taking any cholesterol medication.  The patient stated that he is trying to changes in her diet.    Asthma:  The patient stated that had a flare-up of asthma in December and had high dosage of prednisone at the time.  The symptoms are improved.  The patient denies any nausea, vomiting but still having some chest tightness, the patient is still using albuterol as needed for wheezing.    Past medical history, past social history was reviewed and discussed with the patient.    Review of Systems   Constitutional: Negative for activity change and unexpected weight change.   HENT: Negative for hearing loss, rhinorrhea and trouble swallowing.    Eyes: Negative for discharge and visual disturbance.   Respiratory: Positive for chest tightness and wheezing.    Cardiovascular: Negative for chest pain and palpitations.   Gastrointestinal: Negative for blood in stool, constipation, diarrhea and vomiting.   Endocrine: Negative for polydipsia and polyuria.   Genitourinary: Negative for difficulty urinating, dysuria, hematuria and menstrual problem.   Musculoskeletal: Negative for arthralgias, joint swelling and neck pain.   Neurological: Negative for weakness and headaches.   Psychiatric/Behavioral: Positive for dysphoric mood. Negative for confusion. The patient is nervous/anxious.        Objective:      Physical Exam  Vitals and nursing note reviewed.   Constitutional:       General: She is not in acute distress.     Appearance: Normal appearance. She is well-developed and  well-nourished. She is not diaphoretic.   HENT:      Head: Normocephalic and atraumatic.      Right Ear: External ear normal.      Left Ear: External ear normal.      Nose: Nose normal.      Mouth/Throat:      Mouth: Oropharynx is clear and moist.      Pharynx: No oropharyngeal exudate.   Eyes:      General: No scleral icterus.        Right eye: No discharge.         Left eye: No discharge.      Conjunctiva/sclera: Conjunctivae normal.   Cardiovascular:      Rate and Rhythm: Normal rate and regular rhythm.      Pulses: Intact distal pulses.      Heart sounds: Normal heart sounds. No murmur heard.      Pulmonary:      Effort: Pulmonary effort is normal. No respiratory distress.      Breath sounds: Normal breath sounds. No wheezing.   Musculoskeletal:         General: No tenderness or deformity.      Cervical back: Neck supple.   Skin:     Coloration: Skin is not pale.   Neurological:      Mental Status: She is alert.      Cranial Nerves: No cranial nerve deficit.   Psychiatric:         Mood and Affect: Mood and affect normal.         Behavior: Behavior normal.         Thought Content: Thought content normal.         Judgment: Judgment normal.         Assessment:       1. Anxiety    2. Other hyperlipidemia    3. Mild intermittent asthma without complication        Plan:       Anxiety:  Worsening  -     buPROPion (WELLBUTRIN XL) 150 MG TB24 tablet; Take 1 tablet (150 mg total) by mouth once daily.  Dispense: 30 tablet; Refill: 11  -     ALPRAZolam (XANAX) 0.25 MG tablet; Take 1 tablet (0.25 mg total) by mouth daily as needed for Anxiety.  Dispense: 30 tablet; Refill: 0    Other hyperlipidemia:  Uncontrolled    Mild intermittent asthma without complication:  Stable  -     methylPREDNISolone (MEDROL DOSEPACK) 4 mg tablet; use as directed  Dispense: 1 each; Refill: 0      Recommend Medrol Dosepak for asthma exacerbation, Wellbutrin and Xanax were prescribed for the patient, if the symptoms get worse, the patient notify us  immediately.  [place] prescription monitoring program was checked and okay.  The patient's BMI has been recorded in the chart. The patient has been provided educational materials regarding the benefits of attaining and maintaining a normal weight. We will continue to address and follow this issue during follow up visits.   Patient agreed with assessment and plan. Patient verbalized understanding.

## 2022-02-07 ENCOUNTER — OFFICE VISIT (OUTPATIENT)
Dept: OBSTETRICS AND GYNECOLOGY | Facility: CLINIC | Age: 27
End: 2022-02-07
Payer: COMMERCIAL

## 2022-02-07 VITALS — SYSTOLIC BLOOD PRESSURE: 98 MMHG | WEIGHT: 182.75 LBS | DIASTOLIC BLOOD PRESSURE: 76 MMHG | BODY MASS INDEX: 27.79 KG/M2

## 2022-02-07 DIAGNOSIS — Z09 POSTOP CHECK: Primary | ICD-10-CM

## 2022-02-07 PROCEDURE — 3078F DIAST BP <80 MM HG: CPT | Mod: CPTII,S$GLB,, | Performed by: OBSTETRICS & GYNECOLOGY

## 2022-02-07 PROCEDURE — 1159F MED LIST DOCD IN RCRD: CPT | Mod: CPTII,S$GLB,, | Performed by: OBSTETRICS & GYNECOLOGY

## 2022-02-07 PROCEDURE — 3008F PR BODY MASS INDEX (BMI) DOCUMENTED: ICD-10-PCS | Mod: CPTII,S$GLB,, | Performed by: OBSTETRICS & GYNECOLOGY

## 2022-02-07 PROCEDURE — 99499 NO LOS: ICD-10-PCS | Mod: S$GLB,,, | Performed by: OBSTETRICS & GYNECOLOGY

## 2022-02-07 PROCEDURE — 99999 PR PBB SHADOW E&M-EST. PATIENT-LVL III: CPT | Mod: PBBFAC,,, | Performed by: OBSTETRICS & GYNECOLOGY

## 2022-02-07 PROCEDURE — 99999 PR PBB SHADOW E&M-EST. PATIENT-LVL III: ICD-10-PCS | Mod: PBBFAC,,, | Performed by: OBSTETRICS & GYNECOLOGY

## 2022-02-07 PROCEDURE — 99499 UNLISTED E&M SERVICE: CPT | Mod: S$GLB,,, | Performed by: OBSTETRICS & GYNECOLOGY

## 2022-02-07 PROCEDURE — 3008F BODY MASS INDEX DOCD: CPT | Mod: CPTII,S$GLB,, | Performed by: OBSTETRICS & GYNECOLOGY

## 2022-02-07 PROCEDURE — 3078F PR MOST RECENT DIASTOLIC BLOOD PRESSURE < 80 MM HG: ICD-10-PCS | Mod: CPTII,S$GLB,, | Performed by: OBSTETRICS & GYNECOLOGY

## 2022-02-07 PROCEDURE — 3074F PR MOST RECENT SYSTOLIC BLOOD PRESSURE < 130 MM HG: ICD-10-PCS | Mod: CPTII,S$GLB,, | Performed by: OBSTETRICS & GYNECOLOGY

## 2022-02-07 PROCEDURE — 1159F PR MEDICATION LIST DOCUMENTED IN MEDICAL RECORD: ICD-10-PCS | Mod: CPTII,S$GLB,, | Performed by: OBSTETRICS & GYNECOLOGY

## 2022-02-07 PROCEDURE — 3074F SYST BP LT 130 MM HG: CPT | Mod: CPTII,S$GLB,, | Performed by: OBSTETRICS & GYNECOLOGY

## 2022-04-06 ENCOUNTER — PATIENT MESSAGE (OUTPATIENT)
Dept: FAMILY MEDICINE | Facility: CLINIC | Age: 27
End: 2022-04-06
Payer: COMMERCIAL

## 2022-04-06 ENCOUNTER — TELEPHONE (OUTPATIENT)
Dept: FAMILY MEDICINE | Facility: CLINIC | Age: 27
End: 2022-04-06
Payer: COMMERCIAL

## 2022-04-07 ENCOUNTER — OFFICE VISIT (OUTPATIENT)
Dept: FAMILY MEDICINE | Facility: CLINIC | Age: 27
End: 2022-04-07
Payer: COMMERCIAL

## 2022-04-07 ENCOUNTER — PATIENT MESSAGE (OUTPATIENT)
Dept: FAMILY MEDICINE | Facility: CLINIC | Age: 27
End: 2022-04-07

## 2022-04-07 DIAGNOSIS — M54.50 ACUTE LEFT-SIDED LOW BACK PAIN WITHOUT SCIATICA: Primary | ICD-10-CM

## 2022-04-07 DIAGNOSIS — R41.840 ATTENTION DEFICIT: ICD-10-CM

## 2022-04-07 PROCEDURE — 99214 PR OFFICE/OUTPT VISIT, EST, LEVL IV, 30-39 MIN: ICD-10-PCS | Mod: 95,,, | Performed by: PHYSICIAN ASSISTANT

## 2022-04-07 PROCEDURE — 99214 OFFICE O/P EST MOD 30 MIN: CPT | Mod: 95,,, | Performed by: PHYSICIAN ASSISTANT

## 2022-04-07 RX ORDER — DICLOFENAC SODIUM 50 MG/1
50 TABLET, DELAYED RELEASE ORAL 2 TIMES DAILY
Qty: 20 TABLET | Refills: 0 | Status: SHIPPED | OUTPATIENT
Start: 2022-04-07 | End: 2022-04-17

## 2022-04-07 RX ORDER — METHOCARBAMOL 500 MG/1
500 TABLET, FILM COATED ORAL 4 TIMES DAILY
Qty: 40 TABLET | Refills: 0 | Status: SHIPPED | OUTPATIENT
Start: 2022-04-07 | End: 2022-04-17

## 2022-04-07 NOTE — PROGRESS NOTES
Subjective:       Patient ID: Holly Nichols is a 26 y.o. female       Chief Complaint: No chief complaint on file.    HPI  Patient's  PCP: Marie Berumen MD.  The patient's last visit with me was on 11/18/2020  Patient's past medical history includes:      She is CO back pain that is worse on the left.  She has scoliosis and her back pain has flaired up. She usually manages her pain with her heating pad and TENS unit. 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     She is having trouble focusing. She has had these issues since childhood. She has been taking Wellbutrin 150 mg since January without any relief. She feels that since adding the Wellbutrin she has felt more anxious.  He is currently back in school and requests adderall because it has helped her in the past.     Review of Systems   Constitutional: Negative for activity change and unexpected weight change.   HENT: Negative for hearing loss, rhinorrhea and trouble swallowing.    Eyes: Negative for discharge and visual disturbance.   Respiratory: Negative for chest tightness and wheezing.    Cardiovascular: Negative for chest pain and palpitations.   Gastrointestinal: Negative for blood in stool, constipation, diarrhea and vomiting.   Endocrine: Negative for polydipsia and polyuria.   Genitourinary: Negative for difficulty urinating, dysuria, hematuria and menstrual problem.   Musculoskeletal: Positive for arthralgias and back pain. Negative for joint swelling and neck pain.   Neurological: Positive for headaches. Negative for weakness.   Psychiatric/Behavioral: Positive for decreased concentration. Negative for confusion and dysphoric mood.        Objective:   Physical Exam  Constitutional:       General: She is not in acute distress.     Appearance: She is well-developed.   HENT:      Head: Normocephalic and atraumatic.   Neck:      Thyroid: No thyromegaly.   Pulmonary:      Effort: Pulmonary effort is normal.    Musculoskeletal:         General: Normal range of motion.      Cervical back: Normal range of motion and neck supple.   Skin:     Findings: No erythema or rash.   Neurological:      Mental Status: She is alert and oriented to person, place, and time.   Psychiatric:         Behavior: Behavior normal.         Thought Content: Thought content normal.         Judgment: Judgment normal.          Assessment:       1. Acute left-sided low back pain without sciatica  methocarbamoL (ROBAXIN) 500 MG Tab    diclofenac (VOLTAREN) 50 MG EC tablet    Ambulatory referral/consult to Pain Clinic   2. Attention deficit          Plan:       Acute left-sided low back pain without sciatica  -     START methocarbamoL (ROBAXIN) 500 MG Tab; Take 1 tablet (500 mg total) by mouth 4 (four) times daily. for 10 days  Dispense: 40 tablet; Refill: 0  -     START diclofenac (VOLTAREN) 50 MG EC tablet; Take 1 tablet (50 mg total) by mouth 2 (two) times daily. for 10 days  Dispense: 20 tablet; Refill: 0  -     Ambulatory referral/consult to Pain Clinic; Future; Expected date: 04/14/2022    Attention deficit  - message sent to Dr Berumen for recommendations       No follow-ups on file.       Marce Harris PA-C   04/07/2022   10:36 AM

## 2022-04-07 NOTE — TELEPHONE ENCOUNTER
This patient is requesting adderall.  She tired Wellbutrin which made her more anxious.  Do you want her to see psyc first.  She has been prescribed adderall in the past but never diagnosed with ADHD.   checked (she takes alprazolam PRN, she has not been taking it because she is in school).  She reports taking 5 times since January.

## 2022-04-08 RX ORDER — DEXTROAMPHETAMINE SACCHARATE, AMPHETAMINE ASPARTATE MONOHYDRATE, DEXTROAMPHETAMINE SULFATE AND AMPHETAMINE SULFATE 2.5; 2.5; 2.5; 2.5 MG/1; MG/1; MG/1; MG/1
10 CAPSULE, EXTENDED RELEASE ORAL EVERY MORNING
Qty: 30 CAPSULE | Refills: 0 | Status: SHIPPED | OUTPATIENT
Start: 2022-04-08 | End: 2022-05-12 | Stop reason: SDUPTHER

## 2022-04-08 NOTE — TELEPHONE ENCOUNTER
Please let the patient know that Dr. Berumen has prescribed extended-release Adderall for her to start taking.  She will need a one-month follow-up with Dr. Berumen to discuss this new medication.  Please schedule her for a virtual visit with Dr. Berumen in 1 month

## 2022-04-19 ENCOUNTER — PATIENT MESSAGE (OUTPATIENT)
Dept: GASTROENTEROLOGY | Facility: CLINIC | Age: 27
End: 2022-04-19
Payer: COMMERCIAL

## 2022-04-20 ENCOUNTER — PATIENT MESSAGE (OUTPATIENT)
Dept: GASTROENTEROLOGY | Facility: CLINIC | Age: 27
End: 2022-04-20
Payer: COMMERCIAL

## 2022-04-20 NOTE — TELEPHONE ENCOUNTER
My chart message sent to daniel , see below    Per Nina Bedoya NP-   Patient needs to follow-up appointment for continued evaluation and management. Likely will need another colonoscopy.  Thanks  SUDHIR

## 2022-04-20 NOTE — TELEPHONE ENCOUNTER
Patient needs to follow-up appointment for continued evaluation and management. Likely will need another colonoscopy.  Thanks  SUDHIR

## 2022-04-21 ENCOUNTER — OFFICE VISIT (OUTPATIENT)
Dept: PAIN MEDICINE | Facility: CLINIC | Age: 27
End: 2022-04-21
Payer: COMMERCIAL

## 2022-04-21 ENCOUNTER — HOSPITAL ENCOUNTER (OUTPATIENT)
Dept: RADIOLOGY | Facility: HOSPITAL | Age: 27
Discharge: HOME | End: 2022-04-21
Attending: ANESTHESIOLOGY
Payer: COMMERCIAL

## 2022-04-21 VITALS
BODY MASS INDEX: 25.27 KG/M2 | DIASTOLIC BLOOD PRESSURE: 78 MMHG | WEIGHT: 176.5 LBS | HEART RATE: 89 BPM | HEIGHT: 70 IN | SYSTOLIC BLOOD PRESSURE: 126 MMHG

## 2022-04-21 DIAGNOSIS — M54.2 CERVICALGIA: ICD-10-CM

## 2022-04-21 DIAGNOSIS — G89.29 CHRONIC BILATERAL LOW BACK PAIN WITHOUT SCIATICA: ICD-10-CM

## 2022-04-21 DIAGNOSIS — M47.816 LUMBAR SPONDYLOSIS: ICD-10-CM

## 2022-04-21 DIAGNOSIS — M79.7 FIBROMYALGIA: ICD-10-CM

## 2022-04-21 DIAGNOSIS — M47.816 LUMBAR SPONDYLOSIS: Primary | ICD-10-CM

## 2022-04-21 DIAGNOSIS — M54.9 DORSALGIA: ICD-10-CM

## 2022-04-21 DIAGNOSIS — M54.50 CHRONIC BILATERAL LOW BACK PAIN WITHOUT SCIATICA: ICD-10-CM

## 2022-04-21 PROCEDURE — 1159F PR MEDICATION LIST DOCUMENTED IN MEDICAL RECORD: ICD-10-PCS | Mod: CPTII,S$GLB,, | Performed by: ANESTHESIOLOGY

## 2022-04-21 PROCEDURE — 72052 X-RAY EXAM NECK SPINE 6/>VWS: CPT | Mod: 26,,, | Performed by: RADIOLOGY

## 2022-04-21 PROCEDURE — 3008F BODY MASS INDEX DOCD: CPT | Mod: CPTII,S$GLB,, | Performed by: ANESTHESIOLOGY

## 2022-04-21 PROCEDURE — 3078F DIAST BP <80 MM HG: CPT | Mod: CPTII,S$GLB,, | Performed by: ANESTHESIOLOGY

## 2022-04-21 PROCEDURE — 72114 XR LUMBAR SPINE 5 VIEW WITH FLEX AND EXT: ICD-10-PCS | Mod: 26,,, | Performed by: RADIOLOGY

## 2022-04-21 PROCEDURE — 72114 X-RAY EXAM L-S SPINE BENDING: CPT | Mod: 26,,, | Performed by: RADIOLOGY

## 2022-04-21 PROCEDURE — 72114 X-RAY EXAM L-S SPINE BENDING: CPT | Mod: TC,PO

## 2022-04-21 PROCEDURE — 99204 PR OFFICE/OUTPT VISIT, NEW, LEVL IV, 45-59 MIN: ICD-10-PCS | Mod: S$GLB,,, | Performed by: ANESTHESIOLOGY

## 2022-04-21 PROCEDURE — 99999 PR PBB SHADOW E&M-EST. PATIENT-LVL V: ICD-10-PCS | Mod: PBBFAC,,, | Performed by: ANESTHESIOLOGY

## 2022-04-21 PROCEDURE — 3078F PR MOST RECENT DIASTOLIC BLOOD PRESSURE < 80 MM HG: ICD-10-PCS | Mod: CPTII,S$GLB,, | Performed by: ANESTHESIOLOGY

## 2022-04-21 PROCEDURE — 3074F SYST BP LT 130 MM HG: CPT | Mod: CPTII,S$GLB,, | Performed by: ANESTHESIOLOGY

## 2022-04-21 PROCEDURE — 1160F RVW MEDS BY RX/DR IN RCRD: CPT | Mod: CPTII,S$GLB,, | Performed by: ANESTHESIOLOGY

## 2022-04-21 PROCEDURE — 72052 X-RAY EXAM NECK SPINE 6/>VWS: CPT | Mod: TC,PO

## 2022-04-21 PROCEDURE — 1160F PR REVIEW ALL MEDS BY PRESCRIBER/CLIN PHARMACIST DOCUMENTED: ICD-10-PCS | Mod: CPTII,S$GLB,, | Performed by: ANESTHESIOLOGY

## 2022-04-21 PROCEDURE — 3008F PR BODY MASS INDEX (BMI) DOCUMENTED: ICD-10-PCS | Mod: CPTII,S$GLB,, | Performed by: ANESTHESIOLOGY

## 2022-04-21 PROCEDURE — 99999 PR PBB SHADOW E&M-EST. PATIENT-LVL V: CPT | Mod: PBBFAC,,, | Performed by: ANESTHESIOLOGY

## 2022-04-21 PROCEDURE — 1159F MED LIST DOCD IN RCRD: CPT | Mod: CPTII,S$GLB,, | Performed by: ANESTHESIOLOGY

## 2022-04-21 PROCEDURE — 99204 OFFICE O/P NEW MOD 45 MIN: CPT | Mod: S$GLB,,, | Performed by: ANESTHESIOLOGY

## 2022-04-21 PROCEDURE — 72052 XR CERVICAL SPINE 5 VIEW WITH FLEX AND EXT: ICD-10-PCS | Mod: 26,,, | Performed by: RADIOLOGY

## 2022-04-21 PROCEDURE — 3074F PR MOST RECENT SYSTOLIC BLOOD PRESSURE < 130 MM HG: ICD-10-PCS | Mod: CPTII,S$GLB,, | Performed by: ANESTHESIOLOGY

## 2022-04-21 RX ORDER — GABAPENTIN 300 MG/1
300 CAPSULE ORAL 3 TIMES DAILY
Qty: 90 CAPSULE | Refills: 0 | Status: SHIPPED | OUTPATIENT
Start: 2022-04-21 | End: 2022-05-17

## 2022-04-21 NOTE — PROGRESS NOTES
[facility] New Patient Evaluation    Referred by: Marce Harris PA-C  Reason for referral: back pain    CC:   Chief Complaint   Patient presents with    Back Pain      Last 3 PDI Scores 4/21/2022   Pain Disability Index (PDI) 35       HPI:   Holly Nichols is a 26 y.o. female who presents with back pain.  She has had back pain since 2008.  She has known scoliosis.  She has known fibromyalgia.  Today she rates her pain 4-10, constant, throbbing, tingling, deep.  She has neck pain into the trapezius muscles.  She also has midthoracic and lower back pain.  Denies any rosmery radicular symptoms.  No numbness no weakness.  Pain worse with standing, bending, walking, nighttime and not relieved with anything.    History:    Current Outpatient Medications:     albuterol (PROVENTIL/VENTOLIN HFA) 90 mcg/actuation inhaler, Inhale 2 puffs into the lungs every 6 (six) hours as needed for Wheezing. Rescue, Disp: 18 g, Rfl: 6    ALPRAZolam (XANAX) 0.25 MG tablet, Take 1 tablet (0.25 mg total) by mouth daily as needed for Anxiety., Disp: 30 tablet, Rfl: 0    Bifidobacterium infantis (ALIGN ORAL), Take by mouth once daily., Disp: , Rfl:     citalopram (CELEXA) 20 MG tablet, TAKE 1 TABLET BY MOUTH ONCE DAILY, Disp: 30 tablet, Rfl: 2    citalopram (CELEXA) 40 MG tablet, Take 1.5 tablets (60 mg total) by mouth once daily. (Patient taking differently: Take 60 mg by mouth every evening.), Disp: 45 tablet, Rfl: 11    dextroamphetamine-amphetamine (ADDERALL XR) 10 MG 24 hr capsule, Take 1 capsule (10 mg total) by mouth every morning., Disp: 30 capsule, Rfl: 0    fluticasone propionate (FLONASE) 50 mcg/actuation nasal spray, 1 spray by Each Nostril route 2 (two) times daily., Disp: , Rfl:     gabapentin (NEURONTIN) 300 MG capsule, Take 1 capsule (300 mg total) by mouth 3 (three) times daily., Disp: 90 capsule, Rfl: 0    Past Medical History:   Diagnosis Date    Abnormal Pap smear of cervix     Allergy     Anxiety      Asthma     rare inhaler use    Constipation     Depression     Endometriosis     Fibromyalgia     Gastritis     HLA B27 (HLA B27 positive)     Migraine     Postpartum depression     Scoliosis     Smoker     stopped in January 2018       Past Surgical History:   Procedure Laterality Date    ADENOIDECTOMY      APPENDECTOMY  09/2014    endometriosis    CHOLECYSTECTOMY  12/20/2017    Dr. MARBELLA Anne, [facility]     COLONOSCOPY N/A 5/19/2016    Procedure: COLONOSCOPY;  Surgeon: Jarret Zhao Jr., MD;  Location: [facility];  Service: Endoscopy;  Laterality: N/A;    COLONOSCOPY N/A 8/27/2020    Dr. Zhao: Erythema & ulcerated mucosa in descending, splenic flexure & distal transverse (r/o UC, r/o ischemia, r/o vasculiti, etc.), Mucosal changes in descending colon (secondary to left-sided colitis, to ischemic colitis(?), to vasculitis(?). hemorrhoids; biopsy: TI & random biopsies- WNL, transverse/hepatic/descending- mild active colitis without definitive features of chronicity    CYSTOSCOPY  12/1/2021    Procedure: CYSTOSCOPY;  Surgeon: Ana María Easley MD;  Location: [facility] OR;  Service: OB/GYN;;    ESOPHAGOGASTRODUODENOSCOPY N/A 10/1/2020    Procedure: EGD (ESOPHAGOGASTRODUODENOSCOPY);  Surgeon: Jarret Zhao Jr., MD;  Location: [facility];  Service: Endoscopy;  Laterality: N/A;    LAPAROSCOPIC OOPHORECTOMY Left 12/1/2021    Procedure: OOPHORECTOMY, LAPAROSCOPIC;  Surgeon: Ana María Easley MD;  Location: [facility] OR;  Service: OB/GYN;  Laterality: Left;    LAPAROSCOPIC SALPINGECTOMY Bilateral 12/1/2021    Procedure: SALPINGECTOMY, LAPAROSCOPIC;  Surgeon: Ana María Easley MD;  Location: [facility] OR;  Service: OB/GYN;  Laterality: Bilateral;    LAPAROSCOPIC TOTAL HYSTERECTOMY N/A 12/1/2021    Procedure: HYSTERECTOMY, TOTAL, LAPAROSCOPIC;  Surgeon: Ana María Easley MD;  Location: [facility] OR;  Service: OB/GYN;  Laterality: N/A;    MOUTH SURGERY      x2    PELVIC LAPAROSCOPY      SINUS SURGERY   2021    FESS, turbinate red    TONSILLECTOMY  2016    UPPER GASTROINTESTINAL ENDOSCOPY  2016    Dr. Zhao       Family History   Problem Relation Age of Onset    Fibromyalgia Mother     Depression Mother     Irritable bowel syndrome Mother     Diabetes Father     Depression Father     Mental illness Father         bipolar disorder    Asthma Father     Multiple sclerosis Maternal Aunt     Crohn's disease Maternal Grandmother     Colon cancer Neg Hx     Ulcerative colitis Neg Hx     Stomach cancer Neg Hx     Esophageal cancer Neg Hx     Breast cancer Neg Hx     Ovarian cancer Neg Hx     Celiac disease Neg Hx        Social History     Socioeconomic History    Marital status:     Number of children: 0   Occupational History    Occupation: hotel management   Tobacco Use    Smoking status: Former Smoker     Packs/day: 0.50     Types: Cigarettes     Quit date: 2018     Years since quittin.3    Smokeless tobacco: Never Used   Substance and Sexual Activity    Alcohol use: No     Alcohol/week: 0.0 standard drinks     Comment: quit since     Drug use: No    Sexual activity: Yes     Partners: Male   Social History Narrative    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     Social Determinants of Health     Financial Resource Strain: Unknown    Difficulty of Paying Living Expenses: Patient refused   Food Insecurity: Unknown    Worried About Running Out of Food in the Last Year: Patient refused    Ran Out of Food in the Last Year: Patient refused   Transportation Needs: Unknown    Lack of Transportation (Medical): Patient refused    Lack of Transportation (Non-Medical): Patient refused   Physical Activity: Unknown    Days of Exercise per Week: Patient refused    Minutes of Exercise per Session: Patient refused   Stress: Unknown    Feeling of Stress : Patient refused   Social Connections: Unknown    Frequency of Communication with Friends and Family: Patient refused  "   Frequency of Social Gatherings with Friends and Family: Patient refused    Active Member of Clubs or Organizations: Patient refused    Attends Club or Organization Meetings: Patient refused    Marital Status:    Housing Stability: Unknown    Unable to Pay for Housing in the Last Year: Patient refused    Number of Places Lived in the Last Year: 1    Unstable Housing in the Last Year: Patient refused       Review of patient's allergies indicates:   Allergen Reactions    Latex, natural rubber      burning    Sulfur Hives    Toradol [ketorolac] Other (See Comments)     Metallic taste only once in 2013; has taken since without problems    Tramadol Other (See Comments)     paralysis    Penicillins Hives and Rash    Sulfa (sulfonamide antibiotics) Hives and Rash       Review of Systems:  General ROS: negative for - fever  Psychological ROS: negative for - hostility  Hematological and Lymphatic ROS: negative for - bleeding problems  Endocrine ROS: negative for - unexpected weight changes  Respiratory ROS: no cough, shortness of breath, or wheezing  Cardiovascular ROS: no chest pain or dyspnea on exertion  Gastrointestinal ROS: no abdominal pain, change in bowel habits, or black or bloody stools  Musculoskeletal ROS: negative for - muscular weakness  Neurological ROS: negative for - numbness/tingling  Dermatological ROS: negative for rash    Physical Exam:  Vitals:    04/21/22 1101   BP: 126/78   Pulse: 89   Weight: 80 kg (176 lb 7.7 oz)   Height: 5' 9.6" (1.768 m)   PainSc:   5   PainLoc: Back     Body mass index is 25.61 kg/m².    Gen: NAD  Psych: mood appropriate for given condition  CV: 2+ radial pulse  HEENT: anicteric   Respiratory: non labored  Abd: soft nt, nd  Skin: intact  Sensation: intact to lt touch bilaterally in c4-t1   Reflexes: 2+ b/l Bicep, Mccartney negative  ROM: Cervical ROM full, shoulder, elbow and wrist ROM full  Tone:  Normal at elbow, wrist and shoulder   Inspection: no atrophy " of bicep, FDI or APB noted  Special tests: minimal axial facet loading  Palpation: tender trapezius    Motor:    Right Left   C4 Shoulder Abduction  5  5   C5 Elbow Flexion    5  5   C6 Wrist Extension  5  5   C7 Elbow Extension   5  5   C8/T1 Hand Intrinsics   5  5                 Gait: gait intact  ROM: limited AROM of the L spine in all planes, full ROM at ankles, knees and hips  Sensation: intact to light touch in all dermatomes tested from L2-S1 bilaterally  Reflexes: 2+ b/l patella and achilles  Palpation: Diffusely tender over lumbar paraspinals  -TTP over the b/l greater trochanters and bilateral SI joint  Tone: normal in the b/l knees and hips   Skin: intact  Extremities: No edema in b/l ankles or hands  Provacative tests: + b/l axial facet loading       Right Left   L2/3 Iliacus Hip flexion  5  5   L3/4 Qudratus Femoris Knee Extension  5  5   L4/5 Tib Anterior Ankle Dorsiflexion   5  5   L5/S1 Extensor Hallicus Longus Great toe extension  5  5                 S1/S2 Gastroc/Soleus Plantar Flexion  5  5       Imaging:  Xray spine 11/6/2020  FINDINGS:  S-shaped scoliosis of the thoracolumbar spine with dextroconvex curvature of the thoracic spine and levoconvex curvature of the lumbar spine.  The thoracic dextroconvex curvature measures approximately 30.6 degrees measured from the superior endplate of T4 to the inferior endplate of T11.  Levoconvex curvature of the lumbar spine measures approximately 20.7 degrees measured from the superior endplate of T12 to the inferior endplate of L3.  sagittal balance unable to be assessed due to obscuration of the C7 vertebral body.  No evidence for vertebral segmentation anomaly.  Vertebral body heights are maintained.  No suspicious appearing lytic or blastic lesions.    Xray lumbar spine 4/21/22  FINDINGS:  Vertebral bodies are normal in height without evidence of fracture.  Mild rotatory levoconvex curvature of the lumbar spine.  Normal sagittal alignment is  preserved.  No spondylolisthesis. No abnormal translation with flexion and extension.  Intervertebral disc heights are well maintained.Mild lower lumbar facet arthropathy.    Xray cervical spine 4/21/22  FINDINGS:  C1-C2: Pre-dens space is maintained.  Dens and lateral masses of C1 are unremarkable.  Alignment: Chronic straightening of the normal cervical lordosis, possibly secondary to positioning or muscle spasm.  No spondylolisthesis.  No dynamic instability.  Vertebrae: Vertebral body heights are maintained.  No suspicious appearing lytic or blastic lesions.  Discs and facets: Disc heights are maintained.  Facet joints are unremarkable. Neural foramina are maintained on oblique projections.  Miscellaneous: No additional findings.    Labs:  BMP  Lab Results   Component Value Date     11/29/2021    K 3.9 11/29/2021    CL 99 11/29/2021    CO2 30 11/29/2021    BUN 15 11/29/2021    CREATININE 0.59 11/29/2021    CALCIUM 8.8 11/29/2021    ANIONGAP 8 11/29/2021    ESTGFRAFRICA >60 11/29/2021    EGFRNONAA >60 11/29/2021     Lab Results   Component Value Date    ALT 31 10/13/2021    AST 20 10/13/2021    ALKPHOS 122 10/13/2021    BILITOT 0.4 10/13/2021       Assessment:   Problem List Items Addressed This Visit        Orthopedic    Fibromyalgia    Relevant Medications    gabapentin (NEURONTIN) 300 MG capsule    Other Relevant Orders    Ambulatory referral/consult to Physical/Occupational Therapy      Other Visit Diagnoses     Lumbar spondylosis    -  Primary    Relevant Orders    X-Ray Lumbar Complete Including Flex And Ext (Completed)    Chronic bilateral low back pain without sciatica        Relevant Orders    HME - OTHER    Cervicalgia        Relevant Orders    X-Ray Cervical Spine 5 View With Flex And Ext (Completed)    Dorsalgia        Relevant Orders    HME - OTHER            26 y.o. year old female who presents with back pain.  She has had back pain since 2008.  She has known scoliosis.  She has known  fibromyalgia.  Today she rates her pain 4-10, constant, throbbing, tingling, deep.  She has neck pain into the trapezius muscles.  She also has midthoracic and lower back pain.  Denies any rosmery radicular symptoms.  No numbness no weakness.  Pain worse with standing, bending, walking, nighttime and not relieved with anything.    - on exam she is neurologically intact.  She has quite taut infirm trapezius muscles that I think are causing some of the shoulder neck pain.  She has some minimal cervical axial facet loading.  She has reproducible pain with lumbar axial facet loading.  - on her lumbar x-ray she has some mild lower lumbar facet arthropathy  - I think she has multifactorial pain.  I think she has myofascial pain.  I think she has facet mediated pain primarily in the lower back.  I also think that she has contribution from fibromyalgia causing some of her symptoms.  - she has been good with physical therapy in the past and maintains home exercises.  I think she would benefit from aquatic therapy and her parents have a pool so placed a referral for her to learn some aquatic exercises here at [facility] and she can maintain a home aquatic program  - I prescribed her gabapentin 300 mg t.i.d. to trial for a month for fibromyalgia  - she has had good use of TENS unit in the past for dorsalgia.  I have prescribed her a new TENS unit as rolling has broken  - she is going to maintain these conservative measures for the next couple months and she will follow-up with me in about 2 months and if she still has some spot areas we can consider further interventions as needed    : Not applicable    Dain Lawrence M.D.  Interventional Pain Medicine / Anesthesiology    This note was completed with dictation software and grammatical errors may exist.

## 2022-04-22 ENCOUNTER — PATIENT MESSAGE (OUTPATIENT)
Dept: PAIN MEDICINE | Facility: CLINIC | Age: 27
End: 2022-04-22
Payer: COMMERCIAL

## 2022-04-22 DIAGNOSIS — M41.125 ADOLESCENT IDIOPATHIC SCOLIOSIS OF THORACOLUMBAR REGION: Primary | ICD-10-CM

## 2022-04-22 NOTE — TELEPHONE ENCOUNTER
Yes I can place a referral to the scoliosis specialist on the [place] to discuss will potential options she may have for surgical correction.    Referral placed

## 2022-05-12 RX ORDER — DEXTROAMPHETAMINE SACCHARATE, AMPHETAMINE ASPARTATE MONOHYDRATE, DEXTROAMPHETAMINE SULFATE AND AMPHETAMINE SULFATE 2.5; 2.5; 2.5; 2.5 MG/1; MG/1; MG/1; MG/1
10 CAPSULE, EXTENDED RELEASE ORAL EVERY MORNING
Qty: 30 CAPSULE | Refills: 0 | Status: SHIPPED | OUTPATIENT
Start: 2022-05-12 | End: 2022-05-31 | Stop reason: DRUGHIGH

## 2022-05-12 NOTE — TELEPHONE ENCOUNTER
No new care gaps identified.  [facility] Embedded Care Gaps. Reference number: [id number]. 5/12/2022   11:46:37 AM COYT

## 2022-05-17 ENCOUNTER — OFFICE VISIT (OUTPATIENT)
Dept: FAMILY MEDICINE | Facility: CLINIC | Age: 27
End: 2022-05-17
Payer: COMMERCIAL

## 2022-05-17 VITALS
DIASTOLIC BLOOD PRESSURE: 72 MMHG | WEIGHT: 173.5 LBS | HEIGHT: 70 IN | SYSTOLIC BLOOD PRESSURE: 112 MMHG | BODY MASS INDEX: 24.84 KG/M2 | TEMPERATURE: 98 F | OXYGEN SATURATION: 96 % | HEART RATE: 86 BPM

## 2022-05-17 DIAGNOSIS — J01.10 ACUTE FRONTAL SINUSITIS, RECURRENCE NOT SPECIFIED: Primary | ICD-10-CM

## 2022-05-17 DIAGNOSIS — B37.31 VAGINAL CANDIDA: ICD-10-CM

## 2022-05-17 PROCEDURE — 3008F BODY MASS INDEX DOCD: CPT | Mod: CPTII,S$GLB,, | Performed by: PHYSICIAN ASSISTANT

## 2022-05-17 PROCEDURE — 3078F PR MOST RECENT DIASTOLIC BLOOD PRESSURE < 80 MM HG: ICD-10-PCS | Mod: CPTII,S$GLB,, | Performed by: PHYSICIAN ASSISTANT

## 2022-05-17 PROCEDURE — 3074F PR MOST RECENT SYSTOLIC BLOOD PRESSURE < 130 MM HG: ICD-10-PCS | Mod: CPTII,S$GLB,, | Performed by: PHYSICIAN ASSISTANT

## 2022-05-17 PROCEDURE — 99213 PR OFFICE/OUTPT VISIT, EST, LEVL III, 20-29 MIN: ICD-10-PCS | Mod: S$GLB,,, | Performed by: PHYSICIAN ASSISTANT

## 2022-05-17 PROCEDURE — 99999 PR PBB SHADOW E&M-EST. PATIENT-LVL IV: ICD-10-PCS | Mod: PBBFAC,,, | Performed by: PHYSICIAN ASSISTANT

## 2022-05-17 PROCEDURE — 3078F DIAST BP <80 MM HG: CPT | Mod: CPTII,S$GLB,, | Performed by: PHYSICIAN ASSISTANT

## 2022-05-17 PROCEDURE — 99213 OFFICE O/P EST LOW 20 MIN: CPT | Mod: S$GLB,,, | Performed by: PHYSICIAN ASSISTANT

## 2022-05-17 PROCEDURE — 3008F PR BODY MASS INDEX (BMI) DOCUMENTED: ICD-10-PCS | Mod: CPTII,S$GLB,, | Performed by: PHYSICIAN ASSISTANT

## 2022-05-17 PROCEDURE — 99999 PR PBB SHADOW E&M-EST. PATIENT-LVL IV: CPT | Mod: PBBFAC,,, | Performed by: PHYSICIAN ASSISTANT

## 2022-05-17 PROCEDURE — 3074F SYST BP LT 130 MM HG: CPT | Mod: CPTII,S$GLB,, | Performed by: PHYSICIAN ASSISTANT

## 2022-05-17 RX ORDER — FLUCONAZOLE 150 MG/1
TABLET ORAL
Qty: 2 TABLET | Refills: 0 | Status: SHIPPED | OUTPATIENT
Start: 2022-05-17 | End: 2022-09-16

## 2022-05-17 RX ORDER — CEFDINIR 300 MG/1
300 CAPSULE ORAL 2 TIMES DAILY
Qty: 14 CAPSULE | Refills: 0 | Status: SHIPPED | OUTPATIENT
Start: 2022-05-17 | End: 2022-05-24

## 2022-05-17 NOTE — PROGRESS NOTES
"Subjective:      Patient ID: Holly Nichols is a 26 y.o. female.    Chief Complaint: Sore Throat (Nasal drip, and fatigue for a week; other family members in house sick as well, kids tested negative for flu)  Patient is new to me.    HPI   Patient has PMH of migraines, depression/anxiety, asthma, HLD, fibromyalgia, and tobacco dependence.    Patient reports sore throat, myalgias, and headaches since Friday.  Sick contacts.  Taken mucinex Fast Max and Benedryl without resolution of symptoms.  Patient denies fever, cough, chest pain, or shortness of breath.    Review of Systems   Constitutional: Positive for chills and fatigue. Negative for fever.   HENT: Positive for ear pain (fluid) and sore throat.    Eyes: Negative for pain.   Respiratory: Negative for cough and shortness of breath.    Cardiovascular: Negative for chest pain.   Gastrointestinal: Positive for constipation and diarrhea (more). Negative for abdominal pain, nausea and vomiting.   Musculoskeletal: Positive for myalgias.   Neurological: Positive for headaches. Negative for dizziness and light-headedness.   Hematological: Positive for adenopathy (anterior cervical).       Objective:   /72   Pulse 86   Temp 98.1 °F (36.7 °C) (Oral)   Ht 5' 9.6" (1.768 m)   Wt 78.7 kg (173 lb 8 oz)   LMP 11/11/2020 (LMP Unknown)   SpO2 96%   BMI 25.18 kg/m²      Physical Exam  Vitals reviewed.   Constitutional:       General: She is not in acute distress.     Appearance: Normal appearance. She is well-developed and well-groomed.   HENT:      Head: Normocephalic and atraumatic.      Right Ear: Hearing, tympanic membrane, ear canal and external ear normal.      Left Ear: Hearing, tympanic membrane, ear canal and external ear normal.      Nose:      Right Sinus: Frontal sinus tenderness present. No maxillary sinus tenderness.      Left Sinus: Frontal sinus tenderness present. No maxillary sinus tenderness.      Mouth/Throat:      Lips: Pink.      Mouth: Mucous " membranes are moist.      Pharynx: Oropharynx is clear.   Eyes:      General: Lids are normal.      Conjunctiva/sclera: Conjunctivae normal.   Neck:      Trachea: Phonation normal.   Cardiovascular:      Rate and Rhythm: Normal rate and regular rhythm.      Heart sounds: Normal heart sounds. No murmur heard.    No friction rub. No gallop.   Pulmonary:      Effort: Pulmonary effort is normal. No respiratory distress.      Breath sounds: Normal breath sounds. No decreased breath sounds, wheezing, rhonchi or rales.   Musculoskeletal:         General: Normal range of motion.      Cervical back: Normal range of motion.   Lymphadenopathy:      Cervical: No cervical adenopathy.   Skin:     General: Skin is warm and dry.      Findings: No rash.   Neurological:      General: No focal deficit present.      Mental Status: She is alert and oriented to person, place, and time.   Psychiatric:         Attention and Perception: Attention normal.         Mood and Affect: Mood normal.         Speech: Speech normal.         Behavior: Behavior normal. Behavior is cooperative.         Cognition and Memory: Cognition normal.         Judgment: Judgment normal.        Assessment:      1. Acute frontal sinusitis, recurrence not specified       Plan:   1. Acute frontal sinusitis, recurrence not specified  - cefdinir (OMNICEF) 300 MG capsule; Take 1 capsule (300 mg total) by mouth 2 (two) times daily. for 7 days  Dispense: 14 capsule; Refill: 0    2. Vaginal candida  - fluconazole (DIFLUCAN) 150 MG Tab; Take 1 Tab with symptoms, wait 3 days and take 1 Tab if symptoms continue.  Dispense: 2 tablet; Refill: 0    Follow up as needed.  Patient agreed with plan and expressed understanding.    Thank you for allowing me to serve you,

## 2022-05-18 ENCOUNTER — TELEPHONE (OUTPATIENT)
Dept: GASTROENTEROLOGY | Facility: CLINIC | Age: 27
End: 2022-05-18
Payer: COMMERCIAL

## 2022-05-18 ENCOUNTER — OFFICE VISIT (OUTPATIENT)
Dept: GASTROENTEROLOGY | Facility: CLINIC | Age: 27
End: 2022-05-18
Payer: COMMERCIAL

## 2022-05-18 VITALS — WEIGHT: 173 LBS | HEIGHT: 69 IN | BODY MASS INDEX: 25.62 KG/M2

## 2022-05-18 DIAGNOSIS — R10.9 ABDOMINAL CRAMPING: Primary | ICD-10-CM

## 2022-05-18 DIAGNOSIS — R12 HEARTBURN: ICD-10-CM

## 2022-05-18 DIAGNOSIS — R19.7 INTERMITTENT DIARRHEA: ICD-10-CM

## 2022-05-18 DIAGNOSIS — K52.9 NONSPECIFIC COLITIS: ICD-10-CM

## 2022-05-18 DIAGNOSIS — Z90.49 S/P CHOLECYSTECTOMY: ICD-10-CM

## 2022-05-18 DIAGNOSIS — Z83.79 FAMILY HISTORY OF CROHN'S DISEASE: ICD-10-CM

## 2022-05-18 PROCEDURE — 1160F RVW MEDS BY RX/DR IN RCRD: CPT | Mod: CPTII,95,, | Performed by: NURSE PRACTITIONER

## 2022-05-18 PROCEDURE — 1160F PR REVIEW ALL MEDS BY PRESCRIBER/CLIN PHARMACIST DOCUMENTED: ICD-10-PCS | Mod: CPTII,95,, | Performed by: NURSE PRACTITIONER

## 2022-05-18 PROCEDURE — 99214 OFFICE O/P EST MOD 30 MIN: CPT | Mod: 95,,, | Performed by: NURSE PRACTITIONER

## 2022-05-18 PROCEDURE — 1159F MED LIST DOCD IN RCRD: CPT | Mod: CPTII,95,, | Performed by: NURSE PRACTITIONER

## 2022-05-18 PROCEDURE — 1159F PR MEDICATION LIST DOCUMENTED IN MEDICAL RECORD: ICD-10-PCS | Mod: CPTII,95,, | Performed by: NURSE PRACTITIONER

## 2022-05-18 PROCEDURE — 3008F BODY MASS INDEX DOCD: CPT | Mod: CPTII,95,, | Performed by: NURSE PRACTITIONER

## 2022-05-18 PROCEDURE — 99214 PR OFFICE/OUTPT VISIT, EST, LEVL IV, 30-39 MIN: ICD-10-PCS | Mod: 95,,, | Performed by: NURSE PRACTITIONER

## 2022-05-18 PROCEDURE — 3008F PR BODY MASS INDEX (BMI) DOCUMENTED: ICD-10-PCS | Mod: CPTII,95,, | Performed by: NURSE PRACTITIONER

## 2022-05-18 RX ORDER — DICYCLOMINE HYDROCHLORIDE 20 MG/1
20 TABLET ORAL EVERY 6 HOURS
Qty: 120 TABLET | Refills: 0 | Status: SHIPPED | OUTPATIENT
Start: 2022-05-18 | End: 2022-06-17

## 2022-05-18 RX ORDER — ESOMEPRAZOLE MAGNESIUM 40 MG/1
40 CAPSULE, DELAYED RELEASE ORAL
Qty: 30 CAPSULE | Refills: 2 | Status: SHIPPED | OUTPATIENT
Start: 2022-05-18 | End: 2022-09-06 | Stop reason: SDUPTHER

## 2022-05-18 RX ORDER — DICYCLOMINE HYDROCHLORIDE 10 MG/1
10 CAPSULE ORAL
COMMUNITY
End: 2022-05-18 | Stop reason: DRUGHIGH

## 2022-05-18 RX ORDER — ONDANSETRON 4 MG/1
4 TABLET, ORALLY DISINTEGRATING ORAL EVERY 8 HOURS PRN
COMMUNITY
End: 2023-06-30

## 2022-05-18 RX ORDER — MONTELUKAST SODIUM 10 MG/1
10 TABLET ORAL DAILY
COMMUNITY
Start: 2022-01-24 | End: 2022-09-16

## 2022-05-18 RX ORDER — DEXAMETHASONE 4 MG/1
2 TABLET ORAL 2 TIMES DAILY
COMMUNITY
Start: 2021-12-21 | End: 2022-09-16 | Stop reason: SDUPTHER

## 2022-05-18 NOTE — PROGRESS NOTES
Subjective:       Patient ID: Holly Nichols is a 26 y.o. female Body mass index is 25.92 kg/m².    Chief Complaint: GI Problem  This is an established patient of Dr. Zhao and myself.    The patient location is: in [place]. I verified patient name and date of birth. Patient provided current height and weight measurements.  The chief complaint leading to consultation is: FOLLOW-UP on intermittent diarrhea and abdominal pain    Visit type: audiovisual    Face to Face time with patient: 18 minutes  50 minutes of total time spent on the encounter, which includes face to face time and non-face to face time preparing to see the patient (eg, review of tests), Obtaining and/or reviewing separately obtained history, Documenting clinical information in the electronic or other health record, Independently interpreting results (not separately reported) and communicating results to the patient/family/caregiver, or Care coordination (not separately reported).     Each patient to whom he or she provides medical services by telemedicine is:  (1) informed of the relationship between the physician and patient and the respective role of any other health care provider with respect to management of the patient; and (2) notified that he or she may decline to receive medical services by telemedicine and may withdraw from such care at any time.    GI Problem  The primary symptoms include weight loss (trying to lose weight but also eating makes GI symptoms worse, having mostly protein shake in morning and then lunch or dinner, eating 1 big meal a day, eating more salads with baked chicken), abdominal pain, nausea (occasional, mild with intermittent abdominal pain & diarrhea) and diarrhea (probiotic align daily). Primary symptoms do not include fever, fatigue, vomiting, melena, hematemesis, hematochezia or dysuria. The illness began more than 7 days ago (irregular bowel habits for several years). The problem has been gradually  "worsening (had improved after our last visit with having "bouts" maybe once a week; worsened since 12/2021 (when she stopped breastfeeding).   The abdominal pain began more than 2 days ago. Pain location: described as abdominal cramping. The severity of the abdominal pain is 4/10 (currently). The abdominal pain is relieved by bowel movements (after a bowel movement; triggered after eating (especially heavy meals) & by needing to have a bowel movement).   The diarrhea began more than 1 week ago. Daily occurrences: intermittent diarrhea, ~3 times a week with 4-5 loose stools that day; then goes to once daily to once every other day of formed stool. Risk factors for illness producing diarrhea include recent antibiotic use (recent antibiotics & outpatient surgery(been on several antibiotics for sinus infections, had sinus surgery in 11/2021)).   The illness does not include chills, dysphagia, odynophagia, constipation or back pain. Associated symptoms comments: Eating a bland diet, on low lactose diet. Significant associated medical issues include GERD (daily; PAST: prilosec- similar relief with nexium, protonix- no relief, nexium,  prilosec- became ineffective) and irritable bowel syndrome (bentyl helped in the past, taking 10 mg qid prn with minimal relief currently). Associated medical issues comments: PAST TREATMENT: lialda- helped.     Review of Systems   Constitutional: Positive for weight loss (trying to lose weight but also eating makes GI symptoms worse, having mostly protein shake in morning and then lunch or dinner, eating 1 big meal a day, eating more salads with baked chicken). Negative for appetite change, chills, diaphoresis, fatigue and fever.   HENT: Negative for sore throat and trouble swallowing.    Respiratory: Negative for cough, choking, chest tightness and shortness of breath.    Cardiovascular: Negative for chest pain.   Gastrointestinal: Positive for abdominal pain, diarrhea (probiotic align " daily) and nausea (occasional, mild with intermittent abdominal pain & diarrhea). Negative for abdominal distention, anal bleeding, blood in stool, constipation, dysphagia, hematemesis, hematochezia, melena, rectal pain and vomiting.   Genitourinary: Negative for difficulty urinating, dysuria, flank pain and pelvic pain.   Musculoskeletal: Negative for back pain.   Neurological: Negative for weakness.   Psychiatric/Behavioral: The patient is not nervous/anxious (history of anxious, reports she is now taking celexa and adderall which has controlled her anxiety).        Patient's last menstrual period was 11/11/2020 (lmp unknown).  Past Medical History:   Diagnosis Date    Abnormal Pap smear of cervix     Allergy     Anxiety     Asthma     rare inhaler use    Constipation     Depression     Endometriosis     Fibromyalgia     Gastritis     HLA B27 (HLA B27 positive)     Migraine     Postpartum depression     Scoliosis     Smoker     stopped in January 2018     Past Surgical History:   Procedure Laterality Date    ADENOIDECTOMY      APPENDECTOMY  09/2014    endometriosis    CHOLECYSTECTOMY  12/20/2017    Dr. MARBELLA Anne, [facility]     COLONOSCOPY N/A 05/19/2016    Procedure: COLONOSCOPY;  Surgeon: Jarret Zhao Jr., MD;  Location: [facility] ENDO;  Service: Endoscopy;  Laterality: N/A;    COLONOSCOPY N/A 08/27/2020    Dr. Zhao: Erythema & ulcerated mucosa in descending, splenic flexure & distal transverse (r/o UC, r/o ischemia, r/o vasculiti, etc.), Mucosal changes in descending colon (secondary to left-sided colitis, to ischemic colitis(?), to vasculitis(?). hemorrhoids; biopsy: TI & random biopsies- WNL, transverse/hepatic/descending- mild active colitis without definitive features of chronicity    CYSTOSCOPY  12/01/2021    Procedure: CYSTOSCOPY;  Surgeon: Ana María Easley MD;  Location: [facility] OR;  Service: OB/GYN;;    ESOPHAGOGASTRODUODENOSCOPY N/A 10/01/2020    Dr. Zhao: Erythema in the lower third of  the esophagus; Duodenal bulb erosions without bleeding. biopsy: duodenum- Small bowel mucosa with mild acute inflammation changes may represent erosion, Villous architecture is maintained; stomach- mild chronic gastritis; esophagus- minimal reactive changes    LAPAROSCOPIC OOPHORECTOMY Left 2021    Procedure: OOPHORECTOMY, LAPAROSCOPIC;  Surgeon: Ana María Easley MD;  Location: [facility];  Service: OB/GYN;  Laterality: Left;    LAPAROSCOPIC SALPINGECTOMY Bilateral 2021    Procedure: SALPINGECTOMY, LAPAROSCOPIC;  Surgeon: Ana María Easley MD;  Location: [facility] OR;  Service: OB/GYN;  Laterality: Bilateral;    LAPAROSCOPIC TOTAL HYSTERECTOMY N/A 2021    Procedure: HYSTERECTOMY, TOTAL, LAPAROSCOPIC;  Surgeon: Ana María Easley MD;  Location: [facility] OR;  Service: OB/GYN;  Laterality: N/A;    MOUTH SURGERY      x2    PELVIC LAPAROSCOPY      SINUS SURGERY  2021    FESS, turbinate red    TONSILLECTOMY  2016    UPPER GASTROINTESTINAL ENDOSCOPY  2016    Dr. Zhao     Family History   Problem Relation Age of Onset    Fibromyalgia Mother     Depression Mother     Irritable bowel syndrome Mother     Diabetes Father     Depression Father     Mental illness Father         bipolar disorder    Asthma Father     Multiple sclerosis Maternal Aunt     Crohn's disease Maternal Grandmother     Colon cancer Neg Hx     Ulcerative colitis Neg Hx     Stomach cancer Neg Hx     Esophageal cancer Neg Hx     Breast cancer Neg Hx     Ovarian cancer Neg Hx     Celiac disease Neg Hx      Social History     Tobacco Use    Smoking status: Former Smoker     Packs/day: 0.50     Types: Cigarettes     Quit date: 2018     Years since quittin.3    Smokeless tobacco: Never Used   Substance Use Topics    Alcohol use: No     Alcohol/week: 0.0 standard drinks     Comment: quit since     Drug use: No     Wt Readings from Last 10 Encounters:   22 78.5 kg (173 lb)   22 78.7  kg (173 lb 8 oz)   04/21/22 80 kg (176 lb 7.7 oz)   02/07/22 82.9 kg (182 lb 12.2 oz)   01/24/22 85.2 kg (187 lb 13.3 oz)   01/11/22 84.1 kg (185 lb 6.5 oz)   12/15/21 82 kg (180 lb 12.4 oz)   12/01/21 81.3 kg (179 lb 3.7 oz)   11/29/21 81.3 kg (179 lb 3.7 oz)   11/29/21 81.2 kg (179 lb 0.2 oz)     Lab Results   Component Value Date    WBC 7.42 12/02/2021    HGB 12.4 12/02/2021    HCT 36.1 (L) 12/02/2021    MCV 89 12/02/2021     12/02/2021     CMP  Sodium   Date Value Ref Range Status   11/29/2021 137 136 - 145 mmol/L Final     Potassium   Date Value Ref Range Status   11/29/2021 3.9 3.5 - 5.1 mmol/L Final     Chloride   Date Value Ref Range Status   11/29/2021 99 95 - 110 mmol/L Final     CO2   Date Value Ref Range Status   11/29/2021 30 22 - 31 mmol/L Final     Glucose   Date Value Ref Range Status   11/29/2021 97 70 - 110 mg/dL Final     Comment:     The ADA recommends the following guidelines for fasting glucose:    Normal:       less than 100 mg/dL    Prediabetes:  100 mg/dL to 125 mg/dL    Diabetes:     126 mg/dL or higher       BUN   Date Value Ref Range Status   11/29/2021 15 7 - 18 mg/dL Final     Creatinine   Date Value Ref Range Status   11/29/2021 0.59 0.50 - 1.40 mg/dL Final     Calcium   Date Value Ref Range Status   11/29/2021 8.8 8.4 - 10.2 mg/dL Final     Total Protein   Date Value Ref Range Status   10/13/2021 7.4 6.0 - 8.4 g/dL Final     Albumin   Date Value Ref Range Status   10/13/2021 4.3 3.5 - 5.2 g/dL Final     Total Bilirubin   Date Value Ref Range Status   10/13/2021 0.4 0.1 - 1.0 mg/dL Final     Comment:     For infants and newborns, interpretation of results should be based  on gestational age, weight and in agreement with clinical  observations.    Premature Infant recommended reference ranges:  Up to 24 hours.............<8.0 mg/dL  Up to 48 hours............<12.0 mg/dL  3-5 days..................<15.0 mg/dL  6-29 days.................<15.0 mg/dL       Alkaline Phosphatase   Date  Value Ref Range Status   10/13/2021 122 55 - 135 U/L Final     AST ([place])   Date Value Ref Range Status   03/29/2016 19 14 - 36 U/L Final     AST   Date Value Ref Range Status   10/13/2021 20 10 - 40 U/L Final     ALT   Date Value Ref Range Status   10/13/2021 31 10 - 44 U/L Final     Anion Gap   Date Value Ref Range Status   11/29/2021 8 8 - 16 mmol/L Final     eGFR if    Date Value Ref Range Status   11/29/2021 >60 >60 mL/min/1.73 m^2 Final     eGFR if non    Date Value Ref Range Status   11/29/2021 >60 >60 mL/min/1.73 m^2 Final     Comment:     Calculation used to obtain the estimated glomerular filtration  rate (eGFR) is the CKD-EPI equation.        Lab Results   Component Value Date    LIPASE 39 09/15/2020     Lab Results   Component Value Date    TSH 0.687 10/13/2021     Lab Results   Component Value Date    CRP 0.5 10/16/2014     Reviewed prior medical records including radiology report of 11/12/2020 CT enterography abdomen pelvis; 12/20/17 MRI MRCP; 12/19/17 limited abdominal ultrasound; 12/15/17 abdominal ultrasound; 6/23/16 UGI; & endoscopy history (see surgical history).    11/12/2020 stool studies reviewed: negative    Objective:      Physical Exam  Constitutional:       General: She is not in acute distress.     Appearance: She is well-developed.   Pulmonary:      Effort: Pulmonary effort is normal. No respiratory distress.   Skin:     Coloration: Skin is not pale.   Neurological:      Mental Status: She is alert and oriented to person, place, and time.   Psychiatric:         Speech: Speech normal.         Behavior: Behavior normal.         Thought Content: Thought content normal.         Judgment: Judgment normal.         Assessment:       1. Abdominal cramping    2. Intermittent diarrhea    3. Nonspecific colitis    4. S/P cholecystectomy    5. Family history of Crohn's disease    6. Heartburn        Plan:       Abdominal cramping  -     CBC Without  Differential; Future; Expected date: 05/18/2022  -     Tissue Transglutaminase, IgA; Future; Expected date: 05/18/2022  -     IgA; Future; Expected date: 05/18/2022  -     C-reactive protein; Future; Expected date: 05/18/2022  -     Hepatic Function Panel; Future; Expected date: 05/18/2022  -     Lipase; Future; Expected date: 05/18/2022  -  INCREASE TO   dicyclomine (BENTYL) 20 mg tablet; Take 1 tablet (20 mg total) by mouth every 6 (six) hours.  Dispense: 120 tablet; Refill: 0  - schedule EGD, discussed procedure with patient, including risks and benefits, patient verbalized understanding  - schedule Colonoscopy, discussed procedure with the patient, including risks and benefits, patient verbalized understanding  - encouraged PO intake and daily calorie counts to ensure adequate nutrition is taken in, recommend at least 2,000 calories a day  - recommend nutritional drinks, such as Boost, Ensure or Glucerna, to supplement nutrition needs  - Possible abdominal imaging pending results of testing and if symptoms persist    Intermittent diarrhea  -     Pancreatic elastase, fecal; Future; Expected date: 05/18/2022  -     Stool Exam-Ova,Cysts,Parasites; Future; Expected date: 05/18/2022  -     Fecal fat, qualitative; Future; Expected date: 05/18/2022  -     Giardia / Cryptosporidum, EIA; Future; Expected date: 05/18/2022  -     Occult blood x 1, stool; Future; Expected date: 05/18/2022  -     pH, stool; Future; Expected date: 05/18/2022  -     Rotavirus antigen, stool; Future; Expected date: 05/18/2022  -     WBC, Stool; Future; Expected date: 05/18/2022  -     Stool culture; Future; Expected date: 05/18/2022  -     Clostridium difficile EIA; Future; Expected date: 05/18/2022  -     Adenovirus Molecular Detection, PCR, Non-Blood Stool; Future; Expected date: 05/18/2022  -     Calprotectin, Stool; Future; Expected date: 05/18/2022  -     Tissue Transglutaminase, IgA; Future; Expected date: 05/18/2022  -     IgA; Future;  Expected date: 05/18/2022  - schedule Colonoscopy, discussed procedure with the patient, including risks and benefits, patient verbalized understanding  - recommended OTC probiotic, such as Florastor or Culturelle, taken as directed on packaging  - avoid lactose, alcohol, & caffeine  - avoid known triggers    Nonspecific colitis & Family history of Crohn's disease  - schedule Colonoscopy, discussed procedure with the patient, including risks and benefits, patient verbalized understanding  -     Calprotectin, Stool; Future; Expected date: 05/18/2022  - avoid use of NSAIDs- since they can cause GI upset, bleeding and/or ulcers.    S/P cholecystectomy  -  possible trial of a bile acid sequestrant if diarrhea persist    Heartburn  - Discussed about acid-reducing medications; patient wants to restart nexium 40 mg once daily  -  RESTART   esomeprazole (NEXIUM) 40 MG capsule; Take 1 capsule (40 mg total) by mouth before breakfast.  Dispense: 30 capsule; Refill: 2  - schedule EGD, discussed procedure with patient, including risks and benefits, patient verbalized understanding  -  CONTINUE   ondansetron (ZOFRAN-ODT) 4 MG TbDL; Take 1 tablet (4 mg total) by mouth 3 (three) times daily as needed (nausea).    Follow up in about 1 month (around 6/18/2022), or if symptoms worsen or fail to improve.    If no improvement in symptoms or symptoms worsen, call/follow-up at clinic or go to ER.

## 2022-05-18 NOTE — PATIENT INSTRUCTIONS
Uncertain Causes of Diarrhea (Adult)    Diarrhea is when stools are loose and watery. This can be caused by:  Viral infections  Bacterial infections  Food poisoning  Parasites  Irritable bowel syndrome (IBS)  Inflammatory bowel diseases such as ulcerative colitis, Crohn's disease, and celiac disease  Food intolerance, such as to lactose, the sugar found in milk and milk products  Reaction to medicines like antibiotics, laxatives, cancer drugs, and antacids  Along with diarrhea, you may also have:  Abdominal pain and cramping  Nausea and vomiting  Loss of bowel control  Fever and chills  Bloody stools  In some cases, antibiotics may help to treat diarrhea. You may have a stool sample test. This is done to see what is causing your diarrhea, and if antibiotics will help treat it. The results of a stool sample test may take up to 2 days. The healthcare provider may not give you antibiotics until he or she has the stool test results.  Diarrhea can cause dehydration. This is the loss of too much water and other fluids from the body. When this occurs, body fluid must be replaced. This can be done with oral rehydration solutions. Oral rehydration solutions are available at drugstores and grocery stores without a prescription.  Home care  Follow all instructions given by your healthcare provider. Rest at home for the next 24 hours, or until you feel better. Avoid caffeine, tobacco, and alcohol. These can make diarrhea, cramping, and pain worse.  If taking medicines:  Dont take over-the-counter diarrhea or nausea medicines unless your healthcare provider tells you to.  You may use acetaminophen or NSAID medicines like ibuprofen or naproxen to reduce pain and fever. Dont use these if you have chronic liver or kidney disease, or ever had a stomach ulcer or gastrointestinal bleeding. Don't use NSAID medicines if you are already taking one for another condition (like arthritis) or are on daily aspirin therapy (such as for heart  disease or after a stroke). Talk with your healthcare provider first.  If antibiotics were prescribed, be sure you take them until they are finished. Dont stop taking them even when you feel better. Antibiotics must be taken as a full course.  To prevent the spread of illness:  Remember that washing with soap and water and using alcohol-based  is the best way to prevent the spread of infection.  Clean the toilet after each use.  Wash your hands before eating.  Wash your hands before and after preparing food. Keep in mind that people with diarrhea or vomiting should not prepare food for others.  Wash your hands after using cutting boards, countertops, and knives that have been in contact with raw foods.  Wash and then peel fruits and vegetables.  Keep uncooked meats away from cooked and ready-to-eat foods.  Use a food thermometer when cooking. Cook poultry to at least 165°F (74°C). Cook ground meat (beef, veal, pork, lamb) to at least 160°F (71°C). Cook fresh beef, veal, lamb, and pork to at least 145°F (63°C).  Dont eat raw or undercooked eggs (poached or ventura side up), poultry, meat, or unpasteurized milk and juices.  Food and drinks  The main goal while treating vomiting or diarrhea is to prevent dehydration. This is done by taking small amounts of liquids often.  Keep in mind that liquids are more important than food right now.  Drink only small amounts of liquids at a time.  Dont force yourself to eat, especially if you are having cramping, vomiting, or diarrhea. Dont eat large amounts at a time, even if you are hungry.  If you eat, avoid fatty, greasy, spicy, or fried foods.  Dont eat dairy foods or drink milk if you have diarrhea. These can make diarrhea worse.  During the first 24 hours you can try:  Oral rehydration solutions. Do not use sports drinks. They have too much sugar and not enough electrolytes.  Soft drinks without caffeine  Ginger ale  Water (plain or flavored)  Decaf tea or  coffee  Clear broth, consommé, or bouillon  Gelatin, popsicles, or frozen fruit juice bars  The second 24 hours, if you are feeling better, you can add:  Hot cereal, plain toast, bread, rolls, or crackers  Plain noodles, rice, mashed potatoes, chicken noodle soup, or rice soup  Unsweetened canned fruit (no pineapple)  Bananas  As you recover:  Limit fat intake to less than 15 grams per day. Dont eat margarine, butter, oils, mayonnaise, sauces, gravies, fried foods, peanut butter, meat, poultry, or fish.  Limit fiber. Dont eat raw or cooked vegetables, fresh fruits except bananas, or bran cereals.  Limit caffeine and chocolate.  Limit dairy.  Dont use spices or seasonings except salt.  Go back to your normal diet over time, as you feel better and your symptoms improve.  If the symptoms come back, go back to a simple diet or clear liquids.  Follow-up care  Follow up with your healthcare provider, or as advised. If a stool sample was taken or cultures were done, call the healthcare provider for the results as instructed.  Call 911  Call 911 if you have any of these symptoms:  Trouble breathing  Confusion  Extreme drowsiness or trouble walking  Loss of consciousness  Rapid heart rate  Chest pain  Stiff neck  Seizure  When to seek medical advice  Call your healthcare provider right away if any of these occur:  Abdominal pain that gets worse  Constant lower right abdominal pain  Continued vomiting and inability to keep liquids down  Diarrhea more than 5 times a day  Blood in vomit or stool  Dark urine or no urine for 8 hours, dry mouth and tongue, tiredness, weakness, or dizziness  Drowsiness  New rash  You dont get better in 2 to 3 days  Fever of 100.4°F (38°C) or higher that doesnt get lower with medicine  Date Last Reviewed: 1/3/2016  © 6204-0363 [employer]. [address] [place] [address], [place] [address]. All rights reserved. This information is not intended as a substitute for professional medical care.  Always follow your healthcare professional's instructions.           Abdominal Pain    Abdominal pain is pain in the stomach or belly area. Everyone has this pain from time to time. In many cases it goes away on its own. But abdominal pain can sometimes be due to a serious problem, such as appendicitis. So its important to know when to seek help.  Causes of abdominal pain  There are many possible causes of abdominal pain. Common causes in adults include:  Constipation, diarrhea, or gas  Stomach acid flowing back up into the esophagus (acid reflux or heartburn)  Severe acid reflux, called GERD (gastroesophageal reflux disease)  A sore in the lining of the stomach or small intestine (peptic ulcer)  Inflammation of the gallbladder, liver, or pancreas  Gallstones or kidney stones  Appendicitis   Intestinal blockage   An internal organ pushing through a muscle or other tissue (hernia)  Urinary tract infections  In women, menstrual cramps, fibroids, or endometriosis  Inflammation or infection of the intestines  Diagnosing the cause of abdominal pain  Your healthcare provider will do a physical exam help find the cause of your pain. If needed, tests will be ordered. Belly pain has many possible causes. So it can be hard to find the reason for your pain. Giving details about your pain can help. Tell your provider where and when you feel the pain, and what makes it better or worse. Also let your provider know if you have other symptoms such as:  Fever  Tiredness  Upset stomach (nausea)  Vomiting  Changes in bathroom habits  Treating abdominal pain  Some causes of pain need emergency medical treatment right away. These include appendicitis or a bowel blockage. Other problems can be treated with rest, fluids, or medicines. Your healthcare provider can give you specific instructions for treatment or self-care based on what is causing your pain.  If you have vomiting or diarrhea, sip water or other clear fluids. When you are  ready to eat solid foods again, start with small amounts of easy-to-digest, low-fat foods. These include apple sauce, toast, or crackers.   When to seek medical care  Call 911 or go to the hospital right away if you:  Cant pass stool and are vomiting  Are vomiting blood or have bloody diarrhea or black, tarry diarrhea  Have chest, neck, or shoulder pain  Feel like you might pass out  Have pain in your shoulder blades with nausea  Have sudden, severe belly pain  Have new, severe pain unlike any you have felt before  Have a belly that is rigid, hard, and tender to touch  Call your healthcare provider if you have:  Pain for more than 5 days  Bloating for more than 2 days  Diarrhea for more than 5 days  A fever of 100.4°F (38.0°C) or higher, or as directed by your provider  Pain that gets worse  Weight loss for no reason  Continued lack of appetite  Blood in your stool  How to prevent abdominal pain  Here are some tips to help prevent abdominal pain:  Eat smaller amounts of food at one time.  Avoid greasy, fried, or other high-fat foods.  Avoid foods that give you gas.  Exercise regularly.  Drink plenty of fluids.  To help prevent GERD symptoms:  Quit smoking.  Reduce alcohol and certain foods that increase stomach acid.  Avoid aspirin and over-the-counter pain and fever medicines (NSAIDS or nonsteroidal anti-inflammatory drugs), if possible  Lose extra weight.  Finish eating at least 2 hours before you go to bed or lie down.  Raise the head of your bed.  Date Last Reviewed: 7/1/2016  © 0923-7796 [employer]. [address] [place] [address], [place] [address]. All rights reserved. This information is not intended as a substitute for professional medical care. Always follow your healthcare professional's instructions.         GERD (Adult)    The esophagus is a tube that carries food from the mouth to the stomach. A valve at the lower end of the esophagus prevents stomach acid from flowing upward. When this valve  "doesn't work properly, stomach contents may repeatedly flow back up (reflux) into the esophagus. This is called gastroesophageal reflux disease (GERD). GERD can irritate the esophagus. It can cause problems with swallowing or breathing. In severe cases, GERD can cause recurrent pneumonia or other serious problems.  Symptoms of reflux include burning, pressure or sharp pain in the upper abdomen or mid to lower chest. The pain can spread to the neck, back, or shoulder. There may be belching, an acid taste in the back of the throat, chronic cough, or sore throat or hoarseness. GERD symptoms often occur during the day after a big meal. They can also occur at night when lying down.   Home care  Lifestyle changes can help reduce symptoms. If needed, medicines may be prescribed. Symptoms often improve with treatment, but if treatment is stopped, the symptoms often return after a few months. So most persons with GERD will need to continue treatment.  Lifestyle changes  Limit or avoid fatty, fried, and spicy foods, as well as coffee, chocolate, mint, and foods with high acid content such as tomatoes and citrus fruit and juices (orange, grapefruit, lemon).  Dont eat large meals, especially at night. Frequent, smaller meals are best. Do not lie down right after eating. And dont eat anything 3 hours before going to bed.  Avoid drinking alcohol and smoking. As much as possible, stay away from second hand smoke.  If you are overweight, losing weight will reduce symptoms.   Avoid wearing tight clothing around your stomach area.  If your symptoms occur during sleep, use a foam wedge to elevate your upper body (not just your head.) Or, place 4" blocks under the head of your bed.  Medicines  If needed, medicines can help relieve the symptoms of GERD and prevent damage to the esophagus. Discuss a medicine plan with your healthcare provider. This may include one or more of the following medicines:  Antacids to help neutralize the " normal acids in your stomach.  Acid blockers (H2 blockers) to decrease acid production.  Acid inhibitors (PPIs) to decrease acid production in a different way than the blockers. They may work better, but can take a little longer to take effect.  Take an antacid 30-60 minutes after eating and at bedtime, but not at the same time as an acid blocker.  Try not to take medicines such as ibuprofen and aspirin. If you are taking aspirin for your heart or other medical reasons, talk to your healthcare provider about stopping it.  Follow-up care  Follow up with your healthcare provider or as advised by our staff.  When to seek medical advice  Call your healthcare provider if any of the following occur:  Stomach pain gets worse or moves to the lower right abdomen (appendix area)  Chest pain appears or gets worse, or spreads to the back, neck, shoulder, or arm  Frequent vomiting (cant keep down liquids)  Blood in the stool or vomit (red or black in color)  Feeling weak or dizzy  Fever of 100.4ºF (38ºC) or higher, or as directed by your healthcare provider  Date Last Reviewed: 6/23/2015  © 4293-0575 [employer]. [address] [place] [address]. All rights reserved. This information is not intended as a substitute for professional medical care. Always follow your healthcare professional's instructions.

## 2022-05-18 NOTE — TELEPHONE ENCOUNTER
----- Message from Martha Mart sent at 5/18/2022 10:38 AM CDT -----  Contact: self  Type:  Patient Returning Call    Who Called:  patient   Who Left Message for Patient:  Marielos  Does the patient know what this is regarding?:  confirmed virtual appt for today, her meds has not changed and allergies still the same  Best Call Back Number:  [phone] (home)   Additional Information:  thanks

## 2022-05-18 NOTE — TELEPHONE ENCOUNTER
Attempted to reach the patient to go over her medications and allergies prior to her virtual visit today with Nina Bedoya NP, left message, clinic number provided

## 2022-05-20 ENCOUNTER — TELEPHONE (OUTPATIENT)
Dept: GASTROENTEROLOGY | Facility: CLINIC | Age: 27
End: 2022-05-20
Payer: COMMERCIAL

## 2022-05-20 NOTE — TELEPHONE ENCOUNTER
Called and spoke with the patient, procedure scheduled with the patient, patient verbalized understanding of this.

## 2022-05-26 ENCOUNTER — PATIENT MESSAGE (OUTPATIENT)
Dept: ORTHOPEDICS | Facility: CLINIC | Age: 27
End: 2022-05-26
Payer: COMMERCIAL

## 2022-05-31 ENCOUNTER — OFFICE VISIT (OUTPATIENT)
Dept: FAMILY MEDICINE | Facility: CLINIC | Age: 27
End: 2022-05-31
Payer: COMMERCIAL

## 2022-05-31 DIAGNOSIS — F98.8 ATTENTION DEFICIT DISORDER (ADD) IN ADULT: Primary | ICD-10-CM

## 2022-05-31 PROCEDURE — 99213 PR OFFICE/OUTPT VISIT, EST, LEVL III, 20-29 MIN: ICD-10-PCS | Mod: 95,,, | Performed by: FAMILY MEDICINE

## 2022-05-31 PROCEDURE — 99213 OFFICE O/P EST LOW 20 MIN: CPT | Mod: 95,,, | Performed by: FAMILY MEDICINE

## 2022-05-31 RX ORDER — DEXTROAMPHETAMINE SACCHARATE, AMPHETAMINE ASPARTATE MONOHYDRATE, DEXTROAMPHETAMINE SULFATE AND AMPHETAMINE SULFATE 3.75; 3.75; 3.75; 3.75 MG/1; MG/1; MG/1; MG/1
15 CAPSULE, EXTENDED RELEASE ORAL EVERY MORNING
Qty: 30 CAPSULE | Refills: 0 | Status: SHIPPED | OUTPATIENT
Start: 2022-05-31 | End: 2022-06-27 | Stop reason: SDUPTHER

## 2022-06-08 ENCOUNTER — PATIENT MESSAGE (OUTPATIENT)
Dept: ENDOSCOPY | Facility: HOSPITAL | Age: 27
End: 2022-06-08
Payer: COMMERCIAL

## 2022-06-27 RX ORDER — DEXTROAMPHETAMINE SACCHARATE, AMPHETAMINE ASPARTATE MONOHYDRATE, DEXTROAMPHETAMINE SULFATE AND AMPHETAMINE SULFATE 3.75; 3.75; 3.75; 3.75 MG/1; MG/1; MG/1; MG/1
15 CAPSULE, EXTENDED RELEASE ORAL EVERY MORNING
Qty: 30 CAPSULE | Refills: 0 | Status: SHIPPED | OUTPATIENT
Start: 2022-06-27 | End: 2022-08-03 | Stop reason: SDUPTHER

## 2022-06-27 NOTE — TELEPHONE ENCOUNTER
No new care gaps identified.  [facility] Embedded Care Gaps. Reference number: [id number]. 6/27/2022   11:17:34 AM COYT

## 2022-06-27 NOTE — PROGRESS NOTES
Assessment:       1. Attention deficit disorder (ADD) in adult        Plan:       Attention deficit disorder (ADD) in adult:  Stable  -     dextroamphetamine-amphetamine (ADDERALL XR) 15 MG 24 hr capsule; Take 1 capsule (15 mg total) by mouth every morning.  Dispense: 30 capsule; Refill: 0      [place] prescription monitoring program was checked and okay.  Adderall was prescribed for the patient.  Recommend to increase fiber in the diet, drink plenty water.  If the symptoms get worse, the patient notify us immediately.   Patient agreed with assessment and plan. Patient verbalized understanding.     Subjective:       Patient ID: Holly Nichols is a 26 y.o. female.    Chief Complaint: Medication Refill    HPI     The patient location is:  [place]  The chief complaint leading to consultation is:  Medication refill    Visit type:  Audiovisual    Face to Face time with patient: 10 minutes  12 minutes of total time spent on the encounter, which includes face to face time and non-face to face time preparing to see the patient (eg, review of tests), Obtaining and/or reviewing separately obtained history, Documenting clinical information in the electronic or other health record, Independently interpreting results (not separately reported) and communicating results to the patient/family/caregiver, or Care coordination (not separately reported).         Each patient to whom he or she provides medical services by telemedicine is:  (1) informed of the relationship between the physician and patient and the respective role of any other health care provider with respect to management of the patient; and (2) notified that he or she may decline to receive medical services by telemedicine and may withdraw from such care at any time.    Notes:  The patient here today for medication refill, the patient taking Adderall for ADD, denies any side effects of the medication, denies symptoms of chest pain shortness of breath.  She is  having problems with constipation and diarrhea, denies any blood in the stools or mucus in the stools.    Past medical history, past social history was reviewed and discussed with the patient.    Review of Systems   Constitutional: Negative for activity change and unexpected weight change.   HENT: Negative for hearing loss, rhinorrhea and trouble swallowing.    Eyes: Negative for discharge and visual disturbance.   Respiratory: Negative for chest tightness and wheezing.    Cardiovascular: Negative for chest pain and palpitations.   Gastrointestinal: Positive for constipation and diarrhea. Negative for blood in stool and vomiting.   Endocrine: Negative for polydipsia and polyuria.   Genitourinary: Negative for difficulty urinating, dysuria, hematuria and menstrual problem.   Musculoskeletal: Negative for arthralgias, joint swelling and neck pain.   Neurological: Negative for weakness and headaches.   Psychiatric/Behavioral: Negative for confusion and dysphoric mood.       Objective:      Physical Exam  Constitutional:       General: She is not in acute distress.     Appearance: Normal appearance. She is not ill-appearing.   Neurological:      Mental Status: She is alert.   Psychiatric:         Mood and Affect: Mood normal.         Thought Content: Thought content normal.

## 2022-06-30 ENCOUNTER — TELEPHONE (OUTPATIENT)
Dept: NEUROSURGERY | Facility: CLINIC | Age: 27
End: 2022-06-30
Payer: COMMERCIAL

## 2022-07-27 ENCOUNTER — PATIENT MESSAGE (OUTPATIENT)
Dept: ENDOSCOPY | Facility: HOSPITAL | Age: 27
End: 2022-07-27
Payer: COMMERCIAL

## 2022-08-03 ENCOUNTER — TELEPHONE (OUTPATIENT)
Dept: GASTROENTEROLOGY | Facility: CLINIC | Age: 27
End: 2022-08-03
Payer: COMMERCIAL

## 2022-08-03 NOTE — TELEPHONE ENCOUNTER
I completed a jury duty excuse and patient can access it under letters or she can come  the letter tomorrow. Please also inform patient that if she is still having GI symptoms, she needs to complete the testing we previously ordered including the blood work & stool studies. Continue with EGD and colonoscopy as scheduled for 8/31/2022.  Thanks,  SUDHIR

## 2022-08-03 NOTE — LETTER
[facility] - Gastroenterology  [address]  [place] [address]  Phone: [phone] August 3, 2022    Holly Nichols  [address] [address] [facility] [address]      To Whom It May Concern:    Holly Nichols is unable to participate in jury duty due to currently being evaluated for gastroenterology symptoms, which include but not limited to diarrhea and history of nonspecific colitis.    If you have any questions or concerns, please feel free to call my office.    Sincerely,        JUDD Brewster

## 2022-08-03 NOTE — TELEPHONE ENCOUNTER
"Returned call to the patient, patient was requesting a note to be excused from Jury duty as she has lots of diarrhea and is "living on imodium" per patient, patient states that she also need the letter to say that she has childcare issues as well.  Please advise.  "

## 2022-08-03 NOTE — TELEPHONE ENCOUNTER
----- Message from Marvin Baker sent at 8/3/2022  3:12 PM CDT -----  Type: Needs Medical Advice  Who Called:  Patient    Best Call Back Number: [phone]  Additional Information: Patient states that she would like a callback regarding needing a jury excuse note for her IBS.

## 2022-08-10 ENCOUNTER — PATIENT MESSAGE (OUTPATIENT)
Dept: FAMILY MEDICINE | Facility: CLINIC | Age: 27
End: 2022-08-10
Payer: COMMERCIAL

## 2022-08-10 RX ORDER — DEXTROAMPHETAMINE SACCHARATE, AMPHETAMINE ASPARTATE MONOHYDRATE, DEXTROAMPHETAMINE SULFATE AND AMPHETAMINE SULFATE 3.75; 3.75; 3.75; 3.75 MG/1; MG/1; MG/1; MG/1
15 CAPSULE, EXTENDED RELEASE ORAL EVERY MORNING
Qty: 30 CAPSULE | Refills: 0 | Status: CANCELLED | OUTPATIENT
Start: 2022-08-10 | End: 2022-09-09

## 2022-08-10 NOTE — TELEPHONE ENCOUNTER
No new care gaps identified.  [facility] Embedded Care Gaps. Reference number: [id number]. 8/10/2022   8:20:46 AM COYT

## 2022-08-10 NOTE — TELEPHONE ENCOUNTER
----- Message from Jose Saunders sent at 8/9/2022  3:34 PM CDT -----  Type: Patient Call Back         Who called: Pt          What is the request in detail: Pt called in regarding a called back in regards to dextroamphetamine-amphetamine (ADDERALL XR) 15 MG 24 hr capsule . Pt wanted if she has to make an appointment or can it be filled ? Pt has to go back to school on 8/18/22 and needs before going back to school          Can the clinic reply by [facility]?no          Would the patient rather a call back or a response via [facility]?call back          Best call back number:[phone] (mobile)          Additional Information:           Thank You

## 2022-08-22 ENCOUNTER — PATIENT MESSAGE (OUTPATIENT)
Dept: ENDOSCOPY | Facility: HOSPITAL | Age: 27
End: 2022-08-22
Payer: COMMERCIAL

## 2022-09-06 DIAGNOSIS — R12 HEARTBURN: ICD-10-CM

## 2022-09-06 RX ORDER — ESOMEPRAZOLE MAGNESIUM 40 MG/1
40 CAPSULE, DELAYED RELEASE ORAL
Qty: 30 CAPSULE | Refills: 2 | Status: SHIPPED | OUTPATIENT
Start: 2022-09-06 | End: 2023-01-20 | Stop reason: SDUPTHER

## 2022-09-15 ENCOUNTER — TELEPHONE (OUTPATIENT)
Dept: FAMILY MEDICINE | Facility: CLINIC | Age: 27
End: 2022-09-15
Payer: COMMERCIAL

## 2022-09-16 ENCOUNTER — LAB VISIT (OUTPATIENT)
Dept: LAB | Facility: HOSPITAL | Age: 27
End: 2022-09-16
Attending: FAMILY MEDICINE
Payer: COMMERCIAL

## 2022-09-16 ENCOUNTER — OFFICE VISIT (OUTPATIENT)
Dept: FAMILY MEDICINE | Facility: CLINIC | Age: 27
End: 2022-09-16
Payer: COMMERCIAL

## 2022-09-16 VITALS
HEART RATE: 90 BPM | DIASTOLIC BLOOD PRESSURE: 76 MMHG | HEIGHT: 68 IN | OXYGEN SATURATION: 95 % | SYSTOLIC BLOOD PRESSURE: 100 MMHG | BODY MASS INDEX: 23.15 KG/M2 | WEIGHT: 152.75 LBS

## 2022-09-16 DIAGNOSIS — R63.4 WEIGHT LOSS: ICD-10-CM

## 2022-09-16 DIAGNOSIS — R19.7 INTERMITTENT DIARRHEA: ICD-10-CM

## 2022-09-16 DIAGNOSIS — R53.83 OTHER FATIGUE: ICD-10-CM

## 2022-09-16 DIAGNOSIS — R53.83 OTHER FATIGUE: Primary | ICD-10-CM

## 2022-09-16 DIAGNOSIS — J45.21 MILD INTERMITTENT ASTHMA WITH EXACERBATION: ICD-10-CM

## 2022-09-16 DIAGNOSIS — R10.9 ABDOMINAL CRAMPING: ICD-10-CM

## 2022-09-16 DIAGNOSIS — F98.8 ATTENTION DEFICIT DISORDER (ADD) IN ADULT: ICD-10-CM

## 2022-09-16 LAB
ALBUMIN SERPL BCP-MCNC: 4 G/DL (ref 3.5–5.2)
ALP SERPL-CCNC: 76 U/L (ref 55–135)
ALT SERPL W/O P-5'-P-CCNC: 14 U/L (ref 10–44)
ANION GAP SERPL CALC-SCNC: 7 MMOL/L (ref 8–16)
AST SERPL-CCNC: 12 U/L (ref 10–40)
BILIRUB DIRECT SERPL-MCNC: 0.2 MG/DL (ref 0.1–0.3)
BILIRUB SERPL-MCNC: 0.7 MG/DL (ref 0.1–1)
BUN SERPL-MCNC: 8 MG/DL (ref 6–20)
CALCIUM SERPL-MCNC: 9 MG/DL (ref 8.7–10.5)
CHLORIDE SERPL-SCNC: 107 MMOL/L (ref 95–110)
CO2 SERPL-SCNC: 25 MMOL/L (ref 23–29)
CREAT SERPL-MCNC: 0.7 MG/DL (ref 0.5–1.4)
CRP SERPL-MCNC: <0.3 MG/L (ref 0–8.2)
ERYTHROCYTE [DISTWIDTH] IN BLOOD BY AUTOMATED COUNT: 12.6 % (ref 11.5–14.5)
ERYTHROCYTE [SEDIMENTATION RATE] IN BLOOD BY PHOTOMETRIC METHOD: <2 MM/HR (ref 0–36)
EST. GFR  (NO RACE VARIABLE): >60 ML/MIN/1.73 M^2
GLUCOSE SERPL-MCNC: 84 MG/DL (ref 70–110)
HCT VFR BLD AUTO: 40.6 % (ref 37–48.5)
HGB BLD-MCNC: 14.1 G/DL (ref 12–16)
IGA SERPL-MCNC: 101 MG/DL (ref 40–350)
LIPASE SERPL-CCNC: 21 U/L (ref 4–60)
MCH RBC QN AUTO: 30.2 PG (ref 27–31)
MCHC RBC AUTO-ENTMCNC: 34.7 G/DL (ref 32–36)
MCV RBC AUTO: 87 FL (ref 82–98)
PLATELET # BLD AUTO: 216 K/UL (ref 150–450)
PMV BLD AUTO: 11 FL (ref 9.2–12.9)
POTASSIUM SERPL-SCNC: 3.7 MMOL/L (ref 3.5–5.1)
PROT SERPL-MCNC: 6.5 G/DL (ref 6–8.4)
RBC # BLD AUTO: 4.67 M/UL (ref 4–5.4)
SODIUM SERPL-SCNC: 139 MMOL/L (ref 136–145)
TSH SERPL DL<=0.005 MIU/L-ACNC: 0.91 UIU/ML (ref 0.4–4)
WBC # BLD AUTO: 10.43 K/UL (ref 3.9–12.7)

## 2022-09-16 PROCEDURE — 36415 COLL VENOUS BLD VENIPUNCTURE: CPT | Mod: PO | Performed by: NURSE PRACTITIONER

## 2022-09-16 PROCEDURE — 83516 IMMUNOASSAY NONANTIBODY: CPT | Performed by: NURSE PRACTITIONER

## 2022-09-16 PROCEDURE — 83690 ASSAY OF LIPASE: CPT | Performed by: NURSE PRACTITIONER

## 2022-09-16 PROCEDURE — 99214 PR OFFICE/OUTPT VISIT, EST, LEVL IV, 30-39 MIN: ICD-10-PCS | Mod: S$GLB,,, | Performed by: FAMILY MEDICINE

## 2022-09-16 PROCEDURE — 3008F PR BODY MASS INDEX (BMI) DOCUMENTED: ICD-10-PCS | Mod: CPTII,S$GLB,, | Performed by: FAMILY MEDICINE

## 2022-09-16 PROCEDURE — 82784 ASSAY IGA/IGD/IGG/IGM EACH: CPT | Performed by: NURSE PRACTITIONER

## 2022-09-16 PROCEDURE — 99999 PR PBB SHADOW E&M-EST. PATIENT-LVL IV: ICD-10-PCS | Mod: PBBFAC,,, | Performed by: FAMILY MEDICINE

## 2022-09-16 PROCEDURE — 1159F PR MEDICATION LIST DOCUMENTED IN MEDICAL RECORD: ICD-10-PCS | Mod: CPTII,S$GLB,, | Performed by: FAMILY MEDICINE

## 2022-09-16 PROCEDURE — 1159F MED LIST DOCD IN RCRD: CPT | Mod: CPTII,S$GLB,, | Performed by: FAMILY MEDICINE

## 2022-09-16 PROCEDURE — 3008F BODY MASS INDEX DOCD: CPT | Mod: CPTII,S$GLB,, | Performed by: FAMILY MEDICINE

## 2022-09-16 PROCEDURE — 85027 COMPLETE CBC AUTOMATED: CPT | Performed by: NURSE PRACTITIONER

## 2022-09-16 PROCEDURE — 85652 RBC SED RATE AUTOMATED: CPT | Performed by: FAMILY MEDICINE

## 2022-09-16 PROCEDURE — 99999 PR PBB SHADOW E&M-EST. PATIENT-LVL IV: CPT | Mod: PBBFAC,,, | Performed by: FAMILY MEDICINE

## 2022-09-16 PROCEDURE — 3074F SYST BP LT 130 MM HG: CPT | Mod: CPTII,S$GLB,, | Performed by: FAMILY MEDICINE

## 2022-09-16 PROCEDURE — 3078F DIAST BP <80 MM HG: CPT | Mod: CPTII,S$GLB,, | Performed by: FAMILY MEDICINE

## 2022-09-16 PROCEDURE — 3078F PR MOST RECENT DIASTOLIC BLOOD PRESSURE < 80 MM HG: ICD-10-PCS | Mod: CPTII,S$GLB,, | Performed by: FAMILY MEDICINE

## 2022-09-16 PROCEDURE — 80048 BASIC METABOLIC PNL TOTAL CA: CPT | Performed by: FAMILY MEDICINE

## 2022-09-16 PROCEDURE — 99214 OFFICE O/P EST MOD 30 MIN: CPT | Mod: S$GLB,,, | Performed by: FAMILY MEDICINE

## 2022-09-16 PROCEDURE — 80076 HEPATIC FUNCTION PANEL: CPT | Performed by: NURSE PRACTITIONER

## 2022-09-16 PROCEDURE — 3074F PR MOST RECENT SYSTOLIC BLOOD PRESSURE < 130 MM HG: ICD-10-PCS | Mod: CPTII,S$GLB,, | Performed by: FAMILY MEDICINE

## 2022-09-16 PROCEDURE — 84443 ASSAY THYROID STIM HORMONE: CPT | Performed by: FAMILY MEDICINE

## 2022-09-16 PROCEDURE — 86140 C-REACTIVE PROTEIN: CPT | Performed by: NURSE PRACTITIONER

## 2022-09-16 RX ORDER — DEXAMETHASONE 4 MG/1
2 TABLET ORAL 2 TIMES DAILY
Qty: 12 G | Refills: 5 | Status: SHIPPED | OUTPATIENT
Start: 2022-09-16 | End: 2024-03-20

## 2022-09-16 RX ORDER — DEXTROAMPHETAMINE SACCHARATE, AMPHETAMINE ASPARTATE MONOHYDRATE, DEXTROAMPHETAMINE SULFATE AND AMPHETAMINE SULFATE 3.75; 3.75; 3.75; 3.75 MG/1; MG/1; MG/1; MG/1
15 CAPSULE, EXTENDED RELEASE ORAL EVERY MORNING
Qty: 30 CAPSULE | Refills: 0 | Status: SHIPPED | OUTPATIENT
Start: 2022-09-16 | End: 2022-09-16

## 2022-09-16 RX ORDER — DEXTROAMPHETAMINE SACCHARATE, AMPHETAMINE ASPARTATE MONOHYDRATE, DEXTROAMPHETAMINE SULFATE AND AMPHETAMINE SULFATE 3.75; 3.75; 3.75; 3.75 MG/1; MG/1; MG/1; MG/1
15 CAPSULE, EXTENDED RELEASE ORAL EVERY MORNING
Qty: 30 CAPSULE | Refills: 0 | Status: SHIPPED | OUTPATIENT
Start: 2022-09-16 | End: 2022-09-20

## 2022-09-16 RX ORDER — METHYLPREDNISOLONE 4 MG/1
TABLET ORAL
Qty: 1 EACH | Refills: 0 | Status: SHIPPED | OUTPATIENT
Start: 2022-09-16 | End: 2022-12-13

## 2022-09-16 NOTE — PROGRESS NOTES
Assessment:       1. Other fatigue    2. Weight loss    3. Mild intermittent asthma with exacerbation    4. Attention deficit disorder (ADD) in adult          Plan:       Other fatigue:  Worsening  -     Sedimentation rate; Future; Expected date: 09/16/2022  -     Urinalysis; Future; Expected date: 09/16/2022  -     TSH; Future; Expected date: 09/16/2022  -     Basic Metabolic Panel; Future; Expected date: 09/16/2022    Weight loss:  Worsening  -     Sedimentation rate; Future; Expected date: 09/16/2022  -     Urinalysis; Future; Expected date: 09/16/2022  -     TSH; Future; Expected date: 09/16/2022  -     Basic Metabolic Panel; Future; Expected date: 09/16/2022    Mild intermittent asthma with exacerbation:  Stable  -     FLOVENT  mcg/actuation inhaler; Inhale 2 puffs into the lungs 2 (two) times daily.  Dispense: 12 g; Refill: 5  -     methylPREDNISolone (MEDROL DOSEPACK) 4 mg tablet; use as directed  Dispense: 1 each; Refill: 0    Attention deficit disorder (ADD) in adult:  Stable  -     dextroamphetamine-amphetamine (ADDERALL XR) 15 MG 24 hr capsule; Take 1 capsule (15 mg total) by mouth every morning.  Dispense: 30 capsule; Refill: 0           Will call the patient after we have the results of the test, [place] prescription monitoring program was checked and okay.  Adderall was prescribed for the patient.  The patient was advised to start Flovent, will also prescribe Medrol Dosepak can use the Ventolin inhaler as needed if the symptoms get worse, the patient notify us immediately.  Discontinue montelukast because the patient is having symptoms of depression.  The patient will return in 1 week to receive flu injection and pneumonia vaccine.  Patient agreed with assessment and plan. Patient verbalized understanding.     Subjective:       Patient ID: Holly Nichols is a 27 y.o. female.    Chief Complaint: Medication Refill (Adderall ), Follow-up (Bloodwork ), and Weight Loss (Lost almost 20 pounds  )    HPI    ADD:  The patient is currently taking Adderall extended release 15 mg daily, denies any side effects of the medication, the patient will need a new refill at this time.  The patient denies any symptoms of chest pain shortness of breath.    Weight loss:  The patient stated that she was diagnosed with colitis many years ago and was medication, the patient is not taking the medication at this time but is complaining of weight loss, intermittent episode of diarrhea, weight loss, the symptoms are getting worse.  The patient also feeling very tired, as soon as she eats, she has diarrhea.  She is scheduled for colonoscopy until December,, and started feeling depressed.    Asthma:  The patient complains of intermittent episode of wheezing, she just receive a cortisone injection, but stated that is not have any shortness of breath at this time.    Past medical history, past social history was reviewed and discussed with the patient.    Review of Systems   Constitutional:  Positive for fatigue and unexpected weight change. Negative for activity change and appetite change.   HENT:  Negative for ear discharge.    Eyes:  Negative for discharge and itching.   Respiratory:  Negative for choking and chest tightness.    Cardiovascular:  Negative for leg swelling.   Gastrointestinal:  Positive for abdominal pain and diarrhea. Negative for abdominal distention.   Endocrine: Negative for cold intolerance and heat intolerance.   Genitourinary:  Negative for dysuria and flank pain.   Musculoskeletal:  Negative for arthralgias and back pain.   Skin:  Negative for pallor.   Allergic/Immunologic: Negative for environmental allergies and food allergies.   Neurological:  Negative for dizziness and facial asymmetry.   Hematological:  Negative for adenopathy. Does not bruise/bleed easily.   Psychiatric/Behavioral:  Positive for decreased concentration and dysphoric mood. Negative for agitation and confusion.      Objective:       Physical Exam  Vitals and nursing note reviewed.   Constitutional:       General: She is not in acute distress.     Appearance: Normal appearance. She is well-developed and normal weight. She is not diaphoretic.   HENT:      Head: Normocephalic and atraumatic.      Right Ear: External ear normal.      Left Ear: External ear normal.      Nose: Nose normal.   Eyes:      General: No scleral icterus.        Right eye: No discharge.         Left eye: No discharge.      Conjunctiva/sclera: Conjunctivae normal.   Cardiovascular:      Rate and Rhythm: Normal rate and regular rhythm.      Heart sounds: Normal heart sounds.   Pulmonary:      Effort: Pulmonary effort is normal. No respiratory distress.      Breath sounds: Wheezing present.   Abdominal:      Palpations: There is no mass.   Musculoskeletal:         General: No tenderness or deformity.      Cervical back: Neck supple.   Skin:     Coloration: Skin is not pale.      Findings: No erythema.   Neurological:      Mental Status: She is alert.      Cranial Nerves: No cranial nerve deficit.   Psychiatric:         Behavior: Behavior normal.         Thought Content: Thought content normal.         Judgment: Judgment normal.

## 2022-09-19 ENCOUNTER — PATIENT MESSAGE (OUTPATIENT)
Dept: FAMILY MEDICINE | Facility: CLINIC | Age: 27
End: 2022-09-19
Payer: COMMERCIAL

## 2022-09-19 DIAGNOSIS — F98.8 ATTENTION DEFICIT DISORDER (ADD) IN ADULT: ICD-10-CM

## 2022-09-20 LAB — TTG IGA SER-ACNC: 7 UNITS

## 2022-09-20 RX ORDER — DEXTROAMPHETAMINE SACCHARATE, AMPHETAMINE ASPARTATE MONOHYDRATE, DEXTROAMPHETAMINE SULFATE AND AMPHETAMINE SULFATE 3.75; 3.75; 3.75; 3.75 MG/1; MG/1; MG/1; MG/1
15 CAPSULE, EXTENDED RELEASE ORAL EVERY MORNING
Qty: 30 CAPSULE | Refills: 0 | Status: SHIPPED | OUTPATIENT
Start: 2022-09-20 | End: 2022-10-18 | Stop reason: SDUPTHER

## 2022-09-21 ENCOUNTER — PATIENT MESSAGE (OUTPATIENT)
Dept: FAMILY MEDICINE | Facility: CLINIC | Age: 27
End: 2022-09-21
Payer: COMMERCIAL

## 2022-09-22 ENCOUNTER — TELEPHONE (OUTPATIENT)
Dept: GASTROENTEROLOGY | Facility: CLINIC | Age: 27
End: 2022-09-22
Payer: COMMERCIAL

## 2022-09-22 DIAGNOSIS — R19.5 YEAST IN STOOL: Primary | ICD-10-CM

## 2022-09-22 RX ORDER — NYSTATIN 500000 [USP'U]/1
500000 TABLET, COATED ORAL EVERY 8 HOURS
Qty: 21 TABLET | Refills: 0 | Status: SHIPPED | OUTPATIENT
Start: 2022-09-22 | End: 2022-09-29

## 2022-09-22 NOTE — TELEPHONE ENCOUNTER
Please call to inform & review the results with the patient- stool studies showed rare yeast in stool. I sent in a prescription for nystatin to treat this to your pharmacy on file. Otherwise, negative/unremarkable findings stool studies.    Continue with previous recommendations. If no improvement in symptoms or symptoms worsen, call/follow-up at clinic or go to ER.    Thanks,

## 2022-10-21 ENCOUNTER — PATIENT MESSAGE (OUTPATIENT)
Dept: FAMILY MEDICINE | Facility: CLINIC | Age: 27
End: 2022-10-21
Payer: COMMERCIAL

## 2022-12-07 ENCOUNTER — PATIENT MESSAGE (OUTPATIENT)
Dept: GASTROENTEROLOGY | Facility: CLINIC | Age: 27
End: 2022-12-07
Payer: COMMERCIAL

## 2022-12-16 ENCOUNTER — HOSPITAL ENCOUNTER (OUTPATIENT)
Facility: HOSPITAL | Age: 27
Discharge: HOME | End: 2022-12-16
Attending: INTERNAL MEDICINE | Admitting: INTERNAL MEDICINE
Payer: COMMERCIAL

## 2022-12-16 ENCOUNTER — ANESTHESIA (OUTPATIENT)
Dept: ENDOSCOPY | Facility: HOSPITAL | Age: 27
End: 2022-12-16
Payer: COMMERCIAL

## 2022-12-16 ENCOUNTER — ANESTHESIA EVENT (OUTPATIENT)
Dept: ENDOSCOPY | Facility: HOSPITAL | Age: 27
End: 2022-12-16
Payer: COMMERCIAL

## 2022-12-16 VITALS
DIASTOLIC BLOOD PRESSURE: 63 MMHG | WEIGHT: 152 LBS | SYSTOLIC BLOOD PRESSURE: 112 MMHG | HEIGHT: 68 IN | OXYGEN SATURATION: 100 % | RESPIRATION RATE: 16 BRPM | TEMPERATURE: 98 F | BODY MASS INDEX: 23.04 KG/M2 | HEART RATE: 61 BPM

## 2022-12-16 DIAGNOSIS — R10.9 ABDOMINAL PAIN: ICD-10-CM

## 2022-12-16 PROBLEM — R19.7 INTERMITTENT DIARRHEA: Status: ACTIVE | Noted: 2022-12-16

## 2022-12-16 LAB
C DIFF GDH STL QL: NEGATIVE
C DIFF TOX A+B STL QL IA: NEGATIVE

## 2022-12-16 PROCEDURE — 87324 CLOSTRIDIUM AG IA: CPT | Performed by: INTERNAL MEDICINE

## 2022-12-16 PROCEDURE — 88305 TISSUE EXAM BY PATHOLOGIST: ICD-10-PCS | Mod: 26,,, | Performed by: PATHOLOGY

## 2022-12-16 PROCEDURE — D9220A PRA ANESTHESIA: Mod: ANES,,, | Performed by: ANESTHESIOLOGY

## 2022-12-16 PROCEDURE — 88305 TISSUE EXAM BY PATHOLOGIST: CPT | Performed by: PATHOLOGY

## 2022-12-16 PROCEDURE — 87427 SHIGA-LIKE TOXIN AG IA: CPT | Performed by: INTERNAL MEDICINE

## 2022-12-16 PROCEDURE — D9220A PRA ANESTHESIA: ICD-10-PCS | Mod: ANES,,, | Performed by: ANESTHESIOLOGY

## 2022-12-16 PROCEDURE — 45380 COLONOSCOPY AND BIOPSY: CPT | Mod: ,,, | Performed by: INTERNAL MEDICINE

## 2022-12-16 PROCEDURE — 43239 EGD BIOPSY SINGLE/MULTIPLE: CPT | Mod: 51,,, | Performed by: INTERNAL MEDICINE

## 2022-12-16 PROCEDURE — 87046 STOOL CULTR AEROBIC BACT EA: CPT | Mod: 59 | Performed by: INTERNAL MEDICINE

## 2022-12-16 PROCEDURE — 37000008 HC ANESTHESIA 1ST 15 MINUTES: Mod: PO | Performed by: INTERNAL MEDICINE

## 2022-12-16 PROCEDURE — 43239 EGD BIOPSY SINGLE/MULTIPLE: CPT | Mod: PO | Performed by: INTERNAL MEDICINE

## 2022-12-16 PROCEDURE — 27201012 HC FORCEPS, HOT/COLD, DISP: Mod: PO | Performed by: INTERNAL MEDICINE

## 2022-12-16 PROCEDURE — 45380 PR COLONOSCOPY,BIOPSY: ICD-10-PCS | Mod: ,,, | Performed by: INTERNAL MEDICINE

## 2022-12-16 PROCEDURE — 63600175 PHARM REV CODE 636 W HCPCS: Mod: PO | Performed by: INTERNAL MEDICINE

## 2022-12-16 PROCEDURE — D9220A PRA ANESTHESIA: ICD-10-PCS | Mod: CRNA,,, | Performed by: NURSE ANESTHETIST, CERTIFIED REGISTERED

## 2022-12-16 PROCEDURE — 87045 FECES CULTURE AEROBIC BACT: CPT | Performed by: INTERNAL MEDICINE

## 2022-12-16 PROCEDURE — 63600175 PHARM REV CODE 636 W HCPCS: Mod: PO | Performed by: NURSE ANESTHETIST, CERTIFIED REGISTERED

## 2022-12-16 PROCEDURE — 25000003 PHARM REV CODE 250: Mod: PO | Performed by: NURSE ANESTHETIST, CERTIFIED REGISTERED

## 2022-12-16 PROCEDURE — 87209 SMEAR COMPLEX STAIN: CPT | Performed by: INTERNAL MEDICINE

## 2022-12-16 PROCEDURE — 43239 PR EGD, FLEX, W/BIOPSY, SGL/MULTI: ICD-10-PCS | Mod: 51,,, | Performed by: INTERNAL MEDICINE

## 2022-12-16 PROCEDURE — 88305 TISSUE EXAM BY PATHOLOGIST: CPT | Mod: 26,,, | Performed by: PATHOLOGY

## 2022-12-16 PROCEDURE — D9220A PRA ANESTHESIA: Mod: CRNA,,, | Performed by: NURSE ANESTHETIST, CERTIFIED REGISTERED

## 2022-12-16 PROCEDURE — 45380 COLONOSCOPY AND BIOPSY: CPT | Mod: PO | Performed by: INTERNAL MEDICINE

## 2022-12-16 PROCEDURE — 37000009 HC ANESTHESIA EA ADD 15 MINS: Mod: PO | Performed by: INTERNAL MEDICINE

## 2022-12-16 RX ORDER — LIDOCAINE HYDROCHLORIDE 20 MG/ML
INJECTION INTRAVENOUS
Status: DISCONTINUED | OUTPATIENT
Start: 2022-12-16 | End: 2022-12-16

## 2022-12-16 RX ORDER — FENTANYL CITRATE 50 UG/ML
INJECTION, SOLUTION INTRAMUSCULAR; INTRAVENOUS
Status: DISCONTINUED | OUTPATIENT
Start: 2022-12-16 | End: 2022-12-16

## 2022-12-16 RX ORDER — PROPOFOL 10 MG/ML
VIAL (ML) INTRAVENOUS
Status: DISCONTINUED | OUTPATIENT
Start: 2022-12-16 | End: 2022-12-16

## 2022-12-16 RX ORDER — PROPOFOL 10 MG/ML
VIAL (ML) INTRAVENOUS CONTINUOUS PRN
Status: DISCONTINUED | OUTPATIENT
Start: 2022-12-16 | End: 2022-12-16

## 2022-12-16 RX ORDER — FAMOTIDINE, CALCIUM CARBONATE, AND MAGNESIUM HYDROXIDE 10; 800; 165 MG/1; MG/1; MG/1
1 TABLET, CHEWABLE ORAL 2 TIMES DAILY PRN
Start: 2022-12-16 | End: 2023-01-14 | Stop reason: CLARIF

## 2022-12-16 RX ORDER — SODIUM CHLORIDE, SODIUM LACTATE, POTASSIUM CHLORIDE, CALCIUM CHLORIDE 600; 310; 30; 20 MG/100ML; MG/100ML; MG/100ML; MG/100ML
INJECTION, SOLUTION INTRAVENOUS CONTINUOUS
Status: DISCONTINUED | OUTPATIENT
Start: 2022-12-16 | End: 2022-12-16 | Stop reason: HOSPADM

## 2022-12-16 RX ORDER — SODIUM CHLORIDE 0.9 % (FLUSH) 0.9 %
10 SYRINGE (ML) INJECTION
Status: DISCONTINUED | OUTPATIENT
Start: 2022-12-16 | End: 2022-12-16 | Stop reason: HOSPADM

## 2022-12-16 RX ORDER — DIPHENOXYLATE HYDROCHLORIDE AND ATROPINE SULFATE 2.5; .025 MG/1; MG/1
1 TABLET ORAL EVERY 6 HOURS
Qty: 100 TABLET | Refills: 5 | Status: SHIPPED | OUTPATIENT
Start: 2022-12-16 | End: 2024-03-12 | Stop reason: SDUPTHER

## 2022-12-16 RX ADMIN — FENTANYL CITRATE 50 MCG: 50 INJECTION, SOLUTION INTRAMUSCULAR; INTRAVENOUS at 12:12

## 2022-12-16 RX ADMIN — PROPOFOL 25 MG: 10 INJECTION, EMULSION INTRAVENOUS at 12:12

## 2022-12-16 RX ADMIN — PROPOFOL 150 MCG/KG/MIN: 10 INJECTION, EMULSION INTRAVENOUS at 12:12

## 2022-12-16 RX ADMIN — SODIUM CHLORIDE, POTASSIUM CHLORIDE, SODIUM LACTATE AND CALCIUM CHLORIDE: 600; 310; 30; 20 INJECTION, SOLUTION INTRAVENOUS at 11:12

## 2022-12-16 RX ADMIN — LIDOCAINE HYDROCHLORIDE 100 MG: 20 INJECTION INTRAVENOUS at 12:12

## 2022-12-16 RX ADMIN — PROPOFOL 100 MG: 10 INJECTION, EMULSION INTRAVENOUS at 12:12

## 2022-12-16 NOTE — ANESTHESIA PREPROCEDURE EVALUATION
12/16/2022  Holly Nichols is a 27 y.o., female.      Holly Nichols is a      BMI: There is no height or weight on file to calculate BMI.      Patient Active Problem List   Diagnosis    Unspecified asthma, with status asthmaticus    Pain in thoracic spine    Scoliosis (and kyphoscoliosis), idiopathic    Constipation    Allergy    Ankylosing spondylitis    Insomnia    Fibromyalgia    Fatigue    Vitamin D insufficiency    HLA B27 (HLA B27 positive)    Asthma exacerbation    MDD (major depressive disorder), recurrent episode, mild    Anxiety disorder    Migraine    Status post laparoscopic hysterectomy    Mild intermittent asthma without complication    Other hyperlipidemia    Anxiety    Intermittent diarrhea       Past Surgical History:   Procedure Laterality Date    ADENOIDECTOMY      APPENDECTOMY  09/2014    endometriosis    CHOLECYSTECTOMY  12/20/2017    Dr. MARBELLA Anne, [facility]     COLONOSCOPY N/A 05/19/2016    Procedure: COLONOSCOPY;  Surgeon: Jarret Zhao Jr., MD;  Location: [facility];  Service: Endoscopy;  Laterality: N/A;    COLONOSCOPY N/A 08/27/2020    Dr. Zhao: Erythema & ulcerated mucosa in descending, splenic flexure & distal transverse (r/o UC, r/o ischemia, r/o vasculiti, etc.), Mucosal changes in descending colon (secondary to left-sided colitis, to ischemic colitis(?), to vasculitis(?). hemorrhoids; biopsy: TI & random biopsies- WNL, transverse/hepatic/descending- mild active colitis without definitive features of chronicity    CYSTOSCOPY  12/01/2021    Procedure: CYSTOSCOPY;  Surgeon: Ana María Easley MD;  Location: [facility] OR;  Service: OB/GYN;;    ESOPHAGOGASTRODUODENOSCOPY N/A 10/01/2020    Dr. Zhao: Erythema in the lower third of the esophagus; Duodenal bulb erosions without bleeding. biopsy: duodenum- Small bowel mucosa with mild acute inflammation  changes may represent erosion, Villous architecture is maintained; stomach- mild chronic gastritis; esophagus- minimal reactive changes    LAPAROSCOPIC OOPHORECTOMY Left 12/01/2021    Procedure: OOPHORECTOMY, LAPAROSCOPIC;  Surgeon: Ana María Easley MD;  Location: [facility] OR;  Service: OB/GYN;  Laterality: Left;    LAPAROSCOPIC SALPINGECTOMY Bilateral 12/01/2021    Procedure: SALPINGECTOMY, LAPAROSCOPIC;  Surgeon: Ana María Easley MD;  Location: [facility] OR;  Service: OB/GYN;  Laterality: Bilateral;    LAPAROSCOPIC TOTAL HYSTERECTOMY N/A 12/01/2021    Procedure: HYSTERECTOMY, TOTAL, LAPAROSCOPIC;  Surgeon: Ana María Easley MD;  [facility];  Service: OB/GYN;  Laterality: N/A;    MOUTH SURGERY      x2    PELVIC LAPAROSCOPY      SINUS SURGERY  11/23/2021    FESS, turbinate red    TONSILLECTOMY  07/2016    UPPER GASTROINTESTINAL ENDOSCOPY  05/2016    Dr. Zhao       (Not in a hospital admission)      Review of patient's allergies indicates:   Allergen Reactions    Toradol [ketorolac]     Tramadol     Penicillins Rash    Sulfa (sulfonamide antibiotics) Hives and Rash         Vital Signs Range (Last 24H):  Temp:  [36.9 °C (98.4 °F)]   Pulse:  [57-81]   Resp:  [10-17]   BP: (106-116)/(63-81)   SpO2:  [100 %]         Labs (most recent):    CBC:   No results for input(s): WBC, RBC, HGB, HCT, PLT, MCV, MCH, MCHC in the last 72 hours.    CMP:   No results for input(s): NA, K, CL, CO2, BUN, CREATININE, GLU, MG, PHOS, CALCIUM, ALBUMIN, PROT, ALKPHOS, ALT, AST, BILITOT in the last 72 hours.    No results found for: HGBA1C    No results found for: INR, PROTIME      Diagnostic Studies:      EKG:  Normal sinus rhythm  Right atrial enlargement  Rightward axis  Borderline ECG      Specimen Collected: 12/19/17 20:12 Last Resulted: 12/20/17 13:27                    Pre-op Assessment    I have reviewed the Patient Summary Reports.    I have reviewed the Nursing Notes. I have reviewed the NPO Status.   I have  reviewed the Medications.     Review of Systems  Anesthesia Hx:  No problems with previous Anesthesia  History of prior surgery of interest to airway management or planning: Previous anesthesia: General Airway issues documented on chart review include easy direct laryngoscopy , view on direct laryngoscopy Grade I  Denies Family Hx of Anesthesia complications.   Denies Personal Hx of Anesthesia complications.   Social:  Smoker    Cardiovascular:  Cardiovascular Normal  ECG has been reviewed.    Pulmonary:   Asthma asymptomatic    Hepatic/GI:   dysphagia   Musculoskeletal:   Scoliosis, Fibromyalgia   Neurological:   Neuromuscular Disease, Headaches Migraines   Endocrine:  Endocrine Normal    Psych:   Psychiatric History anxiety depression          Physical Exam   Airway/Jaw/Neck:  Airway Findings: Mouth Opening: Normal   Mallampati: II TM Distance: Normal, at least 6 cm   Jaw/Neck Findings:  Neck ROM: Normal ROM       Dental:  Dental Findings: In tact     Chest/Lungs:  Chest/Lungs Findings: Normal Respiratory Rate, Clear to auscultation      Heart/Vascular:  Heart Findings: Rhythm: Regular Rhythm        Mental Status:  Mental Status Findings:  Alert and Oriented         Anesthesia Plan  Type of Anesthesia, risks & benefits discussed:  Anesthesia Type:  Gen Natural Airway    Patient's Preference:   Plan Factors:          Intra-op Monitoring Plan: Standard ASA Monitors  Intra-op Monitoring Plan Comments:   Post Op Pain Control Plan:   Post Op Pain Control Plan Comments:     Induction:   IV  Beta Blocker:  Patient is not currently on a Beta-Blocker (No further documentation required).       Informed Consent: Informed consent signed with the Patient and all parties understand the risks and agree with anesthesia plan.  All questions answered.  Anesthesia consent signed with patient.  ASA Score: 2     Day of Surgery Review of History & Physical:  There are no significant changes.  H&P Update referred to the  surgeon/provider.          Ready For Surgery From Anesthesia Perspective.           Physical Exam    Airway:  Mallampati: II   Mouth Opening: Normal  TM Distance: Normal, at least 6 cm  Neck ROM: Normal ROM    Dental:  In tact    Chest/Lungs:  Normal Respiratory Rate, Clear to auscultation    Heart:  Rhythm: Regular Rhythm          Anesthesia Plan  Type of Anesthesia, risks & benefits discussed:    Anesthesia Type: Gen Natural Airway  Intra-op Monitoring Plan: Standard ASA Monitors  Induction:  IV  Informed Consent: Informed consent signed with the Patient and all parties understand the risks and agree with anesthesia plan.  All questions answered.   ASA Score: 2  Day of Surgery Review of History & Physical: H&P Update referred to the surgeon/provider.    Ready For Surgery From Anesthesia Perspective.       .

## 2022-12-16 NOTE — TRANSFER OF CARE
"Anesthesia Transfer of Care Note    Patient: Holly Nichols    Procedure(s) Performed: Procedure(s) (LRB):  EGD (ESOPHAGOGASTRODUODENOSCOPY) (N/A)  COLONOSCOPY (N/A)    Patient location: PACU    Anesthesia Type: general    Transport from OR: Transported from OR on room air with adequate spontaneous ventilation    Post pain: adequate analgesia    Post assessment: no apparent anesthetic complications and tolerated procedure well    Post vital signs: stable    Level of consciousness: responds to stimulation    Nausea/Vomiting: no nausea/vomiting    Complications: none    Transfer of care protocol was followed      Last vitals:   Visit Vitals  /81   Pulse 81   Temp 36.9 °C (98.4 °F)   Resp 17   Ht 5' 8" (1.727 m)   Wt 68.9 kg (152 lb)   LMP 11/11/2020 (LMP Unknown)   SpO2 100%   Breastfeeding No   BMI 23.11 kg/m²     "

## 2022-12-16 NOTE — ANESTHESIA POSTPROCEDURE EVALUATION
Anesthesia Post Evaluation    Patient: Holly Nichols    Procedure(s) Performed: Procedure(s) (LRB):  EGD (ESOPHAGOGASTRODUODENOSCOPY) (N/A)  COLONOSCOPY (N/A)    Final Anesthesia Type: general      Patient location during evaluation: PACU  Patient participation: Yes- Able to Participate  Level of consciousness: awake and alert  Post-procedure vital signs: reviewed and stable  Pain management: adequate  Airway patency: patent    PONV status at discharge: No PONV  Anesthetic complications: no      Cardiovascular status: blood pressure returned to baseline  Respiratory status: unassisted and room air  Hydration status: euvolemic  Follow-up not needed.          Vitals Value Taken Time   /63 12/16/22 1319   Temp 36.9 °C (98.4 °F) 12/16/22 1302   Pulse 61 12/16/22 1319   Resp 16 12/16/22 1319   SpO2 100 % 12/16/22 1319         Event Time   Out of Recovery 13:28:32         Pain/Diamond Score: Diamond Score: 10 (12/16/2022  1:19 PM)

## 2022-12-16 NOTE — BRIEF OP NOTE
Discharge Note  Short Stay      SUMMARY     Admit Date: 12/16/2022    Attending Physician: Jarret Zhao Jr., MD     Discharge Physician: Jarret Zhao Jr., MD    Discharge Date: 12/16/2022 1:17 PM    Final Diagnosis: Heartburn [R12]  Intermittent diarrhea [R19.7]  History of colitis [Z87.19]    Minimal reflux.  Minimal antritis.  Grossly normal colon.       Disposition: HOME OR SELF CARE    Patient Instructions:   Current Discharge Medication List        START taking these medications    Details   famotidine-calcium carbonate-magnesium hydroxide (PEPCID COMPLETE) -165 mg Take 1 tablet by mouth 2 (two) times daily as needed.           CONTINUE these medications which have NOT CHANGED    Details   albuterol (PROVENTIL/VENTOLIN HFA) 90 mcg/actuation inhaler Inhale 2 puffs into the lungs every 6 (six) hours as needed for Wheezing. Rescue  Qty: 18 g, Refills: 6    Associated Diagnoses: Mild intermittent asthma with exacerbation      ALPRAZolam (XANAX) 0.25 MG tablet Take 1 tablet (0.25 mg total) by mouth daily as needed for Anxiety.  Qty: 30 tablet, Refills: 0    Associated Diagnoses: Anxiety      !! citalopram (CELEXA) 20 MG tablet TAKE 1 TABLET BY MOUTH EVERY DAY  Qty: 30 tablet, Refills: 11    Associated Diagnoses: Depression, unspecified depression type      !! citalopram (CELEXA) 40 MG tablet Take 1.5 tablets (60 mg total) by mouth every evening.  Qty: 45 tablet, Refills: 5    Associated Diagnoses: Generalized anxiety disorder      dextroamphetamine-amphetamine (ADDERALL XR) 15 MG 24 hr capsule Take 1 capsule (15 mg total) by mouth every morning.  Qty: 30 capsule, Refills: 0    Associated Diagnoses: Attention deficit disorder (ADD) in adult      esomeprazole (NEXIUM) 40 MG capsule Take 1 capsule (40 mg total) by mouth before breakfast.  Qty: 30 capsule, Refills: 2    Associated Diagnoses: Heartburn      FLOVENT  mcg/actuation inhaler Inhale 2 puffs into the lungs 2 (two) times daily.  Qty: 12 g,  Refills: 5    Associated Diagnoses: Mild intermittent asthma with exacerbation      fluticasone propionate (FLONASE) 50 mcg/actuation nasal spray 1 spray by Each Nostril route 2 (two) times daily.      Bifidobacterium infantis (ALIGN ORAL) Take by mouth once daily.      ondansetron (ZOFRAN-ODT) 4 MG TbDL Take 4 mg by mouth every 8 (eight) hours as needed.       !! - Potential duplicate medications found. Please discuss with provider.          Discharge Procedure Orders (must include Diet, Follow-up, Activity)    Follow Up:  Follow up with PCP as per your routine.  Please follow an anti reflux diet and a high fiber low fat diet.  Activity as tolerated.    No driving day of procedure.

## 2022-12-16 NOTE — PATIENT INSTRUCTIONS
Recovery After Procedural Sedation (Adult)   You have been given medicine by vein to make you sleep during your surgery. This may have included both a pain medicine and sleeping medicine. Most of the effects have worn off. But you may still have some drowsiness for the next 6 to 8 hours.  Home care  Follow these guidelines when you get home:  For the next 8 hours, you should be watched by a responsible adult. This person should make sure your condition is not getting worse.  Don't drink any alcohol for the next 24 hours.  Don't drive, operate dangerous machinery, or make important business or personal decisions during the next 24 hours.  To prevent injury or falls, use caution when standing and walking for at least 24 hours after your procedure.  Note: Your healthcare provider may tell you not to take any medicine by mouth for pain or sleep in the next 4 hours. These medicines may react with the medicines you were given in the hospital. This could cause a much stronger response than usual.  Follow-up care  Follow up with your healthcare provider if you are not alert and back to your usual level of activity within 12 hours.  When to seek medical advice  Call your healthcare provider right away if any of these occur:  Drowsiness gets worse  Weakness or dizziness gets worse  Repeated vomiting  You can't be awakened  Fever  New rash  [employer] last reviewed this educational content on 9/1/2019  © 2093-4676 [employer]. [address] [place] [address], [place] [address]. All rights reserved. This information is not intended as a substitute for professional medical care. Always follow your healthcare professional's instructions         Tips to Control Acid Reflux    To control acid reflux, youll need to make some basic diet and lifestyle changes. The simple steps outlined below may be all youll need to ease discomfort.  Watch what you eat  Avoid fatty foods and spicy foods.  Eat fewer acidic foods, such as citrus  and tomato-based foods. These can increase symptoms.  Limit drinking alcohol, caffeine, and fizzy beverages. All increase acid reflux.  Try limiting chocolate, peppermint, and spearmint. These can worsen acid reflux in some people.  Watch when you eat  Avoid lying down for 3 hours after eating.  Do not snack before going to bed.  Raise your head  Raising your head and upper body by 4 to 6 inches helps limit reflux when youre lying down. Put blocks under the head of your bed frame to raise it.  Other changes  Lose weight, if you need to  Dont exercise near bedtime  Avoid tight-fitting clothes  Limit aspirin and ibuprofen  Stop smoking   Date Last Reviewed: 7/1/2016  © 0016-7755 [employer]. [address] [place] [address], [place] [address]. All rights reserved. This information is not intended as a substitute for professional medical care. Always follow your healthcare professional's instructions.         High-Fiber Diet  Fiber is in fruits, vegetables, cereals, and grains. Fiber passes through your body undigested. A high-fiber diet helps food move through your intestinal tract. The added bulk is helpful in preventing constipation. In people with diverticulosis, fiber helps clean out the pouches along the colon wall. It also prevents new pouches from forming. A high-fiber diet reduces the risk of colon cancer. It also lowers blood cholesterol and prevents high blood sugar in people with diabetes.    The fiber-rich foods listed below should be part of your diet. If you are not used to high-fiber foods, start with 1 or 2 foods from this list. Every 3 to 4 days add a new one to your diet. Do this until you are eating 4 high-fiber foods per day. This should give you 20 to 35 grams of fiber a day. It is also important to drink a lot of water when you are on this diet. You should have 6 to 8 glasses of water a day. Water makes the fiber swell and increases the benefit.  Foods high in dietary fiber  The following  foods are high in dietary fiber:  Breads. Breads made with 100% whole-wheat flour; tobin, wheat, or rye crackers; whole-grain tortillas, bran muffins.  Cereals. Whole-grain and bran cereals with bran (shredded wheat, wheat flakes, raisin bran, corn bran); oatmeal, rolled oats, granola, and brown rice.  Fruits. Fresh fruits and their edible skins (pears, prunes, raisins, berries, apples, and apricots); bananas, citrus fruit, mangoes, pineapple; and prune juice.  Nuts. Any nuts and seeds.  Vegetables. Best served raw or lightly cooked. All types, especially: green peas, celery, eggplant, potatoes, spinach, broccoli, [place] sprouts, winter squash, carrots, cauliflower, soybeans, lentils, and fresh and dried beans of all kinds.  Other. Popcorn, any spices.  Date Last Reviewed: 8/1/2016  © 9978-3436 [employer]. [address] [place] [address]. All rights reserved. This information is not intended as a substitute for professional medical care. Always follow your healthcare professional's instructions.

## 2022-12-16 NOTE — [employer] PATIENT INSTRUCTIONS
Discharge Summary/Instructions for after Colonoscopy with   Biopsy/Polypectomy  Holly Nichols    Friday, December 16, 2022  Jarret Zhao MD  RESTRICTIONS ON ACTIVITY:  - Do not drive a car or operate machinery until the day after the procedure.      - The following day: return to full activity including work.  - For  3 days: No heavy lifting, straining or running.  - Diet: You can have solid foods, but no gassy foods (i.e. beans, broccoli,   cabbage, etc).  TREATMENT FOR COMMON SIDE EFFECTS:  - Mild abdominal pain and bloating or excessive gas: rest, eat lightly and   use a heating pad.  SYMPTOMS TO WATCH FOR AND REPORT TO YOUR PHYSICIAN:  1. Severe abdominal pain.  2. Fever within 24 hours after a procedure.  3. A large amount of rectal bleeding. (A small amount of blood from the   rectum is not serious, especially if hemorrhoids are present.  3.  Because air was put into your colon during the procedure, expelling   large amounts of air from your rectum is normal.  4.  You may not have a bowel movement for 1-3 days because of the   colonoscopy prep.  This is normal.  5.  Call immediately if you notice any of the following:   Chills and/or fever over 101   Persistent vomiting   Severe abdominal pain, other than gas cramps   Severe chest pain   Black, tarry stools   Any bleeding - exceeding one tablespoon  Your doctor recommends these additional instructions:  We are waiting for your pathology and culture results.   Eat a high fiber diet  (and eat a gluten free diet and eat a lactose free   diet).   Your physician has recommended a repeat colonoscopy in five years for   screening purposes.   Continue your present medications.   Take Lomotil 1-2 tablets by mouth as needed after each loose bowel movement   PRN.   You have a contact number available for emergencies.  The signs and symptoms   of potential delayed complications were discussed with you.  You may return   to normal activities tomorrow.  Written  discharge instructions were   provided to you.   You may return to normal activities tomorrow.  None  If you have any questions or problems, please call your physician.  EMERGENCY PHONE NUMBER: [phone]  LAB RESULTS: Call in two (2) weeks for lab results, [phone]  ___________________________________________  Nurse Signature  ___________________________________________  Patient/Designated Responsible Party Signature  Jarret Zhao MD  12/16/2022 1:15:00 PM  This report has been verified and signed electronically.  Dear patient,  As a result of recent federal legislation (The Federal Cures Act), you may   receive lab or pathology results from your procedure in your [facility]   account before your physician is able to contact you. Your physician or   their representative will relay the results to you with their   recommendations at their soonest availability.  Thank you.  [employer]ATION

## 2022-12-16 NOTE — H&P
History & Physical - Short Stay  Gastroenterology      SUBJECTIVE:     Procedure: Gastroscopy and Colonoscopy    Chief Complaint/Indication for Procedure:  Abdominal pain, nausea.  Diarrhea.    History of Present Illness:  See recent GI OV note:  Office Visit   5/18/2022  [facility] G[facility]  JUDD Brewster  Gastroenterology Abdominal cramping +5 more  Dx GI Problem  Reason for Visit     Progress Notes    JUDD Brewster at 5/18/2022 11:30 AM    Status: Signed   Subjective:       Patient ID: Holly Nichols is a 26 y.o. female Body mass index is 25.92 kg/m².     Chief Complaint: GI Problem  This is an established patient of Dr. Zhao and myself.     The patient location is: in [place]. I verified patient name and date of birth. Patient provided current height and weight measurements.  The chief complaint leading to consultation is: FOLLOW-UP on intermittent diarrhea and abdominal pain     Visit type: audiovisual     Face to Face time with patient: 18 minutes  50 minutes of total time spent on the encounter, which includes face to face time and non-face to face time preparing to see the patient (eg, review of tests), Obtaining and/or reviewing separately obtained history, Documenting clinical information in the electronic or other health record, Independently interpreting results (not separately reported) and communicating results to the patient/family/caregiver, or Care coordination (not separately reported).      Each patient to whom he or she provides medical services by telemedicine is:  (1) informed of the relationship between the physician and patient and the respective role of any other health care provider with respect to management of the patient; and (2) notified that he or she may decline to receive medical services by telemedicine and may withdraw from such care at any time.     GI Problem  The primary symptoms include weight loss (trying to lose weight but also eating  "makes GI symptoms worse, having mostly protein shake in morning and then lunch or dinner, eating 1 big meal a day, eating more salads with baked chicken), abdominal pain, nausea (occasional, mild with intermittent abdominal pain & diarrhea) and diarrhea (probiotic align daily). Primary symptoms do not include fever, fatigue, vomiting, melena, hematemesis, hematochezia or dysuria. The illness began more than 7 days ago (irregular bowel habits for several years). The problem has been gradually worsening (had improved after our last visit with having "bouts" maybe once a week; worsened since 12/2021 (when she stopped breastfeeding).   The abdominal pain began more than 2 days ago. Pain location: described as abdominal cramping. The severity of the abdominal pain is 4/10 (currently). The abdominal pain is relieved by bowel movements (after a bowel movement; triggered after eating (especially heavy meals) & by needing to have a bowel movement).   The diarrhea began more than 1 week ago. Daily occurrences: intermittent diarrhea, ~3 times a week with 4-5 loose stools that day; then goes to once daily to once every other day of formed stool. Risk factors for illness producing diarrhea include recent antibiotic use (recent antibiotics & outpatient surgery(been on several antibiotics for sinus infections, had sinus surgery in 11/2021)).   The illness does not include chills, dysphagia, odynophagia, constipation or back pain. Associated symptoms comments: Eating a bland diet, on low lactose diet. Significant associated medical issues include GERD (daily; PAST: prilosec- similar relief with nexium, protonix- no relief, nexium,  prilosec- became ineffective) and irritable bowel syndrome (bentyl helped in the past, taking 10 mg qid prn with minimal relief currently). Associated medical issues comments: PAST TREATMENT: lialda- helped.      Review of Systems   Constitutional: Positive for weight loss (trying to lose weight but also " eating makes GI symptoms worse, having mostly protein shake in morning and then lunch or dinner, eating 1 big meal a day, eating more salads with baked chicken). Negative for appetite change, chills, diaphoresis, fatigue and fever.   HENT: Negative for sore throat and trouble swallowing.    Respiratory: Negative for cough, choking, chest tightness and shortness of breath.    Cardiovascular: Negative for chest pain.   Gastrointestinal: Positive for abdominal pain, diarrhea (probiotic align daily) and nausea (occasional, mild with intermittent abdominal pain & diarrhea). Negative for abdominal distention, anal bleeding, blood in stool, constipation, dysphagia, hematemesis, hematochezia, melena, rectal pain and vomiting.     Assessment:       1. Abdominal cramping    2. Intermittent       3. Nonspecific colitis    4. S/P cholecystectomy    5. Family history of Crohn's disease    6. Heartburn        Plan:       Abdominal cramping  -     CBC Without Differential; Future; Expected date: 05/18/2022  -     Tissue Transglutaminase, IgA; Future; Expected date: 05/18/2022  -     IgA; Future; Expected date: 05/18/2022  -     C-reactive protein; Future; Expected date: 05/18/2022  -     Hepatic Function Panel; Future; Expected date: 05/18/2022  -     Lipase; Future; Expected date: 05/18/2022  -  INCREASE TO   dicyclomine (BENTYL) 20 mg tablet; Take 1 tablet (20 mg total) by mouth every 6 (six) hours.  Dispense: 120 tablet; Refill: 0  - schedule EGD, discussed procedure with patient, including risks and benefits, patient verbalized understanding  - schedule Colonoscopy, discussed procedure with the patient, including risks and benefits, patient verbalized understanding  - encouraged PO intake and daily calorie counts to ensure adequate nutrition is taken in, recommend at least 2,000 calories a day  - recommend nutritional drinks, such as Boost, Ensure or Glucerna, to supplement nutrition needs  - Possible abdominal imaging pending  results of testing and if symptoms persist     Intermittent diarrhea  -     Pancreatic elastase, fecal; Future; Expected date: 05/18/2022  -     Stool Exam-Ova,Cysts,Parasites; Future; Expected date: 05/18/2022  -     Fecal fat, qualitative; Future; Expected date: 05/18/2022  -     Giardia / Cryptosporidum, EIA; Future; Expected date: 05/18/2022  -     Occult blood x 1, stool; Future; Expected date: 05/18/2022  -     pH, stool; Future; Expected date: 05/18/2022  -     Rotavirus antigen, stool; Future; Expected date: 05/18/2022  -     WBC, Stool; Future; Expected date: 05/18/2022  -     Stool culture; Future; Expected date: 05/18/2022  -     Clostridium difficile EIA; Future; Expected date: 05/18/2022  -     Adenovirus Molecular Detection, PCR, Non-Blood Stool; Future; Expected date: 05/18/2022  -     Calprotectin, Stool; Future; Expected date: 05/18/2022  -     Tissue Transglutaminase, IgA; Future; Expected date: 05/18/2022  -     IgA; Future; Expected date: 05/18/2022  - schedule Colonoscopy, discussed procedure with the patient, including risks and benefits, patient verbalized understanding  - recommended OTC probiotic, such as Florastor or Culturelle, taken as directed on packaging  - avoid lactose, alcohol, & caffeine  - avoid known triggers     Nonspecific colitis & Family history of Crohn's disease  - schedule Colonoscopy, discussed procedure with the patient, including risks and benefits, patient verbalized understanding  -     Calprotectin, Stool; Future; Expected date: 05/18/2022  - avoid use of NSAIDs- since they can cause GI upset, bleeding and/or ulcers.     S/P cholecystectomy  -  possible trial of a bile acid sequestrant if diarrhea persist     Heartburn  - Discussed about acid-reducing medications; patient wants to restart nexium 40 mg once daily  -  RESTART   esomeprazole (NEXIUM) 40 MG capsule; Take 1 capsule (40 mg total) by mouth before breakfast.  Dispense: 30 capsule; Refill: 2  - schedule EGD,  discussed procedure with patient, including risks and benefits, patient verbalized understanding  -  CONTINUE   ondansetron (ZOFRAN-ODT) 4 MG TbDL; Take 1 tablet (4 mg total) by mouth 3 (three) times daily as needed (nausea).     Follow up in about 1 month (around 6/18/2022), or if symptoms worsen or fail to improve.           Latest Reference Range & Units 09/16/22 09:15   Fat Qual Neutral, Stl Normal  Normal   FECAL SPLIT FAT Normal  Normal   Calprotectin <50 mcg/g <27.1   C. diff Antigen Negative  Negative   C difficile Toxins A+B, EIA Negative  Negative   Giardia Antigen - EIA Negative  Negative   Cryptosporidium Antigen Negative  Negative   Occult Blood Negative  Negative   Rotavirus Negative  Negative   pH, Stool 5.0 - 8.5  7.0   Stool Exam-Ova,Cysts,Parasites  FINAL 09/20/2022 1427   Stool WBC No neutrophils seen  No neutrophils seen   CLOSTRIDIUM DIFFICILE  Rpt   CULTURE, STOOL  Rpt   ENTEROHEMORRHAGIC E.COLI  Rpt   Rpt: View report in Results Review for more information      See most recent Upper GI endoscopy   Indications:         Epigastric abdominal pain, Abdominal pain in the                        left upper quadrant, Diarrhea, Nausea. She is status                        post EGD within the past five years, 5/19/2016.   Impression:          - Normal oropharynx.                        - Normal upper third of esophagus and middle third                        of esophagus.                        - Erythema in the lower third of the esophagus.                        Biopsied.                        - Z-line regular, 39-40 cm from the incisors.                        - Non-erosive esophageal reflux (NERD) disease(?).                        - Normal cardia.                        - Normal gastric fundus, gastric body and antrum.                        - Minimal antritis. Biopsied.                        - Normal pylorus.                        - Duodenal bulb erosions without bleeding. Biopsied.                         - Normal examined duodenum otherwise. Biopsied.   Recommendation:      - Discharge patient to home.                        - Await pathology results.                        - Continue present medications.                        - Use Prilosec (omeprazole) 40 mg PO BID.                        - Use Bentyl (dicyclomine) 10 mg PO QID; 30 min AC                        and HS.                        - Call the G.I. clinic in 2 weeks for reports (if                        you haven't heard from us sooner) [phone].                        - Perform a computed tomographic (CT scan)                        enterography as previously scheduled.                        - Return to GI clinic in 6-8 weeks.   Jarret Zhao MD   10/1/2020    1. ESOPHAGUS, BIOPSY:   Squamous mucosa with minimal reactive changes   No evidence of eosinophils   2. STOMACH, BIOPSY:   Gastric antral mucosa with mild chronic gastritis and reactive/regenerative   changes   No evidence of intestinal metaplasia, dysplasia or malignancy   No evidence of Helicobacter pylori organisms on H&E stain   3. DUODENAL BULB, EROSION, BIOPSY:   Small bowel mucosa with mild acute inflammation and reactive/regenerative   changes may represent erosion   Negative for neoplasia or malignancy   Villous architecture is maintained   4. RANDOM DUODENUM, BIOPSY:   Duodenal mucosa with no significant pathologic abnormality   Villous architecture is maintained       And last Colonoscopy   Indications:         Lower abdominal pain, Change in bowel habits,                        Diarrhea. Last colonoscopy: May 2016,  Impression:          - Erythematous and ulcerated mucosa in the                        descending colon, at the splenic flexure and in the                        distal transverse colon. Biopsied (r/o UC, r/o                        ischemia, r/o vasculiti, etc.).                        - Mucosal changes were found in the descending colon                         (secondary to left-sided colitis, to ischemic                        colitis(?), to vasculitis(?).                        - Non-bleeding internal hemorrhoids.                        - The examination was otherwise normal.                        - The examined portion of the ileum was normal.                        Biopsied.                        - The rectum and recto-sigmoid colon are normal.                        Fluid aspiration performed.   Recommendation:      - Discharge patient to home.                        - Await pathology and culture results.                        - High fiber diet, gluten free diet and lactose free                        diet.                        - Call the G.I. clinic in 2 weeks for reports (if                        you haven't heard from us sooner) [phone].                        - Return to GI clinic in 4-6 weeks.                        - Repeat colonoscopy (date not yet determined).                        - Continue present medications.                        - Return to normal activities tomorrow.   Jarret Zhao MD   8/27/2020     1.  SMALL BOWEL, RANDOM TERMINAL ILEUM, BIOPSY:   - Benign small bowel mucosa with no significant histopathologic alterations.   - No increased intraepithelial lymphocytes or intervillous parasitic   microorganisms.   - No active ileitis, architectural distortion, or granulomas.   2.  COLON, RANDOM CECUM, BIOPSY:   - Benign colonic mucosa with no significant histopathologic alterations.   - No active or microscopic colitis, granulomas, viral cytopathic effect,   architectural distortion, or dysplasia.   3. COLON, RANDOM RIGHT, BIOPSY:   - Benign colonic mucosa with no significant histopathologic alterations.   - No active or microscopic colitis, granulomas, viral cytopathic effect,   architectural distortion, or dysplasia.   4.  COLON, TRANSVERSE, DESCENDING, SPLENIC FLEXURE, BIOPSY:   - Colonic mucosa with mild active colitis with  focal regenerative changes   without definitive features of chronicity, see comment.   - No microscopic colitis, granulomas, viral cytopathic effect, or dysplasia.   5.  COLON, RANDOM SIGMOID, BIOPSY:   - Benign colonic mucosa with no significant histopathologic alterations.   - No active or microscopic colitis, granulomas, viral cytopathic effect,   architectural distortion, or dysplasia.   6.  RECTUM, RANDOM, BIOPSY:   - Benign rectal mucosa with no significant histopathologic alterations.   - No active or microscopic proctitis, granulomas, viral cytopathic effect,   architectural distortion, or dysplasia.           No medications prior to admission.       Review of patient's allergies indicates:   Allergen Reactions    Latex, natural rubber      burning    Sulfur Hives    Toradol [ketorolac] Other (See Comments)     Metallic taste only once in 2013; has taken since without problems    Tramadol Other (See Comments)     paralysis    Penicillins Hives and Rash    Sulfa (sulfonamide antibiotics) Hives and Rash        Past Medical History:   Diagnosis Date    Abnormal Pap smear of cervix     Allergy     Anxiety     Asthma     rare inhaler use    Constipation     Depression     Endometriosis     Fibromyalgia     Gastritis     HLA B27 (HLA B27 positive)     Migraine     Postpartum depression     Scoliosis     Smoker     stopped in January 2018    Tobacco dependence 2/17/2016     Past Surgical History:   Procedure Laterality Date    ADENOIDECTOMY      APPENDECTOMY  09/2014    endometriosis    CHOLECYSTECTOMY  12/20/2017    Dr. MARBELLA Anne, [facility]     COLONOSCOPY N/A 05/19/2016    Procedure: COLONOSCOPY;  Surgeon: Jarret Zhao Jr., MD;  Location: [facility];  Service: Endoscopy;  Laterality: N/A;    COLONOSCOPY N/A 08/27/2020    Dr. Zhao: Erythema & ulcerated mucosa in descending, splenic flexure & distal transverse (r/o UC, r/o ischemia, r/o vasculiti, etc.), Mucosal changes in descending colon (secondary to left-sided  colitis, to ischemic colitis(?), to vasculitis(?). hemorrhoids; biopsy: TI & random biopsies- WNL, transverse/hepatic/descending- mild active colitis without definitive features of chronicity    CYSTOSCOPY  12/01/2021    Procedure: CYSTOSCOPY;  Surgeon: Ana María Easley MD;  Location: [facility] OR;  Service: OB/GYN;;    ESOPHAGOGASTRODUODENOSCOPY N/A 10/01/2020    Dr. Zhao: Erythema in the lower third of the esophagus; Duodenal bulb erosions without bleeding. biopsy: duodenum- Small bowel mucosa with mild acute inflammation changes may represent erosion, Villous architecture is maintained; stomach- mild chronic gastritis; esophagus- minimal reactive changes    LAPAROSCOPIC OOPHORECTOMY Left 12/01/2021    Procedure: OOPHORECTOMY, LAPAROSCOPIC;  Surgeon: Ana María Easley MD;  Location: [facility] OR;  Service: OB/GYN;  Laterality: Left;    LAPAROSCOPIC SALPINGECTOMY Bilateral 12/01/2021    Procedure: SALPINGECTOMY, LAPAROSCOPIC;  Surgeon: Ana María Easley MD;  Location: [facility] OR;  Service: OB/GYN;  Laterality: Bilateral;    LAPAROSCOPIC TOTAL HYSTERECTOMY N/A 12/01/2021    Procedure: HYSTERECTOMY, TOTAL, LAPAROSCOPIC;  Surgeon: Ana María Easley MD;  Location: [facility] OR;  Service: OB/GYN;  Laterality: N/A;    MOUTH SURGERY      x2    PELVIC LAPAROSCOPY      SINUS SURGERY  11/23/2021    FESS, turbinate red    TONSILLECTOMY  07/2016    UPPER GASTROINTESTINAL ENDOSCOPY  05/2016    Dr. Zhao     Family History   Problem Relation Age of Onset    Fibromyalgia Mother     Depression Mother     Irritable bowel syndrome Mother     Diabetes Father     Depression Father     Mental illness Father         bipolar disorder    Asthma Father     Multiple sclerosis Maternal Aunt     Crohn's disease Maternal Grandmother     Colon cancer Neg Hx     Ulcerative colitis Neg Hx     Stomach cancer Neg Hx     Esophageal cancer Neg Hx     Breast cancer Neg Hx     Ovarian cancer Neg Hx     Celiac disease Neg Hx      Social History  "    Tobacco Use    Smoking status: Former     Packs/day: 0.50     Types: Cigarettes     Quit date: 2018     Years since quittin.9    Smokeless tobacco: Never   Substance Use Topics    Alcohol use: No     Alcohol/week: 0.0 standard drinks     Comment: quit since 2018    Drug use: No         OBJECTIVE:     Vital Signs (Most Recent)  Temp: 98.4 °F (36.9 °C) (22 1302)  Pulse: 61 (22 1319)  Resp: 16 (22 1319)  BP: 112/63 (22 1319)  SpO2: 100 % (22 1319)    Physical Exam:  : Ht: 5' 8" (172.7 cm)   Wt: 68.9 kg (152 lb)   BMI: 23.11 kg/m²    .                                                   GENERAL:  Comfortable, in no acute distress.                                 HEENT EXAM:  Nonicteric.  No adenopathy.  Oropharynx is clear.               NECK:  Supple.                                                               LUNGS:  Clear.                                                               CARDIAC:  Regular rate and rhythm.  S1, S2.  No murmur.                      ABDOMEN:  Soft, positive bowel sounds, nontender.  No hepatosplenomegaly or masses.  No rebound or guarding.                                             EXTREMITIES:  No edema.     MENTAL STATUS:  Alert and oriented.    ASSESSMENT/PLAN:     Assessment: Abdominal pain, nausea.  Diarrhea.    Plan: Gastroscopy and Colonoscopy    Anesthesia Plan:   MAC / General Anaesthesia    ASA Grade: ASA 2 - Patient with mild systemic disease with no functional limitations    MALLAMPATI SCORE: I (soft palate, uvula, fauces, and tonsillar pillars visible)    "

## 2022-12-19 LAB
BACTERIA STL CULT: NORMAL
E COLI SXT1 STL QL IA: NEGATIVE
E COLI SXT2 STL QL IA: NEGATIVE

## 2022-12-21 ENCOUNTER — OFFICE VISIT (OUTPATIENT)
Dept: URGENT CARE | Facility: CLINIC | Age: 27
End: 2022-12-21
Payer: COMMERCIAL

## 2022-12-21 VITALS
DIASTOLIC BLOOD PRESSURE: 91 MMHG | HEART RATE: 88 BPM | RESPIRATION RATE: 16 BRPM | SYSTOLIC BLOOD PRESSURE: 125 MMHG | HEIGHT: 68 IN | TEMPERATURE: 97 F | OXYGEN SATURATION: 99 % | WEIGHT: 147 LBS | BODY MASS INDEX: 22.28 KG/M2

## 2022-12-21 DIAGNOSIS — B96.89 ACUTE BACTERIAL SINUSITIS: Primary | ICD-10-CM

## 2022-12-21 DIAGNOSIS — J01.90 ACUTE BACTERIAL SINUSITIS: Primary | ICD-10-CM

## 2022-12-21 PROCEDURE — 3080F DIAST BP >= 90 MM HG: CPT | Mod: CPTII,S$GLB,, | Performed by: NURSE PRACTITIONER

## 2022-12-21 PROCEDURE — 1159F PR MEDICATION LIST DOCUMENTED IN MEDICAL RECORD: ICD-10-PCS | Mod: CPTII,S$GLB,, | Performed by: NURSE PRACTITIONER

## 2022-12-21 PROCEDURE — 3008F PR BODY MASS INDEX (BMI) DOCUMENTED: ICD-10-PCS | Mod: CPTII,S$GLB,, | Performed by: NURSE PRACTITIONER

## 2022-12-21 PROCEDURE — 3080F PR MOST RECENT DIASTOLIC BLOOD PRESSURE >= 90 MM HG: ICD-10-PCS | Mod: CPTII,S$GLB,, | Performed by: NURSE PRACTITIONER

## 2022-12-21 PROCEDURE — 99213 PR OFFICE/OUTPT VISIT, EST, LEVL III, 20-29 MIN: ICD-10-PCS | Mod: S$GLB,,, | Performed by: NURSE PRACTITIONER

## 2022-12-21 PROCEDURE — 1159F MED LIST DOCD IN RCRD: CPT | Mod: CPTII,S$GLB,, | Performed by: NURSE PRACTITIONER

## 2022-12-21 PROCEDURE — 1160F RVW MEDS BY RX/DR IN RCRD: CPT | Mod: CPTII,S$GLB,, | Performed by: NURSE PRACTITIONER

## 2022-12-21 PROCEDURE — 3074F SYST BP LT 130 MM HG: CPT | Mod: CPTII,S$GLB,, | Performed by: NURSE PRACTITIONER

## 2022-12-21 PROCEDURE — 3008F BODY MASS INDEX DOCD: CPT | Mod: CPTII,S$GLB,, | Performed by: NURSE PRACTITIONER

## 2022-12-21 PROCEDURE — 1160F PR REVIEW ALL MEDS BY PRESCRIBER/CLIN PHARMACIST DOCUMENTED: ICD-10-PCS | Mod: CPTII,S$GLB,, | Performed by: NURSE PRACTITIONER

## 2022-12-21 PROCEDURE — 3074F PR MOST RECENT SYSTOLIC BLOOD PRESSURE < 130 MM HG: ICD-10-PCS | Mod: CPTII,S$GLB,, | Performed by: NURSE PRACTITIONER

## 2022-12-21 PROCEDURE — 99213 OFFICE O/P EST LOW 20 MIN: CPT | Mod: S$GLB,,, | Performed by: NURSE PRACTITIONER

## 2022-12-21 RX ORDER — DOXYCYCLINE 100 MG/1
100 CAPSULE ORAL 2 TIMES DAILY
Qty: 14 CAPSULE | Refills: 0 | Status: SHIPPED | OUTPATIENT
Start: 2022-12-21 | End: 2022-12-30 | Stop reason: ALTCHOICE

## 2022-12-21 NOTE — PROGRESS NOTES
"Subjective:       Patient ID: Holly Nichols is a 27 y.o. female.    Vitals:  height is 5' 8" (1.727 m) and weight is 66.7 kg (147 lb). Her oral temperature is 97.3 °F (36.3 °C). Her blood pressure is 125/91 (abnormal) and her pulse is 88. Her respiration is 16 and oxygen saturation is 99%.     Chief Complaint: Sinus Problem    Pt presents with cough, congestion, headaches, sinus pressure, and sneezing that originated 2 weeks ago. Pt states that her children also have this and they were put on antibiotics. Pt states she is concerned as this illness is starting to affect her asthma. Pt reports she has been taking benadryl, tylenol sinus, saline flushes, and her inhaler for her symptoms which has provided no relief.     Provider note begins below:  Patient reports Hx of sinus surgery and re-current sinus infections. Seeing her ENT on 12/30/2022.  Patient reports that her symptoms are consistent with her sinus infections.  Denies cp, sob, fever, chills, vomiting or abdominal pain. Awake and alert. Afebrile. No testing done today.    Sinus Problem  This is a new problem. The current episode started 1 to 4 weeks ago. The problem has been gradually worsening since onset. There has been no fever. Her pain is at a severity of 8/10. The pain is severe. Associated symptoms include congestion, coughing, headaches, sinus pressure and sneezing. Pertinent negatives include no chills, diaphoresis, ear pain or sore throat. Past treatments include oral decongestants. The treatment provided no relief.     Constitution: Negative. Negative for chills, sweating and fatigue.   HENT:  Positive for congestion and sinus pressure. Negative for ear pain, facial swelling and sore throat.    Neck: Negative for painful lymph nodes.   Cardiovascular: Negative.  Negative for chest trauma, chest pain and sob on exertion.   Eyes: Negative.  Negative for eye itching and eye pain.   Respiratory:  Positive for cough. Negative for chest tightness and " asthma.    Gastrointestinal: Negative.  Negative for nausea, vomiting and diarrhea.   Endocrine: negative. cold intolerance and excessive thirst.   Genitourinary: Negative.  Negative for dysuria, frequency, urgency and hematuria.   Musculoskeletal:  Negative for pain, trauma and joint pain.   Skin: Negative.  Negative for rash, wound and hives.   Allergic/Immunologic: Negative.  Positive for sneezing. Negative for eczema, asthma, hives and itching.   Neurological:  Positive for headaches. Negative for disorientation and altered mental status.   Hematologic/Lymphatic: Negative.  Negative for swollen lymph nodes.   Psychiatric/Behavioral: Negative.  Negative for altered mental status, disorientation and confusion.      Objective:      Physical Exam   Constitutional: She is oriented to person, place, and time. She appears well-developed. She is cooperative.  Non-toxic appearance. She does not appear ill. No distress.   HENT:   Head: Normocephalic and atraumatic.   Ears:   Right Ear: Hearing, tympanic membrane, external ear and ear canal normal.   Left Ear: Hearing, tympanic membrane, external ear and ear canal normal.   Nose: Mucosal edema, rhinorrhea, purulent discharge, sinus tenderness and congestion present. No nasal deformity. No epistaxis. Right sinus exhibits maxillary sinus tenderness and frontal sinus tenderness. Left sinus exhibits maxillary sinus tenderness and frontal sinus tenderness.   Mouth/Throat: Uvula is midline, oropharynx is clear and moist and mucous membranes are normal. No trismus in the jaw. Normal dentition. No uvula swelling. No oropharyngeal exudate, posterior oropharyngeal edema or posterior oropharyngeal erythema.   Eyes: Conjunctivae and lids are normal. No scleral icterus.   Neck: Trachea normal and phonation normal. Neck supple. No edema present. No erythema present. No neck rigidity present.   Cardiovascular: Normal rate, regular rhythm, normal heart sounds and normal pulses.    Pulmonary/Chest: Effort normal and breath sounds normal. No stridor. No respiratory distress. She has no decreased breath sounds. She has no wheezes. She has no rhonchi. She has no rales. She exhibits no tenderness.   Abdominal: Normal appearance. She exhibits no distension. Soft. flat abdomen There is no abdominal tenderness. There is no rebound, no guarding, no left CVA tenderness and no right CVA tenderness.   Musculoskeletal: Normal range of motion.         General: No deformity. Normal range of motion.   Lymphadenopathy:     She has no cervical adenopathy.   Neurological: no focal deficit. She is alert and oriented to person, place, and time. She exhibits normal muscle tone. Coordination normal.   Skin: Skin is warm, dry, intact, not diaphoretic and not pale.   Psychiatric: Her speech is normal and behavior is normal. Mood, judgment and thought content normal.   Nursing note and vitals reviewed.      Assessment:       1. Acute bacterial sinusitis          Plan:       FOLLOWUP  Follow up if symptoms worsen or fail to improve, for PLEASE CONTACT PCP OR CONTACT THE EMERGENCY ROOM..     PATIENT INSTRUCTIONS  Patient Instructions   Follow up with ENT as scheduled.      INSTRUCTIONS:  - Rest.  - Drink plenty of fluids.  - Take Tylenol and/or Ibuprofen as directed as needed for fever/pain.  Do not take more than the recommended dose.  - follow up with your PCP within the next 1-2 weeks as needed.  - You must understand that you have received an Urgent Care treatment only and that you may be released before all of your medical problems are known or treated.   - You, the patient, will arrange for follow up care as instructed.   - If your condition worsens or fails to improve we recommend that you receive another evaluation at the ER immediately or contact your PCP to discuss your concerns.   - You can call [phone] or [phone] to help schedule an appointment with the appropriate provider.     -If you smoke  cigarettes, it would be beneficial for you to stop.         THANK YOU FOR ALLOWING ME TO PARTICIPATE IN YOUR HEALTHCARE,     Forest Mccarty NP   Acute bacterial sinusitis  -     doxycycline (VIBRAMYCIN) 100 MG Cap; Take 1 capsule (100 mg total) by mouth 2 (two) times daily. for 7 days  Dispense: 14 capsule; Refill: 0

## 2022-12-21 NOTE — PATIENT INSTRUCTIONS
Follow up with ENT as scheduled.      INSTRUCTIONS:  - Rest.  - Drink plenty of fluids.  - Take Tylenol and/or Ibuprofen as directed as needed for fever/pain.  Do not take more than the recommended dose.  - follow up with your PCP within the next 1-2 weeks as needed.  - You must understand that you have received an Urgent Care treatment only and that you may be released before all of your medical problems are known or treated.   - You, the patient, will arrange for follow up care as instructed.   - If your condition worsens or fails to improve we recommend that you receive another evaluation at the ER immediately or contact your PCP to discuss your concerns.   - You can call [phone] or [phone] to help schedule an appointment with the appropriate provider.     -If you smoke cigarettes, it would be beneficial for you to stop.

## 2022-12-22 LAB — O+P STL MICRO: NORMAL

## 2022-12-26 ENCOUNTER — PATIENT MESSAGE (OUTPATIENT)
Dept: OBSTETRICS AND GYNECOLOGY | Facility: CLINIC | Age: 27
End: 2022-12-26
Payer: COMMERCIAL

## 2022-12-27 LAB
FINAL PATHOLOGIC DIAGNOSIS: NORMAL
GROSS: NORMAL
Lab: NORMAL

## 2022-12-27 NOTE — TELEPHONE ENCOUNTER
Please advise  
Sure she can show PCP on Friday    If still bothering her next week let us know  
16-Feb-2017

## 2022-12-29 ENCOUNTER — TELEPHONE (OUTPATIENT)
Dept: FAMILY MEDICINE | Facility: CLINIC | Age: 27
End: 2022-12-29
Payer: COMMERCIAL

## 2022-12-30 ENCOUNTER — HOSPITAL ENCOUNTER (OUTPATIENT)
Dept: RADIOLOGY | Facility: HOSPITAL | Age: 27
Discharge: HOME | End: 2022-12-30
Attending: FAMILY MEDICINE
Payer: COMMERCIAL

## 2022-12-30 ENCOUNTER — OFFICE VISIT (OUTPATIENT)
Dept: FAMILY MEDICINE | Facility: CLINIC | Age: 27
End: 2022-12-30
Payer: COMMERCIAL

## 2022-12-30 ENCOUNTER — TELEPHONE (OUTPATIENT)
Dept: RADIOLOGY | Facility: HOSPITAL | Age: 27
End: 2022-12-30
Payer: COMMERCIAL

## 2022-12-30 VITALS
HEART RATE: 82 BPM | SYSTOLIC BLOOD PRESSURE: 112 MMHG | DIASTOLIC BLOOD PRESSURE: 74 MMHG | OXYGEN SATURATION: 99 % | WEIGHT: 145.75 LBS | BODY MASS INDEX: 22.16 KG/M2

## 2022-12-30 DIAGNOSIS — R53.83 OTHER FATIGUE: Primary | ICD-10-CM

## 2022-12-30 DIAGNOSIS — M41.20 IDIOPATHIC SCOLIOSIS AND KYPHOSCOLIOSIS: ICD-10-CM

## 2022-12-30 DIAGNOSIS — N63.11 MASS OF UPPER OUTER QUADRANT OF RIGHT BREAST: ICD-10-CM

## 2022-12-30 DIAGNOSIS — F98.8 ATTENTION DEFICIT DISORDER (ADD) IN ADULT: ICD-10-CM

## 2022-12-30 DIAGNOSIS — G47.09 OTHER INSOMNIA: ICD-10-CM

## 2022-12-30 PROCEDURE — 3008F PR BODY MASS INDEX (BMI) DOCUMENTED: ICD-10-PCS | Mod: CPTII,S$GLB,, | Performed by: FAMILY MEDICINE

## 2022-12-30 PROCEDURE — 76642 ULTRASOUND BREAST LIMITED: CPT | Mod: 26,RT,, | Performed by: RADIOLOGY

## 2022-12-30 PROCEDURE — 3074F SYST BP LT 130 MM HG: CPT | Mod: CPTII,S$GLB,, | Performed by: FAMILY MEDICINE

## 2022-12-30 PROCEDURE — 76642 ULTRASOUND BREAST LIMITED: CPT | Mod: TC,PO,RT

## 2022-12-30 PROCEDURE — 99214 OFFICE O/P EST MOD 30 MIN: CPT | Mod: S$GLB,,, | Performed by: FAMILY MEDICINE

## 2022-12-30 PROCEDURE — 3008F BODY MASS INDEX DOCD: CPT | Mod: CPTII,S$GLB,, | Performed by: FAMILY MEDICINE

## 2022-12-30 PROCEDURE — 76642 US BREAST RIGHT LIMITED: ICD-10-PCS | Mod: 26,RT,, | Performed by: RADIOLOGY

## 2022-12-30 PROCEDURE — 99999 PR PBB SHADOW E&M-EST. PATIENT-LVL IV: CPT | Mod: PBBFAC,,, | Performed by: FAMILY MEDICINE

## 2022-12-30 PROCEDURE — 3074F PR MOST RECENT SYSTOLIC BLOOD PRESSURE < 130 MM HG: ICD-10-PCS | Mod: CPTII,S$GLB,, | Performed by: FAMILY MEDICINE

## 2022-12-30 PROCEDURE — 99214 PR OFFICE/OUTPT VISIT, EST, LEVL IV, 30-39 MIN: ICD-10-PCS | Mod: S$GLB,,, | Performed by: FAMILY MEDICINE

## 2022-12-30 PROCEDURE — 3078F DIAST BP <80 MM HG: CPT | Mod: CPTII,S$GLB,, | Performed by: FAMILY MEDICINE

## 2022-12-30 PROCEDURE — 99999 PR PBB SHADOW E&M-EST. PATIENT-LVL IV: ICD-10-PCS | Mod: PBBFAC,,, | Performed by: FAMILY MEDICINE

## 2022-12-30 PROCEDURE — 3078F PR MOST RECENT DIASTOLIC BLOOD PRESSURE < 80 MM HG: ICD-10-PCS | Mod: CPTII,S$GLB,, | Performed by: FAMILY MEDICINE

## 2022-12-30 RX ORDER — DEXTROAMPHETAMINE SACCHARATE, AMPHETAMINE ASPARTATE MONOHYDRATE, DEXTROAMPHETAMINE SULFATE AND AMPHETAMINE SULFATE 3.75; 3.75; 3.75; 3.75 MG/1; MG/1; MG/1; MG/1
15 CAPSULE, EXTENDED RELEASE ORAL EVERY MORNING
Qty: 30 CAPSULE | Refills: 0 | Status: SHIPPED | OUTPATIENT
Start: 2022-12-30 | End: 2023-02-15 | Stop reason: SDUPTHER

## 2022-12-30 NOTE — TELEPHONE ENCOUNTER
[8:45 AM] Radha Thomas  Good morning Ms. Negrete, would you mind helping  me schedule a mammogram and ultrasound for MRN: [id number] ?    [8:49 AM] Radha Thomas  she has not had imahging done before and how long would both imaging take    [8:50 AM] Penelope Ngo  about an hour    [8:48 AM] Penelope Ngo  someone cancelled for today at 1 pm    [8:48 AM] Penelope Ngo  can she take that ?    [8:49 AM] Radha Thomas  she has not had imahging done before and how long would both imaging take    [8:50 AM] Radha Thomas  yes ma'am she will take it

## 2023-01-04 ENCOUNTER — OFFICE VISIT (OUTPATIENT)
Dept: PAIN MEDICINE | Facility: CLINIC | Age: 28
End: 2023-01-04
Payer: COMMERCIAL

## 2023-01-04 ENCOUNTER — PATIENT MESSAGE (OUTPATIENT)
Dept: FAMILY MEDICINE | Facility: CLINIC | Age: 28
End: 2023-01-04
Payer: COMMERCIAL

## 2023-01-04 VITALS
BODY MASS INDEX: 22.1 KG/M2 | OXYGEN SATURATION: 99 % | SYSTOLIC BLOOD PRESSURE: 110 MMHG | DIASTOLIC BLOOD PRESSURE: 59 MMHG | HEIGHT: 68 IN | HEART RATE: 80 BPM | WEIGHT: 145.81 LBS

## 2023-01-04 DIAGNOSIS — G89.29 CHRONIC BILATERAL LOW BACK PAIN WITHOUT SCIATICA: ICD-10-CM

## 2023-01-04 DIAGNOSIS — M41.125 ADOLESCENT IDIOPATHIC SCOLIOSIS OF THORACOLUMBAR REGION: ICD-10-CM

## 2023-01-04 DIAGNOSIS — M54.50 CHRONIC BILATERAL LOW BACK PAIN WITHOUT SCIATICA: ICD-10-CM

## 2023-01-04 DIAGNOSIS — M79.7 FIBROMYALGIA: Primary | ICD-10-CM

## 2023-01-04 DIAGNOSIS — M47.816 LUMBAR SPONDYLOSIS: ICD-10-CM

## 2023-01-04 DIAGNOSIS — M54.9 DORSALGIA: ICD-10-CM

## 2023-01-04 PROCEDURE — 3008F BODY MASS INDEX DOCD: CPT | Mod: CPTII,S$GLB,,

## 2023-01-04 PROCEDURE — 99999 PR PBB SHADOW E&M-EST. PATIENT-LVL III: CPT | Mod: PBBFAC,,,

## 2023-01-04 PROCEDURE — 3008F PR BODY MASS INDEX (BMI) DOCUMENTED: ICD-10-PCS | Mod: CPTII,S$GLB,,

## 2023-01-04 PROCEDURE — 3078F DIAST BP <80 MM HG: CPT | Mod: CPTII,S$GLB,,

## 2023-01-04 PROCEDURE — 99999 PR PBB SHADOW E&M-EST. PATIENT-LVL III: ICD-10-PCS | Mod: PBBFAC,,,

## 2023-01-04 PROCEDURE — 99214 OFFICE O/P EST MOD 30 MIN: CPT | Mod: S$GLB,,,

## 2023-01-04 PROCEDURE — 3074F SYST BP LT 130 MM HG: CPT | Mod: CPTII,S$GLB,,

## 2023-01-04 PROCEDURE — 1159F PR MEDICATION LIST DOCUMENTED IN MEDICAL RECORD: ICD-10-PCS | Mod: CPTII,S$GLB,,

## 2023-01-04 PROCEDURE — 99214 PR OFFICE/OUTPT VISIT, EST, LEVL IV, 30-39 MIN: ICD-10-PCS | Mod: S$GLB,,,

## 2023-01-04 PROCEDURE — 3078F PR MOST RECENT DIASTOLIC BLOOD PRESSURE < 80 MM HG: ICD-10-PCS | Mod: CPTII,S$GLB,,

## 2023-01-04 PROCEDURE — 1159F MED LIST DOCD IN RCRD: CPT | Mod: CPTII,S$GLB,,

## 2023-01-04 PROCEDURE — 3074F PR MOST RECENT SYSTOLIC BLOOD PRESSURE < 130 MM HG: ICD-10-PCS | Mod: CPTII,S$GLB,,

## 2023-01-04 RX ORDER — CETIRIZINE HYDROCHLORIDE 10 MG/1
10 TABLET ORAL NIGHTLY PRN
COMMUNITY

## 2023-01-04 RX ORDER — AZITHROMYCIN 250 MG/1
TABLET, FILM COATED ORAL
COMMUNITY
Start: 2022-09-04 | End: 2023-01-14

## 2023-01-04 RX ORDER — PROMETHAZINE HYDROCHLORIDE AND DEXTROMETHORPHAN HYDROBROMIDE 6.25; 15 MG/5ML; MG/5ML
10 SYRUP ORAL EVERY 8 HOURS PRN
COMMUNITY
End: 2023-06-30

## 2023-01-04 RX ORDER — PREDNISONE 20 MG/1
20 TABLET ORAL 2 TIMES DAILY
COMMUNITY
Start: 2022-12-30 | End: 2023-01-14

## 2023-01-04 RX ORDER — NYSTATIN 500000 [USP'U]/1
1 TABLET, COATED ORAL EVERY 8 HOURS
COMMUNITY
Start: 2022-10-11 | End: 2023-01-14

## 2023-01-04 NOTE — PROGRESS NOTES
[facility] Follow Up Evaluation    Referred by: Marce Harris PA-C  Reason for referral: back pain    CC:   No chief complaint on file.     Last 3 PDI Scores 4/21/2022   Pain Disability Index (PDI) 35     Interval HPI 1/4/2023: Holly Nichols returns to the clinic for follow up.  Today she is reporting continued lower back pain, 4-9/10, significantly worse at night and improved with activities.  Her pain is located across her belt line without any radiation into her legs.  She denies any associated numbness, weakness or any bowel or bladder incontinence.  She has previously completed land base formal physical therapy with dry needling without significant relief.  She is also completed aquatic therapy without significant relief.  She continues to take Robaxin, Tylenol and ibuprofen but her pain persists.        HPI:   Holly Nichols is a 27 y.o. female who presents with back pain.  She has had back pain since 2008.  She has known scoliosis.  She has known fibromyalgia.  Today she rates her pain 4-10, constant, throbbing, tingling, deep.  She has neck pain into the trapezius muscles.  She also has midthoracic and lower back pain.  Denies any rosmery radicular symptoms.  No numbness no weakness.  Pain worse with standing, bending, walking, nighttime and not relieved with anything.    History:    Current Outpatient Medications:     albuterol (PROVENTIL/VENTOLIN HFA) 90 mcg/actuation inhaler, Inhale 2 puffs into the lungs every 6 (six) hours as needed for Wheezing. Rescue, Disp: 18 g, Rfl: 6    ALPRAZolam (XANAX) 0.25 MG tablet, Take 1 tablet (0.25 mg total) by mouth daily as needed for Anxiety., Disp: 30 tablet, Rfl: 0    Bifidobacterium infantis (ALIGN ORAL), Take by mouth once daily., Disp: , Rfl:     citalopram (CELEXA) 20 MG tablet, TAKE 1 TABLET BY MOUTH EVERY DAY, Disp: 30 tablet, Rfl: 11    citalopram (CELEXA) 40 MG tablet, Take 1.5 tablets (60 mg total) by mouth every evening., Disp: 45 tablet,  Rfl: 5    dextroamphetamine-amphetamine (ADDERALL XR) 15 MG 24 hr capsule, Take 1 capsule (15 mg total) by mouth every morning., Disp: 30 capsule, Rfl: 0    diphenoxylate-atropine 2.5-0.025 mg (LOMOTIL) 2.5-0.025 mg per tablet, Take 1 tablet by mouth every 6 (six) hours., Disp: 100 tablet, Rfl: 5    esomeprazole (NEXIUM) 40 MG capsule, Take 1 capsule (40 mg total) by mouth before breakfast., Disp: 30 capsule, Rfl: 2    famotidine-calcium carbonate-magnesium hydroxide (PEPCID COMPLETE) -165 mg, Take 1 tablet by mouth 2 (two) times daily as needed., Disp: , Rfl:     FLOVENT  mcg/actuation inhaler, Inhale 2 puffs into the lungs 2 (two) times daily., Disp: 12 g, Rfl: 5    fluticasone propionate (FLONASE) 50 mcg/actuation nasal spray, 1 spray by Each Nostril route 2 (two) times daily., Disp: , Rfl:     ondansetron (ZOFRAN-ODT) 4 MG TbDL, Take 4 mg by mouth every 8 (eight) hours as needed., Disp: , Rfl:     Past Medical History:   Diagnosis Date    Abnormal Pap smear of cervix     Allergy     Anxiety     Asthma     rare inhaler use    Constipation     Depression     Endometriosis     Fibromyalgia     Gastritis     HLA B27 (HLA B27 positive)     Migraine     Postpartum depression     Scoliosis     Smoker     stopped in January 2018    Tobacco dependence 2/17/2016       Past Surgical History:   Procedure Laterality Date    ADENOIDECTOMY      APPENDECTOMY  09/2014    endometriosis    CHOLECYSTECTOMY  12/20/2017    Dr. MARBELLA Anne, [facility]     COLONOSCOPY N/A 05/19/2016    Procedure: COLONOSCOPY;  Surgeon: Jarret Zhao Jr., MD;  Location: [facility];  Service: Endoscopy;  Laterality: N/A;    COLONOSCOPY N/A 08/27/2020    Dr. Zhao: Erythema & ulcerated mucosa in descending, splenic flexure & distal transverse (r/o UC, r/o ischemia, r/o vasculiti, etc.), Mucosal changes in descending colon (secondary to left-sided colitis, to ischemic colitis(?), to vasculitis(?). hemorrhoids; biopsy: TI & random biopsies- WNL,  transverse/hepatic/descending- mild active colitis without definitive features of chronicity    COLONOSCOPY N/A 12/16/2022    Procedure: COLONOSCOPY;  Surgeon: Jarret Zhao Jr., MD;  Location: [facility] ENDO;  Service: Endoscopy;  Laterality: N/A;    CYSTOSCOPY  12/01/2021    Procedure: CYSTOSCOPY;  Surgeon: Ana María Easley MD;  Location: [facility] OR;  Service: OB/GYN;;    ESOPHAGOGASTRODUODENOSCOPY N/A 10/01/2020    Dr. Zhao: Erythema in the lower third of the esophagus; Duodenal bulb erosions without bleeding. biopsy: duodenum- Small bowel mucosa with mild acute inflammation changes may represent erosion, Villous architecture is maintained; stomach- mild chronic gastritis; esophagus- minimal reactive changes    ESOPHAGOGASTRODUODENOSCOPY N/A 12/16/2022    Procedure: EGD (ESOPHAGOGASTRODUODENOSCOPY);  Surgeon: Jarret Zhao Jr., MD;  [facility];  Service: Endoscopy;  Laterality: N/A;    LAPAROSCOPIC OOPHORECTOMY Left 12/01/2021    Procedure: OOPHORECTOMY, LAPAROSCOPIC;  Surgeon: Ana María Easley MD;  Location: [facility] OR;  Service: OB/GYN;  Laterality: Left;    LAPAROSCOPIC SALPINGECTOMY Bilateral 12/01/2021    Procedure: SALPINGECTOMY, LAPAROSCOPIC;  Surgeon: Ana María Easley MD;  Location: [facility] OR;  Service: OB/GYN;  Laterality: Bilateral;    LAPAROSCOPIC TOTAL HYSTERECTOMY N/A 12/01/2021    Procedure: HYSTERECTOMY, TOTAL, LAPAROSCOPIC;  Surgeon: Ana María Easley MD;  Location: [facility] OR;  Service: OB/GYN;  Laterality: N/A;    MOUTH SURGERY      x2    PELVIC LAPAROSCOPY      SINUS SURGERY  11/23/2021    FESS, turbinate red    TONSILLECTOMY  07/2016    UPPER GASTROINTESTINAL ENDOSCOPY  05/2016    Dr. Zhao       Family History   Problem Relation Age of Onset    Fibromyalgia Mother     Depression Mother     Irritable bowel syndrome Mother     Diabetes Father     Depression Father     Mental illness Father         bipolar disorder    Asthma Father     Multiple sclerosis Maternal Aunt      Crohn's disease Maternal Grandmother     Colon cancer Neg Hx     Ulcerative colitis Neg Hx     Stomach cancer Neg Hx     Esophageal cancer Neg Hx     Breast cancer Neg Hx     Ovarian cancer Neg Hx     Celiac disease Neg Hx        Social History     Socioeconomic History    Marital status:     Number of children: 0   Occupational History    Occupation: hotel management   Tobacco Use    Smoking status: Former     Packs/day: 0.50     Types: Cigarettes     Quit date: 2018     Years since quittin.0    Smokeless tobacco: Never   Substance and Sexual Activity    Alcohol use: No     Alcohol/week: 0.0 standard drinks     Comment: quit since     Drug use: No    Sexual activity: Yes     Partners: Male   Social History Narrative    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     Social Determinants of Health     Financial Resource Strain: Unknown    Difficulty of Paying Living Expenses: Patient refused   Food Insecurity: Unknown    Worried About Running Out of Food in the Last Year: Patient refused    Ran Out of Food in the Last Year: Patient refused   Transportation Needs: Unknown    Lack of Transportation (Medical): Patient refused    Lack of Transportation (Non-Medical): Patient refused   Physical Activity: Unknown    Days of Exercise per Week: Patient refused    Minutes of Exercise per Session: Patient refused   Stress: Unknown    Feeling of Stress : Patient refused   Social Connections: Unknown    Frequency of Communication with Friends and Family: Patient refused    Frequency of Social Gatherings with Friends and Family: Patient refused    Active Member of Clubs or Organizations: Patient refused    Attends Club or Organization Meetings: Patient refused    Marital Status:    Housing Stability: Unknown    Unable to Pay for Housing in the Last Year: Patient refused    Number of Places Lived in the Last Year: 1    Unstable Housing in the Last Year: Patient refused       Review of patient's allergies  "indicates:   Allergen Reactions    Latex, natural rubber      burning    Sulfur Hives    Toradol [ketorolac] Other (See Comments)     Metallic taste only once in 2013; has taken since without problems    Tramadol Other (See Comments)     paralysis    Penicillins Hives and Rash    Sulfa (sulfonamide antibiotics) Hives and Rash       Review of Systems:  General ROS: negative for - fever  Psychological ROS: negative for - hostility  Hematological and Lymphatic ROS: negative for - bleeding problems  Endocrine ROS: negative for - unexpected weight changes  Respiratory ROS: no cough, shortness of breath, or wheezing  Cardiovascular ROS: no chest pain or dyspnea on exertion  Gastrointestinal ROS: no abdominal pain, change in bowel habits, or black or bloody stools  Musculoskeletal ROS: negative for - muscular weakness  Neurological ROS: negative for - numbness/tingling  Dermatological ROS: negative for rash    Physical Exam:  Vitals:    01/04/23 1016   BP: (!) 110/59   Pulse: 80   SpO2: 99%   Weight: 66.2 kg (145 lb 13.4 oz)   Height: 5' 8" (1.727 m)   PainSc:   4   PainLoc: Back       Body mass index is 22.17 kg/m².    Gen: NAD  Psych: mood appropriate for given condition  CV: 2+ radial pulse  HEENT: anicteric   Respiratory: non labored  Abd: soft nt, nd  Skin: intact  ROM: limited AROM of the L spine in all planes, full ROM at ankles, knees and hips  Sensation: intact to light touch in all dermatomes tested from L2-S1 bilaterally  Reflexes: 2+ b/l patella and 0/0 achilles  Palpation: Diffusely tender over lumbar paraspinals  -TTP over the b/l greater trochanters and bilateral SI joint  Tone: normal in the b/l knees and hips   Extremities: No edema in b/l ankles or hands  Provacative tests: + b/l axial facet loading       Right Left   L2/3 Iliacus Hip flexion  5  5   L3/4 Qudratus Femoris Knee Extension  5  5   L4/5 Tib Anterior Ankle Dorsiflexion   5  5   L5/S1 Extensor Hallicus Longus Great toe extension  5  5           "       S1/S2 Gastroc/Soleus Plantar Flexion  5  5       Imaging:  Xray spine 11/6/2020  FINDINGS:  S-shaped scoliosis of the thoracolumbar spine with dextroconvex curvature of the thoracic spine and levoconvex curvature of the lumbar spine.  The thoracic dextroconvex curvature measures approximately 30.6 degrees measured from the superior endplate of T4 to the inferior endplate of T11.  Levoconvex curvature of the lumbar spine measures approximately 20.7 degrees measured from the superior endplate of T12 to the inferior endplate of L3.  sagittal balance unable to be assessed due to obscuration of the C7 vertebral body.  No evidence for vertebral segmentation anomaly.  Vertebral body heights are maintained.  No suspicious appearing lytic or blastic lesions.    Xray lumbar spine 4/21/22  FINDINGS:  Vertebral bodies are normal in height without evidence of fracture.  Mild rotatory levoconvex curvature of the lumbar spine.  Normal sagittal alignment is preserved.  No spondylolisthesis. No abnormal translation with flexion and extension.  Intervertebral disc heights are well maintained.Mild lower lumbar facet arthropathy.    Xray cervical spine 4/21/22  FINDINGS:  C1-C2: Pre-dens space is maintained.  Dens and lateral masses of C1 are unremarkable.  Alignment: Chronic straightening of the normal cervical lordosis, possibly secondary to positioning or muscle spasm.  No spondylolisthesis.  No dynamic instability.  Vertebrae: Vertebral body heights are maintained.  No suspicious appearing lytic or blastic lesions.  Discs and facets: Disc heights are maintained.  Facet joints are unremarkable. Neural foramina are maintained on oblique projections.  Miscellaneous: No additional findings.    Labs:  BMP  Lab Results   Component Value Date     09/16/2022    K 3.7 09/16/2022     09/16/2022    CO2 25 09/16/2022    BUN 8 09/16/2022    CREATININE 0.7 09/16/2022    CALCIUM 9.0 09/16/2022    ANIONGAP 7 (L) 09/16/2022     ESTGFRAFRICA >60 11/29/2021    EGFRNONAA >60 11/29/2021     Lab Results   Component Value Date    ALT 14 09/16/2022    AST 12 09/16/2022    ALKPHOS 76 09/16/2022    BILITOT 0.7 09/16/2022       Assessment:   Problem List Items Addressed This Visit          Orthopedic    Fibromyalgia - Primary     Other Visit Diagnoses       Lumbar spondylosis        Chronic bilateral low back pain without sciatica        Adolescent idiopathic scoliosis of thoracolumbar region        Dorsalgia                    27 y.o. year old female who presents with back pain.  She has had back pain since 2008.  She has known scoliosis.  She has known fibromyalgia.  Today she rates her pain 4-10, constant, throbbing, tingling, deep.  She has neck pain into the trapezius muscles.  She also has midthoracic and lower back pain.  Denies any rosmery radicular symptoms.  No numbness no weakness.  Pain worse with standing, bending, walking, nighttime and not relieved with anything.    1/4/2023: Holly Nichols returns to the clinic for follow up.  Today she is reporting continued lower back pain, 4-9/10, significantly worse at night and improved with activities.  Her pain is located across her belt line without any radiation into her legs.  She denies any associated numbness, weakness or any bowel or bladder incontinence.  She has previously completed land base formal physical therapy with dry needling without significant relief.  She is also completed aquatic therapy without significant relief.  She continues to take Robaxin, Tylenol and ibuprofen but her pain persists.        - on exam she is neurologically intact.  She has increased pain with bilateral axial facet loading  - we reviewed her lumbar x-ray in clinic today and it shows some mild lower lumbar facet arthropathy.   - I believe at this time her pain is with a likely axial facet mediated pain.  - her pain is limiting her mobility interfering with her ADLs.  She finds it difficult to sleep at  night due to the severity of the pain.  - she previously did not tolerate gabapentin 300 mg t.i.d..  - at this time I it is best to maximize conservative therapy.  She is currently on Celexa and I think it is reasonable to trial amitriptyline for her current pain.  - she is going to reach out to the provider that prescribes the Celexa and begin down titrating in start amitriptyline.  - she will continue her at-home PT directed exercises  - will have her follow-up in 2 months after trial of amitriptyline.  If she fails find significant relief with this can consider diagnostic medial branch blocks at L4/5 and L5/S1.      : Not applicable    This note was completed with dictation software and grammatical errors may exist.    The total time spent for evaluation and management on 01/04/2023 including reviewing separately obtained history, performing a medically appropriate exam and evaluation, documenting clinical information in the health record, independently interpreting results and communicating them to the patient/family/caregiver, and ordering medications/tests/procedures was between 30-39 minutes.

## 2023-01-09 NOTE — PROGRESS NOTES
Assessment:       1. Other fatigue    2. Attention deficit disorder (ADD) in adult    3. Other insomnia    4. Scoliosis (and kyphoscoliosis), idiopathic    5. Mass of upper outer quadrant of right breast          Plan:       Other fatigue:  Stable    Attention deficit disorder (ADD) in adult:  Stable  -     dextroamphetamine-amphetamine (ADDERALL XR) 15 MG 24 hr capsule; Take 1 capsule (15 mg total) by mouth every morning.  Dispense: 30 capsule; Refill: 0    Other insomnia:  Stable    Scoliosis (and kyphoscoliosis), idiopathic:  Stable    Mass of upper outer quadrant of right breast:  New problem workup needed  -     Cancel: Mammo Digital Diagnostic Right with Justin; Future; Expected date: 12/30/2022  -     US Breast Right Limited; Future; Expected date: 12/30/2022  -     Cancel: Mammo Digital Diagnostic Bilat with [facility]; Future; Expected date: 12/30/2022         Will call the patient after we have the results of the test, [place] prescription monitoring program was checked and okay, Adderall was prescribed for the patient.   Patient agreed with assessment and plan. Patient verbalized understanding.     Subjective:       Patient ID: Holly Nichols is a 27 y.o. female.    Chief Complaint: Fatigue (Follow up)    HPI    Fatigue:  The patient complains of feeling tired, having energy, the patient stated that he is not able to sleep at night as much.  The patient is coming here for assessment.  The patient also complains of symptoms of pain on the back, she has scoliosis.  The patient is currently seen the pain management specialist secondary to back pain, she takes Robaxin done a decline does not as needed, she also has fibromyalgia.    ADD:  Currently taking Adderall, she will need a new refill today, denies any side effects of the medication.    Lump on the breast:  The patient complains of none on the right breast, the symptoms started more than 4 weeks ago, the patient is coming here for assessment.    Past  medical history, past social history was reviewed and discussed with the patient.    Review of Systems   Constitutional:  Positive for fatigue. Negative for activity change and appetite change.   HENT:  Negative for congestion and ear discharge.    Eyes:  Negative for discharge and itching.   Respiratory:  Negative for choking and chest tightness.    Cardiovascular:  Negative for chest pain, palpitations and leg swelling.   Gastrointestinal:  Negative for abdominal distention, abdominal pain and constipation.   Endocrine: Negative for cold intolerance and heat intolerance.   Genitourinary:  Negative for dysuria and flank pain.   Musculoskeletal:  Positive for back pain. Negative for arthralgias.   Skin:  Negative for pallor and rash.   Allergic/Immunologic: Negative for environmental allergies and food allergies.   Neurological:  Negative for dizziness and facial asymmetry.   Hematological:  Negative for adenopathy. Does not bruise/bleed easily.   Psychiatric/Behavioral:  Positive for sleep disturbance. Negative for agitation, confusion and decreased concentration.      Objective:      Physical Exam  Vitals and nursing note reviewed.   Constitutional:       General: She is not in acute distress.     Appearance: Normal appearance. She is well-developed. She is not diaphoretic.   HENT:      Head: Normocephalic and atraumatic.      Right Ear: External ear normal.      Left Ear: External ear normal.      Nose: Nose normal.   Eyes:      General: No scleral icterus.        Right eye: No discharge.         Left eye: No discharge.      Conjunctiva/sclera: Conjunctivae normal.   Cardiovascular:      Rate and Rhythm: Normal rate and regular rhythm.      Heart sounds: Normal heart sounds.   Pulmonary:      Effort: Pulmonary effort is normal. No respiratory distress.      Breath sounds: Normal breath sounds. No wheezing.   Musculoskeletal:         General: No tenderness or deformity.      Cervical back: Neck supple.   Skin:      Coloration: Skin is not pale.      Findings: No erythema.   Neurological:      Mental Status: She is alert.   Psychiatric:         Behavior: Behavior normal.         Thought Content: Thought content normal.         Judgment: Judgment normal.

## 2023-01-11 ENCOUNTER — TELEPHONE (OUTPATIENT)
Dept: GASTROENTEROLOGY | Facility: CLINIC | Age: 28
End: 2023-01-11
Payer: COMMERCIAL

## 2023-01-20 ENCOUNTER — PATIENT MESSAGE (OUTPATIENT)
Dept: FAMILY MEDICINE | Facility: CLINIC | Age: 28
End: 2023-01-20
Payer: COMMERCIAL

## 2023-01-20 DIAGNOSIS — J45.21 MILD INTERMITTENT ASTHMA WITH EXACERBATION: ICD-10-CM

## 2023-01-20 DIAGNOSIS — J45.20 MILD INTERMITTENT ASTHMA WITHOUT COMPLICATION: ICD-10-CM

## 2023-01-20 DIAGNOSIS — J45.41 MODERATE PERSISTENT ASTHMA WITH ACUTE EXACERBATION: Primary | ICD-10-CM

## 2023-01-20 RX ORDER — LEVALBUTEROL TARTRATE 45 UG/1
1-2 AEROSOL, METERED ORAL EVERY 4 HOURS PRN
Qty: 15 G | Refills: 0 | Status: SHIPPED | OUTPATIENT
Start: 2023-01-20 | End: 2023-01-24 | Stop reason: ALTCHOICE

## 2023-01-20 RX ORDER — ALBUTEROL SULFATE 90 UG/1
2 AEROSOL, METERED RESPIRATORY (INHALATION) EVERY 6 HOURS PRN
Qty: 18 G | Refills: 6 | Status: CANCELLED | OUTPATIENT
Start: 2023-01-20 | End: 2024-01-20

## 2023-01-20 NOTE — TELEPHONE ENCOUNTER
No new care gaps identified.  [facility] Embedded Care Gaps. Reference number: [id number]. 1/20/2023   2:47:10 PM CST

## 2023-01-24 ENCOUNTER — DOCUMENTATION ONLY (OUTPATIENT)
Dept: FAMILY MEDICINE | Facility: CLINIC | Age: 28
End: 2023-01-24

## 2023-01-24 RX ORDER — ALBUTEROL SULFATE 90 UG/1
2 AEROSOL, METERED RESPIRATORY (INHALATION) EVERY 6 HOURS PRN
Qty: 18 G | Refills: 5 | Status: SHIPPED | OUTPATIENT
Start: 2023-01-24 | End: 2023-10-25

## 2023-02-28 ENCOUNTER — PATIENT MESSAGE (OUTPATIENT)
Dept: OBSTETRICS AND GYNECOLOGY | Facility: CLINIC | Age: 28
End: 2023-02-28
Payer: COMMERCIAL

## 2023-03-02 ENCOUNTER — OFFICE VISIT (OUTPATIENT)
Dept: OBSTETRICS AND GYNECOLOGY | Facility: CLINIC | Age: 28
End: 2023-03-02
Payer: COMMERCIAL

## 2023-03-02 VITALS
HEIGHT: 68 IN | WEIGHT: 152.75 LBS | SYSTOLIC BLOOD PRESSURE: 94 MMHG | DIASTOLIC BLOOD PRESSURE: 66 MMHG | BODY MASS INDEX: 23.15 KG/M2

## 2023-03-02 DIAGNOSIS — N87.0 MILD DYSPLASIA OF CERVIX (CIN I): ICD-10-CM

## 2023-03-02 DIAGNOSIS — L73.9 FOLLICULITIS: Primary | ICD-10-CM

## 2023-03-02 DIAGNOSIS — Z90.710 S/P HYSTERECTOMY: ICD-10-CM

## 2023-03-02 PROCEDURE — 3008F BODY MASS INDEX DOCD: CPT | Mod: CPTII,S$GLB,, | Performed by: OBSTETRICS & GYNECOLOGY

## 2023-03-02 PROCEDURE — 1159F PR MEDICATION LIST DOCUMENTED IN MEDICAL RECORD: ICD-10-PCS | Mod: CPTII,S$GLB,, | Performed by: OBSTETRICS & GYNECOLOGY

## 2023-03-02 PROCEDURE — 88175 CYTOPATH C/V AUTO FLUID REDO: CPT | Performed by: OBSTETRICS & GYNECOLOGY

## 2023-03-02 PROCEDURE — 1159F MED LIST DOCD IN RCRD: CPT | Mod: CPTII,S$GLB,, | Performed by: OBSTETRICS & GYNECOLOGY

## 2023-03-02 PROCEDURE — 99999 PR PBB SHADOW E&M-EST. PATIENT-LVL IV: ICD-10-PCS | Mod: PBBFAC,,, | Performed by: OBSTETRICS & GYNECOLOGY

## 2023-03-02 PROCEDURE — 3008F PR BODY MASS INDEX (BMI) DOCUMENTED: ICD-10-PCS | Mod: CPTII,S$GLB,, | Performed by: OBSTETRICS & GYNECOLOGY

## 2023-03-02 PROCEDURE — 99213 PR OFFICE/OUTPT VISIT, EST, LEVL III, 20-29 MIN: ICD-10-PCS | Mod: S$GLB,,, | Performed by: OBSTETRICS & GYNECOLOGY

## 2023-03-02 PROCEDURE — 3074F SYST BP LT 130 MM HG: CPT | Mod: CPTII,S$GLB,, | Performed by: OBSTETRICS & GYNECOLOGY

## 2023-03-02 PROCEDURE — 3078F DIAST BP <80 MM HG: CPT | Mod: CPTII,S$GLB,, | Performed by: OBSTETRICS & GYNECOLOGY

## 2023-03-02 PROCEDURE — 3078F PR MOST RECENT DIASTOLIC BLOOD PRESSURE < 80 MM HG: ICD-10-PCS | Mod: CPTII,S$GLB,, | Performed by: OBSTETRICS & GYNECOLOGY

## 2023-03-02 PROCEDURE — 99999 PR PBB SHADOW E&M-EST. PATIENT-LVL IV: CPT | Mod: PBBFAC,,, | Performed by: OBSTETRICS & GYNECOLOGY

## 2023-03-02 PROCEDURE — 3074F PR MOST RECENT SYSTOLIC BLOOD PRESSURE < 130 MM HG: ICD-10-PCS | Mod: CPTII,S$GLB,, | Performed by: OBSTETRICS & GYNECOLOGY

## 2023-03-02 PROCEDURE — 99213 OFFICE O/P EST LOW 20 MIN: CPT | Mod: S$GLB,,, | Performed by: OBSTETRICS & GYNECOLOGY

## 2023-03-02 RX ORDER — CEPHALEXIN 500 MG/1
500 CAPSULE ORAL 4 TIMES DAILY
Qty: 40 CAPSULE | Refills: 0 | Status: SHIPPED | OUTPATIENT
Start: 2023-03-02 | End: 2023-03-30 | Stop reason: ALTCHOICE

## 2023-03-02 NOTE — PROGRESS NOTES
Chief Complaint   Patient presents with    Vaginal Pain     Lump on left labia majora, painful to touch       History of Present Illness: Holly Nichols is a 27 y.o. female that presents today 3/2/2023 for   Chief Complaint   Patient presents with    Vaginal Pain     Lump on left labia majora, painful to touch         Past Medical History:   Diagnosis Date    Abnormal Pap smear of cervix     Allergy     Anxiety     Asthma     rare inhaler use    Constipation     Depression     Endometriosis     Fibromyalgia     Gastritis     HLA B27 (HLA B27 positive)     Migraine     Postpartum depression     Scoliosis     Smoker     stopped in January 2018    Tobacco dependence 2/17/2016       Past Surgical History:   Procedure Laterality Date    ADENOIDECTOMY      APPENDECTOMY  09/2014    endometriosis    CHOLECYSTECTOMY  12/20/2017    Dr. MARBELLA Anne, [facility]     COLONOSCOPY N/A 05/19/2016    Procedure: COLONOSCOPY;  Surgeon: Jarret Zhao Jr., MD;  Location: [facility];  Service: Endoscopy;  Laterality: N/A;    COLONOSCOPY N/A 08/27/2020    Dr. Zhao: Erythema & ulcerated mucosa in descending, splenic flexure & distal transverse (r/o UC, r/o ischemia, r/o vasculiti, etc.), Mucosal changes in descending colon (secondary to left-sided colitis, to ischemic colitis(?), to vasculitis(?). hemorrhoids; biopsy: TI & random biopsies- WNL, transverse/hepatic/descending- mild active colitis without definitive features of chronicity    COLONOSCOPY N/A 12/16/2022    Procedure: COLONOSCOPY;  Surgeon: Jarret Zhao Jr., MD;  Location: [facility];  Service: Endoscopy;  Laterality: N/A;    CYSTOSCOPY  12/01/2021    Procedure: CYSTOSCOPY;  Surgeon: Ana María Easley MD;  Location: [facility] OR;  Service: OB/GYN;;    ESOPHAGOGASTRODUODENOSCOPY N/A 10/01/2020    Dr. Zhao: Erythema in the lower third of the esophagus; Duodenal bulb erosions without bleeding. biopsy: duodenum- Small bowel mucosa with mild acute inflammation changes may represent  erosion, Villous architecture is maintained; stomach- mild chronic gastritis; esophagus- minimal reactive changes    ESOPHAGOGASTRODUODENOSCOPY N/A 12/16/2022    Procedure: EGD (ESOPHAGOGASTRODUODENOSCOPY);  Surgeon: Jarret Zhao Jr., MD;  Location: [facility] ENDO;  Service: Endoscopy;  Laterality: N/A;    LAPAROSCOPIC OOPHORECTOMY Left 12/01/2021    Procedure: OOPHORECTOMY, LAPAROSCOPIC;  Surgeon: Ana María Easley MD;  Location: [facility] OR;  Service: OB/GYN;  Laterality: Left;    LAPAROSCOPIC SALPINGECTOMY Bilateral 12/01/2021    Procedure: SALPINGECTOMY, LAPAROSCOPIC;  Surgeon: Ana María Easley MD;  Location: [facility] OR;  Service: OB/GYN;  Laterality: Bilateral;    LAPAROSCOPIC TOTAL HYSTERECTOMY N/A 12/01/2021    Procedure: HYSTERECTOMY, TOTAL, LAPAROSCOPIC;  Surgeon: Ana María Easley MD;  Location: [facility] OR;  Service: OB/GYN;  Laterality: N/A;    MOUTH SURGERY      x2    PELVIC LAPAROSCOPY      SINUS SURGERY  11/23/2021    FESS, turbinate red    TONSILLECTOMY  07/2016    UPPER GASTROINTESTINAL ENDOSCOPY  05/2016    Dr. Zhao       Current Outpatient Medications   Medication Sig Dispense Refill    albuterol (VENTOLIN HFA) 90 mcg/actuation inhaler Inhale 2 puffs into the lungs every 6 (six) hours as needed for Wheezing. Rescue 18 g 5    albuterol-ipratropium (DUO-NEB) 2.5 mg-0.5 mg/3 mL nebulizer solution Take 3 mLs by nebulization every 6 (six) hours. Rescue 75 mL 0    Bifidobacterium infantis (ALIGN ORAL) Take 1 tablet by mouth once daily.      cetirizine (ZYRTEC) 10 MG tablet Take 10 mg by mouth nightly as needed for Allergies.      citalopram (CELEXA) 20 MG tablet TAKE 1 TABLET BY MOUTH EVERY DAY (Patient taking differently: Take 20 mg by mouth once daily. Along with a 40 mg tablet for a total of 60 mg) 30 tablet 11    citalopram (CELEXA) 40 MG tablet Take 40 mg by mouth once daily. Along with a 20 mg tablet for a total of 60 mg      dextroamphetamine-amphetamine (ADDERALL XR) 15 MG 24 hr capsule Take 1  capsule (15 mg total) by mouth every morning. 30 capsule 0    diphenoxylate-atropine 2.5-0.025 mg (LOMOTIL) 2.5-0.025 mg per tablet Take 1 tablet by mouth every 6 (six) hours. 100 tablet 5    esomeprazole (NEXIUM) 40 MG capsule Take 1 capsule (40 mg total) by mouth before breakfast. 30 capsule 2    FLOVENT  mcg/actuation inhaler Inhale 2 puffs into the lungs 2 (two) times daily. (Patient taking differently: Inhale 2 puffs into the lungs 2 (two) times daily as needed (shortness of breath).) 12 g 5    fluticasone furoate-vilanteroL (BREO ELLIPTA) 200-25 mcg/dose DsDv diskus inhaler Inhale 1 puff into the lungs once daily. Controller 60 each 0    fluticasone propionate (FLONASE) 50 mcg/actuation nasal spray 1 spray by Each Nostril route 2 (two) times daily as needed for Allergies.      ondansetron (ZOFRAN-ODT) 4 MG TbDL Take 4 mg by mouth every 8 (eight) hours as needed (nausea).      promethazine-dextromethorphan (PROMETHAZINE-DM) 6.25-15 mg/5 mL Syrp Take 10 mLs by mouth every 8 (eight) hours as needed (nausea).      tiotropium bromide (SPIRIVA RESPIMAT) 2.5 mcg/actuation inhaler Inhale 2 puffs into the lungs once daily. Controller 4 g 0    ALPRAZolam (XANAX) 0.25 MG tablet Take 1 tablet (0.25 mg total) by mouth daily as needed for Anxiety. 30 tablet 0    cephALEXin (KEFLEX) 500 MG capsule Take 1 capsule (500 mg total) by mouth 4 (four) times daily. for 10 days 40 capsule 0     No current facility-administered medications for this visit.       Review of patient's allergies indicates:   Allergen Reactions    Latex, natural rubber      burning    Sulfur Hives    Toradol [ketorolac] Other (See Comments)     Metallic taste only once in 2013; has taken since without problems    Tramadol Other (See Comments)     paralysis    Penicillins Hives and Rash    Sulfa (sulfonamide antibiotics) Hives and Rash       Family History   Problem Relation Age of Onset    Fibromyalgia Mother     Depression Mother     Irritable bowel  "syndrome Mother     Diabetes Father     Depression Father     Mental illness Father         bipolar disorder    Asthma Father     Multiple sclerosis Maternal Aunt     Crohn's disease Maternal Grandmother     Colon cancer Neg Hx     Ulcerative colitis Neg Hx     Stomach cancer Neg Hx     Esophageal cancer Neg Hx     Breast cancer Neg Hx     Ovarian cancer Neg Hx     Celiac disease Neg Hx        Social History     Tobacco Use    Smoking status: Former     Packs/day: 0.50     Types: Cigarettes, Vaping with nicotine     Quit date: 2018     Years since quittin.1    Smokeless tobacco: Never    Tobacco comments:     Still vaping   Substance Use Topics    Alcohol use: No     Alcohol/week: 0.0 standard drinks     Comment: quit since     Drug use: No       OB History    Para Term  AB Living   3 3 3     3   SAB IAB Ectopic Multiple Live Births         0 3      # Outcome Date GA Lbr Beny/2nd Weight Sex Delivery Anes PTL Lv   3 Term 21 37w5d 09:30 / 00:07 3.26 kg (7 lb 3 oz) M Vag-Spont EPI N RACHELE   2 Term 20 38w4d / 00:04 3.305 kg (7 lb 4.6 oz) F Vag-Spont EPI N RACHELE   1 Term 18 39w1d 12:11 / 00:20 3.459 kg (7 lb 10 oz) F Vag-Spont EPI N RACHELE           BP 94/66   Ht 5' 8" (1.727 m)   Wt 69.3 kg (152 lb 12.5 oz)   LMP 2020   Physical Exam:  APPEARANCE: Well nourished, well developed, in no acute distress.  SKIN: Normal skin turgor, no lesions.  NECK: Neck symmetric without masses   RESPIRATORY: Normal respiratory effort with no retractions or use of accessory muscles  CARDIOVASCULAR: Peripheral vascular system with no swelling no varicosities and palpation of pulses normal  LYMPHATIC: No enlargements of the lymph nodes noted in the neck, axillae, or groin  ABDOMEN: Soft. No tenderness or masses. No hepatosplenomegaly. No hernias.  PELVIC: Normal external female genitalia with 2 cm left minor abscess draining and flat  Normal hair distribution. Adequate perineal body, normal " urethral meatus. Urethra with no masses.  Bladder nontender. Vagina moist and well rugated without lesions or discharge.  No significant cystocele or rectocele. Adnexa without masses or tenderness. Urethra and bladder normal.  EXTREMITIES: No clubbing cyanosis or edema.    ASSESSMENT/PLAN:  Folliculitis  -     cephALEXin (KEFLEX) 500 MG capsule; Take 1 capsule (500 mg total) by mouth 4 (four) times daily. for 10 days  Dispense: 40 capsule; Refill: 0    S/P hysterectomy  -     Liquid-Based Pap Smear, Screening    Mild dysplasia of cervix (SUE I)  -     Liquid-Based Pap Smear, Screening      Warm compresses, staph prevention discussed, no shaving no tub baths, wash everything in hot water.       20 minutes spent today preparing reviewing previous external notes, reviewing previous results, performing medical examination, orders tests or medications, counseling and documenting.

## 2023-03-08 LAB
FINAL PATHOLOGIC DIAGNOSIS: NORMAL
Lab: NORMAL

## 2023-03-30 ENCOUNTER — OFFICE VISIT (OUTPATIENT)
Dept: FAMILY MEDICINE | Facility: CLINIC | Age: 28
End: 2023-03-30
Payer: COMMERCIAL

## 2023-03-30 VITALS
OXYGEN SATURATION: 98 % | SYSTOLIC BLOOD PRESSURE: 110 MMHG | HEART RATE: 74 BPM | BODY MASS INDEX: 22.73 KG/M2 | WEIGHT: 150 LBS | HEIGHT: 68 IN | DIASTOLIC BLOOD PRESSURE: 66 MMHG

## 2023-03-30 DIAGNOSIS — F41.9 ANXIETY: ICD-10-CM

## 2023-03-30 DIAGNOSIS — F98.8 ATTENTION DEFICIT DISORDER (ADD) IN ADULT: ICD-10-CM

## 2023-03-30 DIAGNOSIS — F33.0 MDD (MAJOR DEPRESSIVE DISORDER), RECURRENT EPISODE, MILD: Primary | ICD-10-CM

## 2023-03-30 DIAGNOSIS — K51.50 LEFT SIDED COLITIS WITHOUT COMPLICATIONS: ICD-10-CM

## 2023-03-30 DIAGNOSIS — G47.09 OTHER INSOMNIA: ICD-10-CM

## 2023-03-30 DIAGNOSIS — L70.8 OTHER ACNE: ICD-10-CM

## 2023-03-30 PROCEDURE — 1159F PR MEDICATION LIST DOCUMENTED IN MEDICAL RECORD: ICD-10-PCS | Mod: CPTII,S$GLB,, | Performed by: FAMILY MEDICINE

## 2023-03-30 PROCEDURE — 3074F SYST BP LT 130 MM HG: CPT | Mod: CPTII,S$GLB,, | Performed by: FAMILY MEDICINE

## 2023-03-30 PROCEDURE — 99214 PR OFFICE/OUTPT VISIT, EST, LEVL IV, 30-39 MIN: ICD-10-PCS | Mod: S$GLB,,, | Performed by: FAMILY MEDICINE

## 2023-03-30 PROCEDURE — 1159F MED LIST DOCD IN RCRD: CPT | Mod: CPTII,S$GLB,, | Performed by: FAMILY MEDICINE

## 2023-03-30 PROCEDURE — 3078F DIAST BP <80 MM HG: CPT | Mod: CPTII,S$GLB,, | Performed by: FAMILY MEDICINE

## 2023-03-30 PROCEDURE — 99999 PR PBB SHADOW E&M-EST. PATIENT-LVL IV: ICD-10-PCS | Mod: PBBFAC,,, | Performed by: FAMILY MEDICINE

## 2023-03-30 PROCEDURE — 3008F PR BODY MASS INDEX (BMI) DOCUMENTED: ICD-10-PCS | Mod: CPTII,S$GLB,, | Performed by: FAMILY MEDICINE

## 2023-03-30 PROCEDURE — 99214 OFFICE O/P EST MOD 30 MIN: CPT | Mod: S$GLB,,, | Performed by: FAMILY MEDICINE

## 2023-03-30 PROCEDURE — 3078F PR MOST RECENT DIASTOLIC BLOOD PRESSURE < 80 MM HG: ICD-10-PCS | Mod: CPTII,S$GLB,, | Performed by: FAMILY MEDICINE

## 2023-03-30 PROCEDURE — 3008F BODY MASS INDEX DOCD: CPT | Mod: CPTII,S$GLB,, | Performed by: FAMILY MEDICINE

## 2023-03-30 PROCEDURE — 3074F PR MOST RECENT SYSTOLIC BLOOD PRESSURE < 130 MM HG: ICD-10-PCS | Mod: CPTII,S$GLB,, | Performed by: FAMILY MEDICINE

## 2023-03-30 PROCEDURE — 99999 PR PBB SHADOW E&M-EST. PATIENT-LVL IV: CPT | Mod: PBBFAC,,, | Performed by: FAMILY MEDICINE

## 2023-03-30 RX ORDER — DEXTROAMPHETAMINE SACCHARATE, AMPHETAMINE ASPARTATE, DEXTROAMPHETAMINE SULFATE AND AMPHETAMINE SULFATE 1.25; 1.25; 1.25; 1.25 MG/1; MG/1; MG/1; MG/1
1 TABLET ORAL DAILY PRN
Qty: 30 TABLET | Refills: 0 | Status: SHIPPED | OUTPATIENT
Start: 2023-03-30 | End: 2023-04-25 | Stop reason: SDUPTHER

## 2023-03-30 RX ORDER — TRETINOIN 0.25 MG/G
CREAM TOPICAL NIGHTLY
Qty: 20 G | Refills: 0 | Status: SHIPPED | OUTPATIENT
Start: 2023-03-30

## 2023-03-30 RX ORDER — HYDROXYZINE HYDROCHLORIDE 25 MG/1
25 TABLET, FILM COATED ORAL NIGHTLY PRN
Qty: 30 TABLET | Refills: 2 | Status: SHIPPED | OUTPATIENT
Start: 2023-03-30

## 2023-04-01 PROBLEM — L70.8 OTHER ACNE: Status: ACTIVE | Noted: 2023-04-01

## 2023-04-01 NOTE — PROGRESS NOTES
Assessment:       1. MDD (major depressive disorder), recurrent episode, mild    2. Left sided colitis without complications    3. Anxiety    4. Other insomnia    5. Other acne    6. Attention deficit disorder (ADD) in adult        Plan:       MDD (major depressive disorder), recurrent episode, mild:  Stable    Left sided colitis without complications:  Improved    Anxiety:  Worsening  -     hydrOXYzine HCL (ATARAX) 25 MG tablet; Take 1 tablet (25 mg total) by mouth nightly as needed for Anxiety.  Dispense: 30 tablet; Refill: 2    Other insomnia:  Stable    Other acne:  Worsened  -     tretinoin (RETIN-A) 0.025 % cream; Apply topically every evening.  Dispense: 20 g; Refill: 0    Attention deficit disorder (ADD) in adult:  Worsening  -     dextroamphetamine-amphetamine (ADDERALL) 5 mg Tab; Take 5 mg by mouth daily as needed.  Dispense: 30 tablet; Refill: 0         [place] prescription monitoring program was checked and okay, will add Adderall 5 mg to take as needed new, the patient will continue taking Adderall extended release.  Hydroxyzine 25 mg was prescribed for anxiety as needed.  Recommend to drink more water, continue to eat healthy.  Will start patient on tretinoin 0.025 %, the patient was advised to discontinue the 0.1%.   Patient agreed with assessment and plan. Patient verbalized understanding.     Subjective:       Patient ID: Holly Nichols is a 27 y.o. female.    Chief Complaint: Follow-up and Medication Refill    HPI    ADD:  The patient is currently taking Adderall, stated that a new she is feeling let the medication is were off and not able to doing her activities, the patient stated that has trouble focusing and she would like to know if the medicine can be increased or something can be added.    Acne:  The patient bought over-the-counter tretinoin 0.1% and notice a peeling on the face, she has been applying still the cream in other areas of the face that did not peel.    Insomnia:  Complains  of anxiety, insomnia, depression, the patient would like to know if something can be prescribed for this problem, the symptoms are not better, the patient also stated that she had colitis but is doing better.    Past medical history, past social history was reviewed and discussed with the patient.    Review of Systems   Constitutional:  Negative for activity change and appetite change.   HENT:  Negative for ear discharge.    Eyes:  Negative for discharge and itching.   Respiratory:  Negative for choking and chest tightness.    Cardiovascular:  Negative for palpitations and leg swelling.   Gastrointestinal:  Negative for abdominal distention and blood in stool.   Endocrine: Negative for cold intolerance and heat intolerance.   Genitourinary:  Negative for dysuria and flank pain.   Musculoskeletal:  Negative for back pain.   Skin:  Positive for rash. Negative for pallor.   Allergic/Immunologic: Negative for environmental allergies and food allergies.   Neurological:  Negative for dizziness and facial asymmetry.   Hematological:  Negative for adenopathy. Does not bruise/bleed easily.   Psychiatric/Behavioral:  Positive for decreased concentration, dysphoric mood and sleep disturbance. Negative for agitation and confusion. The patient is nervous/anxious.      Objective:      Physical Exam  Vitals and nursing note reviewed.   Constitutional:       General: She is not in acute distress.     Appearance: Normal appearance. She is well-developed and normal weight. She is not diaphoretic.   HENT:      Head: Normocephalic and atraumatic.      Right Ear: External ear normal.      Left Ear: External ear normal.      Nose: Nose normal.      Mouth/Throat:      Pharynx: No oropharyngeal exudate.   Eyes:      General: No scleral icterus.        Right eye: No discharge.         Left eye: No discharge.      Conjunctiva/sclera: Conjunctivae normal.   Cardiovascular:      Rate and Rhythm: Normal rate and regular rhythm.      Heart  sounds: Normal heart sounds.   Pulmonary:      Effort: Pulmonary effort is normal. No respiratory distress.      Breath sounds: Normal breath sounds. No wheezing.   Musculoskeletal:         General: No tenderness or deformity.      Cervical back: Neck supple.   Skin:     Coloration: Skin is not pale.      Findings: Rash (Face) present. No erythema.   Neurological:      Mental Status: She is alert.   Psychiatric:         Behavior: Behavior normal.         Thought Content: Thought content normal.         Judgment: Judgment normal.

## 2023-04-18 ENCOUNTER — PATIENT MESSAGE (OUTPATIENT)
Dept: FAMILY MEDICINE | Facility: CLINIC | Age: 28
End: 2023-04-18
Payer: COMMERCIAL

## 2023-04-19 RX ORDER — FLUCONAZOLE 150 MG/1
TABLET ORAL
Qty: 2 TABLET | Refills: 0 | Status: SHIPPED | OUTPATIENT
Start: 2023-04-19 | End: 2023-06-30 | Stop reason: ALTCHOICE

## 2023-05-27 DIAGNOSIS — F98.8 ATTENTION DEFICIT DISORDER (ADD) IN ADULT: ICD-10-CM

## 2023-05-27 NOTE — TELEPHONE ENCOUNTER
No care due was identified.  [facility] Embedded Care Due Messages. Reference number: [id number].   5/27/2023 11:32:23 AM CDT

## 2023-05-28 RX ORDER — DEXTROAMPHETAMINE SACCHARATE, AMPHETAMINE ASPARTATE MONOHYDRATE, DEXTROAMPHETAMINE SULFATE AND AMPHETAMINE SULFATE 3.75; 3.75; 3.75; 3.75 MG/1; MG/1; MG/1; MG/1
15 CAPSULE, EXTENDED RELEASE ORAL EVERY MORNING
Qty: 30 CAPSULE | Refills: 0 | Status: SHIPPED | OUTPATIENT
Start: 2023-05-28 | End: 2023-06-30

## 2023-05-28 RX ORDER — DEXTROAMPHETAMINE SACCHARATE, AMPHETAMINE ASPARTATE, DEXTROAMPHETAMINE SULFATE AND AMPHETAMINE SULFATE 1.25; 1.25; 1.25; 1.25 MG/1; MG/1; MG/1; MG/1
1 TABLET ORAL DAILY PRN
Qty: 30 TABLET | Refills: 0 | Status: SHIPPED | OUTPATIENT
Start: 2023-05-28 | End: 2023-06-30

## 2023-06-30 ENCOUNTER — OFFICE VISIT (OUTPATIENT)
Dept: FAMILY MEDICINE | Facility: CLINIC | Age: 28
End: 2023-06-30
Payer: COMMERCIAL

## 2023-06-30 VITALS
DIASTOLIC BLOOD PRESSURE: 84 MMHG | WEIGHT: 149.69 LBS | BODY MASS INDEX: 22.69 KG/M2 | SYSTOLIC BLOOD PRESSURE: 110 MMHG | OXYGEN SATURATION: 95 % | HEART RATE: 92 BPM | HEIGHT: 68 IN

## 2023-06-30 DIAGNOSIS — B96.89 BACTERIAL SINUSITIS: ICD-10-CM

## 2023-06-30 DIAGNOSIS — F98.8 ATTENTION DEFICIT DISORDER (ADD) IN ADULT: ICD-10-CM

## 2023-06-30 DIAGNOSIS — H92.01 RIGHT EAR PAIN: Primary | ICD-10-CM

## 2023-06-30 DIAGNOSIS — J32.9 BACTERIAL SINUSITIS: ICD-10-CM

## 2023-06-30 DIAGNOSIS — F41.9 ANXIETY: ICD-10-CM

## 2023-06-30 DIAGNOSIS — B37.31 CANDIDAL VULVOVAGINITIS: ICD-10-CM

## 2023-06-30 PROCEDURE — 99214 PR OFFICE/OUTPT VISIT, EST, LEVL IV, 30-39 MIN: ICD-10-PCS | Mod: S$GLB,,, | Performed by: FAMILY MEDICINE

## 2023-06-30 PROCEDURE — 99214 OFFICE O/P EST MOD 30 MIN: CPT | Mod: S$GLB,,, | Performed by: FAMILY MEDICINE

## 2023-06-30 PROCEDURE — 3008F PR BODY MASS INDEX (BMI) DOCUMENTED: ICD-10-PCS | Mod: CPTII,S$GLB,, | Performed by: FAMILY MEDICINE

## 2023-06-30 PROCEDURE — 3079F DIAST BP 80-89 MM HG: CPT | Mod: CPTII,S$GLB,, | Performed by: FAMILY MEDICINE

## 2023-06-30 PROCEDURE — 1159F PR MEDICATION LIST DOCUMENTED IN MEDICAL RECORD: ICD-10-PCS | Mod: CPTII,S$GLB,, | Performed by: FAMILY MEDICINE

## 2023-06-30 PROCEDURE — 3079F PR MOST RECENT DIASTOLIC BLOOD PRESSURE 80-89 MM HG: ICD-10-PCS | Mod: CPTII,S$GLB,, | Performed by: FAMILY MEDICINE

## 2023-06-30 PROCEDURE — 1159F MED LIST DOCD IN RCRD: CPT | Mod: CPTII,S$GLB,, | Performed by: FAMILY MEDICINE

## 2023-06-30 PROCEDURE — 3008F BODY MASS INDEX DOCD: CPT | Mod: CPTII,S$GLB,, | Performed by: FAMILY MEDICINE

## 2023-06-30 PROCEDURE — 3074F SYST BP LT 130 MM HG: CPT | Mod: CPTII,S$GLB,, | Performed by: FAMILY MEDICINE

## 2023-06-30 PROCEDURE — 3074F PR MOST RECENT SYSTOLIC BLOOD PRESSURE < 130 MM HG: ICD-10-PCS | Mod: CPTII,S$GLB,, | Performed by: FAMILY MEDICINE

## 2023-06-30 PROCEDURE — 99999 PR PBB SHADOW E&M-EST. PATIENT-LVL III: CPT | Mod: PBBFAC,,, | Performed by: FAMILY MEDICINE

## 2023-06-30 PROCEDURE — 99999 PR PBB SHADOW E&M-EST. PATIENT-LVL III: ICD-10-PCS | Mod: PBBFAC,,, | Performed by: FAMILY MEDICINE

## 2023-06-30 RX ORDER — CEFDINIR 300 MG/1
300 CAPSULE ORAL 2 TIMES DAILY
Qty: 20 CAPSULE | Refills: 0 | Status: SHIPPED | OUTPATIENT
Start: 2023-06-30 | End: 2023-07-10

## 2023-06-30 RX ORDER — DEXTROAMPHETAMINE SACCHARATE, AMPHETAMINE ASPARTATE MONOHYDRATE, DEXTROAMPHETAMINE SULFATE AND AMPHETAMINE SULFATE 3.75; 3.75; 3.75; 3.75 MG/1; MG/1; MG/1; MG/1
15 CAPSULE, EXTENDED RELEASE ORAL EVERY MORNING
Qty: 30 CAPSULE | Refills: 0 | Status: SHIPPED | OUTPATIENT
Start: 2023-06-30 | End: 2023-07-29 | Stop reason: SDUPTHER

## 2023-06-30 RX ORDER — FLUCONAZOLE 150 MG/1
TABLET ORAL
Qty: 2 TABLET | Refills: 0 | Status: SHIPPED | OUTPATIENT
Start: 2023-06-30 | End: 2024-03-12

## 2023-06-30 RX ORDER — METHYLPREDNISOLONE 4 MG/1
TABLET ORAL
Qty: 1 EACH | Refills: 0 | Status: SHIPPED | OUTPATIENT
Start: 2023-06-30 | End: 2023-07-21

## 2023-06-30 RX ORDER — DEXTROAMPHETAMINE SACCHARATE, AMPHETAMINE ASPARTATE, DEXTROAMPHETAMINE SULFATE AND AMPHETAMINE SULFATE 1.25; 1.25; 1.25; 1.25 MG/1; MG/1; MG/1; MG/1
1 TABLET ORAL DAILY PRN
Qty: 30 TABLET | Refills: 0 | Status: SHIPPED | OUTPATIENT
Start: 2023-06-30 | End: 2024-01-26 | Stop reason: SDUPTHER

## 2023-06-30 NOTE — PROGRESS NOTES
Assessment:       1. Right ear pain    2. Attention deficit disorder (ADD) in adult    3. Bacterial sinusitis    4. Anxiety    5. Candidal vulvovaginitis        Plan:       Right ear pain:  New problem, no further workup needed    Attention deficit disorder (ADD) in adult:  Stable  -     dextroamphetamine-amphetamine (ADDERALL XR) 15 MG 24 hr capsule; Take 1 capsule (15 mg total) by mouth every morning.  Dispense: 30 capsule; Refill: 0  -     dextroamphetamine-amphetamine (ADDERALL) 5 mg Tab; Take 5 mg by mouth daily as needed.  Dispense: 30 tablet; Refill: 0    Bacterial sinusitis:  New problem, next visit workup  -     cefdinir (OMNICEF) 300 MG capsule; Take 1 capsule (300 mg total) by mouth 2 (two) times daily. for 10 days  Dispense: 20 capsule; Refill: 0  -     methylPREDNISolone (MEDROL DOSEPACK) 4 mg tablet; use as directed  Dispense: 1 each; Refill: 0    Anxiety:  Stable    Candidal vulvovaginitis:  As needed  -     fluconazole (DIFLUCAN) 150 MG Tab; 1 tablet p.o. per 1 can repeat in 3 days if the symptoms persist.  Dispense: 2 tablet; Refill: 0         Will start patient on Cefdinir 300 mg twice daily along with Medrol Dosepak, the patient was advised to continue using Neti pot with sterile water, drink more water, vitamin-C, vitamin-D and zinc, if the symptoms get worse, the patient notify us immediately, [place] prescription monitoring program was checked and okay, Adderall extended release and regular Adderall was prescribed for the patient, the patient will contact us when she needs new refills.  Prescription for Diflucan was given to the patient in the case she develops any yeast infections.  Side effects from Diflucan and Celexa were explained to the patient.   Patient agreed with assessment and plan. Patient verbalized understanding.     Subjective:       Patient ID: Holly Nichols is a 27 y.o. female.    Chief Complaint: Follow-up sinusitis    HPI    Sinusitis:  The patient is complaining of  symptoms of pressure in the sinuses especially on the left side, right ear pain, pressure on the right ear, lymphadenopathy, congestion, postnasal drip, the symptoms are getting worse.  The symptoms started approximately 4 weeks ago, the ear pain and the lymphadenopathy approximately 1 day ago.  The patient was supposed to go to the ENT today but cancel the appointment to come here instead.  She would like a prescription for Diflucan indicates she developed yeast infection, she took the medication before without any problems.    ADD:  The patient is currently taking Adderall extended release and Adderall 5 mg, she will need refills today, denies any side effects of the medication.    Anxiety:  The patient is taking Celexa, denies any side effects of the medication.  The patient stated that she still having a lot of stress at home and at work.    Past medical history, past social history was reviewed and discussed with the patient.    Review of Systems   Constitutional:  Negative for activity change and appetite change.   HENT:  Positive for congestion, ear pain, postnasal drip, rhinorrhea and sinus pressure. Negative for ear discharge.    Eyes:  Negative for discharge and itching.   Respiratory:  Negative for choking and chest tightness.    Cardiovascular:  Negative for chest pain and palpitations.   Gastrointestinal:  Negative for abdominal distention and abdominal pain.   Endocrine: Negative for cold intolerance and heat intolerance.   Genitourinary:  Negative for dysuria and flank pain.   Musculoskeletal:  Negative for arthralgias and back pain.   Skin:  Negative for pallor and rash.   Allergic/Immunologic: Negative for environmental allergies and food allergies.   Neurological:  Negative for dizziness, facial asymmetry and headaches.   Hematological:  Negative for adenopathy.   Psychiatric/Behavioral:  Positive for decreased concentration. Negative for agitation and confusion.      Objective:      Physical  Exam  Vitals and nursing note reviewed.   Constitutional:       General: She is not in acute distress.     Appearance: Normal appearance. She is well-developed and normal weight. She is not diaphoretic.   HENT:      Head: Normocephalic and atraumatic.      Right Ear: External ear normal.      Left Ear: External ear normal.      Nose: Mucosal edema, congestion and rhinorrhea present.      Right Sinus: Maxillary sinus tenderness and frontal sinus tenderness present.   Eyes:      General: No scleral icterus.        Right eye: No discharge.         Left eye: No discharge.      Conjunctiva/sclera: Conjunctivae normal.      Pupils: Pupils are equal, round, and reactive to light.   Cardiovascular:      Rate and Rhythm: Normal rate and regular rhythm.      Heart sounds: Normal heart sounds.   Pulmonary:      Effort: Pulmonary effort is normal. No respiratory distress.      Breath sounds: Normal breath sounds.   Abdominal:      General: There is no distension.   Musculoskeletal:         General: No deformity.      Cervical back: Neck supple.   Lymphadenopathy:      Cervical: Cervical adenopathy (Right side) present.   Skin:     Coloration: Skin is not pale.      Findings: No erythema.   Neurological:      Mental Status: She is alert.      Cranial Nerves: No cranial nerve deficit.   Psychiatric:         Behavior: Behavior normal.         Thought Content: Thought content normal.         Judgment: Judgment normal.

## 2023-07-07 DIAGNOSIS — F32.A DEPRESSION, UNSPECIFIED DEPRESSION TYPE: ICD-10-CM

## 2023-07-07 RX ORDER — CITALOPRAM 20 MG/1
TABLET, FILM COATED ORAL
Qty: 30 TABLET | Refills: 11 | Status: SHIPPED | OUTPATIENT
Start: 2023-07-07 | End: 2024-02-01 | Stop reason: SDUPTHER

## 2023-07-29 DIAGNOSIS — F98.8 ATTENTION DEFICIT DISORDER (ADD) IN ADULT: ICD-10-CM

## 2023-07-29 RX ORDER — DEXTROAMPHETAMINE SACCHARATE, AMPHETAMINE ASPARTATE MONOHYDRATE, DEXTROAMPHETAMINE SULFATE AND AMPHETAMINE SULFATE 3.75; 3.75; 3.75; 3.75 MG/1; MG/1; MG/1; MG/1
15 CAPSULE, EXTENDED RELEASE ORAL EVERY MORNING
Qty: 30 CAPSULE | Refills: 0 | Status: SHIPPED | OUTPATIENT
Start: 2023-07-29 | End: 2023-09-04 | Stop reason: SDUPTHER

## 2023-07-29 NOTE — TELEPHONE ENCOUNTER
No care due was identified.  [facility] Embedded Care Due Messages. Reference number: [id number].   7/29/2023 9:57:17 AM CDT

## 2023-08-05 DIAGNOSIS — R12 HEARTBURN: ICD-10-CM

## 2023-08-07 RX ORDER — ESOMEPRAZOLE MAGNESIUM 40 MG/1
40 CAPSULE, DELAYED RELEASE ORAL
Qty: 30 CAPSULE | Refills: 2 | Status: SHIPPED | OUTPATIENT
Start: 2023-08-07 | End: 2024-01-26 | Stop reason: SDUPTHER

## 2023-09-04 DIAGNOSIS — F98.8 ATTENTION DEFICIT DISORDER (ADD) IN ADULT: ICD-10-CM

## 2023-09-05 NOTE — TELEPHONE ENCOUNTER
No care due was identified.  [facility] Embedded Care Due Messages. Reference number: [id number].   9/04/2023 7:45:07 PM CDT

## 2023-09-06 DIAGNOSIS — F98.8 ATTENTION DEFICIT DISORDER (ADD) IN ADULT: ICD-10-CM

## 2023-09-06 RX ORDER — DEXTROAMPHETAMINE SACCHARATE, AMPHETAMINE ASPARTATE MONOHYDRATE, DEXTROAMPHETAMINE SULFATE AND AMPHETAMINE SULFATE 3.75; 3.75; 3.75; 3.75 MG/1; MG/1; MG/1; MG/1
15 CAPSULE, EXTENDED RELEASE ORAL EVERY MORNING
Qty: 30 CAPSULE | Refills: 0 | Status: CANCELLED | OUTPATIENT
Start: 2023-09-06 | End: 2023-10-06

## 2023-09-06 NOTE — TELEPHONE ENCOUNTER
No care due was identified.  [facility] Embedded Care Due Messages. Reference number: [id number].   9/06/2023 9:46:03 AM CDT

## 2023-09-07 RX ORDER — DEXTROAMPHETAMINE SACCHARATE, AMPHETAMINE ASPARTATE MONOHYDRATE, DEXTROAMPHETAMINE SULFATE AND AMPHETAMINE SULFATE 3.75; 3.75; 3.75; 3.75 MG/1; MG/1; MG/1; MG/1
15 CAPSULE, EXTENDED RELEASE ORAL EVERY MORNING
Qty: 30 CAPSULE | Refills: 0 | Status: SHIPPED | OUTPATIENT
Start: 2023-09-07 | End: 2023-10-09 | Stop reason: SDUPTHER

## 2023-09-19 ENCOUNTER — PATIENT MESSAGE (OUTPATIENT)
Dept: FAMILY MEDICINE | Facility: CLINIC | Age: 28
End: 2023-09-19
Payer: COMMERCIAL

## 2023-10-09 DIAGNOSIS — F98.8 ATTENTION DEFICIT DISORDER (ADD) IN ADULT: ICD-10-CM

## 2023-10-09 RX ORDER — DEXTROAMPHETAMINE SACCHARATE, AMPHETAMINE ASPARTATE MONOHYDRATE, DEXTROAMPHETAMINE SULFATE AND AMPHETAMINE SULFATE 3.75; 3.75; 3.75; 3.75 MG/1; MG/1; MG/1; MG/1
15 CAPSULE, EXTENDED RELEASE ORAL EVERY MORNING
Qty: 30 CAPSULE | Refills: 0 | Status: SHIPPED | OUTPATIENT
Start: 2023-10-09 | End: 2023-10-25

## 2023-10-09 NOTE — TELEPHONE ENCOUNTER
No care due was identified.  [facility] Embedded Care Due Messages. Reference number: [id number].   10/09/2023 8:14:43 AM CDT

## 2023-10-25 ENCOUNTER — OFFICE VISIT (OUTPATIENT)
Dept: FAMILY MEDICINE | Facility: CLINIC | Age: 28
End: 2023-10-25
Payer: COMMERCIAL

## 2023-10-25 VITALS
WEIGHT: 144.38 LBS | HEIGHT: 68 IN | BODY MASS INDEX: 21.88 KG/M2 | HEART RATE: 76 BPM | OXYGEN SATURATION: 98 % | DIASTOLIC BLOOD PRESSURE: 88 MMHG | SYSTOLIC BLOOD PRESSURE: 110 MMHG

## 2023-10-25 DIAGNOSIS — J45.40 MODERATE PERSISTENT ASTHMA WITHOUT COMPLICATION: ICD-10-CM

## 2023-10-25 DIAGNOSIS — F41.1 GENERALIZED ANXIETY DISORDER: ICD-10-CM

## 2023-10-25 DIAGNOSIS — R03.0 ELEVATED BP WITHOUT DIAGNOSIS OF HYPERTENSION: ICD-10-CM

## 2023-10-25 DIAGNOSIS — J32.9 BACTERIAL SINUSITIS: ICD-10-CM

## 2023-10-25 DIAGNOSIS — F98.8 ATTENTION DEFICIT DISORDER (ADD) IN ADULT: Primary | ICD-10-CM

## 2023-10-25 DIAGNOSIS — B96.89 BACTERIAL SINUSITIS: ICD-10-CM

## 2023-10-25 DIAGNOSIS — M41.20 IDIOPATHIC SCOLIOSIS AND KYPHOSCOLIOSIS: ICD-10-CM

## 2023-10-25 DIAGNOSIS — J01.00 ACUTE NON-RECURRENT MAXILLARY SINUSITIS: ICD-10-CM

## 2023-10-25 PROCEDURE — 3008F BODY MASS INDEX DOCD: CPT | Mod: CPTII,S$GLB,, | Performed by: FAMILY MEDICINE

## 2023-10-25 PROCEDURE — 3074F PR MOST RECENT SYSTOLIC BLOOD PRESSURE < 130 MM HG: ICD-10-PCS | Mod: CPTII,S$GLB,, | Performed by: FAMILY MEDICINE

## 2023-10-25 PROCEDURE — 1159F PR MEDICATION LIST DOCUMENTED IN MEDICAL RECORD: ICD-10-PCS | Mod: CPTII,S$GLB,, | Performed by: FAMILY MEDICINE

## 2023-10-25 PROCEDURE — 99214 PR OFFICE/OUTPT VISIT, EST, LEVL IV, 30-39 MIN: ICD-10-PCS | Mod: S$GLB,,, | Performed by: FAMILY MEDICINE

## 2023-10-25 PROCEDURE — 1159F MED LIST DOCD IN RCRD: CPT | Mod: CPTII,S$GLB,, | Performed by: FAMILY MEDICINE

## 2023-10-25 PROCEDURE — 3074F SYST BP LT 130 MM HG: CPT | Mod: CPTII,S$GLB,, | Performed by: FAMILY MEDICINE

## 2023-10-25 PROCEDURE — 3008F PR BODY MASS INDEX (BMI) DOCUMENTED: ICD-10-PCS | Mod: CPTII,S$GLB,, | Performed by: FAMILY MEDICINE

## 2023-10-25 PROCEDURE — 3079F PR MOST RECENT DIASTOLIC BLOOD PRESSURE 80-89 MM HG: ICD-10-PCS | Mod: CPTII,S$GLB,, | Performed by: FAMILY MEDICINE

## 2023-10-25 PROCEDURE — 99999 PR PBB SHADOW E&M-EST. PATIENT-LVL V: CPT | Mod: PBBFAC,,, | Performed by: FAMILY MEDICINE

## 2023-10-25 PROCEDURE — 3079F DIAST BP 80-89 MM HG: CPT | Mod: CPTII,S$GLB,, | Performed by: FAMILY MEDICINE

## 2023-10-25 PROCEDURE — 99214 OFFICE O/P EST MOD 30 MIN: CPT | Mod: S$GLB,,, | Performed by: FAMILY MEDICINE

## 2023-10-25 PROCEDURE — 99999 PR PBB SHADOW E&M-EST. PATIENT-LVL V: ICD-10-PCS | Mod: PBBFAC,,, | Performed by: FAMILY MEDICINE

## 2023-10-25 RX ORDER — DEXTROAMPHETAMINE SACCHARATE, AMPHETAMINE ASPARTATE MONOHYDRATE, DEXTROAMPHETAMINE SULFATE AND AMPHETAMINE SULFATE 3.75; 3.75; 3.75; 3.75 MG/1; MG/1; MG/1; MG/1
15 CAPSULE, EXTENDED RELEASE ORAL EVERY MORNING
Qty: 30 CAPSULE | Refills: 0 | Status: SHIPPED | OUTPATIENT
Start: 2023-11-09 | End: 2023-12-14 | Stop reason: SDUPTHER

## 2023-10-25 RX ORDER — CEFDINIR 300 MG/1
300 CAPSULE ORAL 2 TIMES DAILY
Qty: 20 CAPSULE | Refills: 0 | Status: SHIPPED | OUTPATIENT
Start: 2023-10-25 | End: 2023-11-04

## 2023-10-25 RX ORDER — ALBUTEROL SULFATE 90 UG/1
2 AEROSOL, METERED RESPIRATORY (INHALATION) EVERY 6 HOURS PRN
Qty: 18 G | Refills: 5 | Status: SHIPPED | OUTPATIENT
Start: 2023-10-25

## 2023-10-25 RX ORDER — METHYLPREDNISOLONE 4 MG/1
TABLET ORAL
Qty: 21 EACH | Refills: 0 | Status: SHIPPED | OUTPATIENT
Start: 2023-10-25 | End: 2023-11-15

## 2023-10-25 NOTE — PROGRESS NOTES
Assessment and plan:    Attention deficit disorder (ADD) in adult: Stable  -     dextroamphetamine-amphetamine (ADDERALL XR) 15 MG 24 hr capsule; Take 1 capsule (15 mg total) by mouth every morning.  Dispense: 30 capsule; Refill: 0    Generalized anxiety disorder: Stable    Acute non-recurrent maxillary sinusitis: Worsening  -     cefdinir (OMNICEF) 300 MG capsule; Take 1 capsule (300 mg total) by mouth 2 (two) times daily. for 10 days  Dispense: 20 capsule; Refill: 0  -     methylPREDNISolone (MEDROL DOSEPACK) 4 mg tablet; use as directed  Dispense: 21 each; Refill: 0    Elevated BP without diagnosis of hypertension: Uncontrolled    Moderate persistent asthma without complication: Stable  -     albuterol (VENTOLIN HFA) 90 mcg/actuation inhaler; Inhale 2 puffs into the lungs every 6 (six) hours as needed for Wheezing. Rescue  Dispense: 18 g; Refill: 5    Bacterial sinusitis: Worsening  -     cefdinir (OMNICEF) 300 MG capsule; Take 1 capsule (300 mg total) by mouth 2 (two) times daily. for 10 days  Dispense: 20 capsule; Refill: 0  -     methylPREDNISolone (MEDROL DOSEPACK) 4 mg tablet; use as directed  Dispense: 21 each; Refill: 0    Scoliosis (and kyphoscoliosis), idiopathic: Worsening     Recommend the patient to avoid any decongestant medications due to increase on the blood pressure.  Will start patient on cefdinir and Medrol Dosepak, continue nasal saline spray, avoid Afrin and cortisone in the nose.  Drink plenty of water, [place] prescription monitoring program was checked and okay, Adderall was prescribed.  The patient will make an appointment to see the pulmonologist for possible doxepin went and will follow-up with the pain management specialist for possible injection.   Patient agreed with assessment and plan. Patient verbalized understanding.     Subjective:       Patient ID: Holly Nichols is a 28 y.o. female.    Chief Complaint: Follow-up (3 month follow up )    HPI    The patient here today for a  follow-up visit, the patient has ADD, currently taking Adderall, the patient doing well with current treatment, denies any side effects of the medication.  The patient also has anxiety disorder, denies any depression.  Complaining of pain on the lower back, was seen the specialist who recommend amitriptyline but the patient because taking currently SSRI not able to take amitriptyline.  She will make an appointment for a follow-up for possible injection.  The patient also complains of having symptoms of nasal congestion, pressure in the sinuses, postnasal drip, the patient has been using Afrin, decongestant medication, nasal saline spray, Neti pot, but stated that every time that she put anything in her nose it burns.  The patient try to make an appointment to see the ENT specialist but not able because take too long to get appointment.    Past medical history, past social history was reviewed and discussed with the patient.    Review of Systems   Constitutional:  Negative for activity change and appetite change.   HENT:  Positive for congestion, postnasal drip and sinus pressure. Negative for ear discharge.    Eyes:  Negative for discharge and itching.   Respiratory:  Negative for choking and chest tightness.    Cardiovascular:  Negative for chest pain and palpitations.   Gastrointestinal:  Negative for abdominal distention and abdominal pain.   Endocrine: Negative for cold intolerance and heat intolerance.   Genitourinary:  Negative for dysuria and flank pain.   Musculoskeletal:  Positive for back pain. Negative for arthralgias.   Skin:  Negative for pallor and rash.   Allergic/Immunologic: Negative for environmental allergies and food allergies.   Neurological:  Negative for dizziness and facial asymmetry.   Hematological:  Negative for adenopathy. Does not bruise/bleed easily.   Psychiatric/Behavioral:  Positive for decreased concentration and sleep disturbance. Negative for agitation and confusion. The patient  is nervous/anxious.        Objective:      Physical Exam  Vitals and nursing note reviewed.   Constitutional:       General: She is not in acute distress.     Appearance: Normal appearance. She is well-developed. She is not diaphoretic.   HENT:      Head: Normocephalic and atraumatic.      Right Ear: External ear normal.      Left Ear: External ear normal.      Nose: Nasal tenderness, mucosal edema and congestion present.      Right Sinus: Maxillary sinus tenderness and frontal sinus tenderness present.      Left Sinus: Maxillary sinus tenderness and frontal sinus tenderness present.      Mouth/Throat:      Pharynx: No oropharyngeal exudate.   Eyes:      General: No scleral icterus.        Right eye: No discharge.         Left eye: No discharge.      Conjunctiva/sclera: Conjunctivae normal.   Cardiovascular:      Rate and Rhythm: Normal rate and regular rhythm.      Heart sounds: Normal heart sounds.   Pulmonary:      Effort: Pulmonary effort is normal. No respiratory distress.      Breath sounds: Normal breath sounds. No wheezing.   Abdominal:      Palpations: There is no mass.   Musculoskeletal:         General: No tenderness or deformity.      Cervical back: Neck supple.   Skin:     Coloration: Skin is not pale.   Neurological:      Mental Status: She is alert.      Cranial Nerves: No cranial nerve deficit.   Psychiatric:         Behavior: Behavior normal.         Thought Content: Thought content normal.         Judgment: Judgment normal.

## 2023-12-14 DIAGNOSIS — F98.8 ATTENTION DEFICIT DISORDER (ADD) IN ADULT: ICD-10-CM

## 2023-12-14 RX ORDER — DEXTROAMPHETAMINE SACCHARATE, AMPHETAMINE ASPARTATE MONOHYDRATE, DEXTROAMPHETAMINE SULFATE AND AMPHETAMINE SULFATE 3.75; 3.75; 3.75; 3.75 MG/1; MG/1; MG/1; MG/1
15 CAPSULE, EXTENDED RELEASE ORAL EVERY MORNING
Qty: 30 CAPSULE | Refills: 0 | Status: SHIPPED | OUTPATIENT
Start: 2023-12-14 | End: 2024-01-16 | Stop reason: SDUPTHER

## 2023-12-14 NOTE — TELEPHONE ENCOUNTER
No care due was identified.  [facility] Embedded Care Due Messages. Reference number: [id number].   12/14/2023 7:24:12 AM CST

## 2024-01-16 DIAGNOSIS — F98.8 ATTENTION DEFICIT DISORDER (ADD) IN ADULT: ICD-10-CM

## 2024-01-16 RX ORDER — DEXTROAMPHETAMINE SACCHARATE, AMPHETAMINE ASPARTATE MONOHYDRATE, DEXTROAMPHETAMINE SULFATE AND AMPHETAMINE SULFATE 3.75; 3.75; 3.75; 3.75 MG/1; MG/1; MG/1; MG/1
15 CAPSULE, EXTENDED RELEASE ORAL EVERY MORNING
Qty: 30 CAPSULE | Refills: 0 | Status: SHIPPED | OUTPATIENT
Start: 2024-01-16 | End: 2024-01-26

## 2024-01-16 NOTE — TELEPHONE ENCOUNTER
No care due was identified.  [facility] Embedded Care Due Messages. Reference number: [id number].   1/16/2024 7:51:51 AM CST

## 2024-01-26 ENCOUNTER — OFFICE VISIT (OUTPATIENT)
Dept: FAMILY MEDICINE | Facility: CLINIC | Age: 29
End: 2024-01-26
Payer: COMMERCIAL

## 2024-01-26 VITALS
BODY MASS INDEX: 21.95 KG/M2 | HEIGHT: 68 IN | RESPIRATION RATE: 18 BRPM | WEIGHT: 144.81 LBS | DIASTOLIC BLOOD PRESSURE: 70 MMHG | TEMPERATURE: 99 F | HEART RATE: 88 BPM | OXYGEN SATURATION: 98 % | SYSTOLIC BLOOD PRESSURE: 118 MMHG

## 2024-01-26 DIAGNOSIS — B96.89 BACTERIAL SINUSITIS: Primary | ICD-10-CM

## 2024-01-26 DIAGNOSIS — J32.9 BACTERIAL SINUSITIS: Primary | ICD-10-CM

## 2024-01-26 DIAGNOSIS — M45.9 ANKYLOSING SPONDYLITIS, UNSPECIFIED SITE OF SPINE: ICD-10-CM

## 2024-01-26 DIAGNOSIS — F98.8 ATTENTION DEFICIT DISORDER (ADD) IN ADULT: ICD-10-CM

## 2024-01-26 DIAGNOSIS — R12 HEARTBURN: ICD-10-CM

## 2024-01-26 DIAGNOSIS — K51.50 LEFT SIDED COLITIS WITHOUT COMPLICATIONS: ICD-10-CM

## 2024-01-26 PROCEDURE — 1159F MED LIST DOCD IN RCRD: CPT | Mod: CPTII,S$GLB,, | Performed by: FAMILY MEDICINE

## 2024-01-26 PROCEDURE — 99999 PR PBB SHADOW E&M-EST. PATIENT-LVL IV: CPT | Mod: PBBFAC,,, | Performed by: FAMILY MEDICINE

## 2024-01-26 PROCEDURE — 1160F RVW MEDS BY RX/DR IN RCRD: CPT | Mod: CPTII,S$GLB,, | Performed by: FAMILY MEDICINE

## 2024-01-26 PROCEDURE — 96372 THER/PROPH/DIAG INJ SC/IM: CPT | Mod: S$GLB,,, | Performed by: FAMILY MEDICINE

## 2024-01-26 PROCEDURE — 99214 OFFICE O/P EST MOD 30 MIN: CPT | Mod: 25,S$GLB,, | Performed by: FAMILY MEDICINE

## 2024-01-26 PROCEDURE — 3008F BODY MASS INDEX DOCD: CPT | Mod: CPTII,S$GLB,, | Performed by: FAMILY MEDICINE

## 2024-01-26 PROCEDURE — 3074F SYST BP LT 130 MM HG: CPT | Mod: CPTII,S$GLB,, | Performed by: FAMILY MEDICINE

## 2024-01-26 PROCEDURE — 3078F DIAST BP <80 MM HG: CPT | Mod: CPTII,S$GLB,, | Performed by: FAMILY MEDICINE

## 2024-01-26 RX ORDER — METHYLPREDNISOLONE 4 MG/1
TABLET ORAL
Qty: 1 EACH | Refills: 0 | Status: SHIPPED | OUTPATIENT
Start: 2024-01-26 | End: 2024-02-16

## 2024-01-26 RX ORDER — DEXAMETHASONE SODIUM PHOSPHATE 4 MG/ML
8 INJECTION, SOLUTION INTRA-ARTICULAR; INTRALESIONAL; INTRAMUSCULAR; INTRAVENOUS; SOFT TISSUE
Status: DISCONTINUED | OUTPATIENT
Start: 2024-01-26 | End: 2024-01-26

## 2024-01-26 RX ORDER — DEXTROAMPHETAMINE SACCHARATE, AMPHETAMINE ASPARTATE, DEXTROAMPHETAMINE SULFATE AND AMPHETAMINE SULFATE 1.25; 1.25; 1.25; 1.25 MG/1; MG/1; MG/1; MG/1
1 TABLET ORAL DAILY PRN
Qty: 30 TABLET | Refills: 0 | Status: SHIPPED | OUTPATIENT
Start: 2024-01-26 | End: 2024-04-26

## 2024-01-26 RX ORDER — DEXTROAMPHETAMINE SACCHARATE, AMPHETAMINE ASPARTATE MONOHYDRATE, DEXTROAMPHETAMINE SULFATE AND AMPHETAMINE SULFATE 3.75; 3.75; 3.75; 3.75 MG/1; MG/1; MG/1; MG/1
15 CAPSULE, EXTENDED RELEASE ORAL EVERY MORNING
Qty: 30 CAPSULE | Refills: 0 | Status: SHIPPED | OUTPATIENT
Start: 2024-03-16 | End: 2024-04-26 | Stop reason: SDUPTHER

## 2024-01-26 RX ORDER — DEXAMETHASONE SODIUM PHOSPHATE 4 MG/ML
8 INJECTION, SOLUTION INTRA-ARTICULAR; INTRALESIONAL; INTRAMUSCULAR; INTRAVENOUS; SOFT TISSUE
Status: COMPLETED | OUTPATIENT
Start: 2024-01-26 | End: 2024-01-26

## 2024-01-26 RX ORDER — CEFDINIR 300 MG/1
300 CAPSULE ORAL 2 TIMES DAILY
Qty: 20 CAPSULE | Refills: 0 | Status: SHIPPED | OUTPATIENT
Start: 2024-01-26 | End: 2024-02-05

## 2024-01-26 RX ORDER — DEXTROAMPHETAMINE SACCHARATE, AMPHETAMINE ASPARTATE MONOHYDRATE, DEXTROAMPHETAMINE SULFATE AND AMPHETAMINE SULFATE 3.75; 3.75; 3.75; 3.75 MG/1; MG/1; MG/1; MG/1
15 CAPSULE, EXTENDED RELEASE ORAL DAILY
Qty: 30 CAPSULE | Refills: 0 | Status: SHIPPED | OUTPATIENT
Start: 2024-04-16 | End: 2024-04-26 | Stop reason: SDUPTHER

## 2024-01-26 RX ORDER — CEFTRIAXONE 1 G/1
1 INJECTION, POWDER, FOR SOLUTION INTRAMUSCULAR; INTRAVENOUS
Status: COMPLETED | OUTPATIENT
Start: 2024-01-26 | End: 2024-01-26

## 2024-01-26 RX ORDER — ESOMEPRAZOLE MAGNESIUM 40 MG/1
40 CAPSULE, DELAYED RELEASE ORAL
Qty: 90 CAPSULE | Refills: 1 | Status: SHIPPED | OUTPATIENT
Start: 2024-01-26 | End: 2024-07-24

## 2024-01-26 RX ORDER — DEXTROAMPHETAMINE SACCHARATE, AMPHETAMINE ASPARTATE MONOHYDRATE, DEXTROAMPHETAMINE SULFATE AND AMPHETAMINE SULFATE 3.75; 3.75; 3.75; 3.75 MG/1; MG/1; MG/1; MG/1
15 CAPSULE, EXTENDED RELEASE ORAL EVERY MORNING
Qty: 30 CAPSULE | Refills: 0 | Status: SHIPPED | OUTPATIENT
Start: 2024-02-16 | End: 2024-03-17

## 2024-01-26 RX ADMIN — CEFTRIAXONE 1 G: 1 INJECTION, POWDER, FOR SOLUTION INTRAMUSCULAR; INTRAVENOUS at 09:01

## 2024-01-26 RX ADMIN — DEXAMETHASONE SODIUM PHOSPHATE 8 MG: 4 INJECTION, SOLUTION INTRA-ARTICULAR; INTRALESIONAL; INTRAMUSCULAR; INTRAVENOUS; SOFT TISSUE at 09:01

## 2024-01-26 NOTE — PROGRESS NOTES
Assessment:       1. Bacterial sinusitis    2. Left sided colitis without complications    3. Ankylosing spondylitis, unspecified site of spine    4. Attention deficit disorder (ADD) in adult    5. Heartburn        Plan:       Bacterial sinusitis: worsening  -     cefdinir (OMNICEF) 300 MG capsule; Take 1 capsule (300 mg total) by mouth 2 (two) times daily. for 10 days  Dispense: 20 capsule; Refill: 0  -     methylPREDNISolone (MEDROL DOSEPACK) 4 mg tablet; use as directed  Dispense: 1 each; Refill: 0  -     cefTRIAXone injection 1 g  -     dexAMETHasone injection 8 mg    Left sided colitis without complications: resolved    Ankylosing spondylitis, unspecified site of spine: stable    Attention deficit disorder (ADD) in adult: stable  -     dextroamphetamine-amphetamine (ADDERALL XR) 15 MG 24 hr capsule; Take 1 capsule (15 mg total) by mouth every morning.  Dispense: 30 capsule; Refill: 0  -     dextroamphetamine-amphetamine (ADDERALL XR) 15 MG 24 hr capsule; Take 1 capsule (15 mg total) by mouth every morning.  Dispense: 30 capsule; Refill: 0  -     dextroamphetamine-amphetamine (ADDERALL XR) 15 MG 24 hr capsule; Take 1 capsule (15 mg total) by mouth once daily.  Dispense: 30 capsule; Refill: 0  -     dextroamphetamine-amphetamine (ADDERALL) 5 mg Tab; Take 5 mg by mouth daily as needed.  Dispense: 30 tablet; Refill: 0    Heartburn: stable  -     esomeprazole (NEXIUM) 40 MG capsule; Take 1 capsule (40 mg total) by mouth before breakfast.  Dispense: 90 capsule; Refill: 1       [place] prescription monitoring program was checked and okay, Adderall was prescribed for the patient.  Will start patient on Rocephin, Decadron, tomorrow the patient can start taking Medrol Dosepak and antibiotics.  The patient will follow-up with the ENT specialist and the pulmonologist.  Breathing treatments or use the inhaler as directed.  The patient will follow-up with the pain management specialist secondary to back problems.   Patient  agreed with assessment and plan. Patient verbalized understanding.     Subjective:       Patient ID: Holly Nichols is a 28 y.o. female.    Chief Complaint: Follow-up and medication refill    HPI     The patient is coming here today for a follow-up and medication refill, the patient is complaining of nasal congestion, pressure in the sinuses, pain the sinuses, especially on the right side, the patient stated that she make an appointment to see a pulmonologist for Dupixent injections secondary to asthma.  The symptoms are getting worse.  The patient is coming here for assessment, she takes Adderall for ADD, she will need refills today, denies any side effects of the medication.  The patient also complains of acid reflux symptoms and taking Nexium, she will need refills today.  Denies any symptoms of colitis at this office visit.  She complains of back pain, she has been seen the chiropractor in the pain management specialist, she is enquiring about  marijuana for pain.    Past medical history, past social history was reviewed and discussed with the patient.    Review of Systems   Constitutional:  Negative for activity change and appetite change.   HENT:  Positive for congestion, postnasal drip, sinus pressure and sinus pain. Negative for ear discharge.    Eyes:  Negative for discharge and itching.   Respiratory:  Positive for cough and wheezing. Negative for choking.    Cardiovascular:  Negative for chest pain, palpitations and leg swelling.   Gastrointestinal:  Negative for abdominal distention and abdominal pain.        GERD   Endocrine: Negative for cold intolerance and heat intolerance.   Genitourinary:  Negative for dysuria and flank pain.   Musculoskeletal:  Positive for arthralgias and back pain.   Skin:  Negative for pallor and rash.   Allergic/Immunologic: Negative for environmental allergies and food allergies.   Neurological:  Negative for dizziness and headaches.   Hematological:  Negative for  adenopathy. Does not bruise/bleed easily.   Psychiatric/Behavioral:  Positive for decreased concentration, dysphoric mood and sleep disturbance. Negative for agitation and confusion. The patient is nervous/anxious.        Objective:      Physical Exam  Vitals and nursing note reviewed.   Constitutional:       General: She is in acute distress.      Appearance: Normal appearance. She is well-developed and normal weight. She is not diaphoretic.   HENT:      Head: Normocephalic and atraumatic.      Right Ear: External ear normal.      Left Ear: External ear normal.      Nose: Mucosal edema and congestion present.      Right Sinus: Maxillary sinus tenderness and frontal sinus tenderness present.   Eyes:      General: No scleral icterus.        Right eye: No discharge.         Left eye: No discharge.      Conjunctiva/sclera: Conjunctivae normal.      Pupils: Pupils are equal, round, and reactive to light.   Cardiovascular:      Rate and Rhythm: Normal rate and regular rhythm.      Heart sounds: Normal heart sounds.   Pulmonary:      Effort: Pulmonary effort is normal. No respiratory distress.      Breath sounds: Wheezing (on bilateral lung fields) present.   Musculoskeletal:         General: No tenderness or deformity.      Cervical back: Neck supple.   Skin:     Coloration: Skin is not pale.      Findings: No erythema.   Neurological:      Mental Status: She is alert.   Psychiatric:         Behavior: Behavior normal.         Thought Content: Thought content normal.         Judgment: Judgment normal.

## 2024-02-01 ENCOUNTER — PATIENT MESSAGE (OUTPATIENT)
Dept: FAMILY MEDICINE | Facility: CLINIC | Age: 29
End: 2024-02-01
Payer: COMMERCIAL

## 2024-02-01 DIAGNOSIS — F32.A DEPRESSION, UNSPECIFIED DEPRESSION TYPE: ICD-10-CM

## 2024-02-01 RX ORDER — CITALOPRAM 40 MG/1
TABLET, FILM COATED ORAL
Qty: 135 TABLET | Refills: 0 | OUTPATIENT
Start: 2024-02-01

## 2024-02-01 RX ORDER — CITALOPRAM 20 MG/1
20 TABLET, FILM COATED ORAL DAILY
Qty: 90 TABLET | Refills: 3 | Status: SHIPPED | OUTPATIENT
Start: 2024-02-01

## 2024-02-01 RX ORDER — CITALOPRAM 40 MG/1
40 TABLET, FILM COATED ORAL DAILY
Qty: 90 TABLET | Refills: 3 | Status: SHIPPED | OUTPATIENT
Start: 2024-02-01

## 2024-02-01 RX ORDER — CITALOPRAM 40 MG/1
40 TABLET, FILM COATED ORAL DAILY
Qty: 90 TABLET | Refills: 3 | Status: CANCELLED | OUTPATIENT
Start: 2024-02-01

## 2024-02-01 NOTE — TELEPHONE ENCOUNTER
Refill Routing Note   Medication(s) are not appropriate for processing by [facility] for the following reason(s):        No active prescription written by provider  Drug-drug interaction: Celexa 20 & 40 mg on active medication list    [facility] action(s):  Defer               Appointments  past 12m or future 3m with PCP    Date Provider   Last Visit   3/2/2023 Ana María Easley MD   Next Visit   Visit date not found Ana María Easley MD   ED visits in past 90 days: 0        Note composed:6:57 PM 01/31/2024

## 2024-02-01 NOTE — TELEPHONE ENCOUNTER
No care due was identified.  [facility] Embedded Care Due Messages. Reference number: [id number].   2/01/2024 10:00:16 AM CST

## 2024-02-01 NOTE — TELEPHONE ENCOUNTER
Medication refused due to failing protocol.    Requested Prescriptions   Pending Prescriptions Disp Refills    citalopram (CELEXA) 40 MG tablet [Pharmacy Med Name: CITALOPRAM 40MG TABLETS] 135 tablet 0     Sig: TAKE 1 AND 1/2 TABLETS(60 MG) BY MOUTH EVERY EVENING       Psychiatry:  Antidepressants - SSRI Failed - 1/31/2024  3:17 PM        Failed - Matches previous order       Previous Authorizing Provider: Historical Provider (citalopram (CELEXA) 40 MG tablet)              Passed - Patient is at least 18 years old        Passed - Negative Pregnancy Status Check        Passed - Valid OB/GYN Encounter within 15 months     Recent Visits  Date Type Provider Dept   03/02/23 Office Visit Ana María Easley MD [facility]   Showing recent visits within past 720 days and meeting all other requirements  Future Appointments  No visits were found meeting these conditions.  Showing future appointments within next 150 days and meeting all other requirements        Future Appointments                In 1 month RESPIRATORY THERAPY, [facility] PULMONARY FUNCTION [facility]    In 1 month Blaine Cagle MD [facility]    In 2 months Marie Berumen MD [facility]

## 2024-02-28 ENCOUNTER — TELEPHONE (OUTPATIENT)
Dept: PAIN MEDICINE | Facility: CLINIC | Age: 29
End: 2024-02-28

## 2024-02-28 ENCOUNTER — OFFICE VISIT (OUTPATIENT)
Dept: PAIN MEDICINE | Facility: CLINIC | Age: 29
End: 2024-02-28
Payer: COMMERCIAL

## 2024-02-28 VITALS
HEIGHT: 68 IN | WEIGHT: 145.5 LBS | DIASTOLIC BLOOD PRESSURE: 80 MMHG | BODY MASS INDEX: 22.05 KG/M2 | HEART RATE: 95 BPM | SYSTOLIC BLOOD PRESSURE: 122 MMHG

## 2024-02-28 DIAGNOSIS — M41.125 ADOLESCENT IDIOPATHIC SCOLIOSIS OF THORACOLUMBAR REGION: ICD-10-CM

## 2024-02-28 DIAGNOSIS — G89.29 CHRONIC BILATERAL LOW BACK PAIN WITHOUT SCIATICA: ICD-10-CM

## 2024-02-28 DIAGNOSIS — M54.50 CHRONIC BILATERAL LOW BACK PAIN WITHOUT SCIATICA: ICD-10-CM

## 2024-02-28 DIAGNOSIS — M47.816 LUMBAR SPONDYLOSIS: Primary | ICD-10-CM

## 2024-02-28 PROCEDURE — 3079F DIAST BP 80-89 MM HG: CPT | Mod: CPTII,S$GLB,,

## 2024-02-28 PROCEDURE — 1159F MED LIST DOCD IN RCRD: CPT | Mod: CPTII,S$GLB,,

## 2024-02-28 PROCEDURE — 3008F BODY MASS INDEX DOCD: CPT | Mod: CPTII,S$GLB,,

## 2024-02-28 PROCEDURE — 99999 PR PBB SHADOW E&M-EST. PATIENT-LVL IV: CPT | Mod: PBBFAC,,,

## 2024-02-28 PROCEDURE — 3074F SYST BP LT 130 MM HG: CPT | Mod: CPTII,S$GLB,,

## 2024-02-28 PROCEDURE — 99214 OFFICE O/P EST MOD 30 MIN: CPT | Mod: S$GLB,,,

## 2024-02-28 NOTE — PROGRESS NOTES
[facility] Follow Up Evaluation    Referred by: Marce Harris PA-C  Reason for referral: back pain    CC:   Chief Complaint   Patient presents with    Back Pain          2/28/2024    11:42 AM 4/21/2022    11:01 AM   Last 3 PDI Scores   Pain Disability Index (PDI) 39 35     Interval HPI 2/28/2024: Holly Nichols returns to the office for follow up.  She returns for follow-up with continued lower back pain, 6/10, pain is mainly located in her lower back across her waist but does radiate up her back.  No pain radiating into her legs.  Pain is worsened with physical activities, somewhat improved with rest.  She has been maintaining her at-home PT directed exercises, attending chiropractic care in addition to taking Tylenol and ibuprofen without significant relief of her pain.      HPI:   Holly Nichols is a 28 y.o. female who presents with back pain.  She has had back pain since 2008.  She has known scoliosis.  She has known fibromyalgia.  Today she rates her pain 4-10, constant, throbbing, tingling, deep.  She has neck pain into the trapezius muscles.  She also has midthoracic and lower back pain.  Denies any rosmery radicular symptoms.  No numbness no weakness.  Pain worse with standing, bending, walking, nighttime and not relieved with anything.    History:    Current Outpatient Medications:     albuterol (VENTOLIN HFA) 90 mcg/actuation inhaler, Inhale 2 puffs into the lungs every 6 (six) hours as needed for Wheezing. Rescue, Disp: 18 g, Rfl: 5    Bifidobacterium infantis (ALIGN ORAL), Take 1 tablet by mouth once daily., Disp: , Rfl:     cetirizine (ZYRTEC) 10 MG tablet, Take 10 mg by mouth nightly as needed for Allergies., Disp: , Rfl:     citalopram (CELEXA) 20 MG tablet, Take 1 tablet (20 mg total) by mouth once daily. Along with 40mg for a total of 60mg daily, Disp: 90 tablet, Rfl: 3    citalopram (CELEXA) 40 MG tablet, Take 1 tablet (40 mg total) by mouth once daily. Along with a 20 mg tablet for  a total of 60 mg, Disp: 90 tablet, Rfl: 3    dextroamphetamine-amphetamine (ADDERALL XR) 15 MG 24 hr capsule, Take 1 capsule (15 mg total) by mouth every morning., Disp: 30 capsule, Rfl: 0    [START ON 3/16/2024] dextroamphetamine-amphetamine (ADDERALL XR) 15 MG 24 hr capsule, Take 1 capsule (15 mg total) by mouth every morning., Disp: 30 capsule, Rfl: 0    [START ON 4/16/2024] dextroamphetamine-amphetamine (ADDERALL XR) 15 MG 24 hr capsule, Take 1 capsule (15 mg total) by mouth once daily., Disp: 30 capsule, Rfl: 0    dextroamphetamine-amphetamine (ADDERALL) 5 mg Tab, Take 5 mg by mouth daily as needed., Disp: 30 tablet, Rfl: 0    diphenoxylate-atropine 2.5-0.025 mg (LOMOTIL) 2.5-0.025 mg per tablet, Take 1 tablet by mouth every 6 (six) hours., Disp: 100 tablet, Rfl: 5    esomeprazole (NEXIUM) 40 MG capsule, Take 1 capsule (40 mg total) by mouth before breakfast., Disp: 90 capsule, Rfl: 1    FLOVENT  mcg/actuation inhaler, Inhale 2 puffs into the lungs 2 (two) times daily., Disp: 12 g, Rfl: 5    fluticasone propionate (FLONASE) 50 mcg/actuation nasal spray, 1 spray by Each Nostril route 2 (two) times daily as needed for Allergies., Disp: , Rfl:     hydrOXYzine HCL (ATARAX) 25 MG tablet, Take 1 tablet (25 mg total) by mouth nightly as needed for Anxiety., Disp: 30 tablet, Rfl: 2    tretinoin (RETIN-A) 0.025 % cream, Apply topically every evening., Disp: 20 g, Rfl: 0    albuterol-ipratropium (DUO-NEB) 2.5 mg-0.5 mg/3 mL nebulizer solution, Take 3 mLs by nebulization every 6 (six) hours. Rescue, Disp: 75 mL, Rfl: 0    ALPRAZolam (XANAX) 0.25 MG tablet, Take 1 tablet (0.25 mg total) by mouth daily as needed for Anxiety., Disp: 30 tablet, Rfl: 0    fluconazole (DIFLUCAN) 150 MG Tab, 1 tablet p.o. per 1 can repeat in 3 days if the symptoms persist., Disp: 2 tablet, Rfl: 0    Past Medical History:   Diagnosis Date    Abnormal Pap smear of cervix     Allergy     Anxiety     Asthma     rare inhaler use     Constipation     Depression     Endometriosis     Fibromyalgia     Gastritis     HLA B27 (HLA B27 positive)     Migraine     Postpartum depression     Scoliosis     Smoker     stopped in January 2018    Tobacco dependence 2/17/2016       Past Surgical History:   Procedure Laterality Date    ADENOIDECTOMY      APPENDECTOMY  09/2014    endometriosis    CHOLECYSTECTOMY  12/20/2017    Dr. MARBELLA Anne, [facility]     COLONOSCOPY N/A 05/19/2016    Procedure: COLONOSCOPY;  Surgeon: Jarret Zhao Jr., MD;  Location: [facility];  Service: Endoscopy;  Laterality: N/A;    COLONOSCOPY N/A 08/27/2020    Dr. Zhao: Erythema & ulcerated mucosa in descending, splenic flexure & distal transverse (r/o UC, r/o ischemia, r/o vasculiti, etc.), Mucosal changes in descending colon (secondary to left-sided colitis, to ischemic colitis(?), to vasculitis(?). hemorrhoids; biopsy: TI & random biopsies- WNL, transverse/hepatic/descending- mild active colitis without definitive features of chronicity    COLONOSCOPY N/A 12/16/2022    Procedure: COLONOSCOPY;  Surgeon: Jarret Zhao Jr., MD;  Location: [facility];  Service: Endoscopy;  Laterality: N/A;    CYSTOSCOPY  12/01/2021    Procedure: CYSTOSCOPY;  Surgeon: Ana María Easley MD;  Location: [facility];  Service: OB/GYN;;    ESOPHAGOGASTRODUODENOSCOPY N/A 10/01/2020    Dr. Zhao: Erythema in the lower third of the esophagus; Duodenal bulb erosions without bleeding. biopsy: duodenum- Small bowel mucosa with mild acute inflammation changes may represent erosion, Villous architecture is maintained; stomach- mild chronic gastritis; esophagus- minimal reactive changes    ESOPHAGOGASTRODUODENOSCOPY N/A 12/16/2022    Procedure: EGD (ESOPHAGOGASTRODUODENOSCOPY);  Surgeon: Jarret Zhao Jr., MD;  Location: [facility];  Service: Endoscopy;  Laterality: N/A;    LAPAROSCOPIC OOPHORECTOMY Left 12/01/2021    Procedure: OOPHORECTOMY, LAPAROSCOPIC;  Surgeon: Ana María Easley MD;  Location: [facility];  Service:  OB/GYN;  Laterality: Left;    LAPAROSCOPIC SALPINGECTOMY Bilateral 2021    Procedure: SALPINGECTOMY, LAPAROSCOPIC;  Surgeon: Ana María Easley MD;  Location: [facility] OR;  Service: OB/GYN;  Laterality: Bilateral;    LAPAROSCOPIC TOTAL HYSTERECTOMY N/A 2021    Procedure: HYSTERECTOMY, TOTAL, LAPAROSCOPIC;  Surgeon: Ana María Easley MD;  Location: [facility] OR;  Service: OB/GYN;  Laterality: N/A;    MOUTH SURGERY      x2    PELVIC LAPAROSCOPY      SINUS SURGERY  2021    FESS, turbinate red    TONSILLECTOMY  2016    UPPER GASTROINTESTINAL ENDOSCOPY  2016    Dr. Zhao       Family History   Problem Relation Age of Onset    Fibromyalgia Mother     Depression Mother     Irritable bowel syndrome Mother     Diabetes Father     Depression Father     Mental illness Father         bipolar disorder    Asthma Father     Multiple sclerosis Maternal Aunt     Crohn's disease Maternal Grandmother     Colon cancer Neg Hx     Ulcerative colitis Neg Hx     Stomach cancer Neg Hx     Esophageal cancer Neg Hx     Breast cancer Neg Hx     Ovarian cancer Neg Hx     Celiac disease Neg Hx        Social History     Socioeconomic History    Marital status:     Number of children: 0   Occupational History    Occupation: [employer] management   Tobacco Use    Smoking status: Former     Current packs/day: 0.00     Types: Cigarettes, Vaping with nicotine     Quit date: 2018     Years since quittin.1    Smokeless tobacco: Never    Tobacco comments:     Still vaping   Substance and Sexual Activity    Alcohol use: No     Alcohol/week: 0.0 standard drinks of alcohol     Comment: quit since     Drug use: No    Sexual activity: Yes     Partners: Male   Social History Narrative    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     Social Determinants of Health     Financial Resource Strain: Unknown (2022)    Overall Financial Resource Strain (CARDIA)     Difficulty of Paying Living Expenses: Patient declined   Food  Insecurity: Unknown (4/7/2022)    Hunger Vital Sign     Worried About Running Out of Food in the Last Year: Patient declined     Ran Out of Food in the Last Year: Patient declined   Transportation Needs: Unknown (4/7/2022)    PRAPARE - Transportation     Lack of Transportation (Medical): Patient declined     Lack of Transportation (Non-Medical): Patient declined   Physical Activity: Unknown (1/26/2024)    Exercise Vital Sign     Days of Exercise per Week: Patient declined   Stress: Unknown (4/7/2022)    [employer] - Occupational Stress Questionnaire     Feeling of Stress : Patient declined   Recent Concern: Stress - Stress Concern Present (1/24/2022)    [employer] - Occupational Stress Questionnaire     Feeling of Stress : Rather much   Social Connections: Unknown (4/7/2022)    Social Connection and Isolation Panel [NHANES]     Frequency of Communication with Friends and Family: Patient declined     Frequency of Social Gatherings with Friends and Family: Patient declined     Active Member of Clubs or Organizations: Patient declined     Attends Club or Organization Meetings: Patient declined     Marital Status:    Housing Stability: Unknown (4/7/2022)    Housing Stability Vital Sign     Unable to Pay for Housing in the Last Year: Patient refused     Unstable Housing in the Last Year: Patient refused       Review of patient's allergies indicates:   Allergen Reactions    Latex, natural rubber      burning    Sulfur Hives    Toradol [ketorolac] Other (See Comments)     Metallic taste only once in 2013; has taken since without problems    Tramadol Other (See Comments)     paralysis    Penicillins Hives and Rash    Sulfa (sulfonamide antibiotics) Hives and Rash       Review of Systems:  General ROS: negative for - fever  Psychological ROS: negative for - hostility  Hematological and Lymphatic ROS: negative for - bleeding problems  Endocrine ROS: negative  "for - unexpected weight changes  Respiratory ROS: no cough, shortness of breath, or wheezing  Cardiovascular ROS: no chest pain or dyspnea on exertion  Gastrointestinal ROS: no abdominal pain, change in bowel habits, or black or bloody stools  Musculoskeletal ROS: negative for - muscular weakness  Neurological ROS: negative for - numbness/tingling  Dermatological ROS: negative for rash    Physical Exam:  Vitals:    02/28/24 1145   BP: 122/80   Pulse: 95   Weight: 66 kg (145 lb 8.1 oz)   Height: 5' 8" (1.727 m)   PainSc:   6   PainLoc: Back       Body mass index is 22.12 kg/m².    Gen: NAD  Psych: mood appropriate for given condition  CV: 2+ radial pulse  HEENT: anicteric   Respiratory: non labored  Abd: soft nt, nd  Skin: intact  ROM: limited AROM of the L spine in all planes, full ROM at ankles, knees and hips  Sensation: intact to light touch in all dermatomes tested from L2-S1 bilaterally  Reflexes: 2+ b/l patella and 0/0 achilles  Palpation: Diffusely tender over lumbar paraspinals  -TTP over the b/l greater trochanters and bilateral SI joint  Tone: normal in the b/l knees and hips   Extremities: No edema in b/l ankles or hands  Provacative tests: + b/l axial facet loading       Right Left   L2/3 Iliacus Hip flexion  5  5   L3/4 Qudratus Femoris Knee Extension  5  5   L4/5 Tib Anterior Ankle Dorsiflexion   5  5   L5/S1 Extensor Hallicus Longus Great toe extension  5  5                 S1/S2 Gastroc/Soleus Plantar Flexion  5  5       Imaging:  Xray spine 11/6/2020  FINDINGS:  S-shaped scoliosis of the thoracolumbar spine with dextroconvex curvature of the thoracic spine and levoconvex curvature of the lumbar spine.  The thoracic dextroconvex curvature measures approximately 30.6 degrees measured from the superior endplate of T4 to the inferior endplate of T11.  Levoconvex curvature of the lumbar spine measures approximately 20.7 degrees measured from the superior endplate of T12 to the inferior endplate of L3.  " sagittal balance unable to be assessed due to obscuration of the C7 vertebral body.  No evidence for vertebral segmentation anomaly.  Vertebral body heights are maintained.  No suspicious appearing lytic or blastic lesions.    Xray lumbar spine 4/21/22  FINDINGS:  Vertebral bodies are normal in height without evidence of fracture.  Mild rotatory levoconvex curvature of the lumbar spine.  Normal sagittal alignment is preserved.  No spondylolisthesis. No abnormal translation with flexion and extension.  Intervertebral disc heights are well maintained.Mild lower lumbar facet arthropathy.    Xray cervical spine 4/21/22  FINDINGS:  C1-C2: Pre-dens space is maintained.  Dens and lateral masses of C1 are unremarkable.  Alignment: Chronic straightening of the normal cervical lordosis, possibly secondary to positioning or muscle spasm.  No spondylolisthesis.  No dynamic instability.  Vertebrae: Vertebral body heights are maintained.  No suspicious appearing lytic or blastic lesions.  Discs and facets: Disc heights are maintained.  Facet joints are unremarkable. Neural foramina are maintained on oblique projections.  Miscellaneous: No additional findings.    Labs:  BMP  Lab Results   Component Value Date     01/17/2023    K 3.9 01/17/2023     01/17/2023    CO2 32 (H) 01/17/2023    BUN 9 01/17/2023    CREATININE 0.58 01/17/2023    CALCIUM 9.0 01/17/2023    ANIONGAP 5 (L) 01/17/2023    ESTGFRAFRICA >60 11/29/2021    EGFRNONAA >60 11/29/2021     Lab Results   Component Value Date    ALT 19 01/14/2023    AST 23 01/14/2023    ALKPHOS 81 01/14/2023    BILITOT 0.4 01/14/2023       Assessment:   Problem List Items Addressed This Visit    None  Visit Diagnoses       Lumbar spondylosis    -  Primary    Chronic bilateral low back pain without sciatica        Adolescent idiopathic scoliosis of thoracolumbar region                      28 y.o. year old female who presents with back pain.  She has had back pain since 2008.  She  has known scoliosis.  She has known fibromyalgia.  Today she rates her pain 4-10, constant, throbbing, tingling, deep.  She has neck pain into the trapezius muscles.  She also has midthoracic and lower back pain.  Denies any rosmery radicular symptoms.  No numbness no weakness.  Pain worse with standing, bending, walking, nighttime and not relieved with anything.    2/28/2024: Holly Nichols returns to the office for follow up.  She returns for follow-up with continued lower back pain, 6/10, pain is mainly located in her lower back across her waist but does radiate up her back.  No pain radiating into her legs.  Pain is worsened with physical activities, somewhat improved with rest.  She has been maintaining her at-home PT directed exercises, attending chiropractic care in addition to taking Tylenol and ibuprofen without significant relief of her pain.      - on exam she is neurologically intact.  She has increased pain with bilateral axial facet loading  -I have reviewed her lumbar x-ray and it shows shows Mild rotatory levoconvex curvatur some mild lower lumbar facet arthropathy.   - she continues to have pain that limits her mobility interferes with her ADLs and quality of life.  She has not finding relief with NSAIDs, at-home PT directed exercises and chiropractic care.  - she has been maintaining these modalities over the past year without significant relief.  - I believe her pain is combination of myofascial pain and axial facet mediated pain.  - for this continued pain I would like to schedule her for bilateral L4/5 and L5/S1 diagnostic medial branch blocks.  If successful we will repeat the blocks prior to proceeding with radiofrequency ablation.  - follow-up 4 weeks post procedure or sooner if needed      : Not applicable    This note was completed with dictation software and grammatical errors may exist.

## 2024-02-28 NOTE — H&P (VIEW-ONLY)
[facility] Follow Up Evaluation    Referred by: Marce Harris PA-C  Reason for referral: back pain    CC:   Chief Complaint   Patient presents with    Back Pain          2/28/2024    11:42 AM 4/21/2022    11:01 AM   Last 3 PDI Scores   Pain Disability Index (PDI) 39 35     Interval HPI 2/28/2024: Holly Nichols returns to the office for follow up.  She returns for follow-up with continued lower back pain, 6/10, pain is mainly located in her lower back across her waist but does radiate up her back.  No pain radiating into her legs.  Pain is worsened with physical activities, somewhat improved with rest.  She has been maintaining her at-home PT directed exercises, attending chiropractic care in addition to taking Tylenol and ibuprofen without significant relief of her pain.      HPI:   Holly Nichols is a 28 y.o. female who presents with back pain.  She has had back pain since 2008.  She has known scoliosis.  She has known fibromyalgia.  Today she rates her pain 4-10, constant, throbbing, tingling, deep.  She has neck pain into the trapezius muscles.  She also has midthoracic and lower back pain.  Denies any rosmery radicular symptoms.  No numbness no weakness.  Pain worse with standing, bending, walking, nighttime and not relieved with anything.    History:    Current Outpatient Medications:     albuterol (VENTOLIN HFA) 90 mcg/actuation inhaler, Inhale 2 puffs into the lungs every 6 (six) hours as needed for Wheezing. Rescue, Disp: 18 g, Rfl: 5    Bifidobacterium infantis (ALIGN ORAL), Take 1 tablet by mouth once daily., Disp: , Rfl:     cetirizine (ZYRTEC) 10 MG tablet, Take 10 mg by mouth nightly as needed for Allergies., Disp: , Rfl:     citalopram (CELEXA) 20 MG tablet, Take 1 tablet (20 mg total) by mouth once daily. Along with 40mg for a total of 60mg daily, Disp: 90 tablet, Rfl: 3    citalopram (CELEXA) 40 MG tablet, Take 1 tablet (40 mg total) by mouth once daily. Along with a 20 mg tablet for  a total of 60 mg, Disp: 90 tablet, Rfl: 3    dextroamphetamine-amphetamine (ADDERALL XR) 15 MG 24 hr capsule, Take 1 capsule (15 mg total) by mouth every morning., Disp: 30 capsule, Rfl: 0    [START ON 3/16/2024] dextroamphetamine-amphetamine (ADDERALL XR) 15 MG 24 hr capsule, Take 1 capsule (15 mg total) by mouth every morning., Disp: 30 capsule, Rfl: 0    [START ON 4/16/2024] dextroamphetamine-amphetamine (ADDERALL XR) 15 MG 24 hr capsule, Take 1 capsule (15 mg total) by mouth once daily., Disp: 30 capsule, Rfl: 0    dextroamphetamine-amphetamine (ADDERALL) 5 mg Tab, Take 5 mg by mouth daily as needed., Disp: 30 tablet, Rfl: 0    diphenoxylate-atropine 2.5-0.025 mg (LOMOTIL) 2.5-0.025 mg per tablet, Take 1 tablet by mouth every 6 (six) hours., Disp: 100 tablet, Rfl: 5    esomeprazole (NEXIUM) 40 MG capsule, Take 1 capsule (40 mg total) by mouth before breakfast., Disp: 90 capsule, Rfl: 1    FLOVENT  mcg/actuation inhaler, Inhale 2 puffs into the lungs 2 (two) times daily., Disp: 12 g, Rfl: 5    fluticasone propionate (FLONASE) 50 mcg/actuation nasal spray, 1 spray by Each Nostril route 2 (two) times daily as needed for Allergies., Disp: , Rfl:     hydrOXYzine HCL (ATARAX) 25 MG tablet, Take 1 tablet (25 mg total) by mouth nightly as needed for Anxiety., Disp: 30 tablet, Rfl: 2    tretinoin (RETIN-A) 0.025 % cream, Apply topically every evening., Disp: 20 g, Rfl: 0    albuterol-ipratropium (DUO-NEB) 2.5 mg-0.5 mg/3 mL nebulizer solution, Take 3 mLs by nebulization every 6 (six) hours. Rescue, Disp: 75 mL, Rfl: 0    ALPRAZolam (XANAX) 0.25 MG tablet, Take 1 tablet (0.25 mg total) by mouth daily as needed for Anxiety., Disp: 30 tablet, Rfl: 0    fluconazole (DIFLUCAN) 150 MG Tab, 1 tablet p.o. per 1 can repeat in 3 days if the symptoms persist., Disp: 2 tablet, Rfl: 0    Past Medical History:   Diagnosis Date    Abnormal Pap smear of cervix     Allergy     Anxiety     Asthma     rare inhaler use     Constipation     Depression     Endometriosis     Fibromyalgia     Gastritis     HLA B27 (HLA B27 positive)     Migraine     Postpartum depression     Scoliosis     Smoker     stopped in January 2018    Tobacco dependence 2/17/2016       Past Surgical History:   Procedure Laterality Date    ADENOIDECTOMY      APPENDECTOMY  09/2014    endometriosis    CHOLECYSTECTOMY  12/20/2017    Dr. MARBELLA Anne, [facility]     COLONOSCOPY N/A 05/19/2016    Procedure: COLONOSCOPY;  Surgeon: Jarret Zhao Jr., MD;  Location: [facility];  Service: Endoscopy;  Laterality: N/A;    COLONOSCOPY N/A 08/27/2020    Dr. Zhao: Erythema & ulcerated mucosa in descending, splenic flexure & distal transverse (r/o UC, r/o ischemia, r/o vasculiti, etc.), Mucosal changes in descending colon (secondary to left-sided colitis, to ischemic colitis(?), to vasculitis(?). hemorrhoids; biopsy: TI & random biopsies- WNL, transverse/hepatic/descending- mild active colitis without definitive features of chronicity    COLONOSCOPY N/A 12/16/2022    Procedure: COLONOSCOPY;  Surgeon: Jarret Zhao Jr., MD;  Location: [facility];  Service: Endoscopy;  Laterality: N/A;    CYSTOSCOPY  12/01/2021    Procedure: CYSTOSCOPY;  Surgeon: Ana Maraí Easley MD;  Location: [facility];  Service: OB/GYN;;    ESOPHAGOGASTRODUODENOSCOPY N/A 10/01/2020    Dr. Zhao: Erythema in the lower third of the esophagus; Duodenal bulb erosions without bleeding. biopsy: duodenum- Small bowel mucosa with mild acute inflammation changes may represent erosion, Villous architecture is maintained; stomach- mild chronic gastritis; esophagus- minimal reactive changes    ESOPHAGOGASTRODUODENOSCOPY N/A 12/16/2022    Procedure: EGD (ESOPHAGOGASTRODUODENOSCOPY);  Surgeon: Jarret Zhao Jr., MD;  Location: [facility];  Service: Endoscopy;  Laterality: N/A;    LAPAROSCOPIC OOPHORECTOMY Left 12/01/2021    Procedure: OOPHORECTOMY, LAPAROSCOPIC;  Surgeon: Ana María Easley MD;  Location: [facility];  Service:  OB/GYN;  Laterality: Left;    LAPAROSCOPIC SALPINGECTOMY Bilateral 2021    Procedure: SALPINGECTOMY, LAPAROSCOPIC;  Surgeon: Ana María Easley MD;  Location: [facility] OR;  Service: OB/GYN;  Laterality: Bilateral;    LAPAROSCOPIC TOTAL HYSTERECTOMY N/A 2021    Procedure: HYSTERECTOMY, TOTAL, LAPAROSCOPIC;  Surgeon: Ana María Easley MD;  Location: [facility] OR;  Service: OB/GYN;  Laterality: N/A;    MOUTH SURGERY      x2    PELVIC LAPAROSCOPY      SINUS SURGERY  2021    FESS, turbinate red    TONSILLECTOMY  2016    UPPER GASTROINTESTINAL ENDOSCOPY  2016    Dr. Zhao       Family History   Problem Relation Age of Onset    Fibromyalgia Mother     Depression Mother     Irritable bowel syndrome Mother     Diabetes Father     Depression Father     Mental illness Father         bipolar disorder    Asthma Father     Multiple sclerosis Maternal Aunt     Crohn's disease Maternal Grandmother     Colon cancer Neg Hx     Ulcerative colitis Neg Hx     Stomach cancer Neg Hx     Esophageal cancer Neg Hx     Breast cancer Neg Hx     Ovarian cancer Neg Hx     Celiac disease Neg Hx        Social History     Socioeconomic History    Marital status:     Number of children: 0   Occupational History    Occupation: [employer]   Tobacco Use    Smoking status: Former     Current packs/day: 0.00     Types: Cigarettes, Vaping with nicotine     Quit date: 2018     Years since quittin.1    Smokeless tobacco: Never    Tobacco comments:     Still vaping   Substance and Sexual Activity    Alcohol use: No     Alcohol/week: 0.0 standard drinks of alcohol     Comment: quit since     Drug use: No    Sexual activity: Yes     Partners: Male   Social History Narrative    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     Social Determinants of Health     Financial Resource Strain: Unknown (2022)    Overall Financial Resource Strain (CARDIA)     Difficulty of Paying Living Expenses: Patient declined   Food  Insecurity: Unknown (4/7/2022)    Hunger Vital Sign     Worried About Running Out of Food in the Last Year: Patient declined     Ran Out of Food in the Last Year: Patient declined   Transportation Needs: Unknown (4/7/2022)    PRAPARE - Transportation     Lack of Transportation (Medical): Patient declined     Lack of Transportation (Non-Medical): Patient declined   Physical Activity: Unknown (1/26/2024)    Exercise Vital Sign     Days of Exercise per Week: Patient declined   Stress: Unknown (4/7/2022)    [employer] [place] [employer] - Occupational Stress Questionnaire     Feeling of Stress : Patient declined   Recent Concern: Stress - Stress Concern Present (1/24/2022)    [employer] [place] [employer] - Occupational Stress Questionnaire     Feeling of Stress : Rather much   Social Connections: Unknown (4/7/2022)    Social Connection and Isolation Panel [NHANES]     Frequency of Communication with Friends and Family: Patient declined     Frequency of Social Gatherings with Friends and Family: Patient declined     Active Member of Clubs or Organizations: Patient declined     Attends Club or Organization Meetings: Patient declined     Marital Status:    Housing Stability: Unknown (4/7/2022)    Housing Stability Vital Sign     Unable to Pay for Housing in the Last Year: Patient refused     Unstable Housing in the Last Year: Patient refused       Review of patient's allergies indicates:   Allergen Reactions    Latex, natural rubber      burning    Sulfur Hives    Toradol [ketorolac] Other (See Comments)     Metallic taste only once in 2013; has taken since without problems    Tramadol Other (See Comments)     paralysis    Penicillins Hives and Rash    Sulfa (sulfonamide antibiotics) Hives and Rash       Review of Systems:  General ROS: negative for - fever  Psychological ROS: negative for - hostility  Hematological and Lymphatic ROS: negative for - bleeding problems  Endocrine ROS: negative  "for - unexpected weight changes  Respiratory ROS: no cough, shortness of breath, or wheezing  Cardiovascular ROS: no chest pain or dyspnea on exertion  Gastrointestinal ROS: no abdominal pain, change in bowel habits, or black or bloody stools  Musculoskeletal ROS: negative for - muscular weakness  Neurological ROS: negative for - numbness/tingling  Dermatological ROS: negative for rash    Physical Exam:  Vitals:    02/28/24 1145   BP: 122/80   Pulse: 95   Weight: 66 kg (145 lb 8.1 oz)   Height: 5' 8" (1.727 m)   PainSc:   6   PainLoc: Back       Body mass index is 22.12 kg/m².    Gen: NAD  Psych: mood appropriate for given condition  CV: 2+ radial pulse  HEENT: anicteric   Respiratory: non labored  Abd: soft nt, nd  Skin: intact  ROM: limited AROM of the L spine in all planes, full ROM at ankles, knees and hips  Sensation: intact to light touch in all dermatomes tested from L2-S1 bilaterally  Reflexes: 2+ b/l patella and 0/0 achilles  Palpation: Diffusely tender over lumbar paraspinals  -TTP over the b/l greater trochanters and bilateral SI joint  Tone: normal in the b/l knees and hips   Extremities: No edema in b/l ankles or hands  Provacative tests: + b/l axial facet loading       Right Left   L2/3 Iliacus Hip flexion  5  5   L3/4 Qudratus Femoris Knee Extension  5  5   L4/5 Tib Anterior Ankle Dorsiflexion   5  5   L5/S1 Extensor Hallicus Longus Great toe extension  5  5                 S1/S2 Gastroc/Soleus Plantar Flexion  5  5       Imaging:  Xray spine 11/6/2020  FINDINGS:  S-shaped scoliosis of the thoracolumbar spine with dextroconvex curvature of the thoracic spine and levoconvex curvature of the lumbar spine.  The thoracic dextroconvex curvature measures approximately 30.6 degrees measured from the superior endplate of T4 to the inferior endplate of T11.  Levoconvex curvature of the lumbar spine measures approximately 20.7 degrees measured from the superior endplate of T12 to the inferior endplate of L3.  " sagittal balance unable to be assessed due to obscuration of the C7 vertebral body.  No evidence for vertebral segmentation anomaly.  Vertebral body heights are maintained.  No suspicious appearing lytic or blastic lesions.    Xray lumbar spine 4/21/22  FINDINGS:  Vertebral bodies are normal in height without evidence of fracture.  Mild rotatory levoconvex curvature of the lumbar spine.  Normal sagittal alignment is preserved.  No spondylolisthesis. No abnormal translation with flexion and extension.  Intervertebral disc heights are well maintained.Mild lower lumbar facet arthropathy.    Xray cervical spine 4/21/22  FINDINGS:  C1-C2: Pre-dens space is maintained.  Dens and lateral masses of C1 are unremarkable.  Alignment: Chronic straightening of the normal cervical lordosis, possibly secondary to positioning or muscle spasm.  No spondylolisthesis.  No dynamic instability.  Vertebrae: Vertebral body heights are maintained.  No suspicious appearing lytic or blastic lesions.  Discs and facets: Disc heights are maintained.  Facet joints are unremarkable. Neural foramina are maintained on oblique projections.  Miscellaneous: No additional findings.    Labs:  BMP  Lab Results   Component Value Date     01/17/2023    K 3.9 01/17/2023     01/17/2023    CO2 32 (H) 01/17/2023    BUN 9 01/17/2023    CREATININE 0.58 01/17/2023    CALCIUM 9.0 01/17/2023    ANIONGAP 5 (L) 01/17/2023    ESTGFRAFRICA >60 11/29/2021    EGFRNONAA >60 11/29/2021     Lab Results   Component Value Date    ALT 19 01/14/2023    AST 23 01/14/2023    ALKPHOS 81 01/14/2023    BILITOT 0.4 01/14/2023       Assessment:   Problem List Items Addressed This Visit    None  Visit Diagnoses       Lumbar spondylosis    -  Primary    Chronic bilateral low back pain without sciatica        Adolescent idiopathic scoliosis of thoracolumbar region                      28 y.o. year old female who presents with back pain.  She has had back pain since 2008.  She  has known scoliosis.  She has known fibromyalgia.  Today she rates her pain 4-10, constant, throbbing, tingling, deep.  She has neck pain into the trapezius muscles.  She also has midthoracic and lower back pain.  Denies any rosmery radicular symptoms.  No numbness no weakness.  Pain worse with standing, bending, walking, nighttime and not relieved with anything.    2/28/2024: Holly Nichols returns to the office for follow up.  She returns for follow-up with continued lower back pain, 6/10, pain is mainly located in her lower back across her waist but does radiate up her back.  No pain radiating into her legs.  Pain is worsened with physical activities, somewhat improved with rest.  She has been maintaining her at-home PT directed exercises, attending chiropractic care in addition to taking Tylenol and ibuprofen without significant relief of her pain.      - on exam she is neurologically intact.  She has increased pain with bilateral axial facet loading  -I have reviewed her lumbar x-ray and it shows shows Mild rotatory levoconvex curvatur some mild lower lumbar facet arthropathy.   - she continues to have pain that limits her mobility interferes with her ADLs and quality of life.  She has not finding relief with NSAIDs, at-home PT directed exercises and chiropractic care.  - she has been maintaining these modalities over the past year without significant relief.  - I believe her pain is combination of myofascial pain and axial facet mediated pain.  - for this continued pain I would like to schedule her for bilateral L4/5 and L5/S1 diagnostic medial branch blocks.  If successful we will repeat the blocks prior to proceeding with radiofrequency ablation.  - follow-up 4 weeks post procedure or sooner if needed      : Not applicable    This note was completed with dictation software and grammatical errors may exist.

## 2024-02-28 NOTE — TELEPHONE ENCOUNTER
Please schedule patient for the following procedure:    Physician - Dr Lawrence    Type of Procedure/Injection - Lumbar Medial Branch Block  L4/5 and L5/S1           Laterality - Bilateral      Anxiolysis- RNIV      Need to hold medication - No      N/A      Clearance needed - No      Follow up - phone call next day

## 2024-02-29 ENCOUNTER — TELEPHONE (OUTPATIENT)
Dept: PAIN MEDICINE | Facility: CLINIC | Age: 29
End: 2024-02-29
Payer: COMMERCIAL

## 2024-02-29 DIAGNOSIS — M47.816 LUMBAR SPONDYLOSIS: Primary | ICD-10-CM

## 2024-02-29 RX ORDER — SODIUM CHLORIDE, SODIUM LACTATE, POTASSIUM CHLORIDE, CALCIUM CHLORIDE 600; 310; 30; 20 MG/100ML; MG/100ML; MG/100ML; MG/100ML
INJECTION, SOLUTION INTRAVENOUS CONTINUOUS
Status: CANCELLED | OUTPATIENT
Start: 2024-02-29

## 2024-02-29 NOTE — TELEPHONE ENCOUNTER
----- Message from Kiara Jackson sent at 2/29/2024  8:51 AM CST -----  Contact: Self  Type:  Patient Returning Call    Who Called:  Self  Who Left Message for Patient:  Yudith  Does the patient know what this is regarding?:  scheduling her procedure  Best Call Back Number:  [phone]  Additional Information:  Please call the patient back at the phone number listed above to advise. Thank you!

## 2024-02-29 NOTE — TELEPHONE ENCOUNTER
Attempted to reach patient to schedule. No answer. Left voicemail to return call to office.       *Patient's case request has been placed and patient would need to choose a date.*

## 2024-03-12 ENCOUNTER — OFFICE VISIT (OUTPATIENT)
Dept: GASTROENTEROLOGY | Facility: CLINIC | Age: 29
End: 2024-03-12
Payer: COMMERCIAL

## 2024-03-12 VITALS — HEIGHT: 68 IN | WEIGHT: 143.31 LBS | BODY MASS INDEX: 21.72 KG/M2

## 2024-03-12 DIAGNOSIS — Z87.19 HISTORY OF COLITIS: ICD-10-CM

## 2024-03-12 DIAGNOSIS — R11.2 NON-INTRACTABLE VOMITING WITH NAUSEA: ICD-10-CM

## 2024-03-12 DIAGNOSIS — Z90.49 S/P CHOLECYSTECTOMY: ICD-10-CM

## 2024-03-12 DIAGNOSIS — Z87.19 HISTORY OF IBS: ICD-10-CM

## 2024-03-12 DIAGNOSIS — R19.7 DIARRHEA, UNSPECIFIED TYPE: ICD-10-CM

## 2024-03-12 DIAGNOSIS — R63.4 WEIGHT LOSS: ICD-10-CM

## 2024-03-12 DIAGNOSIS — R10.30 LOWER ABDOMINAL PAIN: Primary | ICD-10-CM

## 2024-03-12 DIAGNOSIS — R63.0 DECREASED APPETITE: ICD-10-CM

## 2024-03-12 DIAGNOSIS — Z87.19 HISTORY OF GASTROESOPHAGEAL REFLUX (GERD): ICD-10-CM

## 2024-03-12 PROCEDURE — 99214 OFFICE O/P EST MOD 30 MIN: CPT | Mod: S$GLB,,, | Performed by: NURSE PRACTITIONER

## 2024-03-12 PROCEDURE — 99999 PR PBB SHADOW E&M-EST. PATIENT-LVL IV: CPT | Mod: PBBFAC,,, | Performed by: NURSE PRACTITIONER

## 2024-03-12 PROCEDURE — 1160F RVW MEDS BY RX/DR IN RCRD: CPT | Mod: CPTII,S$GLB,, | Performed by: NURSE PRACTITIONER

## 2024-03-12 PROCEDURE — 3008F BODY MASS INDEX DOCD: CPT | Mod: CPTII,S$GLB,, | Performed by: NURSE PRACTITIONER

## 2024-03-12 PROCEDURE — 1159F MED LIST DOCD IN RCRD: CPT | Mod: CPTII,S$GLB,, | Performed by: NURSE PRACTITIONER

## 2024-03-12 RX ORDER — DIPHENOXYLATE HYDROCHLORIDE AND ATROPINE SULFATE 2.5; .025 MG/1; MG/1
1 TABLET ORAL EVERY 6 HOURS PRN
Qty: 40 TABLET | Refills: 1 | Status: SHIPPED | OUTPATIENT
Start: 2024-03-12

## 2024-03-12 RX ORDER — ONDANSETRON 4 MG/1
4 TABLET, ORALLY DISINTEGRATING ORAL 3 TIMES DAILY PRN
Qty: 30 TABLET | Refills: 1 | Status: SHIPPED | OUTPATIENT
Start: 2024-03-12

## 2024-03-12 RX ORDER — DIPHENOXYLATE HYDROCHLORIDE AND ATROPINE SULFATE 2.5; .025 MG/1; MG/1
1 TABLET ORAL EVERY 6 HOURS
Qty: 40 TABLET | Refills: 1 | Status: SHIPPED | OUTPATIENT
Start: 2024-03-12 | End: 2024-03-12 | Stop reason: SDUPTHER

## 2024-03-12 RX ORDER — MONTELUKAST SODIUM 4 MG/1
1 TABLET, CHEWABLE ORAL 2 TIMES DAILY
Qty: 60 TABLET | Refills: 2 | Status: SHIPPED | OUTPATIENT
Start: 2024-03-12 | End: 2024-04-16

## 2024-03-12 NOTE — PROGRESS NOTES
Subjective:       Patient ID: Holly Nichols is a 28 y.o. female Body mass index is 21.79 kg/m².    Chief Complaint: Other (IBS flare)  This is an established patient of Dr. Zhao and myself.    GI Problem  The primary symptoms include weight loss (decreased appetite, lost ~5-9 lbs over the past year), abdominal pain, nausea (occasional, mild with intermittent abdominal pain & diarrhea), vomiting and diarrhea (probiotic align daily, imodium PRN; lomotil PRN- has taken 3 times recently, helps). Primary symptoms do not include fever, fatigue, melena, hematemesis, hematochezia or dysuria. The illness began more than 7 days ago (irregular bowel habits for several years). Progression since onset: had resolved after our last visit; recurred over the past few months.   The abdominal pain began more than 2 days ago. The abdominal pain is located in the LLQ, RLQ, periumbilical region and suprapubic region (described as sharp to throbbing). The severity of the abdominal pain is 0/10 (currently). The abdominal pain is relieved by bowel movements (after a bowel movement; triggered after eating (especially heavy meals) & by needing to have a bowel movement).   The vomiting began yesterday. Vomiting occurred once. The emesis contains undigested food (everything she ate the day before).   The diarrhea began more than 1 week ago. The diarrhea occurs 5 to 10 times per day (anytime she eats or drinks). Risk factors: denies recent antibiotic/hospitalization, suspect food intake, ill contacts, or foreign travel.   The illness does not include chills, dysphagia, odynophagia, constipation or back pain. Associated symptoms comments: Eating a bland diet, on low lactose diet. Significant associated medical issues include GERD (controlled with taking nexium 40 mg daily; PAST TREATMENT: protonix- no relief,  prilosec- became ineffective) and irritable bowel syndrome (PAST TREATMENT: bentyl helped in the past). Associated medical issues  comments: PAST TREATMENT: lialda- helped colitis diagnosis.     Review of Systems   Constitutional:  Positive for appetite change (recent decreased appetite started ~1/2024; been on adderall for the past 2 years without change in dosage) and weight loss (decreased appetite, lost ~5-9 lbs over the past year). Negative for chills, diaphoresis, fatigue and fever.   HENT:  Negative for sore throat and trouble swallowing.    Respiratory:  Negative for cough, choking, chest tightness and shortness of breath.    Cardiovascular:  Negative for chest pain.   Gastrointestinal:  Positive for abdominal pain, diarrhea (probiotic align daily, imodium PRN; lomotil PRN- has taken 3 times recently, helps), nausea (occasional, mild with intermittent abdominal pain & diarrhea) and vomiting. Negative for abdominal distention, anal bleeding, blood in stool, constipation, dysphagia, hematemesis, hematochezia, melena and rectal pain.   Genitourinary:  Negative for difficulty urinating, dysuria, flank pain and pelvic pain.   Musculoskeletal:  Negative for back pain.   Neurological:  Negative for weakness.       Patient's last menstrual period was 11/01/2020. S/p hysterectomy.  Past Medical History:   Diagnosis Date    Abnormal Pap smear of cervix     Allergy     Anxiety     Asthma     rare inhaler use    Constipation     Depression     Endometriosis     Fibromyalgia     Gastritis     HLA B27 (HLA B27 positive)     Migraine     Postpartum depression     Scoliosis     Smoker     stopped in January 2018    Tobacco dependence 2/17/2016     Past Surgical History:   Procedure Laterality Date    ADENOIDECTOMY      APPENDECTOMY  09/2014    endometriosis    CHOLECYSTECTOMY  12/20/2017    Dr. MARBELLA Anne, [facility]     COLONOSCOPY N/A 05/19/2016    Procedure: COLONOSCOPY;  Surgeon: Jarret Zhao Jr., MD;  Location: [facility];  Service: Endoscopy;  Laterality: N/A;    COLONOSCOPY N/A 08/27/2020    Dr. Zhao: Erythema & ulcerated mucosa in descending,  splenic flexure & distal transverse (r/o UC, r/o ischemia, r/o vasculiti, etc.), Mucosal changes in descending colon (secondary to left-sided colitis, to ischemic colitis(?), to vasculitis(?). hemorrhoids; biopsy: TI & random biopsies- WNL, transverse/hepatic/descending- mild active colitis without definitive features of chronicity    COLONOSCOPY N/A 12/16/2022    Procedure: COLONOSCOPY;  Surgeon: Jarret Zhao Jr., MD;  Location: [facility];  Service: Endoscopy;  Laterality: N/A; Repeat colonoscopy in 5 years for screening    CYSTOSCOPY  12/01/2021    Procedure: CYSTOSCOPY;  Surgeon: Ana María Easley MD;  Location: [facility] OR;  Service: OB/GYN;;    ESOPHAGOGASTRODUODENOSCOPY N/A 10/01/2020    Dr. Zhao: Erythema in the lower third of the esophagus; Duodenal bulb erosions without bleeding. biopsy: duodenum- Small bowel mucosa with mild acute inflammation changes may represent erosion, Villous architecture is maintained; stomach- mild chronic gastritis; esophagus- minimal reactive changes    ESOPHAGOGASTRODUODENOSCOPY N/A 12/16/2022    Procedure: EGD (ESOPHAGOGASTRODUODENOSCOPY);  Surgeon: Jarret Zhao Jr., MD;  Location: [facility];  Service: Endoscopy;  Laterality: N/A;    LAPAROSCOPIC OOPHORECTOMY Left 12/01/2021    Procedure: OOPHORECTOMY, LAPAROSCOPIC;  Surgeon: Ana María Easley MD;  Location: [facility] OR;  Service: OB/GYN;  Laterality: Left;    LAPAROSCOPIC SALPINGECTOMY Bilateral 12/01/2021    Procedure: SALPINGECTOMY, LAPAROSCOPIC;  Surgeon: Ana María Easley MD;  Location: [facility] OR;  Service: OB/GYN;  Laterality: Bilateral;    LAPAROSCOPIC TOTAL HYSTERECTOMY N/A 12/01/2021    Procedure: HYSTERECTOMY, TOTAL, LAPAROSCOPIC;  Surgeon: Ana María Easley MD;  Location: [facility] OR;  Service: OB/GYN;  Laterality: N/A;    MOUTH SURGERY      x2    PELVIC LAPAROSCOPY      SINUS SURGERY  11/23/2021    FESS, turbinate red    TONSILLECTOMY  07/2016    UPPER GASTROINTESTINAL ENDOSCOPY  05/2016    Dr. Zhao      Family History   Problem Relation Age of Onset    Fibromyalgia Mother     Depression Mother     Irritable bowel syndrome Mother     Diabetes Father     Depression Father     Mental illness Father         bipolar disorder    Asthma Father     Multiple sclerosis Maternal Aunt     Irritable bowel syndrome Maternal Grandmother     Colon cancer Neg Hx     Ulcerative colitis Neg Hx     Stomach cancer Neg Hx     Esophageal cancer Neg Hx     Breast cancer Neg Hx     Ovarian cancer Neg Hx     Celiac disease Neg Hx      Social History     Tobacco Use    Smoking status: Former     Current packs/day: 0.00     Types: Cigarettes, Vaping with nicotine     Quit date: 2018     Years since quittin.1    Smokeless tobacco: Never    Tobacco comments:     Still vaping   Substance Use Topics    Alcohol use: No     Alcohol/week: 0.0 standard drinks of alcohol     Comment: quit since     Drug use: No     Wt Readings from Last 10 Encounters:   24 65 kg (143 lb 4.8 oz)   24 66 kg (145 lb 8.1 oz)   24 65.7 kg (144 lb 13.5 oz)   10/25/23 65.5 kg (144 lb 6.4 oz)   23 67.9 kg (149 lb 11.1 oz)   23 68.1 kg (150 lb 0.4 oz)   23 69.3 kg (152 lb 12.5 oz)   23 65.3 kg (144 lb)   23 66.2 kg (145 lb 13.4 oz)   22 66.1 kg (145 lb 11.6 oz)     Lab Results   Component Value Date    WBC 5.24 2023    HGB 14.5 2023    HCT 41.1 2023    MCV 86 2023     2023     CMP  Sodium   Date Value Ref Range Status   2023 138 136 - 145 mmol/L Final     Potassium   Date Value Ref Range Status   2023 3.9 3.5 - 5.1 mmol/L Final     Chloride   Date Value Ref Range Status   2023 101 95 - 110 mmol/L Final     CO2   Date Value Ref Range Status   2023 32 (H) 22 - 31 mmol/L Final     Glucose   Date Value Ref Range Status   2023 88 70 - 110 mg/dL Final     Comment:     The ADA recommends the following guidelines for fasting glucose:    Normal:        less than 100 mg/dL    Prediabetes:  100 mg/dL to 125 mg/dL    Diabetes:     126 mg/dL or higher       BUN   Date Value Ref Range Status   01/17/2023 9 7 - 18 mg/dL Final     Creatinine   Date Value Ref Range Status   01/17/2023 0.58 0.50 - 1.40 mg/dL Final     Calcium   Date Value Ref Range Status   01/17/2023 9.0 8.4 - 10.2 mg/dL Final     Total Protein   Date Value Ref Range Status   01/14/2023 7.1 6.0 - 8.4 g/dL Final     Albumin   Date Value Ref Range Status   01/14/2023 4.4 3.5 - 5.2 g/dL Final     Total Bilirubin   Date Value Ref Range Status   01/14/2023 0.4 0.2 - 1.3 mg/dL Final     Alkaline Phosphatase   Date Value Ref Range Status   01/14/2023 81 38 - 145 U/L Final     AST ([place])   Date Value Ref Range Status   03/29/2016 19 14 - 36 U/L Final     AST   Date Value Ref Range Status   01/14/2023 23 14 - 36 U/L Final     ALT   Date Value Ref Range Status   01/14/2023 19 0 - 35 U/L Final     Anion Gap   Date Value Ref Range Status   01/17/2023 5 (L) 8 - 16 mmol/L Final     eGFR if    Date Value Ref Range Status   11/29/2021 >60 >60 mL/min/1.73 m^2 Final     eGFR if non    Date Value Ref Range Status   11/29/2021 >60 >60 mL/min/1.73 m^2 Final     Comment:     Calculation used to obtain the estimated glomerular filtration  rate (eGFR) is the CKD-EPI equation.        Lab Results   Component Value Date    LIPASE 21 09/16/2022     Lab Results   Component Value Date    TSH 0.908 09/16/2022     Lab Results   Component Value Date    CRP <0.3 09/16/2022     Reviewed prior medical records including radiology report of 10/26/2021 pelvic ultrasound; 11/12/2020 CT enterography abdomen pelvis; 12/20/17 MRI MRCP; 12/19/17 limited abdominal ultrasound; 12/15/17 abdominal ultrasound; 6/23/16 UGI; & endoscopy history (see surgical history).    11/12/2020 stool studies reviewed: negative  12/16/2022 stool studies reviewed: negative  9/16/2022 stool studies reviewed: rare yeast seen;  celiac serology WNL  Objective:      Physical Exam  Vitals and nursing note reviewed.   Constitutional:       General: She is not in acute distress.     Appearance: Normal appearance. She is well-developed. She is not diaphoretic.   HENT:      Mouth/Throat:      Lips: Pink. No lesions.      Mouth: Mucous membranes are moist. No oral lesions.      Tongue: No lesions.      Pharynx: Oropharynx is clear. No pharyngeal swelling or posterior oropharyngeal erythema.   Eyes:      General: No scleral icterus.     Conjunctiva/sclera: Conjunctivae normal.   Pulmonary:      Effort: Pulmonary effort is normal. No respiratory distress.      Breath sounds: Normal breath sounds. No wheezing.   Abdominal:      General: Bowel sounds are normal. There is no distension or abdominal bruit.      Palpations: Abdomen is soft. Abdomen is not rigid. There is no mass.      Tenderness: There is no abdominal tenderness. There is no guarding or rebound. Negative signs include Chandra's sign and McBurney's sign.   Skin:     General: Skin is warm and dry.      Coloration: Skin is not jaundiced or pale.      Findings: No erythema or rash.   Neurological:      Mental Status: She is alert and oriented to person, place, and time.   Psychiatric:         Speech: Speech normal.         Behavior: Behavior normal.         Thought Content: Thought content normal.         Judgment: Judgment normal.         Assessment:       1. Lower abdominal pain    2. History of colitis    3. History of IBS    4. Diarrhea, unspecified type    5. S/P cholecystectomy    6. Non-intractable vomiting with nausea    7. Decreased appetite    8. Weight loss    9. History of gastroesophageal reflux (GERD)          Plan:       Lower abdominal pain  -     Stool Exam-Ova,Cysts,Parasites; Future; Expected date: 03/12/2024  -     Giardia / Cryptosporidum, EIA; Future; Expected date: 03/12/2024  -     Stool culture; Future; Expected date: 03/12/2024  -     Clostridium difficile EIA;  Future; Expected date: 03/12/2024  -     Calprotectin, Stool; Future; Expected date: 03/12/2024  -     CT Abdomen Pelvis With IV Contrast Routine Oral Contrast; Future; Expected date: 03/12/2024  -     CBC Without Differential; Future; Expected date: 03/12/2024  -     Creatinine, Serum; Future; Expected date: 03/12/2024  - schedule Colonoscopy, discussed procedure with the patient, including risks and benefits, patient verbalized understanding    History of colitis  -     Calprotectin, Stool; Future; Expected date: 03/12/2024  -     CT Abdomen Pelvis With IV Contrast Routine Oral Contrast; Future; Expected date: 03/12/2024  - schedule Colonoscopy, discussed procedure with the patient, including risks and benefits, patient verbalized understanding  - avoid use of NSAIDs- since they can cause GI upset, bleeding and/or ulcers.    History of IBS  -  START   colestipoL (COLESTID) 1 gram Tab; Take 1 tablet (1 g total) by mouth 2 (two) times daily. Take other oral medications >1h before or >4h after colestipol  Dispense: 60 tablet; Refill: 2    Diarrhea, unspecified type  -     Stool Exam-Ova,Cysts,Parasites; Future; Expected date: 03/12/2024  -     Giardia / Cryptosporidum, EIA; Future; Expected date: 03/12/2024  -     Stool culture; Future; Expected date: 03/12/2024  -     Clostridium difficile EIA; Future; Expected date: 03/12/2024  -     Calprotectin, Stool; Future; Expected date: 03/12/2024  -     CT Abdomen Pelvis With IV Contrast Routine Oral Contrast; Future; Expected date: 03/12/2024  -   REFILL  diphenoxylate-atropine 2.5-0.025 mg (LOMOTIL) 2.5-0.025 mg per tablet; Take 1 tablet by mouth every 6 (six) hours PRN diarrhea.  Dispense: 40 tablet; Refill: 1  - schedule Colonoscopy, discussed procedure with the patient, including risks and benefits, patient verbalized understanding  - CONTINUE OTC probiotic taken as directed on packaging  - avoid lactose, alcohol, & caffeine  - avoid known triggers    S/P  cholecystectomy  -     CT Abdomen Pelvis With IV Contrast Routine Oral Contrast; Future; Expected date: 03/12/2024  -  START   colestipoL (COLESTID) 1 gram Tab; Take 1 tablet (1 g total) by mouth 2 (two) times daily. Take other oral medications >1h before or >4h after colestipol  Dispense: 60 tablet; Refill: 2    Non-intractable vomiting with nausea  -     CT Abdomen Pelvis With IV Contrast Routine Oral Contrast; Future; Expected date: 03/12/2024  -  RESTART   ondansetron (ZOFRAN-ODT) 4 MG TbDL; Take 1 tablet (4 mg total) by mouth 3 (three) times daily as needed (nausea).  Dispense: 30 tablet; Refill: 1  -     CBC Without Differential; Future; Expected date: 03/12/2024  -     Creatinine, Serum; Future; Expected date: 03/12/2024  -     Lipase; Future; Expected date: 03/12/2024  -     Hepatic Function Panel; Future; Expected date: 03/12/2024  - Possible EGD pending results of testing and if symptoms persist    Decreased appetite  -     CT Abdomen Pelvis With IV Contrast Routine Oral Contrast; Future; Expected date: 03/12/2024  - Possible EGD pending results of testing and if symptoms persist    Weight loss  -     CT Abdomen Pelvis With IV Contrast Routine Oral Contrast; Future; Expected date: 03/12/2024  - schedule Colonoscopy, discussed procedure with the patient, including risks and benefits, patient verbalized understanding  - encouraged PO intake and daily calorie counts to ensure adequate nutrition is taken in, recommend at least 2,000 calories a day  - recommend nutritional drinks, such as Boost, Ensure or Glucerna, to supplement nutrition needs  - Possible EGD pending results of testing and if symptoms persist    History of GERD  -  CONTINUE   esomeprazole (NEXIUM) 40 MG capsule; Take 1 capsule (40 mg total) by mouth before breakfast.    Follow up in about 1 month (around 4/12/2024), or if symptoms worsen or fail to improve.    If no improvement in symptoms or symptoms worsen, call/follow-up at clinic or go to  ER.        40 minutes of total time spent on the encounter, which includes face to face time and non-face to face time preparing to see the patient (e.g., review of tests), Obtaining and/or reviewing separately obtained history, Documenting clinical information in the electronic or other health record, Independently interpreting results (not separately reported) and communicating results to the patient/family/caregiver, or Care coordination (not separately reported).

## 2024-03-12 NOTE — PATIENT INSTRUCTIONS
Irritable Bowel Syndrome    Irritable bowel syndrome (IBS) is a disorder of the intestines. It is not a disease, but a group of symptoms caused by changes in the way the intestines work. It is fairly common, but the cause is not well understood.  Symptoms of IBS include:  Abdominal pain, discomfort, and cramping  Diarrhea  Constipation or dry, hard stools  Mucous stool  Bloating  Feeling of incomplete bowel movements  It usually results in one of 3 patterns of symptoms:  Chronic abdominal pain and constipation  Recurring episodes of diarrhea, with or without pain  Alternating diarrhea and constipation  Home care  The goal of treatment is to control and relieve your symptoms, so you can lead a full and active life. There is no cure for IBS. But it can be managed.  Diet  Your diet did not cause your IBS, but it can affect it. No one diet works for everyone. Finding the best foods for you may take trial and error. Keep a food log to help find what foods made your symptoms worse. Below are some tips that may help you.  Eat more slowly. Eat smaller amounts at a time, but more often. Remember, you can always eat more, but cannot eat less once youve eaten too much.  High-fiber foods are complicated. While they may help relieve constipation, they can make your bloating, cramping, gas, and diarrhea worse.  Eat less sugar.  Try cutting out dairy products. Sometimes this helps.  Try cutting out foods that are high in fat and fatty meats.  You can control bloating or passing excess gas. Be careful with gassy vegetables and fruits like beans, cabbage, broccoli, and cauliflower.  Be careful with carbonated beverages and fruit juices. They can make your bloating and diarrhea worse.  Caffeine, alcohol, and stimulants can make symptoms worse. These include coffee, tea, sodas, energy drinks, and chocolate.  Lifestyle  Look for factors that seem to worsen your symptoms. These include stress and emotions.   Although stress does not  cause IBS, it may trigger flare-ups. Counseling can help you learn to handle stress. So can self-help measures like exercise, yoga, and meditation.  Depression can occur along with IBS. Your healthcare provider may prescribe antidepressant medicine. This may help with diarrhea, constipation, and cramping, as well as with symptoms of depression.  Smoking doesn't cause IBS, but can make the symptoms worse.  Medicines  Your healthcare provider may prescribe medicines. Take them as directed. For acute flare-ups of your illness, your provider may give you prescription medicines.  Check with your healthcare provider before taking any medicines for diarrhea.  Avoid anti-inflammatory medicines like ibuprofen or naproxen.  Consider nutritional supplements. This is especially true if your diarrhea is prolonged, or you aren't eating or are losing weight  Follow-up care  Follow up with your healthcare provider, or as advised. If a stool sample was taken or cultures were done, you will be told if your treatment needs to change. You can call as directed for the results.  When to seek medical advice  Call your healthcare provider right away if any of these occur:  Abdominal pain gets worse  Constant abdominal pain moves to the right-lower abdomen  You can't keep liquids down because of vomiting  You have severe diarrhea  You have blood (red or black color) or mucus in your stool  You feel very weak or dizzy, faint, or have extreme thirst  You have a fever of 100.4ºF (38.0ºC) or higher, or as directed by your healthcare provider  Date Last Reviewed: 8/31/2015  © 3731-6090 [employer]. [address] [place] [address], [place] [address]. All rights reserved. This information is not intended as a substitute for professional medical care. Always follow your healthcare professional's instructions.

## 2024-03-13 ENCOUNTER — HOSPITAL ENCOUNTER (OUTPATIENT)
Facility: HOSPITAL | Age: 29
Discharge: HOME | End: 2024-03-13
Attending: ANESTHESIOLOGY | Admitting: ANESTHESIOLOGY
Payer: COMMERCIAL

## 2024-03-13 ENCOUNTER — HOSPITAL ENCOUNTER (OUTPATIENT)
Dept: RADIOLOGY | Facility: HOSPITAL | Age: 29
Discharge: HOME | End: 2024-03-13
Attending: ANESTHESIOLOGY | Admitting: ANESTHESIOLOGY
Payer: COMMERCIAL

## 2024-03-13 VITALS
SYSTOLIC BLOOD PRESSURE: 113 MMHG | TEMPERATURE: 99 F | HEART RATE: 65 BPM | WEIGHT: 143.31 LBS | OXYGEN SATURATION: 100 % | HEIGHT: 68 IN | RESPIRATION RATE: 18 BRPM | BODY MASS INDEX: 21.72 KG/M2 | DIASTOLIC BLOOD PRESSURE: 61 MMHG

## 2024-03-13 DIAGNOSIS — M47.816 LUMBAR SPONDYLOSIS: Primary | ICD-10-CM

## 2024-03-13 DIAGNOSIS — M54.50 LOWER BACK PAIN: ICD-10-CM

## 2024-03-13 PROCEDURE — 76000 FLUOROSCOPY <1 HR PHYS/QHP: CPT | Mod: TC,PO

## 2024-03-13 PROCEDURE — 64493 INJ PARAVERT F JNT L/S 1 LEV: CPT | Mod: 50,PO | Performed by: ANESTHESIOLOGY

## 2024-03-13 PROCEDURE — 64494 INJ PARAVERT F JNT L/S 2 LEV: CPT | Mod: 50,,, | Performed by: ANESTHESIOLOGY

## 2024-03-13 PROCEDURE — 63600175 PHARM REV CODE 636 W HCPCS: Mod: PO | Performed by: ANESTHESIOLOGY

## 2024-03-13 PROCEDURE — 64493 INJ PARAVERT F JNT L/S 1 LEV: CPT | Mod: 50,,, | Performed by: ANESTHESIOLOGY

## 2024-03-13 PROCEDURE — 64494 INJ PARAVERT F JNT L/S 2 LEV: CPT | Mod: 50,PO | Performed by: ANESTHESIOLOGY

## 2024-03-13 PROCEDURE — 99152 MOD SED SAME PHYS/QHP 5/>YRS: CPT | Mod: ,,, | Performed by: ANESTHESIOLOGY

## 2024-03-13 PROCEDURE — 25000003 PHARM REV CODE 250: Mod: PO | Performed by: ANESTHESIOLOGY

## 2024-03-13 RX ORDER — MIDAZOLAM HYDROCHLORIDE 2 MG/2ML
INJECTION, SOLUTION INTRAMUSCULAR; INTRAVENOUS
Status: DISCONTINUED | OUTPATIENT
Start: 2024-03-13 | End: 2024-03-13 | Stop reason: HOSPADM

## 2024-03-13 RX ORDER — LIDOCAINE HYDROCHLORIDE 10 MG/ML
INJECTION, SOLUTION EPIDURAL; INFILTRATION; INTRACAUDAL; PERINEURAL
Status: DISCONTINUED | OUTPATIENT
Start: 2024-03-13 | End: 2024-03-13 | Stop reason: HOSPADM

## 2024-03-13 RX ORDER — SODIUM CHLORIDE, SODIUM LACTATE, POTASSIUM CHLORIDE, CALCIUM CHLORIDE 600; 310; 30; 20 MG/100ML; MG/100ML; MG/100ML; MG/100ML
INJECTION, SOLUTION INTRAVENOUS CONTINUOUS
Status: DISCONTINUED | OUTPATIENT
Start: 2024-03-13 | End: 2024-03-13 | Stop reason: HOSPADM

## 2024-03-13 RX ORDER — BUPIVACAINE HYDROCHLORIDE 2.5 MG/ML
INJECTION, SOLUTION EPIDURAL; INFILTRATION; INTRACAUDAL
Status: DISCONTINUED | OUTPATIENT
Start: 2024-03-13 | End: 2024-03-13 | Stop reason: HOSPADM

## 2024-03-13 RX ADMIN — SODIUM CHLORIDE, POTASSIUM CHLORIDE, SODIUM LACTATE AND CALCIUM CHLORIDE: 600; 310; 30; 20 INJECTION, SOLUTION INTRAVENOUS at 01:03

## 2024-03-13 NOTE — OP NOTE
Procedure Note    Procedure Date: 3/13/2024    Procedure Performed:  bilateral Diagnostic Lumbar Medial Branch @ L4/5 and L5/L6, with Fluoroscopic Guidance    Indications:   Holly Nichols has chronic, moderate to severe axial lower back pain present for over the past 3 months that has failed to respond to physical therapy and PT-directed home exercises. There is no untreated radiculopathy or claudication present.  The patient has clinical and radiologic findings suggestive of facet mediated pain.     Pre-op diagnosis: Lumbar Spondylosis    Post-op diagnosis: same    Physician: Dain Lawrence MD    IV Anxiolysis medications: versed 2mg    Medications injected: Bupivacaine 0.25%, 0.5 cc per level    Local anesthetic used: 1% Lidocaine, 4 ml    Estimated Blood Loss: none    Complications:  none    Technique:  The patient was interviewed in the holding area and Risks/Benefits were discussed, including, but not limited to, the possibility of new or different pain, bleeding or infection.   All questions were answered.  The patient understood and accepted risks.  Consent was reviewed.  A time out was taken to identify the patient, procedure and side of the procedure. The patient was placed in a prone position, then prepped and draped in the usual sterile fashion using ChloraPrep x2 and sterile towels.  The levels were determined under fluoroscopic guidance and then marked.  1% Lidocaine was given by raising a wheal at the skin over each site and then infiltrated approximately 2cm deeper.  A 25-gauge 3.5 inch needle was introduced to the anatomic location of the L3-5 medial branch nerves on the bilateral side.  Then, after negative aspiration, 0.5 cc of Bupivacaine 0.25% was injected @ each level.  The patient tolerated the procedure well.  The patient tolerated the procedure well and was transferred to the [facility] in stable condition.  The patient was monitored after the procedure.  Patient was given post procedure and  discharge instructions to follow at home.  The patient was discharged in a stable condition.    Event Time In   In Facility 1312   In Pre-Procedure 1333   Physician Available    Anesthesia Available    Pre-Op: Bedside Procedure Start    Pre-Op: Bedside Procedure Stop    Pre-Procedure Complete 1400   Out of Pre-Procedure    Anesthesia Start    Anesthesia Start Data Collection    Setup Start    Setup Complete    In Room 1432   Prep Start    Procedure Prep Complete    MD notified pt. ready    Procedure Start 1437   Procedure Closing    Emergence    Procedure Finish 1444   Sedation Start 1431   Scope In    Extent Reached    Scope Out    Sedation End 1444   Out of Room 1446   Cleanup Start    Cleanup Complete    Cosmetic Start    Cosmetic Stop    Pain Mgmt In Room    Pain Mgmt Out Room    In Recovery 1448   Anesthesia Finish    Bedside Procedure Start    Bedside Procedure Stop    Recovery Care Complete    Out of Recovery    To Phase II 1448   In Phase II    Pain Mgmt Recovery Start    Pain Mgmt Recovery Stop    Obs Rec Start    Obs Rec Stop    Phase II Care Complete    Out of Phase II    Procedural Care Complete    Discharge    Pain Follow Up Needed    Pain Follow Up Complete

## 2024-03-13 NOTE — DISCHARGE INSTRUCTIONS
PAIN MANAGEMENT    HOME CARE INSTRUCTIONS   Do not use heat (such as a heating pad) for 24 hours.  You may apply an ice pack to the injection site for 20 minutes at a time for the first 24 hours for soreness/discomfort at injection site   Keep site clean and dry for 24 hours. If bandaid is present, remove when desired.   Do not drive until tomorrow.  Take care when walking after a lumbar injection.   Resume home medication as prescribed today.  Resume Aspirin, Plavix, or Coumadin the day after the procedure unless other wise instructed.      BLOCKS  Resume regular activities today.  Pain office will call in next 2 days.      CALL PHYSICIAN FOR:  Severe increase in your usual pain or the appearance of new pain.  Prolonged or increasing weakness or numbness in the legs or arms.  Fever greater than 100 degrees F.  Drainage, redness, active bleeding, or increased swelling at the injection site.  Headache that increases when your head is upright and decreases when you lie flat.    FOR EMERGENCIES:   Go directly to the emergency department for any shortness of breath, chest pain, or problems breathing.

## 2024-03-13 NOTE — INTERVAL H&P NOTE
The patient has been examined and the H&P has been reviewed:    I concur with the findings and no changes have occurred since H&P was written.  The risks and benefits of this intervention, and alternative therapies were discussed with the patient.  The discussion of risks included infection, bleeding, need for additional procedures or surgery, nerve damage.  Questions regarding the procedure, risks, expected outcome, and possible side effects were solicited and answered to the patient's satisfaction.  Holly Nichols wishes to proceed with the injection or procedure.  Written consent was obtained.  ASA 2, MP II        There are no hospital problems to display for this patient.

## 2024-03-13 NOTE — DISCHARGE SUMMARY
[facility]  Discharge Note  Short Stay    Admit Date: 3/13/2024    Discharge Date: 3/13/2024    Attending Physician: Dain Lawrence     Discharge Provider: Dain Lawrence    Diagnoses:  There are no hospital problems to display for this patient.      Discharged Condition: Good    Final Diagnoses: Lumbar spondylosis [M47.816]    Disposition: Home or Self Care    Hospital Course: No complications, uneventful    Outcome of Hospitalization, Treatment, Procedure, or Surgery:  Patient was admitted for outpatient interventional pain management procedure. The patient tolerated the procedure well with no complications.    Follow up/Patient Instructions:  Follow up as scheduled in Pain Management office in 2-3 weeks.  Patient has received instructions and follow up date and time.    Medications:  Continue previous medications    Discharge Procedure Orders   Notify your health care provider if you experience any of the following:  temperature >100.4     Notify your health care provider if you experience any of the following:  persistent nausea and vomiting or diarrhea     Notify your health care provider if you experience any of the following:  severe uncontrolled pain     Notify your health care provider if you experience any of the following:  redness, tenderness, or signs of infection (pain, swelling, redness, odor or green/yellow discharge around incision site)     Notify your health care provider if you experience any of the following:  difficulty breathing or increased cough     Notify your health care provider if you experience any of the following:  severe persistent headache     Notify your health care provider if you experience any of the following:  worsening rash     Notify your health care provider if you experience any of the following:  persistent dizziness, light-headedness, or visual disturbances     Notify your health care provider if you experience any of the following:  increased confusion or  weakness     Activity as tolerated

## 2024-03-18 ENCOUNTER — TELEPHONE (OUTPATIENT)
Dept: PAIN MEDICINE | Facility: CLINIC | Age: 29
End: 2024-03-18
Payer: COMMERCIAL

## 2024-03-18 DIAGNOSIS — M47.816 SPONDYLOSIS OF LUMBAR JOINT: ICD-10-CM

## 2024-03-18 DIAGNOSIS — J45.902 ASTHMA WITH STATUS ASTHMATICUS: ICD-10-CM

## 2024-03-18 DIAGNOSIS — M47.816 LUMBAR SPONDYLOSIS: Primary | ICD-10-CM

## 2024-03-18 RX ORDER — SODIUM CHLORIDE, SODIUM LACTATE, POTASSIUM CHLORIDE, CALCIUM CHLORIDE 600; 310; 30; 20 MG/100ML; MG/100ML; MG/100ML; MG/100ML
INJECTION, SOLUTION INTRAVENOUS CONTINUOUS
Status: CANCELLED | OUTPATIENT
Start: 2024-03-18

## 2024-03-18 NOTE — TELEPHONE ENCOUNTER
Regarding your Lumbar Medial Branch Block , For Date of Service:  3/15/24    Please answer the following questions:    1. What percentage of pain relief did you receive following the block, from 0-100%?   90%  2. How many hours did pain relief last following the block?    9 HRS  3. During this time please describe in detail the activities you were able to do?  Sleep, deep cleaned house, played with kids and dog.  4. Pain score from 0-10 pre procedure -   9  5. Pain score from 0-10 post procedure -   0

## 2024-04-02 ENCOUNTER — HOSPITAL ENCOUNTER (OUTPATIENT)
Dept: RADIOLOGY | Facility: HOSPITAL | Age: 29
Discharge: HOME | End: 2024-04-02
Attending: ANESTHESIOLOGY | Admitting: ANESTHESIOLOGY
Payer: COMMERCIAL

## 2024-04-02 ENCOUNTER — HOSPITAL ENCOUNTER (OUTPATIENT)
Facility: HOSPITAL | Age: 29
Discharge: HOME | End: 2024-04-02
Attending: ANESTHESIOLOGY | Admitting: ANESTHESIOLOGY
Payer: COMMERCIAL

## 2024-04-02 VITALS
DIASTOLIC BLOOD PRESSURE: 75 MMHG | SYSTOLIC BLOOD PRESSURE: 112 MMHG | RESPIRATION RATE: 15 BRPM | OXYGEN SATURATION: 99 % | HEART RATE: 64 BPM | TEMPERATURE: 98 F

## 2024-04-02 DIAGNOSIS — M47.816 SPONDYLOSIS OF LUMBAR JOINT: ICD-10-CM

## 2024-04-02 DIAGNOSIS — M54.50 LOWER BACK PAIN: ICD-10-CM

## 2024-04-02 DIAGNOSIS — M47.816 LUMBAR SPONDYLOSIS: Primary | ICD-10-CM

## 2024-04-02 PROCEDURE — 63600175 PHARM REV CODE 636 W HCPCS: Mod: PO | Performed by: ANESTHESIOLOGY

## 2024-04-02 PROCEDURE — 99152 MOD SED SAME PHYS/QHP 5/>YRS: CPT | Mod: ,,, | Performed by: ANESTHESIOLOGY

## 2024-04-02 PROCEDURE — 64494 INJ PARAVERT F JNT L/S 2 LEV: CPT | Mod: 50,PO | Performed by: ANESTHESIOLOGY

## 2024-04-02 PROCEDURE — 64493 INJ PARAVERT F JNT L/S 1 LEV: CPT | Mod: 50,PO | Performed by: ANESTHESIOLOGY

## 2024-04-02 PROCEDURE — 64493 INJ PARAVERT F JNT L/S 1 LEV: CPT | Mod: 50,,, | Performed by: ANESTHESIOLOGY

## 2024-04-02 PROCEDURE — 25000003 PHARM REV CODE 250: Mod: PO | Performed by: ANESTHESIOLOGY

## 2024-04-02 PROCEDURE — 64494 INJ PARAVERT F JNT L/S 2 LEV: CPT | Mod: 50,,, | Performed by: ANESTHESIOLOGY

## 2024-04-02 PROCEDURE — 76000 FLUOROSCOPY <1 HR PHYS/QHP: CPT | Mod: TC,PO

## 2024-04-02 RX ORDER — BUPIVACAINE HYDROCHLORIDE 2.5 MG/ML
INJECTION, SOLUTION EPIDURAL; INFILTRATION; INTRACAUDAL
Status: DISCONTINUED | OUTPATIENT
Start: 2024-04-02 | End: 2024-04-02 | Stop reason: HOSPADM

## 2024-04-02 RX ORDER — SODIUM CHLORIDE, SODIUM LACTATE, POTASSIUM CHLORIDE, CALCIUM CHLORIDE 600; 310; 30; 20 MG/100ML; MG/100ML; MG/100ML; MG/100ML
INJECTION, SOLUTION INTRAVENOUS CONTINUOUS
Status: DISCONTINUED | OUTPATIENT
Start: 2024-04-02 | End: 2024-04-02 | Stop reason: HOSPADM

## 2024-04-02 RX ORDER — LIDOCAINE HYDROCHLORIDE 10 MG/ML
INJECTION, SOLUTION EPIDURAL; INFILTRATION; INTRACAUDAL; PERINEURAL
Status: DISCONTINUED | OUTPATIENT
Start: 2024-04-02 | End: 2024-04-02 | Stop reason: HOSPADM

## 2024-04-02 RX ORDER — MIDAZOLAM HYDROCHLORIDE 1 MG/ML
INJECTION INTRAMUSCULAR; INTRAVENOUS
Status: DISCONTINUED | OUTPATIENT
Start: 2024-04-02 | End: 2024-04-02 | Stop reason: HOSPADM

## 2024-04-02 RX ADMIN — SODIUM CHLORIDE, POTASSIUM CHLORIDE, SODIUM LACTATE AND CALCIUM CHLORIDE: 600; 310; 30; 20 INJECTION, SOLUTION INTRAVENOUS at 01:04

## 2024-04-02 NOTE — H&P
[facility] - [facility]  History & Physical - Short Stay  Pain Management       SUBJECTIVE:     Procedure: Procedure(s) (LRB):  Block-nerve-medial branch-lumbar-L4/5-L5/S1 (Bilateral)    Chief Complaint/Reason for Admission:  Lumbar spondylosis [M47.816]    PTA Medications   Medication Sig    albuterol (VENTOLIN HFA) 90 mcg/actuation inhaler Inhale 2 puffs into the lungs every 6 (six) hours as needed for Wheezing. Rescue    Bifidobacterium infantis (ALIGN ORAL) Take 1 tablet by mouth once daily.    cetirizine (ZYRTEC) 10 MG tablet Take 10 mg by mouth nightly as needed for Allergies.    citalopram (CELEXA) 20 MG tablet Take 1 tablet (20 mg total) by mouth once daily. Along with 40mg for a total of 60mg daily    citalopram (CELEXA) 40 MG tablet Take 1 tablet (40 mg total) by mouth once daily. Along with a 20 mg tablet for a total of 60 mg    colestipoL (COLESTID) 1 gram Tab Take 1 tablet (1 g total) by mouth 2 (two) times daily. Take other oral medications >1h before or >4h after colestipol    dextroamphetamine-amphetamine (ADDERALL XR) 15 MG 24 hr capsule Take 1 capsule (15 mg total) by mouth every morning.    [START ON 4/16/2024] dextroamphetamine-amphetamine (ADDERALL XR) 15 MG 24 hr capsule Take 1 capsule (15 mg total) by mouth once daily.    dextroamphetamine-amphetamine (ADDERALL) 5 mg Tab Take 5 mg by mouth daily as needed.    diphenoxylate-atropine 2.5-0.025 mg (LOMOTIL) 2.5-0.025 mg per tablet Take 1 tablet by mouth every 6 (six) hours as needed for Diarrhea.    esomeprazole (NEXIUM) 40 MG capsule Take 1 capsule (40 mg total) by mouth before breakfast.    fluticasone propionate (FLONASE) 50 mcg/actuation nasal spray 1 spray by Each Nostril route 2 (two) times daily as needed for Allergies.    fluticasone-umeclidin-vilanter (TRELEGY ELLIPTA) 200-62.5-25 mcg inhaler Inhale 1 puff into the lungs once daily.    hydrOXYzine HCL (ATARAX) 25 MG tablet Take 1 tablet (25 mg total) by mouth nightly as needed for Anxiety.     ipratropium-albuteroL (COMBIVENT RESPIMAT)  mcg/actuation inhaler Inhale 1 puff into the lungs 4 (four) times daily. Rescue    ondansetron (ZOFRAN-ODT) 4 MG TbDL Take 1 tablet (4 mg total) by mouth 3 (three) times daily as needed (nausea).    tretinoin (RETIN-A) 0.025 % cream Apply topically every evening.    ALPRAZolam (XANAX) 0.25 MG tablet Take 1 tablet (0.25 mg total) by mouth daily as needed for Anxiety.       Review of patient's allergies indicates:   Allergen Reactions    Latex, natural rubber      burning    Sulfur Hives    Toradol [ketorolac] Other (See Comments)     Metallic taste only once in 2013; has taken since without problems    Tramadol Other (See Comments)     paralysis    Penicillins Hives and Rash    Sulfa (sulfonamide antibiotics) Hives and Rash       Past Medical History:   Diagnosis Date    Abnormal Pap smear of cervix     Allergy     Anxiety     Asthma     rare inhaler use    Constipation     Depression     Endometriosis     Fibromyalgia     Gastritis     HLA B27 (HLA B27 positive)     Migraine     Postpartum depression     Scoliosis     Smoker     stopped in January 2018    Tobacco dependence 2/17/2016     Past Surgical History:   Procedure Laterality Date    ADENOIDECTOMY      APPENDECTOMY  09/2014    endometriosis    CHOLECYSTECTOMY  12/20/2017    Dr. MARBELLA Anne, [facility]     COLONOSCOPY N/A 05/19/2016    Procedure: COLONOSCOPY;  Surgeon: Jarret Zhao Jr., MD;  [facility];  Service: Endoscopy;  Laterality: N/A;    COLONOSCOPY N/A 08/27/2020    Dr. Zhao: Erythema & ulcerated mucosa in descending, splenic flexure & distal transverse (r/o UC, r/o ischemia, r/o vasculiti, etc.), Mucosal changes in descending colon (secondary to left-sided colitis, to ischemic colitis(?), to vasculitis(?). hemorrhoids; biopsy: TI & random biopsies- WNL, transverse/hepatic/descending- mild active colitis without definitive features of chronicity    COLONOSCOPY N/A 12/16/2022    Procedure:  COLONOSCOPY;  Surgeon: Jarret Zhao Jr., MD;  Location: [facility];  Service: Endoscopy;  Laterality: N/A; Repeat colonoscopy in 5 years for screening    CYSTOSCOPY  12/01/2021    Procedure: CYSTOSCOPY;  Surgeon: Ana María Easley MD;  Location: [facility] OR;  Service: OB/GYN;;    ESOPHAGOGASTRODUODENOSCOPY N/A 10/01/2020    Dr. Zhao: Erythema in the lower third of the esophagus; Duodenal bulb erosions without bleeding. biopsy: duodenum- Small bowel mucosa with mild acute inflammation changes may represent erosion, Villous architecture is maintained; stomach- mild chronic gastritis; esophagus- minimal reactive changes    ESOPHAGOGASTRODUODENOSCOPY N/A 12/16/2022    Procedure: EGD (ESOPHAGOGASTRODUODENOSCOPY);  Surgeon: Jarret Zhao Jr., MD;  Location: [facility];  Service: Endoscopy;  Laterality: N/A;    INJECTION OF ANESTHETIC AGENT AROUND MEDIAL BRANCH NERVES INNERVATING LUMBAR FACET JOINT Bilateral 3/13/2024    Procedure: Block-nerve-medial branch-lumbar    L4/5, L5/S1;  Surgeon: Dain Lawrence MD;  Location: [facility];  Service: Pain Management;  Laterality: Bilateral;    LAPAROSCOPIC OOPHORECTOMY Left 12/01/2021    Procedure: OOPHORECTOMY, LAPAROSCOPIC;  Surgeon: Ana María Easley MD;  Location: [facility] OR;  Service: OB/GYN;  Laterality: Left;    LAPAROSCOPIC SALPINGECTOMY Bilateral 12/01/2021    Procedure: SALPINGECTOMY, LAPAROSCOPIC;  Surgeon: Ana María Easley MD;  Location: [facility] OR;  Service: OB/GYN;  Laterality: Bilateral;    LAPAROSCOPIC TOTAL HYSTERECTOMY N/A 12/01/2021    Procedure: HYSTERECTOMY, TOTAL, LAPAROSCOPIC;  Surgeon: Ana María Easley MD;  Location: [facility] OR;  Service: OB/GYN;  Laterality: N/A;    MOUTH SURGERY      x2    PELVIC LAPAROSCOPY      SINUS SURGERY  11/23/2021    FESS, turbinate red    TONSILLECTOMY  07/2016    UPPER GASTROINTESTINAL ENDOSCOPY  05/2016    Dr. Zhao     Family History   Problem Relation Age of Onset    Fibromyalgia Mother     Depression Mother      Irritable bowel syndrome Mother     Diabetes Father     Depression Father     Mental illness Father         bipolar disorder    Asthma Father     Multiple sclerosis Maternal Aunt     Irritable bowel syndrome Maternal Grandmother     Colon cancer Neg Hx     Ulcerative colitis Neg Hx     Stomach cancer Neg Hx     Esophageal cancer Neg Hx     Breast cancer Neg Hx     Ovarian cancer Neg Hx     Celiac disease Neg Hx      Social History     Tobacco Use    Smoking status: Former     Current packs/day: 0.00     Types: Cigarettes, Vaping with nicotine     Quit date: 2018     Years since quittin.2    Smokeless tobacco: Never    Tobacco comments:     Still vaping   Substance Use Topics    Alcohol use: No     Alcohol/week: 0.0 standard drinks of alcohol     Comment: quit since     Drug use: No        Current Facility-Administered Medications:     lactated ringers infusion, , Intravenous, Continuous, Dain Lawrence MD    Review of Systems:  General ROS: negative for - fever  Dermatological ROS: negative for rash    OBJECTIVE:     Vital Signs (Most Recent):  Temp: 98.4 °F (36.9 °C) (24 1333)  Pulse: 67 (24 1333)  Resp: 18 (24 1333)  BP: (!) 126/59 (24 1333)  SpO2: 99 % (24 1333)  There is no height or weight on file to calculate BMI.    Physical Exam:  General appearance - alert, well appearing, and in no distress  Mental status - AOx3  Eyes - pupils equal and reactive, extraocular eye movements intact  Heart - normal rate, regular rhythm, normal S1, S2, no murmurs, rubs, clicks or gallops  Chest - clear to auscultation, no wheezes, rales or rhonchi, symmetric air entry  Abdomen - soft, nontender, nondistended, no masses or organomegaly  Neurological - alert, oriented, normal speech, no focal findings or movement disorder noted  Extremities - peripheral pulses normal, no pedal edema, no clubbing or cyanosis      ASSESSMENT/PLAN:     There are no hospital problems to display for this  patient.     Indication: The patient has clinical and radiologic findings suggestive of facet mediated pain and is s/p 1st diagnostic bilateral lumbar L4/5 and L5/6medial branch blocks with at least 80% relief of their axial back pain lasting the duration of the local anesthetic utilized.    We will proceed with bilateral L4/5 and L5/6 RFA.   The risks and benefits of this intervention, and alternative therapies were discussed with the patient.  The discussion of risks included infection, bleeding, need for additional procedures or surgery, nerve damage, paralysis, adverse medication reaction(s), stroke, and/or death.  Questions regarding the procedure, risks, expected outcome, and possible side effects were solicited and answered to the patient's satisfaction.  Holly wishes to proceed with the injection.  Verbal and written consent were verified.  ASA 2, MP II      Proceed with intervention as scheduled.    Dain Lawrence M.D.  Interventional Pain Medicine / Anesthesiology

## 2024-04-02 NOTE — OP NOTE
Procedure Note    Procedure Date: 4/2/2024    Procedure Performed:  bilateral Diagnostic Lumbar Medial Branch @ L4/5 and L5/6, with Fluoroscopic Guidance    Indications: The patient has clinical and radiologic findings suggestive of facet mediated pain and is s/p 1st diagnostic bilateral lumbar L4/5 and L5/6 medial branch blocks with at least 80% relief of their axial back pain lasting the duration of the local anesthetic utilized.       Pre-op diagnosis: Lumbar Spondylosis    Post-op diagnosis: same    Physician: Dain Lawrence MD    IV Anxiolysis medications: versed 2mg    Medications injected: Bupivacaine 0.25%, 0.5 cc per level    Local anesthetic used: 1% Lidocaine, 4 ml    Estimated Blood Loss: none    Complications:  none    Technique:  The patient was interviewed in the holding area and Risks/Benefits were discussed, including, but not limited to, the possibility of new or different pain, bleeding or infection.   All questions were answered.  The patient understood and accepted risks.  Consent was reviewed.  A time out was taken to identify the patient, procedure and side of the procedure. The patient was placed in a prone position, then prepped and draped in the usual sterile fashion using ChloraPrep x2 and sterile towels.  The levels were determined under fluoroscopic guidance and then marked.  1% Lidocaine was given by raising a wheal at the skin over each site and then infiltrated approximately 2cm deeper.  A 25-gauge 3.5 inch needle was introduced to the anatomic location of the L3-5 medial branch nerves on the bilateral side.  Then, after negative aspiration, 0.5 cc of Bupivacaine 0.25% was injected @ each level.  The patient tolerated the procedure well.  The patient tolerated the procedure well and was transferred to the [facility] in stable condition.  The patient was monitored after the procedure.  Patient was given post procedure and discharge instructions to follow at home.  The patient was  discharged in a stable condition.    Event Time In   In Facility 1259   In Pre-Procedure 1316   Physician Available    Anesthesia Available    Pre-Op: Bedside Procedure Start    Pre-Op: Bedside Procedure Stop    Pre-Procedure Complete 1338   Out of Pre-Procedure    Anesthesia Start    Anesthesia Start Data Collection    Setup Start    Setup Complete    In Room 1343   Prep Start    Procedure Prep Complete    MD notified pt. ready    Procedure Start 1347   Procedure Closing    Emergence    Procedure Finish 1353   Sedation Start 1342   Scope In    Extent Reached    Scope Out    Sedation End 1353   Out of Room 1354   Cleanup Start    Cleanup Complete    Cosmetic Start    Cosmetic Stop    Pain Mgmt In Room    Pain Mgmt Out Room    In Recovery    Anesthesia Finish    Bedside Procedure Start    Bedside Procedure Stop    Recovery Care Complete    Out of Recovery    To Phase II    In Phase II    Pain Mgmt Recovery Start    Pain Mgmt Recovery Stop    Obs Rec Start    Obs Rec Stop    Phase II Care Complete    Out of Phase II    Procedural Care Complete    Discharge    Pain Follow Up Needed    Pain Follow Up Complete

## 2024-04-02 NOTE — DISCHARGE SUMMARY
[facility]  Discharge Note  Short Stay    Admit Date: 4/2/2024    Discharge Date: 4/2/2024    Attending Physician: Dain Lawrence     Discharge Provider: Dain Lawrence    Diagnoses:  There are no hospital problems to display for this patient.      Discharged Condition: Good    Final Diagnoses: Lumbar spondylosis [M47.816]    Disposition: Home or Self Care    Hospital Course: No complications, uneventful    Outcome of Hospitalization, Treatment, Procedure, or Surgery:  Patient was admitted for outpatient interventional pain management procedure. The patient tolerated the procedure well with no complications.    Follow up/Patient Instructions:  Follow up as scheduled in Pain Management office in 2-3 weeks.  Patient has received instructions and follow up date and time.    Medications:  Continue previous medications    Discharge Procedure Orders   Notify your health care provider if you experience any of the following:  temperature >100.4     Notify your health care provider if you experience any of the following:  persistent nausea and vomiting or diarrhea     Notify your health care provider if you experience any of the following:  severe uncontrolled pain     Notify your health care provider if you experience any of the following:  redness, tenderness, or signs of infection (pain, swelling, redness, odor or green/yellow discharge around incision site)     Notify your health care provider if you experience any of the following:  difficulty breathing or increased cough     Notify your health care provider if you experience any of the following:  severe persistent headache     Notify your health care provider if you experience any of the following:  worsening rash     Notify your health care provider if you experience any of the following:  persistent dizziness, light-headedness, or visual disturbances     Notify your health care provider if you experience any of the following:  increased confusion or  weakness     Activity as tolerated

## 2024-04-02 NOTE — H&P (VIEW-ONLY)
[facility] - [facility]  History & Physical - Short Stay  Pain Management       SUBJECTIVE:     Procedure: Procedure(s) (LRB):  Block-nerve-medial branch-lumbar-L4/5-L5/S1 (Bilateral)    Chief Complaint/Reason for Admission:  Lumbar spondylosis [M47.816]    PTA Medications   Medication Sig    albuterol (VENTOLIN HFA) 90 mcg/actuation inhaler Inhale 2 puffs into the lungs every 6 (six) hours as needed for Wheezing. Rescue    Bifidobacterium infantis (ALIGN ORAL) Take 1 tablet by mouth once daily.    cetirizine (ZYRTEC) 10 MG tablet Take 10 mg by mouth nightly as needed for Allergies.    citalopram (CELEXA) 20 MG tablet Take 1 tablet (20 mg total) by mouth once daily. Along with 40mg for a total of 60mg daily    citalopram (CELEXA) 40 MG tablet Take 1 tablet (40 mg total) by mouth once daily. Along with a 20 mg tablet for a total of 60 mg    colestipoL (COLESTID) 1 gram Tab Take 1 tablet (1 g total) by mouth 2 (two) times daily. Take other oral medications >1h before or >4h after colestipol    dextroamphetamine-amphetamine (ADDERALL XR) 15 MG 24 hr capsule Take 1 capsule (15 mg total) by mouth every morning.    [START ON 4/16/2024] dextroamphetamine-amphetamine (ADDERALL XR) 15 MG 24 hr capsule Take 1 capsule (15 mg total) by mouth once daily.    dextroamphetamine-amphetamine (ADDERALL) 5 mg Tab Take 5 mg by mouth daily as needed.    diphenoxylate-atropine 2.5-0.025 mg (LOMOTIL) 2.5-0.025 mg per tablet Take 1 tablet by mouth every 6 (six) hours as needed for Diarrhea.    esomeprazole (NEXIUM) 40 MG capsule Take 1 capsule (40 mg total) by mouth before breakfast.    fluticasone propionate (FLONASE) 50 mcg/actuation nasal spray 1 spray by Each Nostril route 2 (two) times daily as needed for Allergies.    fluticasone-umeclidin-vilanter (TRELEGY ELLIPTA) 200-62.5-25 mcg inhaler Inhale 1 puff into the lungs once daily.    hydrOXYzine HCL (ATARAX) 25 MG tablet Take 1 tablet (25 mg total) by mouth nightly as needed for Anxiety.     ipratropium-albuteroL (COMBIVENT RESPIMAT)  mcg/actuation inhaler Inhale 1 puff into the lungs 4 (four) times daily. Rescue    ondansetron (ZOFRAN-ODT) 4 MG TbDL Take 1 tablet (4 mg total) by mouth 3 (three) times daily as needed (nausea).    tretinoin (RETIN-A) 0.025 % cream Apply topically every evening.    ALPRAZolam (XANAX) 0.25 MG tablet Take 1 tablet (0.25 mg total) by mouth daily as needed for Anxiety.       Review of patient's allergies indicates:   Allergen Reactions    Latex, natural rubber      burning    Sulfur Hives    Toradol [ketorolac] Other (See Comments)     Metallic taste only once in 2013; has taken since without problems    Tramadol Other (See Comments)     paralysis    Penicillins Hives and Rash    Sulfa (sulfonamide antibiotics) Hives and Rash       Past Medical History:   Diagnosis Date    Abnormal Pap smear of cervix     Allergy     Anxiety     Asthma     rare inhaler use    Constipation     Depression     Endometriosis     Fibromyalgia     Gastritis     HLA B27 (HLA B27 positive)     Migraine     Postpartum depression     Scoliosis     Smoker     stopped in January 2018    Tobacco dependence 2/17/2016     Past Surgical History:   Procedure Laterality Date    ADENOIDECTOMY      APPENDECTOMY  09/2014    endometriosis    CHOLECYSTECTOMY  12/20/2017    Dr. MARBELLA Anne, [facility]     COLONOSCOPY N/A 05/19/2016    Procedure: COLONOSCOPY;  Surgeon: Jarret Zhao Jr., MD;  Location: [facility];  Service: Endoscopy;  Laterality: N/A;    COLONOSCOPY N/A 08/27/2020    Dr. Zhao: Erythema & ulcerated mucosa in descending, splenic flexure & distal transverse (r/o UC, r/o ischemia, r/o vasculiti, etc.), Mucosal changes in descending colon (secondary to left-sided colitis, to ischemic colitis(?), to vasculitis(?). hemorrhoids; biopsy: TI & random biopsies- WNL, transverse/hepatic/descending- mild active colitis without definitive features of chronicity    COLONOSCOPY N/A 12/16/2022    Procedure:  COLONOSCOPY;  Surgeon: Jarret Zhao Jr., MD;  Location: [facility];  Service: Endoscopy;  Laterality: N/A; Repeat colonoscopy in 5 years for screening    CYSTOSCOPY  12/01/2021    Procedure: CYSTOSCOPY;  Surgeon: Ana María Easley MD;  Location: [facility] OR;  Service: OB/GYN;;    ESOPHAGOGASTRODUODENOSCOPY N/A 10/01/2020    Dr. Zhao: Erythema in the lower third of the esophagus; Duodenal bulb erosions without bleeding. biopsy: duodenum- Small bowel mucosa with mild acute inflammation changes may represent erosion, Villous architecture is maintained; stomach- mild chronic gastritis; esophagus- minimal reactive changes    ESOPHAGOGASTRODUODENOSCOPY N/A 12/16/2022    Procedure: EGD (ESOPHAGOGASTRODUODENOSCOPY);  Surgeon: Jarret Zhao Jr., MD;  Location: [facility];  Service: Endoscopy;  Laterality: N/A;    INJECTION OF ANESTHETIC AGENT AROUND MEDIAL BRANCH NERVES INNERVATING LUMBAR FACET JOINT Bilateral 3/13/2024    Procedure: Block-nerve-medial branch-lumbar    L4/5, L5/S1;  Surgeon: Dain Lawrence MD;  Location: [facility];  Service: Pain Management;  Laterality: Bilateral;    LAPAROSCOPIC OOPHORECTOMY Left 12/01/2021    Procedure: OOPHORECTOMY, LAPAROSCOPIC;  Surgeon: Ana María Easley MD;  Location: [facility] OR;  Service: OB/GYN;  Laterality: Left;    LAPAROSCOPIC SALPINGECTOMY Bilateral 12/01/2021    Procedure: SALPINGECTOMY, LAPAROSCOPIC;  Surgeon: Ana María Easley MD;  Location: [facility] OR;  Service: OB/GYN;  Laterality: Bilateral;    LAPAROSCOPIC TOTAL HYSTERECTOMY N/A 12/01/2021    Procedure: HYSTERECTOMY, TOTAL, LAPAROSCOPIC;  Surgeon: Ana María Easley MD;  Location: [facility] OR;  Service: OB/GYN;  Laterality: N/A;    MOUTH SURGERY      x2    PELVIC LAPAROSCOPY      SINUS SURGERY  11/23/2021    FESS, turbinate red    TONSILLECTOMY  07/2016    UPPER GASTROINTESTINAL ENDOSCOPY  05/2016    Dr. Zhao     Family History   Problem Relation Age of Onset    Fibromyalgia Mother     Depression Mother      Irritable bowel syndrome Mother     Diabetes Father     Depression Father     Mental illness Father         bipolar disorder    Asthma Father     Multiple sclerosis Maternal Aunt     Irritable bowel syndrome Maternal Grandmother     Colon cancer Neg Hx     Ulcerative colitis Neg Hx     Stomach cancer Neg Hx     Esophageal cancer Neg Hx     Breast cancer Neg Hx     Ovarian cancer Neg Hx     Celiac disease Neg Hx      Social History     Tobacco Use    Smoking status: Former     Current packs/day: 0.00     Types: Cigarettes, Vaping with nicotine     Quit date: 2018     Years since quittin.2    Smokeless tobacco: Never    Tobacco comments:     Still vaping   Substance Use Topics    Alcohol use: No     Alcohol/week: 0.0 standard drinks of alcohol     Comment: quit since     Drug use: No        Current Facility-Administered Medications:     lactated ringers infusion, , Intravenous, Continuous, Dain Lawrence MD    Review of Systems:  General ROS: negative for - fever  Dermatological ROS: negative for rash    OBJECTIVE:     Vital Signs (Most Recent):  Temp: 98.4 °F (36.9 °C) (24 1333)  Pulse: 67 (24 1333)  Resp: 18 (24 1333)  BP: (!) 126/59 (24 1333)  SpO2: 99 % (24 1333)  There is no height or weight on file to calculate BMI.    Physical Exam:  General appearance - alert, well appearing, and in no distress  Mental status - AOx3  Eyes - pupils equal and reactive, extraocular eye movements intact  Heart - normal rate, regular rhythm, normal S1, S2, no murmurs, rubs, clicks or gallops  Chest - clear to auscultation, no wheezes, rales or rhonchi, symmetric air entry  Abdomen - soft, nontender, nondistended, no masses or organomegaly  Neurological - alert, oriented, normal speech, no focal findings or movement disorder noted  Extremities - peripheral pulses normal, no pedal edema, no clubbing or cyanosis      ASSESSMENT/PLAN:     There are no hospital problems to display for this  patient.     Indication: The patient has clinical and radiologic findings suggestive of facet mediated pain and is s/p 1st diagnostic bilateral lumbar L4/5 and L5/6medial branch blocks with at least 80% relief of their axial back pain lasting the duration of the local anesthetic utilized.    We will proceed with bilateral L4/5 and L5/6 RFA.   The risks and benefits of this intervention, and alternative therapies were discussed with the patient.  The discussion of risks included infection, bleeding, need for additional procedures or surgery, nerve damage, paralysis, adverse medication reaction(s), stroke, and/or death.  Questions regarding the procedure, risks, expected outcome, and possible side effects were solicited and answered to the patient's satisfaction.  Holly wishes to proceed with the injection.  Verbal and written consent were verified.  ASA 2, MP II      Proceed with intervention as scheduled.    Dain Lawrence M.D.  Interventional Pain Medicine / Anesthesiology

## 2024-04-04 ENCOUNTER — TELEPHONE (OUTPATIENT)
Dept: PAIN MEDICINE | Facility: CLINIC | Age: 29
End: 2024-04-04
Payer: COMMERCIAL

## 2024-04-04 DIAGNOSIS — M47.816 LUMBAR SPONDYLOSIS: Primary | ICD-10-CM

## 2024-04-04 RX ORDER — SODIUM CHLORIDE, SODIUM LACTATE, POTASSIUM CHLORIDE, CALCIUM CHLORIDE 600; 310; 30; 20 MG/100ML; MG/100ML; MG/100ML; MG/100ML
INJECTION, SOLUTION INTRAVENOUS CONTINUOUS
Status: CANCELLED | OUTPATIENT
Start: 2024-04-04

## 2024-04-14 DIAGNOSIS — Z87.19 HISTORY OF IBS: ICD-10-CM

## 2024-04-14 DIAGNOSIS — Z90.49 S/P CHOLECYSTECTOMY: ICD-10-CM

## 2024-04-16 ENCOUNTER — OFFICE VISIT (OUTPATIENT)
Dept: OBSTETRICS AND GYNECOLOGY | Facility: CLINIC | Age: 29
End: 2024-04-16
Payer: COMMERCIAL

## 2024-04-16 VITALS
WEIGHT: 144.81 LBS | SYSTOLIC BLOOD PRESSURE: 100 MMHG | DIASTOLIC BLOOD PRESSURE: 74 MMHG | HEIGHT: 68 IN | BODY MASS INDEX: 21.95 KG/M2

## 2024-04-16 DIAGNOSIS — R10.31 RIGHT LOWER QUADRANT ABDOMINAL PAIN: Primary | ICD-10-CM

## 2024-04-16 DIAGNOSIS — N87.0 MILD DYSPLASIA OF CERVIX (CIN I): ICD-10-CM

## 2024-04-16 DIAGNOSIS — Z90.710 S/P HYSTERECTOMY: ICD-10-CM

## 2024-04-16 PROCEDURE — 99213 OFFICE O/P EST LOW 20 MIN: CPT | Mod: S$GLB,,, | Performed by: OBSTETRICS & GYNECOLOGY

## 2024-04-16 PROCEDURE — 3078F DIAST BP <80 MM HG: CPT | Mod: CPTII,S$GLB,, | Performed by: OBSTETRICS & GYNECOLOGY

## 2024-04-16 PROCEDURE — 1159F MED LIST DOCD IN RCRD: CPT | Mod: CPTII,S$GLB,, | Performed by: OBSTETRICS & GYNECOLOGY

## 2024-04-16 PROCEDURE — 3008F BODY MASS INDEX DOCD: CPT | Mod: CPTII,S$GLB,, | Performed by: OBSTETRICS & GYNECOLOGY

## 2024-04-16 PROCEDURE — 99999 PR PBB SHADOW E&M-EST. PATIENT-LVL IV: CPT | Mod: PBBFAC,,, | Performed by: OBSTETRICS & GYNECOLOGY

## 2024-04-16 PROCEDURE — 3074F SYST BP LT 130 MM HG: CPT | Mod: CPTII,S$GLB,, | Performed by: OBSTETRICS & GYNECOLOGY

## 2024-04-16 RX ORDER — MONTELUKAST SODIUM 4 MG/1
TABLET, CHEWABLE ORAL
Qty: 180 TABLET | Refills: 0 | Status: SHIPPED | OUTPATIENT
Start: 2024-04-16

## 2024-04-16 NOTE — PROGRESS NOTES
Chief Complaint   Patient presents with    Follow-up     ER: Enlarged Ovary       History of Present Illness: Holly Nichols is a 28 y.o. female that presents today 4/16/2024 with LMP Patient's last menstrual period was 11/01/2020. for   Chief Complaint   Patient presents with    Follow-up     ER: Enlarged Ovary     She reports told ovary enlarged.   Pain right lower radiating to below ribs sharp stabbing with some improvement today    Colonoscopy planned for 5/23    Past Medical History:   Diagnosis Date    Abnormal Pap smear of cervix     Allergy     Anxiety     Asthma     rare inhaler use    Constipation     Depression     Endometriosis     Fibromyalgia     Gastritis     HLA B27 (HLA B27 positive)     Migraine     Postpartum depression     Scoliosis     Smoker     stopped in January 2018    Tobacco dependence 2/17/2016       Past Surgical History:   Procedure Laterality Date    ADENOIDECTOMY      APPENDECTOMY  09/2014    endometriosis    CHOLECYSTECTOMY  12/20/2017    Dr. MARBELLA Anne, [facility]     COLONOSCOPY N/A 05/19/2016    Procedure: COLONOSCOPY;  Surgeon: Jarret Zhao Jr., MD;  Location: [facility];  Service: Endoscopy;  Laterality: N/A;    COLONOSCOPY N/A 08/27/2020    Dr. Zhao: Erythema & ulcerated mucosa in descending, splenic flexure & distal transverse (r/o UC, r/o ischemia, r/o vasculiti, etc.), Mucosal changes in descending colon (secondary to left-sided colitis, to ischemic colitis(?), to vasculitis(?). hemorrhoids; biopsy: TI & random biopsies- WNL, transverse/hepatic/descending- mild active colitis without definitive features of chronicity    COLONOSCOPY N/A 12/16/2022    Procedure: COLONOSCOPY;  Surgeon: Jarret Zhao Jr., MD;  Location: [facility];  Service: Endoscopy;  Laterality: N/A; Repeat colonoscopy in 5 years for screening    CYSTOSCOPY  12/01/2021    Procedure: CYSTOSCOPY;  Surgeon: Ana María Easley MD;  Location: [facility] OR;  Service: OB/GYN;;    ESOPHAGOGASTRODUODENOSCOPY N/A  10/01/2020    Dr. Zhao: Erythema in the lower third of the esophagus; Duodenal bulb erosions without bleeding. biopsy: duodenum- Small bowel mucosa with mild acute inflammation changes may represent erosion, Villous architecture is maintained; stomach- mild chronic gastritis; esophagus- minimal reactive changes    ESOPHAGOGASTRODUODENOSCOPY N/A 12/16/2022    Procedure: EGD (ESOPHAGOGASTRODUODENOSCOPY);  Surgeon: Jarret Zhao Jr., MD;  Location: [facility] ENDO;  Service: Endoscopy;  Laterality: N/A;    INJECTION OF ANESTHETIC AGENT AROUND MEDIAL BRANCH NERVES INNERVATING LUMBAR FACET JOINT Bilateral 3/13/2024    Procedure: Block-nerve-medial branch-lumbar    L4/5, L5/S1;  Surgeon: Dain Lawrence MD;  Location: [facility] OR;  Service: Pain Management;  Laterality: Bilateral;    INJECTION OF ANESTHETIC AGENT AROUND MEDIAL BRANCH NERVES INNERVATING LUMBAR FACET JOINT Bilateral 4/2/2024    Procedure: Block-nerve-medial branch-lumbar-L4/5-L5/S1;  Surgeon: Dain Lawrence MD;  Location: [facility] OR;  Service: Pain Management;  Laterality: Bilateral;    LAPAROSCOPIC OOPHORECTOMY Left 12/01/2021    Procedure: OOPHORECTOMY, LAPAROSCOPIC;  Surgeon: Ana María Easley MD;  Location: [facility] OR;  Service: OB/GYN;  Laterality: Left;    LAPAROSCOPIC SALPINGECTOMY Bilateral 12/01/2021    Procedure: SALPINGECTOMY, LAPAROSCOPIC;  Surgeon: Ana María Easley MD;  Location: [facility] OR;  Service: OB/GYN;  Laterality: Bilateral;    LAPAROSCOPIC TOTAL HYSTERECTOMY N/A 12/01/2021    Procedure: HYSTERECTOMY, TOTAL, LAPAROSCOPIC;  Surgeon: Ana María Easley MD;  Location: [facility] OR;  Service: OB/GYN;  Laterality: N/A;    MOUTH SURGERY      x2    PELVIC LAPAROSCOPY      SINUS SURGERY  11/23/2021    FESS, turbinate red    TONSILLECTOMY  07/2016    UPPER GASTROINTESTINAL ENDOSCOPY  05/2016    Dr. Zhao       Outpatient Medications Prior to Visit   Medication Sig Dispense Refill    albuterol (VENTOLIN HFA) 90 mcg/actuation inhaler Inhale 2 puffs  into the lungs every 6 (six) hours as needed for Wheezing. Rescue 18 g 5    Bifidobacterium infantis (ALIGN ORAL) Take 1 tablet by mouth once daily.      cetirizine (ZYRTEC) 10 MG tablet Take 10 mg by mouth nightly as needed for Allergies.      citalopram (CELEXA) 20 MG tablet Take 1 tablet (20 mg total) by mouth once daily. Along with 40mg for a total of 60mg daily 90 tablet 3    citalopram (CELEXA) 40 MG tablet Take 1 tablet (40 mg total) by mouth once daily. Along with a 20 mg tablet for a total of 60 mg 90 tablet 3    colestipoL (COLESTID) 1 gram Tab Take 1 tablet (1 g total) by mouth 2 (two) times daily. Take other oral medications >1h before or >4h after colestipol 60 tablet 2    dextroamphetamine-amphetamine (ADDERALL XR) 15 MG 24 hr capsule Take 1 capsule (15 mg total) by mouth every morning. 30 capsule 0    dextroamphetamine-amphetamine (ADDERALL XR) 15 MG 24 hr capsule Take 1 capsule (15 mg total) by mouth once daily. 30 capsule 0    dextroamphetamine-amphetamine (ADDERALL) 5 mg Tab Take 5 mg by mouth daily as needed. 30 tablet 0    diphenoxylate-atropine 2.5-0.025 mg (LOMOTIL) 2.5-0.025 mg per tablet Take 1 tablet by mouth every 6 (six) hours as needed for Diarrhea. 40 tablet 1    esomeprazole (NEXIUM) 40 MG capsule Take 1 capsule (40 mg total) by mouth before breakfast. 90 capsule 1    fluticasone propionate (FLONASE) 50 mcg/actuation nasal spray 1 spray by Each Nostril route 2 (two) times daily as needed for Allergies.      fluticasone-umeclidin-vilanter (TRELEGY ELLIPTA) 200-62.5-25 mcg inhaler Inhale 1 puff into the lungs once daily. 60 each 2    hydrOXYzine HCL (ATARAX) 25 MG tablet Take 1 tablet (25 mg total) by mouth nightly as needed for Anxiety. 30 tablet 2    ipratropium-albuteroL (COMBIVENT RESPIMAT)  mcg/actuation inhaler Inhale 1 puff into the lungs 4 (four) times daily. Rescue 4 g 3    ondansetron (ZOFRAN-ODT) 4 MG TbDL Take 1 tablet (4 mg total) by mouth 3 (three) times daily as  needed (nausea). 30 tablet 1    tretinoin (RETIN-A) 0.025 % cream Apply topically every evening. 20 g 0    ALPRAZolam (XANAX) 0.25 MG tablet Take 1 tablet (0.25 mg total) by mouth daily as needed for Anxiety. 30 tablet 0     No facility-administered medications prior to visit.       Review of patient's allergies indicates:   Allergen Reactions    Latex, natural rubber      burning    Sulfur Hives    Toradol [ketorolac] Other (See Comments)     Metallic taste only once in ; has taken since without problems    Tramadol Other (See Comments)     paralysis    Penicillins Hives and Rash    Sulfa (sulfonamide antibiotics) Hives and Rash       Family History   Problem Relation Name Age of Onset    Fibromyalgia Mother      Depression Mother      Irritable bowel syndrome Mother      Diabetes Father      Depression Father      Mental illness Father          bipolar disorder    Asthma Father      Multiple sclerosis Maternal Aunt      Irritable bowel syndrome Maternal Grandmother      Colon cancer Neg Hx      Ulcerative colitis Neg Hx      Stomach cancer Neg Hx      Esophageal cancer Neg Hx      Breast cancer Neg Hx      Ovarian cancer Neg Hx      Celiac disease Neg Hx         Social History     Tobacco Use    Smoking status: Former     Current packs/day: 0.00     Types: Cigarettes, Vaping with nicotine     Quit date: 2018     Years since quittin.2    Smokeless tobacco: Never    Tobacco comments:     Still vaping   Substance Use Topics    Alcohol use: No     Alcohol/week: 0.0 standard drinks of alcohol     Comment: quit since     Drug use: No       OB History    Para Term  AB Living   3 3 3     3   SAB IAB Ectopic Multiple Live Births         0 3      # Outcome Date GA Lbr Beny/2nd Weight Sex Type Anes PTL Lv   3 Term 21 37w5d 09:30 / 00:07 3.26 kg (7 lb 3 oz) M Vag-Spont EPI N RACHELE   2 Term 20 38w4d / 00:04 3.305 kg (7 lb 4.6 oz) F Vag-Spont EPI N RACHELE   1 Term 18 39w1d 12:11 / 00:20  "3.459 kg (7 lb 10 oz) F Vag-Spont EPI N RACHELE         /74   Ht 5' 8" (1.727 m)   Wt 65.7 kg (144 lb 13.5 oz)   LMP 11/01/2020   Physical Exam:  APPEARANCE: Well nourished, well developed, in no acute distress.  SKIN: Normal skin turgor, no lesions.  NECK: Neck symmetric without masses   RESPIRATORY: Normal respiratory effort with no retractions or use of accessory muscles  CARDIOVASCULAR: Peripheral vascular system with no swelling no varicosities and palpation of pulses normal  LYMPHATIC: No enlargements of the lymph nodes noted in the neck, axillae, or groin  ABDOMEN: Soft. No tenderness or masses. No hepatosplenomegaly. No hernias.  PELVIC: Normal external female genitalia without lesions. Normal hair distribution. Adequate perineal body, normal urethral meatus. Urethra with no masses.  Bladder nontender. Vagina moist and well rugated without lesions or discharge.  Adnexa with RIGHT non- tenderness mass ++ baseball sized. Urethra and bladder normal.  EXTREMITIES: No clubbing cyanosis or edema.    ASSESSMENT/PLAN:  Right lower quadrant abdominal pain  -     US Pelvis Comp with Transvag NON-OB (xpd; Future; Expected date: 04/16/2024    S/P hysterectomy    Mild dysplasia of cervix (SUE I)        30 minutes spent today spent preparing reviewing previous external notes, reviewing previous results, performing medical examination, ordering tests or medications, counseling and documenting.       "

## 2024-04-17 ENCOUNTER — PATIENT MESSAGE (OUTPATIENT)
Dept: OBSTETRICS AND GYNECOLOGY | Facility: CLINIC | Age: 29
End: 2024-04-17
Payer: COMMERCIAL

## 2024-04-17 ENCOUNTER — TELEPHONE (OUTPATIENT)
Dept: OBSTETRICS AND GYNECOLOGY | Facility: CLINIC | Age: 29
End: 2024-04-17
Payer: COMMERCIAL

## 2024-04-17 NOTE — TELEPHONE ENCOUNTER
----- Message from Lina Caruso sent at 4/17/2024 11:35 AM CDT -----  Type:  Sooner Appointment Request    Caller is requesting a sooner appointment.  Caller declined first available appointment listed below.  Caller will not accept being placed on the waitlist and is requesting a message be sent to doctor.    Name of Caller:  pt  When is the first available appointment?  April 24  Symptoms:    Would the patient rather a call back or a response via [facility]? call  Best Call Back Number:  [phone]  Additional Information:  pt is requesting sooner appt.  Please call to advise

## 2024-04-24 ENCOUNTER — HOSPITAL ENCOUNTER (OUTPATIENT)
Dept: RADIOLOGY | Facility: HOSPITAL | Age: 29
Discharge: HOME | End: 2024-04-24
Attending: ANESTHESIOLOGY | Admitting: ANESTHESIOLOGY
Payer: COMMERCIAL

## 2024-04-24 ENCOUNTER — HOSPITAL ENCOUNTER (OUTPATIENT)
Facility: HOSPITAL | Age: 29
Discharge: HOME | End: 2024-04-24
Attending: ANESTHESIOLOGY | Admitting: ANESTHESIOLOGY
Payer: COMMERCIAL

## 2024-04-24 VITALS
BODY MASS INDEX: 21.82 KG/M2 | SYSTOLIC BLOOD PRESSURE: 129 MMHG | OXYGEN SATURATION: 99 % | DIASTOLIC BLOOD PRESSURE: 79 MMHG | HEART RATE: 68 BPM | RESPIRATION RATE: 18 BRPM | WEIGHT: 144 LBS | HEIGHT: 68 IN | TEMPERATURE: 98 F

## 2024-04-24 DIAGNOSIS — M54.50 LOWER BACK PAIN: ICD-10-CM

## 2024-04-24 DIAGNOSIS — M47.816 LUMBAR SPONDYLOSIS: Primary | ICD-10-CM

## 2024-04-24 PROCEDURE — 63600175 PHARM REV CODE 636 W HCPCS: Mod: JZ,JG,PO | Performed by: ANESTHESIOLOGY

## 2024-04-24 PROCEDURE — 64635 DESTROY LUMB/SAC FACET JNT: CPT | Mod: 50,PO | Performed by: ANESTHESIOLOGY

## 2024-04-24 PROCEDURE — 64636 DESTROY L/S FACET JNT ADDL: CPT | Mod: 50,,, | Performed by: ANESTHESIOLOGY

## 2024-04-24 PROCEDURE — 64636 DESTROY L/S FACET JNT ADDL: CPT | Mod: 50,PO | Performed by: ANESTHESIOLOGY

## 2024-04-24 PROCEDURE — 99152 MOD SED SAME PHYS/QHP 5/>YRS: CPT | Mod: ,,, | Performed by: ANESTHESIOLOGY

## 2024-04-24 PROCEDURE — 76000 FLUOROSCOPY <1 HR PHYS/QHP: CPT | Mod: TC,PO

## 2024-04-24 PROCEDURE — 64635 DESTROY LUMB/SAC FACET JNT: CPT | Mod: 50,,, | Performed by: ANESTHESIOLOGY

## 2024-04-24 PROCEDURE — 25000003 PHARM REV CODE 250: Mod: PO | Performed by: ANESTHESIOLOGY

## 2024-04-24 RX ORDER — SODIUM CHLORIDE, SODIUM LACTATE, POTASSIUM CHLORIDE, CALCIUM CHLORIDE 600; 310; 30; 20 MG/100ML; MG/100ML; MG/100ML; MG/100ML
INJECTION, SOLUTION INTRAVENOUS CONTINUOUS
Status: DISCONTINUED | OUTPATIENT
Start: 2024-04-24 | End: 2024-04-24 | Stop reason: HOSPADM

## 2024-04-24 RX ORDER — LIDOCAINE HYDROCHLORIDE 20 MG/ML
INJECTION, SOLUTION EPIDURAL; INFILTRATION; INTRACAUDAL; PERINEURAL
Status: DISCONTINUED | OUTPATIENT
Start: 2024-04-24 | End: 2024-04-24 | Stop reason: HOSPADM

## 2024-04-24 RX ORDER — LIDOCAINE HYDROCHLORIDE 10 MG/ML
INJECTION, SOLUTION EPIDURAL; INFILTRATION; INTRACAUDAL; PERINEURAL
Status: DISCONTINUED | OUTPATIENT
Start: 2024-04-24 | End: 2024-04-24 | Stop reason: HOSPADM

## 2024-04-24 RX ORDER — BUPIVACAINE HYDROCHLORIDE 2.5 MG/ML
INJECTION, SOLUTION EPIDURAL; INFILTRATION; INTRACAUDAL
Status: DISCONTINUED | OUTPATIENT
Start: 2024-04-24 | End: 2024-04-24 | Stop reason: HOSPADM

## 2024-04-24 RX ORDER — TRIAMCINOLONE ACETONIDE 40 MG/ML
INJECTION, SUSPENSION INTRA-ARTICULAR; INTRAMUSCULAR
Status: DISCONTINUED | OUTPATIENT
Start: 2024-04-24 | End: 2024-04-24 | Stop reason: HOSPADM

## 2024-04-24 RX ORDER — FENTANYL CITRATE 50 UG/ML
INJECTION, SOLUTION INTRAMUSCULAR; INTRAVENOUS
Status: DISCONTINUED | OUTPATIENT
Start: 2024-04-24 | End: 2024-04-24 | Stop reason: HOSPADM

## 2024-04-24 RX ORDER — MIDAZOLAM HYDROCHLORIDE 1 MG/ML
INJECTION INTRAMUSCULAR; INTRAVENOUS
Status: DISCONTINUED | OUTPATIENT
Start: 2024-04-24 | End: 2024-04-24 | Stop reason: HOSPADM

## 2024-04-24 RX ADMIN — SODIUM CHLORIDE, POTASSIUM CHLORIDE, SODIUM LACTATE AND CALCIUM CHLORIDE: 600; 310; 30; 20 INJECTION, SOLUTION INTRAVENOUS at 12:04

## 2024-04-24 NOTE — OP NOTE
Procedure Note    Procedure Date: 4/24/2024    Procedure Performed:  Radiofrequency ablation of the L4/5 and L5/L6 medial branch nerves on the  bilateral side utilizing fluoroscopy    Indication: The patient has clinical and radiologic findings suggestive of facet mediated pain and is s/p 2nd diagnostic bilateral lumbar L4/5 and L5/L6 medial branch blocks with at least 80% relief of their axial back pain lasting the duration of the local anesthetic utilized.     Pre-op diagnosis: Lumbar Spondylosis    Post-op diagnosis: same    Physician: Dain Lawrence MD    IV Sedation medications: Moderate sedation was achieved with midazolam 2 mg and fentanyl 100 mcg.  Continuous monitoring of EKG, blood pressure and pulse oximetry was provided by a registered nurse during the entire course of the procedure under my supervision and recorded in the patient's medical record.   Total time for sedation was 16 minutes.    Medications injected:  Pre-lesion, 1ml of 2% lidocaine at each level.  From a mixture of 5ml of 0.25% bupivacaine and 40mg of methylprednisolone, 1ml of this solution was injected at each level post-lesion.    Local anesthetic used: 1% Lidocaine, 4 ml    Estimated Blood Loss: none    Complications:  none    Technique:  The patient was interviewed in the holding area and Risks/Benefits were discussed, including, but not limited to, the possibility of new or different pain, bleeding or infection.   All questions were answered.  The patient understood and accepted risks.  Consent was reviewed.  A time out was taken to identify the patient, procedure and side of the procedure. The patient was placed in a prone position, then prepped and draped in the usual sterile fashion using ChloraPrep x2 and sterile towels.  The levels were determined under fluoroscopic guidance and then marked.  AP and oblique fluoroscopy were used to identify and lucinda the junctions between the superior articular processes and transverse processes at  the L3-5 levels on the Bilateral side.  The Bilateral sacral ala was also identified and marked.  The skin and subcutaneous tissues in these identified areas were anesthetized with 1% lidocaine. A 20-gauge 100 mm TV TubeX RF needle was advanced towards each of these points under fluoroscopic guidance such that the tip of the needle was positioned posterior to the Neuro-foramen on lateral fluoroscopic view.  Then, each needle was positioned such that, on stimulation, the patient had an appropriate pain response between 0.3-0.5 V, with no motor response, other than Lumbar Paraspinal Muscles up to 2.0V.  One mL of 2% lidocaine was then injected at each level prior to lesioning, which was performed for 90 seconds at 80°C.  Once the lesion was complete, 1 mL of a solution consisting of 5 mL 0.25% bupivacaine and 40mg methylprednisolone was injected through each probe. The probes were removed with a 1% lidocaine flush.  The patient tolerated the procedure well.  The patient was monitored after the procedure.  Patient was given post procedure and discharge instructions to follow at home.  The patient was discharged in a stable condition.    Event Time In   In Facility 1156   In Pre-Procedure 1230   Physician Available    Anesthesia Available    Pre-Op: Bedside Procedure Start    Pre-Op: Bedside Procedure Stop    Pre-Procedure Complete 1249   Out of Pre-Procedure    Anesthesia Start    Anesthesia Start Data Collection    Setup Start    Setup Complete    In Room 1340   Prep Start    Procedure Prep Complete    MD notified pt. ready    Procedure Start 1347   Procedure Closing    Emergence    Procedure Finish 1405   Sedation Start 1349   Scope In    Extent Reached    Scope Out    Sedation End 1405   Out of Room 1408   Cleanup Start    Cleanup Complete    Cosmetic Start    Cosmetic Stop    Pain Mgmt In Room    Pain Mgmt Out Room    In Recovery    Anesthesia Finish    Bedside Procedure Start    Bedside Procedure Stop    Recovery  Care Complete    Out of Recovery    To Phase II    In Phase II    Pain Mgmt Recovery Start    Pain Mgmt Recovery Stop    Obs Rec Start    Obs Rec Stop    Phase II Care Complete    Out of Phase II    Procedural Care Complete    Discharge    Pain Follow Up Needed    Pain Follow Up Complete

## 2024-04-24 NOTE — DISCHARGE SUMMARY
[facility]  Discharge Note  Short Stay    Admit Date: 4/24/2024    Discharge Date: 4/24/2024    Attending Physician: Dain Lawrence     Discharge Provider: Dain Lawrence    Diagnoses:  There are no hospital problems to display for this patient.      Discharged Condition: Good    Final Diagnoses: Lumbar spondylosis [M47.816]    Disposition: Home or Self Care    Hospital Course: No complications, uneventful    Outcome of Hospitalization, Treatment, Procedure, or Surgery:  Patient was admitted for outpatient interventional pain management procedure. The patient tolerated the procedure well with no complications.    Follow up/Patient Instructions:  Follow up as scheduled in Pain Management office in 2-3 weeks.  Patient has received instructions and follow up date and time.    Medications:  Continue previous medications    Discharge Procedure Orders   Notify your health care provider if you experience any of the following:  temperature >100.4     Notify your health care provider if you experience any of the following:  persistent nausea and vomiting or diarrhea     Notify your health care provider if you experience any of the following:  severe uncontrolled pain     Notify your health care provider if you experience any of the following:  redness, tenderness, or signs of infection (pain, swelling, redness, odor or green/yellow discharge around incision site)     Notify your health care provider if you experience any of the following:  difficulty breathing or increased cough     Notify your health care provider if you experience any of the following:  severe persistent headache     Notify your health care provider if you experience any of the following:  worsening rash     Notify your health care provider if you experience any of the following:  persistent dizziness, light-headedness, or visual disturbances     Notify your health care provider if you experience any of the following:  increased confusion or  weakness     Activity as tolerated

## 2024-04-24 NOTE — INTERVAL H&P NOTE
The patient has been examined and the H&P has been reviewed:    I concur with the findings and no changes have occurred since H&P was written.    Indication: The patient has clinical and radiologic findings suggestive of facet mediated pain and is s/p 2nd diagnostic bilateral lumbar L4/5 and L5/L6 medial branch blocks with at least 80% relief of their axial back pain lasting the duration of the local anesthetic utilized.  We will proceed with L4/5 and L5/L6 RFA. The risks and benefits of this intervention, and alternative therapies were discussed with the patient.  The discussion of risks included infection, bleeding, need for additional procedures or surgery, nerve damage.  Questions regarding the procedure, risks, expected outcome, and possible side effects were solicited and answered to the patient's satisfaction.  Holly Nichols wishes to proceed with the injection or procedure.  Written consent was obtained.  ASA 2, MP II    There are no hospital problems to display for this patient.

## 2024-04-25 ENCOUNTER — TELEPHONE (OUTPATIENT)
Dept: FAMILY MEDICINE | Facility: CLINIC | Age: 29
End: 2024-04-25
Payer: COMMERCIAL

## 2024-04-25 NOTE — TELEPHONE ENCOUNTER
Called pt to confirm appt since she has not confirmed via text or portal. She stated she will be there.

## 2024-04-26 ENCOUNTER — OFFICE VISIT (OUTPATIENT)
Dept: FAMILY MEDICINE | Facility: CLINIC | Age: 29
End: 2024-04-26
Payer: COMMERCIAL

## 2024-04-26 ENCOUNTER — TELEPHONE (OUTPATIENT)
Dept: PAIN MEDICINE | Facility: CLINIC | Age: 29
End: 2024-04-26
Payer: COMMERCIAL

## 2024-04-26 VITALS
HEIGHT: 68 IN | DIASTOLIC BLOOD PRESSURE: 70 MMHG | OXYGEN SATURATION: 97 % | HEART RATE: 90 BPM | WEIGHT: 144.38 LBS | RESPIRATION RATE: 18 BRPM | BODY MASS INDEX: 21.88 KG/M2 | SYSTOLIC BLOOD PRESSURE: 126 MMHG

## 2024-04-26 DIAGNOSIS — M41.20 IDIOPATHIC SCOLIOSIS AND KYPHOSCOLIOSIS: ICD-10-CM

## 2024-04-26 DIAGNOSIS — F41.9 ANXIETY: ICD-10-CM

## 2024-04-26 DIAGNOSIS — K58.2 IRRITABLE BOWEL SYNDROME WITH BOTH CONSTIPATION AND DIARRHEA: ICD-10-CM

## 2024-04-26 DIAGNOSIS — F98.8 ATTENTION DEFICIT DISORDER (ADD) IN ADULT: Primary | ICD-10-CM

## 2024-04-26 DIAGNOSIS — K59.09 OTHER CONSTIPATION: ICD-10-CM

## 2024-04-26 PROCEDURE — 3008F BODY MASS INDEX DOCD: CPT | Mod: CPTII,S$GLB,, | Performed by: FAMILY MEDICINE

## 2024-04-26 PROCEDURE — 1159F MED LIST DOCD IN RCRD: CPT | Mod: CPTII,S$GLB,, | Performed by: FAMILY MEDICINE

## 2024-04-26 PROCEDURE — 99999 PR PBB SHADOW E&M-EST. PATIENT-LVL IV: CPT | Mod: PBBFAC,,, | Performed by: FAMILY MEDICINE

## 2024-04-26 PROCEDURE — 3078F DIAST BP <80 MM HG: CPT | Mod: CPTII,S$GLB,, | Performed by: FAMILY MEDICINE

## 2024-04-26 PROCEDURE — 3074F SYST BP LT 130 MM HG: CPT | Mod: CPTII,S$GLB,, | Performed by: FAMILY MEDICINE

## 2024-04-26 PROCEDURE — 99214 OFFICE O/P EST MOD 30 MIN: CPT | Mod: S$GLB,,, | Performed by: FAMILY MEDICINE

## 2024-04-26 RX ORDER — METHYLPREDNISOLONE 4 MG/1
TABLET ORAL
Qty: 1 EACH | Refills: 0 | Status: SHIPPED | OUTPATIENT
Start: 2024-04-26 | End: 2024-05-17

## 2024-04-26 RX ORDER — LUBIPROSTONE 8 UG/1
8 CAPSULE ORAL 2 TIMES DAILY
Qty: 60 CAPSULE | Refills: 0 | Status: SHIPPED | OUTPATIENT
Start: 2024-04-26 | End: 2024-04-26

## 2024-04-26 RX ORDER — DEXTROAMPHETAMINE SACCHARATE, AMPHETAMINE ASPARTATE, DEXTROAMPHETAMINE SULFATE AND AMPHETAMINE SULFATE 1.25; 1.25; 1.25; 1.25 MG/1; MG/1; MG/1; MG/1
1 TABLET ORAL DAILY PRN
Qty: 30 TABLET | Refills: 0 | Status: SHIPPED | OUTPATIENT
Start: 2024-05-26

## 2024-04-26 RX ORDER — DEXTROAMPHETAMINE SACCHARATE, AMPHETAMINE ASPARTATE MONOHYDRATE, DEXTROAMPHETAMINE SULFATE AND AMPHETAMINE SULFATE 3.75; 3.75; 3.75; 3.75 MG/1; MG/1; MG/1; MG/1
15 CAPSULE, EXTENDED RELEASE ORAL EVERY MORNING
Qty: 30 CAPSULE | Refills: 0 | Status: SHIPPED | OUTPATIENT
Start: 2024-05-26

## 2024-04-26 RX ORDER — DEXTROAMPHETAMINE SACCHARATE, AMPHETAMINE ASPARTATE MONOHYDRATE, DEXTROAMPHETAMINE SULFATE AND AMPHETAMINE SULFATE 3.75; 3.75; 3.75; 3.75 MG/1; MG/1; MG/1; MG/1
15 CAPSULE, EXTENDED RELEASE ORAL EVERY MORNING
Qty: 30 CAPSULE | Refills: 0 | Status: SHIPPED | OUTPATIENT
Start: 2024-06-26

## 2024-04-26 RX ORDER — ALPRAZOLAM 0.25 MG/1
0.25 TABLET ORAL DAILY PRN
Qty: 15 TABLET | Refills: 0 | Status: SHIPPED | OUTPATIENT
Start: 2024-04-26 | End: 2024-05-11

## 2024-04-26 RX ORDER — LUBIPROSTONE 8 UG/1
CAPSULE ORAL
Qty: 180 CAPSULE | Refills: 0 | Status: SHIPPED | OUTPATIENT
Start: 2024-04-26

## 2024-04-26 RX ORDER — DEXTROAMPHETAMINE SACCHARATE, AMPHETAMINE ASPARTATE, DEXTROAMPHETAMINE SULFATE AND AMPHETAMINE SULFATE 1.25; 1.25; 1.25; 1.25 MG/1; MG/1; MG/1; MG/1
1 TABLET ORAL DAILY PRN
Qty: 30 TABLET | Refills: 0 | Status: SHIPPED | OUTPATIENT
Start: 2024-04-26

## 2024-04-26 RX ORDER — DEXTROAMPHETAMINE SACCHARATE, AMPHETAMINE ASPARTATE, DEXTROAMPHETAMINE SULFATE AND AMPHETAMINE SULFATE 1.25; 1.25; 1.25; 1.25 MG/1; MG/1; MG/1; MG/1
1 TABLET ORAL DAILY PRN
Qty: 30 TABLET | Refills: 0 | Status: SHIPPED | OUTPATIENT
Start: 2024-06-26

## 2024-04-26 RX ORDER — LUBIPROSTONE 8 UG/1
CAPSULE ORAL
Qty: 60 CAPSULE | Refills: 0 | Status: SHIPPED | OUTPATIENT
Start: 2024-04-26 | End: 2024-04-26

## 2024-04-26 RX ORDER — DEXTROAMPHETAMINE SACCHARATE, AMPHETAMINE ASPARTATE MONOHYDRATE, DEXTROAMPHETAMINE SULFATE AND AMPHETAMINE SULFATE 3.75; 3.75; 3.75; 3.75 MG/1; MG/1; MG/1; MG/1
15 CAPSULE, EXTENDED RELEASE ORAL DAILY
Qty: 30 CAPSULE | Refills: 0 | Status: SHIPPED | OUTPATIENT
Start: 2024-04-26

## 2024-04-26 NOTE — TELEPHONE ENCOUNTER
----- Message from Romina Cruz sent at 4/26/2024  9:31 AM CDT -----  Type: Needs Medical Advice  Who Called:  patient  Best Call Back Number: [phone] (home)   Additional Information: patient requesting a call back from nurse- she has questions regarding her ablation

## 2024-04-26 NOTE — TELEPHONE ENCOUNTER
No care due was identified.  Health [facility] Embedded Care Due Messages. Reference number: [id number].   4/26/2024 10:04:31 AM CDT

## 2024-04-26 NOTE — TELEPHONE ENCOUNTER
No care due was identified.  Health [facility] Embedded Care Due Messages. Reference number: [id number].   4/26/2024 10:17:34 AM CDT

## 2024-04-26 NOTE — PROGRESS NOTES
Assessment:       1. Attention deficit disorder (ADD) in adult    2. Anxiety    3. Scoliosis (and kyphoscoliosis), idiopathic    4. Other constipation    5. Irritable bowel syndrome with both constipation and diarrhea        Assessment & Plan    Attention deficit disorder (ADD) in adult:  Improved  -     dextroamphetamine-amphetamine (ADDERALL XR) 15 MG 24 hr capsule; Take 1 capsule (15 mg total) by mouth once daily.  Dispense: 30 capsule; Refill: 0  -     dextroamphetamine-amphetamine (ADDERALL) 5 mg Tab; Take 5 mg by mouth daily as needed.  Dispense: 30 tablet; Refill: 0  -     dextroamphetamine-amphetamine (ADDERALL XR) 15 MG 24 hr capsule; Take 1 capsule (15 mg total) by mouth every morning.  Dispense: 30 capsule; Refill: 0  -     dextroamphetamine-amphetamine (ADDERALL XR) 15 MG 24 hr capsule; Take 1 capsule (15 mg total) by mouth every morning.  Dispense: 30 capsule; Refill: 0  -     dextroamphetamine-amphetamine (ADDERALL) 5 mg Tab; Take 5 mg by mouth daily as needed.  Dispense: 30 tablet; Refill: 0  -     dextroamphetamine-amphetamine (ADDERALL) 5 mg Tab; Take 5 mg by mouth daily as needed.  Dispense: 30 tablet; Refill: 0  [place] prescription monitoring program was checked and okay, Adderall was prescribed.    Anxiety:  Worsening.  -     ALPRAZolam (XANAX) 0.25 MG tablet; Take 1 tablet (0.25 mg total) by mouth daily as needed for Anxiety.  Dispense: 15 tablet; Refill: 0    Scoliosis (and kyphoscoliosis), idiopathic: Worsening pain.    Other constipation:  New problem, patient will follow-up with the GI specialist    Irritable bowel syndrome with both constipation and diarrhea: Worsening  -     lubiprostone (AMITIZA) 8 MCG Cap; Take 1 capsule (8 mcg total) by mouth 2 (two) times daily.  Dispense: 60 capsule; Refill: 0  Recommend the patient to take Amitiza as needed when she has constipation problems and continue with the recommendations by the gastroenterologist.  Continue probiotics over-the-counter  daily increase fiber and drink more water.       Follow-up  The patient is scheduled for a follow-up visit in 3 months.        Patient agreed with assessment and plan. Patient verbalized understanding.     Subjective:       Patient ID: Holly Nichols is a 28 y.o. female.    Chief Complaint: Follow-up and medication refill    HPI  History of Present Illness  The patient presents for evaluation of multiple medical concerns.    The patient reported missing a few doses of Adderall due to her nerve blocks, necessitating an additional 2 to 3 doses. Upon contacting her pharmacy this morning, she was informed that her prescription had  yesterday, prompting her to seek a refill.    The patient underwent a nerve ablation procedure at L4, L5, and L6 on Wednesday, which unfortunately did not yield any significant improvement. She is currently experiencing significant pain, which radiates from her hips to her legs. The pain disrupts her sleep, preventing her from bending down and necessitating early leave from her work yesterday. She has been managing the pain with ibuprofen 800, which has not provided relief. She has been applying ice to her back. She has visited the ER twice due to severe pain, CT scan showed presence of constipation. An enema was attempted, but was unsuccessful. An old healed fracture from L1 was identified during her first ER visit. She has a history of a hysterectomy, with one ovary remaining. She describes the pain as feeling as though her ovary was bursting.  She has been seeing her pain management specialist for her back pain and plans to discuss her options with him. She was prescribed Norco 5 after her first ER visit, which provided relief. However, she took ibuprofen 800 yesterday due to severe pain, which did not provide relief.    The patient still has her Xanax, which she believes has . She is inquiring about taking Xanax in conjunction with her Adderall. She is scheduled for a  colonoscopy on 05/22/2024 or 05/23/2024 and is inquiring about the possibility of taking Xanax or Vistaril at home post-procedure to prevent interaction with Adderall.    The patient has been diagnosed with Irritable Bowel Syndrome (IBS). She typically experiences diarrhea, for which she takes probiotics and colestipol, she takes also Lomotil as needed. She experiences constant stomach pain. She takes Lomotil only when she has diarrhea and stomach pains. She is scheduled for a scope with her gastroenterologist, Dr. Fernandez, on 05/23/2024.       Past medical history, past social history was reviewed and discussed with the patient.    Review of Systems   Respiratory:  Negative for apnea and chest tightness.    Cardiovascular:  Negative for chest pain and leg swelling.   Gastrointestinal:  Positive for abdominal pain and constipation.   Musculoskeletal:  Positive for arthralgias and back pain.       Objective:      Physical Exam    Physical Exam  Vital Signs  The patient's blood pressure is 126/70.   The patient lungs are clear to auscultation, heart is regular rate and rhythm, extremity has presence of not edema, lower back has tenderness to palpation with muscle spasms.  Results  Laboratory Studies  CMP, electrolytes were good. Blood count fine. Urinalysis did not show any bacteria or blood.    Imaging  CT lungs are clear. Gallbladder is surgically absent. There is a focal fat left hepatic lobe adjacent to the falciform ligament. Liver is unremarkable. Pancreas, spleen, kidneys good. There is moderate colonic stool present most notable in the cecum and ascending colon.     This note was generated with the assistance of ambient listening technology. Verbal consent was obtained by the patient and accompanying visitor(s) for the recording of patient appointment to facilitate this note. I attest to having reviewed and edited the generated note for accuracy, though some syntax or spelling errors may persist. Please contact  the author of this note for any clarification.

## 2024-04-30 ENCOUNTER — PATIENT MESSAGE (OUTPATIENT)
Dept: PAIN MEDICINE | Facility: CLINIC | Age: 29
End: 2024-04-30
Payer: COMMERCIAL

## 2024-04-30 DIAGNOSIS — M79.18 MYOFASCIAL PAIN: Primary | ICD-10-CM

## 2024-04-30 DIAGNOSIS — M54.9 DORSALGIA: ICD-10-CM

## 2024-04-30 RX ORDER — TIZANIDINE 2 MG/1
2 TABLET ORAL 2 TIMES DAILY
Qty: 40 TABLET | Refills: 0 | Status: SHIPPED | OUTPATIENT
Start: 2024-04-30 | End: 2024-05-28 | Stop reason: SDUPTHER

## 2024-04-30 NOTE — TELEPHONE ENCOUNTER
I have called in tizanidine and an order for a TENS unit.  It might be easier for her to get a TENS unit off [employer] as I am not sure her insurance will cover this

## 2024-05-03 ENCOUNTER — OFFICE VISIT (OUTPATIENT)
Dept: PAIN MEDICINE | Facility: CLINIC | Age: 29
End: 2024-05-03
Payer: COMMERCIAL

## 2024-05-03 ENCOUNTER — HOSPITAL ENCOUNTER (OUTPATIENT)
Dept: RADIOLOGY | Facility: HOSPITAL | Age: 29
Discharge: HOME | End: 2024-05-03
Payer: COMMERCIAL

## 2024-05-03 VITALS
HEIGHT: 68 IN | SYSTOLIC BLOOD PRESSURE: 126 MMHG | WEIGHT: 143.5 LBS | BODY MASS INDEX: 21.75 KG/M2 | HEART RATE: 82 BPM | DIASTOLIC BLOOD PRESSURE: 86 MMHG

## 2024-05-03 DIAGNOSIS — M54.9 DORSALGIA, UNSPECIFIED: Primary | ICD-10-CM

## 2024-05-03 DIAGNOSIS — M47.816 LUMBAR SPONDYLOSIS: ICD-10-CM

## 2024-05-03 DIAGNOSIS — M54.9 DORSALGIA: ICD-10-CM

## 2024-05-03 DIAGNOSIS — M54.9 DORSALGIA, UNSPECIFIED: ICD-10-CM

## 2024-05-03 DIAGNOSIS — M54.16 LUMBAR RADICULOPATHY, CHRONIC: ICD-10-CM

## 2024-05-03 DIAGNOSIS — M79.18 MYOFASCIAL PAIN: ICD-10-CM

## 2024-05-03 PROCEDURE — 99999 PR PBB SHADOW E&M-EST. PATIENT-LVL V: CPT | Mod: PBBFAC,,,

## 2024-05-03 PROCEDURE — 72110 X-RAY EXAM L-2 SPINE 4/>VWS: CPT | Mod: TC,PO

## 2024-05-03 PROCEDURE — 72110 X-RAY EXAM L-2 SPINE 4/>VWS: CPT | Mod: 26,,, | Performed by: RADIOLOGY

## 2024-05-03 PROCEDURE — 3074F SYST BP LT 130 MM HG: CPT | Mod: CPTII,S$GLB,,

## 2024-05-03 PROCEDURE — 3079F DIAST BP 80-89 MM HG: CPT | Mod: CPTII,S$GLB,,

## 2024-05-03 PROCEDURE — 99213 OFFICE O/P EST LOW 20 MIN: CPT | Mod: S$GLB,,,

## 2024-05-03 PROCEDURE — 3008F BODY MASS INDEX DOCD: CPT | Mod: CPTII,S$GLB,,

## 2024-05-03 PROCEDURE — 1159F MED LIST DOCD IN RCRD: CPT | Mod: CPTII,S$GLB,,

## 2024-05-03 NOTE — PROGRESS NOTES
[facility] Follow Up Evaluation    Referred by: Marce Harris PA-C  Reason for referral: back pain    CC:   Chief Complaint   Patient presents with    Low-back Pain          5/3/2024     1:05 PM 2/28/2024    11:42 AM 4/21/2022    11:01 AM   Last 3 PDI Scores   Pain Disability Index (PDI) 50 39 35     Interval HPI 5/3/2024: Holly Nichols returns to the office for follow up.  She is s/p bilateral L4/5 and L5/S1 RFA on 04/24/2024 without significant relief.  Since the procedure she has had worsening severe lower back pain, 10/10, located across her lower back with radiation down bilateral legs.  She denies any associated numbness, rosmery weakness or any new changes to her bowel bladder function.  She has been taking NSAIDs, Zanaflex and Medrol Dosepak without significant relief.  She denies any trauma.    HPI:   Holly Nichols is a 28 y.o. female who presents with back pain.  She has had back pain since 2008.  She has known scoliosis.  She has known fibromyalgia.  Today she rates her pain 4-10, constant, throbbing, tingling, deep.  She has neck pain into the trapezius muscles.  She also has midthoracic and lower back pain.  Denies any rosmery radicular symptoms.  No numbness no weakness.  Pain worse with standing, bending, walking, nighttime and not relieved with anything.    Pain Intervention History:  - s/p bilateral L4/5 and L5/S1 RFA on 04/24/2024 without significant relief.    History:    Current Outpatient Medications:     albuterol (VENTOLIN HFA) 90 mcg/actuation inhaler, Inhale 2 puffs into the lungs every 6 (six) hours as needed for Wheezing. Rescue, Disp: 18 g, Rfl: 5    ALPRAZolam (XANAX) 0.25 MG tablet, Take 1 tablet (0.25 mg total) by mouth daily as needed for Anxiety., Disp: 15 tablet, Rfl: 0    Bifidobacterium infantis (ALIGN ORAL), Take 1 tablet by mouth once daily., Disp: , Rfl:     cetirizine (ZYRTEC) 10 MG tablet, Take 10 mg by mouth nightly as needed for Allergies., Disp: , Rfl:      citalopram (CELEXA) 20 MG tablet, Take 1 tablet (20 mg total) by mouth once daily. Along with 40mg for a total of 60mg daily, Disp: 90 tablet, Rfl: 3    citalopram (CELEXA) 40 MG tablet, Take 1 tablet (40 mg total) by mouth once daily. Along with a 20 mg tablet for a total of 60 mg, Disp: 90 tablet, Rfl: 3    colestipoL (COLESTID) 1 gram Tab, TAKE 1 TABLET BY MOUTH TWICE DAILY. TAKE OTHER ORAL MEDICATIONS MORE THAN 1 HOUR BEFORE BEDTIME OR MORE THAN 4 HOURS AFTER COLESTIPOL, Disp: 180 tablet, Rfl: 0    dextroamphetamine-amphetamine (ADDERALL XR) 15 MG 24 hr capsule, Take 1 capsule (15 mg total) by mouth once daily., Disp: 30 capsule, Rfl: 0    [START ON 5/26/2024] dextroamphetamine-amphetamine (ADDERALL XR) 15 MG 24 hr capsule, Take 1 capsule (15 mg total) by mouth every morning., Disp: 30 capsule, Rfl: 0    [START ON 6/26/2024] dextroamphetamine-amphetamine (ADDERALL XR) 15 MG 24 hr capsule, Take 1 capsule (15 mg total) by mouth every morning., Disp: 30 capsule, Rfl: 0    dextroamphetamine-amphetamine (ADDERALL) 5 mg Tab, Take 5 mg by mouth daily as needed., Disp: 30 tablet, Rfl: 0    [START ON 5/26/2024] dextroamphetamine-amphetamine (ADDERALL) 5 mg Tab, Take 5 mg by mouth daily as needed., Disp: 30 tablet, Rfl: 0    [START ON 6/26/2024] dextroamphetamine-amphetamine (ADDERALL) 5 mg Tab, Take 5 mg by mouth daily as needed., Disp: 30 tablet, Rfl: 0    diphenoxylate-atropine 2.5-0.025 mg (LOMOTIL) 2.5-0.025 mg per tablet, Take 1 tablet by mouth every 6 (six) hours as needed for Diarrhea., Disp: 40 tablet, Rfl: 1    DUPIXENT  mg/2 mL PnIj, INJECT 600 MG (2 PENS) UNDER THE SKIN ON DAY 1, THEN 300 MG (1 PEN) EVERY 2 WEEKS STARTING ON DAY 15, Disp: 4 mL, Rfl: 5    esomeprazole (NEXIUM) 40 MG capsule, Take 1 capsule (40 mg total) by mouth before breakfast., Disp: 90 capsule, Rfl: 1    fluticasone propionate (FLONASE) 50 mcg/actuation nasal spray, 1 spray by Each Nostril route 2 (two) times daily as needed for  Allergies., Disp: , Rfl:     fluticasone-umeclidin-vilanter (TRELEGY ELLIPTA) 200-62.5-25 mcg inhaler, Inhale 1 puff into the lungs once daily., Disp: 60 each, Rfl: 2    hydrOXYzine HCL (ATARAX) 25 MG tablet, Take 1 tablet (25 mg total) by mouth nightly as needed for Anxiety., Disp: 30 tablet, Rfl: 2    ipratropium-albuteroL (COMBIVENT RESPIMAT)  mcg/actuation inhaler, Inhale 1 puff into the lungs 4 (four) times daily. Rescue, Disp: 4 g, Rfl: 3    lubiprostone (AMITIZA) 8 MCG Cap, TAKE 1 CAPSULE(8 MCG) BY MOUTH TWICE DAILY, Disp: 180 capsule, Rfl: 0    methylPREDNISolone (MEDROL DOSEPACK) 4 mg tablet, use as directed, Disp: 1 each, Rfl: 0    ondansetron (ZOFRAN-ODT) 4 MG TbDL, Take 1 tablet (4 mg total) by mouth 3 (three) times daily as needed (nausea)., Disp: 30 tablet, Rfl: 1    tiZANidine (ZANAFLEX) 2 MG tablet, Take 1 tablet (2 mg total) by mouth 2 (two) times a day., Disp: 40 tablet, Rfl: 0    tretinoin (RETIN-A) 0.025 % cream, Apply topically every evening., Disp: 20 g, Rfl: 0    Past Medical History:   Diagnosis Date    Abnormal Pap smear of cervix     Allergy     Anxiety     Asthma     rare inhaler use    Constipation     Depression     Endometriosis     Fibromyalgia     Gastritis     HLA B27 (HLA B27 positive)     Migraine     Postpartum depression     Scoliosis     Smoker     stopped in January 2018    Tobacco dependence 2/17/2016       Past Surgical History:   Procedure Laterality Date    ADENOIDECTOMY      APPENDECTOMY  09/2014    endometriosis    CHOLECYSTECTOMY  12/20/2017    Dr. MARBELLA Anne, [facility]     COLONOSCOPY N/A 05/19/2016    Procedure: COLONOSCOPY;  Surgeon: Jarret Zhao Jr., MD;  Location: [facility];  Service: Endoscopy;  Laterality: N/A;    COLONOSCOPY N/A 08/27/2020    Dr. Zhao: Erythema & ulcerated mucosa in descending, splenic flexure & distal transverse (r/o UC, r/o ischemia, r/o vasculiti, etc.), Mucosal changes in descending colon (secondary to left-sided colitis, to ischemic  colitis(?), to vasculitis(?). hemorrhoids; biopsy: TI & random biopsies- WNL, transverse/hepatic/descending- mild active colitis without definitive features of chronicity    COLONOSCOPY N/A 12/16/2022    Procedure: COLONOSCOPY;  Surgeon: Jarret Zhao Jr., MD;  Location: [facility];  Service: Endoscopy;  Laterality: N/A; Repeat colonoscopy in 5 years for screening    CYSTOSCOPY  12/01/2021    Procedure: CYSTOSCOPY;  Surgeon: Ana María Easley MD;  Location: [facility] OR;  Service: OB/GYN;;    ESOPHAGOGASTRODUODENOSCOPY N/A 10/01/2020    Dr. Zhao: Erythema in the lower third of the esophagus; Duodenal bulb erosions without bleeding. biopsy: duodenum- Small bowel mucosa with mild acute inflammation changes may represent erosion, Villous architecture is maintained; stomach- mild chronic gastritis; esophagus- minimal reactive changes    ESOPHAGOGASTRODUODENOSCOPY N/A 12/16/2022    Procedure: EGD (ESOPHAGOGASTRODUODENOSCOPY);  Surgeon: Jarret Zhao Jr., MD;  Location: [facility];  Service: Endoscopy;  Laterality: N/A;    INJECTION OF ANESTHETIC AGENT AROUND MEDIAL BRANCH NERVES INNERVATING LUMBAR FACET JOINT Bilateral 3/13/2024    Procedure: Block-nerve-medial branch-lumbar    L4/5, L5/S1;  Surgeon: Dain Lawrence MD;  Location: [facility];  Service: Pain Management;  Laterality: Bilateral;    INJECTION OF ANESTHETIC AGENT AROUND MEDIAL BRANCH NERVES INNERVATING LUMBAR FACET JOINT Bilateral 4/2/2024    Procedure: Block-nerve-medial branch-lumbar-L4/5-L5/S1;  Surgeon: Dain Lawrence MD;  Location: [facility];  Service: Pain Management;  Laterality: Bilateral;    LAPAROSCOPIC OOPHORECTOMY Left 12/01/2021    Procedure: OOPHORECTOMY, LAPAROSCOPIC;  Surgeon: Ana María Easley MD;  Location: [facility] OR;  Service: OB/GYN;  Laterality: Left;    LAPAROSCOPIC SALPINGECTOMY Bilateral 12/01/2021    Procedure: SALPINGECTOMY, LAPAROSCOPIC;  Surgeon: Ana María Easley MD;  Location: [facility] OR;  Service: OB/GYN;  Laterality:  Bilateral;    LAPAROSCOPIC TOTAL HYSTERECTOMY N/A 2021    Procedure: HYSTERECTOMY, TOTAL, LAPAROSCOPIC;  Surgeon: Ana María Easley MD;  Location: [facility] OR;  Service: OB/GYN;  Laterality: N/A;    MOUTH SURGERY      x2    PELVIC LAPAROSCOPY      RADIOFREQUENCY ABLATION OF LUMBAR MEDIAL BRANCH NERVE AT SINGLE LEVEL Bilateral 2024    Procedure: Radiofrequency Ablation, Nerve, Spinal, Lumbar, Medial Branch, L4/5, L5/L6;  Surgeon: Dain Lawrence MD;  Location: [facility] OR;  Service: Pain Management;  Laterality: Bilateral;    SINUS SURGERY  2021    FESS, turbinate red    TONSILLECTOMY  2016    UPPER GASTROINTESTINAL ENDOSCOPY  2016    Dr. Zhao       Family History   Problem Relation Name Age of Onset    Fibromyalgia Mother      Depression Mother      Irritable bowel syndrome Mother      Diabetes Father      Depression Father      Mental illness Father          bipolar disorder    Asthma Father      Multiple sclerosis Maternal Aunt      Irritable bowel syndrome Maternal Grandmother      Colon cancer Neg Hx      Ulcerative colitis Neg Hx      Stomach cancer Neg Hx      Esophageal cancer Neg Hx      Breast cancer Neg Hx      Ovarian cancer Neg Hx      Celiac disease Neg Hx         Social History     Socioeconomic History    Marital status:     Number of children: 0   Occupational History    Occupation: hotel management   Tobacco Use    Smoking status: Former     Current packs/day: 0.00     Types: Cigarettes, Vaping with nicotine     Quit date: 2018     Years since quittin.3    Smokeless tobacco: Never    Tobacco comments:     Still vaping   Substance and Sexual Activity    Alcohol use: No    Drug use: No    Sexual activity: Yes     Partners: Male   Social History Narrative    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     Social Determinants of Health     Financial Resource Strain: Unknown (2022)    Overall Financial Resource Strain (CARDIA)     Difficulty of Paying Living Expenses:  Patient declined   Food Insecurity: Unknown (4/7/2022)    Hunger Vital Sign     Worried About Running Out of Food in the Last Year: Patient declined     Ran Out of Food in the Last Year: Patient declined   Transportation Needs: Unknown (4/7/2022)    PRAPARE - Transportation     Lack of Transportation (Medical): Patient declined     Lack of Transportation (Non-Medical): Patient declined   Physical Activity: Unknown (1/26/2024)    Exercise Vital Sign     Days of Exercise per Week: Patient declined   Stress: Unknown (4/7/2022)    [employer] - Occupational Stress Questionnaire     Feeling of Stress : Patient declined   Recent Concern: Stress - Stress Concern Present (1/24/2022)    [employer] - Occupational Stress Questionnaire     Feeling of Stress : Rather much   Housing Stability: Unknown (4/7/2022)    Housing Stability Vital Sign     Unable to Pay for Housing in the Last Year: Patient refused     Unstable Housing in the Last Year: Patient refused       Review of patient's allergies indicates:   Allergen Reactions    Latex, natural rubber      burning    Sulfur Hives    Toradol [ketorolac] Other (See Comments)     Metallic taste only once in 2013; has taken since without problems; reports R sided paralysis    Tramadol Other (See Comments)     paralysis    Penicillins Hives and Rash    Sulfa (sulfonamide antibiotics) Hives and Rash       Review of Systems:  General ROS: negative for - fever  Psychological ROS: negative for - hostility  Hematological and Lymphatic ROS: negative for - bleeding problems  Endocrine ROS: negative for - unexpected weight changes  Respiratory ROS: no cough, shortness of breath, or wheezing  Cardiovascular ROS: no chest pain or dyspnea on exertion  Gastrointestinal ROS: no abdominal pain, change in bowel habits, or black or bloody stools  Musculoskeletal ROS: negative for - muscular weakness  Neurological ROS: negative for -  "numbness/tingling  Dermatological ROS: negative for rash    Physical Exam:  Vitals:    05/03/24 1305   BP: 126/86   Pulse: 82   Weight: 65.1 kg (143 lb 8.3 oz)   Height: 5' 8" (1.727 m)   PainSc: 10-Worst pain ever   PainLoc: Back       Body mass index is 21.82 kg/m².    Gen: NAD,   Psych: mood appropriate for given condition  CV: 2+ radial pulse  HEENT: anicteric   Respiratory: non labored  Abd: soft nt, nd  Skin: intact, no swelling in lower lumbar spine, no ecchymosis, erythema, temperature asymmetry, swelling  ROM: limited AROM of the L spine in all planes, full ROM at ankles, knees and hips  Sensation: intact to light touch in all dermatomes tested from L2-S1 bilaterally  Reflexes: 2+ b/l patella and 0+ right Achilles, 2+ left Achilles  Palpation: Diffusely tender over lumbar paraspinals  -TTP over the b/l greater trochanters and bilateral SI joint  Tone: normal in the b/l knees and hips   Extremities: No edema in b/l ankles or hands  Provacative tests: + b/l axial facet loading       Right Left   L2/3 Iliacus Hip flexion  5  5   L3/4 Qudratus Femoris Knee Extension  5  5   L4/5 Tib Anterior Ankle Dorsiflexion   5  5   L5/S1 Extensor Hallicus Longus Great toe extension  5  5                 S1/S2 Gastroc/Soleus Plantar Flexion  5  5       Imaging:  Xray spine 11/6/2020  FINDINGS:  S-shaped scoliosis of the thoracolumbar spine with dextroconvex curvature of the thoracic spine and levoconvex curvature of the lumbar spine.  The thoracic dextroconvex curvature measures approximately 30.6 degrees measured from the superior endplate of T4 to the inferior endplate of T11.  Levoconvex curvature of the lumbar spine measures approximately 20.7 degrees measured from the superior endplate of T12 to the inferior endplate of L3.  sagittal balance unable to be assessed due to obscuration of the C7 vertebral body.  No evidence for vertebral segmentation anomaly.  Vertebral body heights are maintained.  No suspicious appearing " lytic or blastic lesions.    Xray lumbar spine 4/21/22  FINDINGS:  Vertebral bodies are normal in height without evidence of fracture.  Mild rotatory levoconvex curvature of the lumbar spine.  Normal sagittal alignment is preserved.  No spondylolisthesis. No abnormal translation with flexion and extension.  Intervertebral disc heights are well maintained.Mild lower lumbar facet arthropathy.    Xray cervical spine 4/21/22  FINDINGS:  C1-C2: Pre-dens space is maintained.  Dens and lateral masses of C1 are unremarkable.  Alignment: Chronic straightening of the normal cervical lordosis, possibly secondary to positioning or muscle spasm.  No spondylolisthesis.  No dynamic instability.  Vertebrae: Vertebral body heights are maintained.  No suspicious appearing lytic or blastic lesions.  Discs and facets: Disc heights are maintained.  Facet joints are unremarkable. Neural foramina are maintained on oblique projections.  Miscellaneous: No additional findings.    Labs:  BMP  Lab Results   Component Value Date     04/17/2024    K 3.7 04/17/2024     04/17/2024    CO2 27 04/17/2024    BUN 6 (L) 04/17/2024    CREATININE 0.58 04/17/2024    CALCIUM 9.4 04/17/2024    ANIONGAP 8 04/17/2024    ESTGFRAFRICA >60 11/29/2021    EGFRNONAA >60 11/29/2021     Lab Results   Component Value Date    ALT 21 04/17/2024    AST 29 04/17/2024    ALKPHOS 73 04/17/2024    BILITOT 0.6 04/17/2024       Assessment:   Problem List Items Addressed This Visit    None  Visit Diagnoses       Dorsalgia, unspecified    -  Primary    Relevant Orders    MRI Lumbar Spine Without Contrast    X-Ray Lumbar Spine 5 View    Lumbar radiculopathy, chronic        Relevant Orders    MRI Lumbar Spine Without Contrast    Myofascial pain        Dorsalgia        Lumbar spondylosis                        28 y.o. year old female who presents with back pain.  She has had back pain since 2008.  She has known scoliosis.  She has known fibromyalgia.  Today she rates her  pain 4-10, constant, throbbing, tingling, deep.  She has neck pain into the trapezius muscles.  She also has midthoracic and lower back pain.  Denies any rosmery radicular symptoms.  No numbness no weakness.  Pain worse with standing, bending, walking, nighttime and not relieved with anything.    5/3/2024: Holly Nichols returns to the office for follow up.  She is s/p bilateral L4/5 and L5/S1 RFA on 04/24/2024 without significant relief.  Since the procedure she has had worsening severe lower back pain, 10/10, located across her lower back with radiation down bilateral legs.  She denies any associated numbness, rosmery weakness or any new changes to her bowel bladder function.  She has been taking NSAIDs, Zanaflex and Medrol Dosepak without significant relief.  She denies any trauma.      - on exam she is neurologically intact.  Full strength in lower extremities, very mild tenderness to palpation of lumbar spine.  No swelling of lower lumbar spine, ecchymosis, temperature asymmetry, erythema or any signs of infection or hematoma.  -   She is s/p bilateral L4/5 and L5/S1 RFA on 04/24/2024 without significant relief.  - I am unsure why she is having severe worsening pain.  There are no signs of infection, she denies any fever or chills, no trauma.  - at this time with reports of new radiating pain into bilateral legs I would like to order an MRI of her lumbar spine for further evaluation.  - while we get her set up for this, she would likely benefit from a anti neuropathic, she did not tolerate gabapentin in the past.  We will trial Lyrica 75 mg nightly for the 1st week, if tolerating will increase to twice daily.  Prescription sent to Dr. Lawrence today for approval.  - continue NSAIDs, Zanaflex.   - I have also ordered an x-ray of her lumbar spine today for further evaluation of her bony anatomy.  - we will call her with results of imaging and future treatment plan.      : Not applicable    This note was completed  with dictation software and grammatical errors may exist.

## 2024-05-06 DIAGNOSIS — M54.16 LUMBAR RADICULOPATHY, CHRONIC: Primary | ICD-10-CM

## 2024-05-06 RX ORDER — PREGABALIN 75 MG/1
75 CAPSULE ORAL 2 TIMES DAILY
Qty: 60 CAPSULE | Refills: 1 | Status: SHIPPED | OUTPATIENT
Start: 2024-05-06 | End: 2024-05-28

## 2024-05-08 ENCOUNTER — TELEPHONE (OUTPATIENT)
Dept: PAIN MEDICINE | Facility: CLINIC | Age: 29
End: 2024-05-08
Payer: COMMERCIAL

## 2024-05-08 ENCOUNTER — OFFICE VISIT (OUTPATIENT)
Dept: PODIATRY | Facility: CLINIC | Age: 29
End: 2024-05-08
Payer: COMMERCIAL

## 2024-05-08 VITALS — HEIGHT: 68 IN | WEIGHT: 143.5 LBS | BODY MASS INDEX: 21.75 KG/M2

## 2024-05-08 DIAGNOSIS — L60.0 INGROWN NAIL: Primary | ICD-10-CM

## 2024-05-08 DIAGNOSIS — L60.3 NAIL DYSTROPHY: ICD-10-CM

## 2024-05-08 PROCEDURE — 99999 PR PBB SHADOW E&M-EST. PATIENT-LVL IV: CPT | Mod: PBBFAC,,, | Performed by: STUDENT IN AN ORGANIZED HEALTH CARE EDUCATION/TRAINING PROGRAM

## 2024-05-08 PROCEDURE — 3008F BODY MASS INDEX DOCD: CPT | Mod: CPTII,S$GLB,, | Performed by: STUDENT IN AN ORGANIZED HEALTH CARE EDUCATION/TRAINING PROGRAM

## 2024-05-08 PROCEDURE — 11730 AVULSION NAIL PLATE SIMPLE 1: CPT | Mod: S$GLB,,, | Performed by: STUDENT IN AN ORGANIZED HEALTH CARE EDUCATION/TRAINING PROGRAM

## 2024-05-08 PROCEDURE — 1159F MED LIST DOCD IN RCRD: CPT | Mod: CPTII,S$GLB,, | Performed by: STUDENT IN AN ORGANIZED HEALTH CARE EDUCATION/TRAINING PROGRAM

## 2024-05-08 PROCEDURE — 1160F RVW MEDS BY RX/DR IN RCRD: CPT | Mod: CPTII,S$GLB,, | Performed by: STUDENT IN AN ORGANIZED HEALTH CARE EDUCATION/TRAINING PROGRAM

## 2024-05-08 PROCEDURE — 99204 OFFICE O/P NEW MOD 45 MIN: CPT | Mod: 25,57,S$GLB, | Performed by: STUDENT IN AN ORGANIZED HEALTH CARE EDUCATION/TRAINING PROGRAM

## 2024-05-08 NOTE — PROGRESS NOTES
CHIEF CONCERN: Ingrown Toenail (Toenail removal, right great toenail)         HPI:    Holly Nichols is a 28 y.o. female with concerns of painful toenail on the right hallux.    The pain started weeks ago.  Pain aggravated by direct pressure and close toe shoes.   Patient denies acute trauma to the toe.    Home treatment:  clipping      Patient Active Problem List   Diagnosis    Unspecified asthma, with status asthmaticus    Pain in thoracic spine    Scoliosis (and kyphoscoliosis), idiopathic    Constipation    Allergy    Ankylosing spondylitis    Insomnia    Fibromyalgia    Fatigue    Vitamin D insufficiency    HLA B27 (HLA B27 positive)    Moderate persistent asthma with acute exacerbation    Anxiety disorder    Nicotine abuse    Migraine    Status post laparoscopic hysterectomy    Mild intermittent asthma without complication    Other hyperlipidemia    Anxiety    Intermittent diarrhea    Left sided colitis without complications    Other acne           Current Outpatient Medications on File Prior to Visit   Medication Sig Dispense Refill    albuterol (VENTOLIN HFA) 90 mcg/actuation inhaler Inhale 2 puffs into the lungs every 6 (six) hours as needed for Wheezing. Rescue 18 g 5    ALPRAZolam (XANAX) 0.25 MG tablet Take 1 tablet (0.25 mg total) by mouth daily as needed for Anxiety. 15 tablet 0    Bifidobacterium infantis (ALIGN ORAL) Take 1 tablet by mouth once daily.      cetirizine (ZYRTEC) 10 MG tablet Take 10 mg by mouth nightly as needed for Allergies.      citalopram (CELEXA) 20 MG tablet Take 1 tablet (20 mg total) by mouth once daily. Along with 40mg for a total of 60mg daily 90 tablet 3    citalopram (CELEXA) 40 MG tablet Take 1 tablet (40 mg total) by mouth once daily. Along with a 20 mg tablet for a total of 60 mg 90 tablet 3    colestipoL (COLESTID) 1 gram Tab TAKE 1 TABLET BY MOUTH TWICE DAILY. TAKE OTHER ORAL MEDICATIONS MORE THAN 1 HOUR BEFORE BEDTIME OR MORE THAN 4 HOURS AFTER COLESTIPOL 180    Patient:   NADYA MOCK            MRN: [id number]            FIN: [id number]              Age:   82 years     Sex:  FEMALE     :  35   Associated Diagnoses:   None   Author:   ADAM, SAGE     Subjective   Additional information   2 small bloody bms this morning   no significant bleeding  looks like old blood per patient  hgb stable  family bedside.       Health Status   Allergies:    Allergies (4) Active Reaction  atropine SOB - Shortness of breath  Mustard Hives, Facial swelling  caffeine Vomiting  Benadryl None Documented    Current medications:    Medications (15) Active  Scheduled: (7)  *Do NOT give MetFORMIN  1 each, N/A, Daily  AmLODIPine 2.5 mg tab  2.5 mg 1 tab, Oral, Daily  Cholecalciferol 1,000 unit tab  2,000 unit 2 tab, Oral, Daily  Hydrocortisone 1% lotion 120 mL BULK  1 application, Topical, TID  Insulin human lispro 100 unit/mL inj 3 mL BULK  1-5 unit, Subcutaneous, QID [with meals & HS]  Levothyroxine 100 mcg tab  100 mcg 1 tab, Oral, QAM at 6  Pravastatin 40 mg tab  40 mg 1 tab, Oral, Q Bedtime  Continuous: (1)  Lactated Ringers 1,000 mL  1,000 mL, IV, 60 mL/hr  PRN: (7)  Acetaminophen 325 mg tab  650 mg 2 tab, Oral, Q6H  Docusate sodium 100 mg cap  100 mg 1 cap, Oral, BID  Fluticasone 50 mcg nasal spray 16 gm  50 mcg 1 spray, IntraNasal, Daily  HydrALAZINE 20 mg/1 mL inj SDV  10 mg 0.5 mL, Slow IV Push, Q6H  Hydrocodone-acetaminophen 5-325 mg tab  1 tab, Oral, Q6H  Ondansetron 4 mg/2 mL inj SDV  4 mg 2 mL, Slow IV Push, Q8H  Zolpidem 5 mg tab  5 mg 1 tab, Oral, Q Bedtime      Review of Systems   Constitutional:  Negative except as documented in history of present illness.   Respiratory:  Negative except as documented in history of present illness.   Genitourinary:  Negative except as documented in history of present illness.   Gastrointestinal:  Negative except as documented in history of present illness.   Musculoskeletal:  Negative except as documented in history of  present illness.   Integumentary:  Negative except as documented in history of present illness.      Objective   VS/Measurements     Vitals between:   27-JUN-2019 13:43:14   TO   28-JUN-2019 13:43:14                   LAST RESULT MINIMUM MAXIMUM  Temperature 36.6 36 36.7  Heart Rate 75 75 84  Respiratory Rate 16 14 18  NISBP           126 104 137  NIDBP           76 64 77  NIMBP           97 82 97  SpO2                    97 96 99    General:  Alert and oriented, No acute distress.    Respiratory:  Lungs are clear to auscultation, Respirations are non-labored, Breath sounds are equal.   Cardiovascular:  Normal rate, Regular rhythm, No murmur.    Gastrointestinal:  Soft, Non-tender, Non-distended, Normal bowel sounds, No organomegaly, No masses.   Integumentary:  Warm, Dry, Intact.    Neurologic:  Alert, Oriented.    Psychiatric:  Cooperative, Appropriate mood & affect.      Results Review   General results   Interpretation:   Labs between:  27-JUN-2019 13:43 to 28-JUN-2019 13:43  CBC:                 WBC  HgB  Hct  Plt  MCV  RDW   28-JUN-2019   (L) 7.7  (L) 23.8         28-JUN-2019   (L) 7.9  (L) 23.9         27-JUN-2019   (L) 7.7  (L) 23.1         27-JUN-2019   (L) 8.2  (L) 25.3         BMP:                 Na  Cl  BUN  Glu   28-JUN-2019 138  106  7  (H) 186                              K  CO2  Cr  Ca                              3.8  27  0.52  8.4   POC GLU:                 Latest Result  Latest Date  Minimum  Min Date  Maximum  Max Date                             (H) 285  28-JUN-2019 (H) 285  28-JUN-2019 (H) 254  27-JUN-2019                         Impression and Plan   Assessment and Plan:       Diagnosis:   82F with h/o htn, dm, hld.  Presented with gi bleeding.  Colonoscopy 2016 tics hemorrhoids here with dr yancey.   She took a colon cleanse/weight loss supplement at home, not sure if contributing to bleeding.  CTAP with contrast mild gastric wall thickening, moderate stool, tics, rectal wall thickening,  tablet 0    dextroamphetamine-amphetamine (ADDERALL XR) 15 MG 24 hr capsule Take 1 capsule (15 mg total) by mouth once daily. 30 capsule 0    [START ON 5/26/2024] dextroamphetamine-amphetamine (ADDERALL XR) 15 MG 24 hr capsule Take 1 capsule (15 mg total) by mouth every morning. 30 capsule 0    [START ON 6/26/2024] dextroamphetamine-amphetamine (ADDERALL XR) 15 MG 24 hr capsule Take 1 capsule (15 mg total) by mouth every morning. 30 capsule 0    dextroamphetamine-amphetamine (ADDERALL) 5 mg Tab Take 5 mg by mouth daily as needed. 30 tablet 0    [START ON 5/26/2024] dextroamphetamine-amphetamine (ADDERALL) 5 mg Tab Take 5 mg by mouth daily as needed. 30 tablet 0    [START ON 6/26/2024] dextroamphetamine-amphetamine (ADDERALL) 5 mg Tab Take 5 mg by mouth daily as needed. 30 tablet 0    diphenoxylate-atropine 2.5-0.025 mg (LOMOTIL) 2.5-0.025 mg per tablet Take 1 tablet by mouth every 6 (six) hours as needed for Diarrhea. 40 tablet 1    esomeprazole (NEXIUM) 40 MG capsule Take 1 capsule (40 mg total) by mouth before breakfast. 90 capsule 1    fluticasone propionate (FLONASE) 50 mcg/actuation nasal spray 1 spray by Each Nostril route 2 (two) times daily as needed for Allergies.      fluticasone-umeclidin-vilanter (TRELEGY ELLIPTA) 200-62.5-25 mcg inhaler Inhale 1 puff into the lungs once daily. 60 each 2    hydrOXYzine HCL (ATARAX) 25 MG tablet Take 1 tablet (25 mg total) by mouth nightly as needed for Anxiety. 30 tablet 2    ipratropium-albuteroL (COMBIVENT RESPIMAT)  mcg/actuation inhaler Inhale 1 puff into the lungs 4 (four) times daily. Rescue 4 g 3    ondansetron (ZOFRAN-ODT) 4 MG TbDL Take 1 tablet (4 mg total) by mouth 3 (three) times daily as needed (nausea). 30 tablet 1    pregabalin (LYRICA) 75 MG capsule Take 1 capsule (75 mg total) by mouth 2 (two) times daily. 60 capsule 01    tiZANidine (ZANAFLEX) 2 MG tablet Take 1 tablet (2 mg total) by mouth 2 (two) times a day. 40 tablet 0    tretinoin (RETIN-A)  "0.025 % cream Apply topically every evening. 20 g 0    DUPIXENT  mg/2 mL PnIj INJECT 600 MG (2 PENS) UNDER THE SKIN ON DAY 1, THEN 300 MG (1 PEN) EVERY 2 WEEKS STARTING ON DAY 15 (Patient not taking: Reported on 5/8/2024) 4 mL 5    lubiprostone (AMITIZA) 8 MCG Cap TAKE 1 CAPSULE(8 MCG) BY MOUTH TWICE DAILY 180 capsule 0    methylPREDNISolone (MEDROL DOSEPACK) 4 mg tablet use as directed 1 each 0     No current facility-administered medications on file prior to visit.            Review of patient's allergies indicates:   Allergen Reactions    Latex, natural rubber      burning    Sulfur Hives    Toradol [ketorolac] Other (See Comments)     Metallic taste only once in 2013; has taken since without problems; reports R sided paralysis    Tramadol Other (See Comments)     paralysis    Penicillins Hives and Rash    Sulfa (sulfonamide antibiotics) Hives and Rash           ROS:  General ROS: negative for chills, fatigue or fever  Cardiovascular ROS: no chest pain or dyspnea on exertion  Musculoskeletal ROS: negative for - joint pain or joint stiffness.  Negative for loss of strength  Neuro ROS: Negative for syncope, numbness, or muscle weakness  Skin ROS: Negative for rash, itching or hair changes.  +Toenail changes        EXAM:   Vitals:    05/08/24 1550   Weight: 65.1 kg (143 lb 8.3 oz)   Height: 5' 8" (1.727 m)       Right LOWER EXTREMITY EXAM:    Vascular:    Dorsalis pedis and posterior tibial pulses are palpable. capillary refill time is within normal limits   Toes are warm to touch.    There is  presence of digital hair.    There is  mild localized edema to the affected toe.     Neurological:    Light touch, proprioception, and sharp/dull sensation are all intact.   Mulders click:  Absent  Tinels:  Absent.   No LOPS    Dermatological:    The toenail is incurvated, Both border of the hallux.      no erythema noted to the affected area.     Absent paronychia.     Absent abscess      Musculoskeletal:    Muscle " left inguinal hernia.  Hgb 8-9s.  Was normal in october.   - a/w rectal bleeding, no pain - suspect diverticular bleed  - colonoscopy - diverticulosis, colon full of blood  - cta last night neg active bleed  - hgb stable today  - ok to advance diet  - if hgb remains stable and no active bleeding, possible discharge home tomorrow  - gi will f/u tomorrow  - thank you for the consult, call as needed please  *   .   strength is 5/5 in all groups .    No swelling/crepitus at the interphalangeal joints.        ASSESSMENT/PLAN     1. Ingrown nail    2. Nail dystrophy           I counseled the patient on the patient's  conditions, their implications and medical management.       Nail Removal    Date/Time: 5/8/2024 3:40 PM    Performed by: Washington Rivera DPM  Authorized by: Washington Rivera DPM    Consent Done?:  Yes (Written)  Time out: Immediately prior to the procedure a time out was called    Prep: patient was prepped and draped in usual sterile fashion    Location:     Location:  Right foot    Location detail:  Right big toe  Anesthesia:     Anesthesia:  Digital block    Local anesthetic:  Lidocaine 1% without epinephrine and bupivacaine 0.5% without epinephrine    Anesthetic total (ml):  5  Procedure Details:     Preparation:  Skin prepped with ChloraPrep    Amount removed:  Complete    Wedge excision of skin of nail fold: No      Nail bed sutured?: No      Nail matrix removed:  None    Removed nail replaced and anchored: No      Dressing applied:  4x4 and antibiotic ointment    Patient tolerance:  Patient tolerated the procedure well with no immediate complications        Verbal and written post operative instructions were provided to the patient.     Patient to monitor for any adverse reactions and follow up in 10-14 days post op if needed.      Washington Rivera DPM

## 2024-05-08 NOTE — LETTER
May 8, 2024        Lab Attending             [facility]  [address]  [place] [address]  Phone: [phone]   Patient: Holly Nichols   MR Number: [id number]   YOB: 1995   Date of Visit: 5/8/2024       Dear  Attending:    Thank you for referring Holly Nichols to me for evaluation. Below are the relevant portions of my assessment and plan of care.            If you have questions, please do not hesitate to call me. I look forward to following Holly along with you.    Sincerely,      Washington Rivera, URBANO           CC  No Recipients

## 2024-05-09 ENCOUNTER — PATIENT MESSAGE (OUTPATIENT)
Dept: PAIN MEDICINE | Facility: CLINIC | Age: 29
End: 2024-05-09
Payer: COMMERCIAL

## 2024-05-09 NOTE — TELEPHONE ENCOUNTER
Unfortunately, we are limited on treatment options.  We will need the MRI for further information.  I believe, she was having difficulty with Lyrica during the day however she can retry and see how she tolerates it better now.  Recommend continuing with muscle relaxer, Tylenol, ibuprofen.  Continue with stretching ice, heat.  Over-the-counter lidocaine patches would also be helpful.

## 2024-05-14 ENCOUNTER — PATIENT MESSAGE (OUTPATIENT)
Dept: PAIN MEDICINE | Facility: CLINIC | Age: 29
End: 2024-05-14
Payer: COMMERCIAL

## 2024-05-14 NOTE — TELEPHONE ENCOUNTER
We can keep currently scheduled appointment for 5/27.    Can you ask her to show us the documentation from the CT on 04/13/2024.    MRI report does not indicate a compression fracture at L1.  If it was indicated on CT on 04/13, it would also be shown on most recent lumbar MRI.

## 2024-05-20 ENCOUNTER — TELEPHONE (OUTPATIENT)
Dept: GASTROENTEROLOGY | Facility: CLINIC | Age: 29
End: 2024-05-20
Payer: COMMERCIAL

## 2024-05-20 NOTE — TELEPHONE ENCOUNTER
----- Message from Brea Perez sent at 5/20/2024 10:46 AM CDT -----  Regarding: call back  Contact: patient  Type: Needs Medical Advice  Who Called:  patient   Symptoms (please be specific):  missed call from silvio   How long has patient had these symptoms:    Pharmacy name and phone #:    Best Call Back Number: [phone]    Additional Information: call back thanks

## 2024-05-21 ENCOUNTER — TELEPHONE (OUTPATIENT)
Dept: PAIN MEDICINE | Facility: CLINIC | Age: 29
End: 2024-05-21
Payer: COMMERCIAL

## 2024-05-21 NOTE — TELEPHONE ENCOUNTER
CT scan shows potential L1 transverse process fracture, they are unable to determine the age on this with CT scan.  This was not noted on lumbar MRI.  I am unable to see the images of the MRI but it may be present there as well.    Ultimately, with or without it being acute, there is no interventional treatment for this fracture.  It is only symptomatic treatment.    At this time, recommend continuing to maximize conservative management.

## 2024-05-21 NOTE — TELEPHONE ENCOUNTER
Pt has been given the CT results and she states she will drop off the MRI disk so you can see the images

## 2024-05-28 ENCOUNTER — OFFICE VISIT (OUTPATIENT)
Dept: PAIN MEDICINE | Facility: CLINIC | Age: 29
End: 2024-05-28
Payer: COMMERCIAL

## 2024-05-28 VITALS
DIASTOLIC BLOOD PRESSURE: 72 MMHG | HEIGHT: 68 IN | SYSTOLIC BLOOD PRESSURE: 115 MMHG | HEART RATE: 77 BPM | WEIGHT: 146.38 LBS | BODY MASS INDEX: 22.19 KG/M2

## 2024-05-28 DIAGNOSIS — M54.16 LUMBAR RADICULOPATHY, CHRONIC: Primary | ICD-10-CM

## 2024-05-28 DIAGNOSIS — M79.18 MYOFASCIAL PAIN: ICD-10-CM

## 2024-05-28 DIAGNOSIS — M41.125 ADOLESCENT IDIOPATHIC SCOLIOSIS OF THORACOLUMBAR REGION: ICD-10-CM

## 2024-05-28 DIAGNOSIS — M47.816 LUMBAR SPONDYLOSIS: ICD-10-CM

## 2024-05-28 PROCEDURE — 99213 OFFICE O/P EST LOW 20 MIN: CPT | Mod: S$GLB,,,

## 2024-05-28 PROCEDURE — 99999 PR PBB SHADOW E&M-EST. PATIENT-LVL IV: CPT | Mod: PBBFAC,,,

## 2024-05-28 PROCEDURE — 3078F DIAST BP <80 MM HG: CPT | Mod: CPTII,S$GLB,,

## 2024-05-28 PROCEDURE — 3008F BODY MASS INDEX DOCD: CPT | Mod: CPTII,S$GLB,,

## 2024-05-28 PROCEDURE — 3074F SYST BP LT 130 MM HG: CPT | Mod: CPTII,S$GLB,,

## 2024-05-28 PROCEDURE — 1159F MED LIST DOCD IN RCRD: CPT | Mod: CPTII,S$GLB,,

## 2024-05-28 RX ORDER — TIZANIDINE 2 MG/1
2 TABLET ORAL 2 TIMES DAILY
Qty: 40 TABLET | Refills: 2 | Status: SHIPPED | OUTPATIENT
Start: 2024-05-28

## 2024-05-28 NOTE — PROGRESS NOTES
[facility] Follow Up Evaluation    Referred by: Marce Harris PA-C  Reason for referral: back pain    CC:   Chief Complaint   Patient presents with    Low-back Pain          5/28/2024     8:59 AM 5/3/2024     1:05 PM 2/28/2024    11:42 AM   Last 3 PDI Scores   Pain Disability Index (PDI) 33 50 39       Interval HPI 5/28/2024: Holly Nichols returns to the office for follow up.  She returns for follow-up with continued midback pain, 5/10, mainly located in her midback on the right side.  Previous severe pain throughout her back has improved.  She denies any new numbness or tingling, weakness or any new changes to her bowel or bladder function.  She reports intermittent pain radiating into legs but not frequent.  She feels like previous severe pain in her lower back has improved since the RFA.  She continues to take Zanaflex nightly in addition to Lyrica 75 mg b.i.d. with added improvement.  No ill side effects or adverse events with these medications.      HPI:   Holly Nichols is a 28 y.o. female who presents with back pain.  She has had back pain since 2008.  She has known scoliosis.  She has known fibromyalgia.  Today she rates her pain 4-10, constant, throbbing, tingling, deep.  She has neck pain into the trapezius muscles.  She also has midthoracic and lower back pain.  Denies any rosmery radicular symptoms.  No numbness no weakness.  Pain worse with standing, bending, walking, nighttime and not relieved with anything.    Pain Intervention History:  - s/p bilateral L4/5 and L5/S1 RFA on 04/24/2024 with 50% relief     History:    Current Outpatient Medications:     albuterol (VENTOLIN HFA) 90 mcg/actuation inhaler, Inhale 2 puffs into the lungs every 6 (six) hours as needed for Wheezing. Rescue, Disp: 18 g, Rfl: 5    Bifidobacterium infantis (ALIGN ORAL), Take 1 tablet by mouth once daily., Disp: , Rfl:     cetirizine (ZYRTEC) 10 MG tablet, Take 10 mg by mouth nightly as needed for Allergies.,  Disp: , Rfl:     citalopram (CELEXA) 20 MG tablet, Take 1 tablet (20 mg total) by mouth once daily. Along with 40mg for a total of 60mg daily, Disp: 90 tablet, Rfl: 3    citalopram (CELEXA) 40 MG tablet, Take 1 tablet (40 mg total) by mouth once daily. Along with a 20 mg tablet for a total of 60 mg, Disp: 90 tablet, Rfl: 3    colestipoL (COLESTID) 1 gram Tab, TAKE 1 TABLET BY MOUTH TWICE DAILY. TAKE OTHER ORAL MEDICATIONS MORE THAN 1 HOUR BEFORE BEDTIME OR MORE THAN 4 HOURS AFTER COLESTIPOL, Disp: 180 tablet, Rfl: 0    dextroamphetamine-amphetamine (ADDERALL XR) 15 MG 24 hr capsule, Take 1 capsule (15 mg total) by mouth once daily., Disp: 30 capsule, Rfl: 0    dextroamphetamine-amphetamine (ADDERALL XR) 15 MG 24 hr capsule, Take 1 capsule (15 mg total) by mouth every morning., Disp: 30 capsule, Rfl: 0    [START ON 6/26/2024] dextroamphetamine-amphetamine (ADDERALL XR) 15 MG 24 hr capsule, Take 1 capsule (15 mg total) by mouth every morning., Disp: 30 capsule, Rfl: 0    dextroamphetamine-amphetamine (ADDERALL) 5 mg Tab, Take 5 mg by mouth daily as needed., Disp: 30 tablet, Rfl: 0    dextroamphetamine-amphetamine (ADDERALL) 5 mg Tab, Take 5 mg by mouth daily as needed., Disp: 30 tablet, Rfl: 0    [START ON 6/26/2024] dextroamphetamine-amphetamine (ADDERALL) 5 mg Tab, Take 5 mg by mouth daily as needed., Disp: 30 tablet, Rfl: 0    diphenoxylate-atropine 2.5-0.025 mg (LOMOTIL) 2.5-0.025 mg per tablet, Take 1 tablet by mouth every 6 (six) hours as needed for Diarrhea., Disp: 40 tablet, Rfl: 1    DUPIXENT  mg/2 mL PnIj, INJECT 600 MG (2 PENS) UNDER THE SKIN ON DAY 1, THEN 300 MG (1 PEN) EVERY 2 WEEKS STARTING ON DAY 15, Disp: 4 mL, Rfl: 5    esomeprazole (NEXIUM) 40 MG capsule, Take 1 capsule (40 mg total) by mouth before breakfast., Disp: 90 capsule, Rfl: 1    fluticasone propionate (FLONASE) 50 mcg/actuation nasal spray, 1 spray by Each Nostril route 2 (two) times daily as needed for Allergies., Disp: , Rfl:      fluticasone-umeclidin-vilanter (TRELEGY ELLIPTA) 200-62.5-25 mcg inhaler, Inhale 1 puff into the lungs once daily., Disp: 60 each, Rfl: 2    hydrOXYzine HCL (ATARAX) 25 MG tablet, Take 1 tablet (25 mg total) by mouth nightly as needed for Anxiety., Disp: 30 tablet, Rfl: 2    ipratropium-albuteroL (COMBIVENT RESPIMAT)  mcg/actuation inhaler, Inhale 1 puff into the lungs 4 (four) times daily. Rescue, Disp: 4 g, Rfl: 3    ondansetron (ZOFRAN-ODT) 4 MG TbDL, Take 1 tablet (4 mg total) by mouth 3 (three) times daily as needed (nausea)., Disp: 30 tablet, Rfl: 1    pregabalin (LYRICA) 75 MG capsule, Take 1 capsule (75 mg total) by mouth 2 (two) times daily., Disp: 60 capsule, Rfl: 01    tretinoin (RETIN-A) 0.025 % cream, Apply topically every evening., Disp: 20 g, Rfl: 0    ALPRAZolam (XANAX) 0.25 MG tablet, Take 1 tablet (0.25 mg total) by mouth daily as needed for Anxiety., Disp: 15 tablet, Rfl: 0    lubiprostone (AMITIZA) 8 MCG Cap, TAKE 1 CAPSULE(8 MCG) BY MOUTH TWICE DAILY, Disp: 180 capsule, Rfl: 0    tiZANidine (ZANAFLEX) 2 MG tablet, Take 1 tablet (2 mg total) by mouth 2 (two) times a day., Disp: 40 tablet, Rfl: 02    Past Medical History:   Diagnosis Date    Abnormal Pap smear of cervix     Allergy     Anxiety     Asthma     rare inhaler use    Constipation     Depression     Endometriosis     Fibromyalgia     Gastritis     HLA B27 (HLA B27 positive)     Migraine     Postpartum depression     Scoliosis     Smoker     stopped in January 2018    Tobacco dependence 2/17/2016       Past Surgical History:   Procedure Laterality Date    ADENOIDECTOMY      APPENDECTOMY  09/2014    endometriosis    CHOLECYSTECTOMY  12/20/2017    Dr. MARBELLA Anne, [facility]     COLONOSCOPY N/A 05/19/2016    Procedure: COLONOSCOPY;  Surgeon: Jarret Zhao Jr., MD;  Location: [facility];  Service: Endoscopy;  Laterality: N/A;    COLONOSCOPY N/A 08/27/2020    Dr. Pavel: Erythema & ulcerated mucosa in descending, splenic flexure & distal  transverse (r/o UC, r/o ischemia, r/o vasculiti, etc.), Mucosal changes in descending colon (secondary to left-sided colitis, to ischemic colitis(?), to vasculitis(?). hemorrhoids; biopsy: TI & random biopsies- WNL, transverse/hepatic/descending- mild active colitis without definitive features of chronicity    COLONOSCOPY N/A 12/16/2022    Procedure: COLONOSCOPY;  Surgeon: Jarret Zhao Jr., MD;  Location: [facility] ENDO;  Service: Endoscopy;  Laterality: N/A; Repeat colonoscopy in 5 years for screening    CYSTOSCOPY  12/01/2021    Procedure: CYSTOSCOPY;  Surgeon: Ana María aEsley MD;  Location: [facility] OR;  Service: OB/GYN;;    ESOPHAGOGASTRODUODENOSCOPY N/A 10/01/2020    Dr. Zhao: Erythema in the lower third of the esophagus; Duodenal bulb erosions without bleeding. biopsy: duodenum- Small bowel mucosa with mild acute inflammation changes may represent erosion, Villous architecture is maintained; stomach- mild chronic gastritis; esophagus- minimal reactive changes    ESOPHAGOGASTRODUODENOSCOPY N/A 12/16/2022    Procedure: EGD (ESOPHAGOGASTRODUODENOSCOPY);  Surgeon: Jarret Zhao Jr., MD;  Location: [facility];  Service: Endoscopy;  Laterality: N/A;    INJECTION OF ANESTHETIC AGENT AROUND MEDIAL BRANCH NERVES INNERVATING LUMBAR FACET JOINT Bilateral 3/13/2024    Procedure: Block-nerve-medial branch-lumbar    L4/5, L5/S1;  Surgeon: Dain Lawrence MD;  Location: [facility] OR;  Service: Pain Management;  Laterality: Bilateral;    INJECTION OF ANESTHETIC AGENT AROUND MEDIAL BRANCH NERVES INNERVATING LUMBAR FACET JOINT Bilateral 4/2/2024    Procedure: Block-nerve-medial branch-lumbar-L4/5-L5/S1;  Surgeon: Dain Lawrence MD;  Location: [facility] OR;  Service: Pain Management;  Laterality: Bilateral;    LAPAROSCOPIC OOPHORECTOMY Left 12/01/2021    Procedure: OOPHORECTOMY, LAPAROSCOPIC;  Surgeon: Ana María Easley MD;  Location: [facility] OR;  Service: OB/GYN;  Laterality: Left;    LAPAROSCOPIC SALPINGECTOMY Bilateral  2021    Procedure: SALPINGECTOMY, LAPAROSCOPIC;  Surgeon: Ana María Easley MD;  Location: [facility] OR;  Service: OB/GYN;  Laterality: Bilateral;    LAPAROSCOPIC TOTAL HYSTERECTOMY N/A 2021    Procedure: HYSTERECTOMY, TOTAL, LAPAROSCOPIC;  Surgeon: Ana María Easley MD;  Location: [facility] OR;  Service: OB/GYN;  Laterality: N/A;    MOUTH SURGERY      x2    PELVIC LAPAROSCOPY      RADIOFREQUENCY ABLATION OF LUMBAR MEDIAL BRANCH NERVE AT SINGLE LEVEL Bilateral 2024    Procedure: Radiofrequency Ablation, Nerve, Spinal, Lumbar, Medial Branch, L4/5, L5/L6;  Surgeon: Dain Lawrence MD;  Location: [facility] OR;  Service: Pain Management;  Laterality: Bilateral;    SINUS SURGERY  2021    FESS, turbinate red    TONSILLECTOMY  2016    UPPER GASTROINTESTINAL ENDOSCOPY  2016    Dr. Zhao       Family History   Problem Relation Name Age of Onset    Fibromyalgia Mother      Depression Mother      Irritable bowel syndrome Mother      Diabetes Father      Depression Father      Mental illness Father          bipolar disorder    Asthma Father      Multiple sclerosis Maternal Aunt      Irritable bowel syndrome Maternal Grandmother      Colon cancer Neg Hx      Ulcerative colitis Neg Hx      Stomach cancer Neg Hx      Esophageal cancer Neg Hx      Breast cancer Neg Hx      Ovarian cancer Neg Hx      Celiac disease Neg Hx         Social History     Socioeconomic History    Marital status:     Number of children: 0   Occupational History    Occupation: hotel management   Tobacco Use    Smoking status: Former     Current packs/day: 0.00     Types: Cigarettes, Vaping with nicotine     Quit date: 2018     Years since quittin.4    Smokeless tobacco: Never    Tobacco comments:     Still vaping   Substance and Sexual Activity    Alcohol use: No    Drug use: No    Sexual activity: Yes     Partners: Male   Social History Narrative    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     Social Determinants of  Health     Financial Resource Strain: Unknown (4/7/2022)    Overall Financial Resource Strain (CARDIA)     Difficulty of Paying Living Expenses: Patient declined   Food Insecurity: Unknown (4/7/2022)    Hunger Vital Sign     Worried About Running Out of Food in the Last Year: Patient declined     Ran Out of Food in the Last Year: Patient declined   Transportation Needs: Unknown (4/7/2022)    PRAPARE - Transportation     Lack of Transportation (Medical): Patient declined     Lack of Transportation (Non-Medical): Patient declined   Physical Activity: Unknown (1/26/2024)    Exercise Vital Sign     Days of Exercise per Week: Patient declined   Stress: Unknown (4/7/2022)    [employer] - Occupational Stress Questionnaire     Feeling of Stress : Patient declined   Recent Concern: Stress - Stress Concern Present (1/24/2022)    [employer] - Occupational Stress Questionnaire     Feeling of Stress : Rather much   Housing Stability: Unknown (4/7/2022)    Housing Stability Vital Sign     Unable to Pay for Housing in the Last Year: Patient refused     Unstable Housing in the Last Year: Patient refused       Review of patient's allergies indicates:   Allergen Reactions    Latex, natural rubber      burning    Sulfur Hives    Toradol [ketorolac] Other (See Comments)     Metallic taste only once in 2013; has taken since without problems; reports R sided paralysis    Tramadol Other (See Comments)     paralysis    Penicillins Hives and Rash    Sulfa (sulfonamide antibiotics) Hives and Rash       Review of Systems:  General ROS: negative for - fever  Psychological ROS: negative for - hostility  Hematological and Lymphatic ROS: negative for - bleeding problems  Endocrine ROS: negative for - unexpected weight changes  Respiratory ROS: no cough, shortness of breath, or wheezing  Cardiovascular ROS: no chest pain or dyspnea on exertion  Gastrointestinal ROS: no abdominal pain,  "change in bowel habits, or black or bloody stools  Musculoskeletal ROS: negative for - muscular weakness  Neurological ROS: negative for - numbness/tingling  Dermatological ROS: negative for rash    Physical Exam:  Vitals:    05/28/24 0905   BP: 115/72   Pulse: 77   Weight: 66.4 kg (146 lb 6.2 oz)   Height: 5' 8" (1.727 m)   PainSc:   5   PainLoc: Back       Body mass index is 22.26 kg/m².    Gen: NAD,   Psych: mood appropriate for given condition  CV: 2+ radial pulse  HEENT: anicteric   Respiratory: non labored  Abd: soft nt, nd  Skin: intact,   ROM: limited AROM of the L spine in all planes, full ROM at ankles, knees and hips  Sensation: intact to light touch in all dermatomes tested from L2-S1 bilaterally  Reflexes: 2+ b/l patella and 0+ right Achilles, 2+ left Achilles  Palpation: Diffusely tender over lumbar paraspinals  -TTP over the b/l greater trochanters and bilateral SI joint  Tone: normal in the b/l knees and hips   Extremities: No edema in b/l ankles or hands  Provacative tests: + b/l axial facet loading       Right Left   L2/3 Iliacus Hip flexion  5  5   L3/4 Qudratus Femoris Knee Extension  5  5   L4/5 Tib Anterior Ankle Dorsiflexion   5  5   L5/S1 Extensor Hallicus Longus Great toe extension  5  5                 S1/S2 Gastroc/Soleus Plantar Flexion  5  5       Imaging:  Xray spine 11/6/2020  FINDINGS:  S-shaped scoliosis of the thoracolumbar spine with dextroconvex curvature of the thoracic spine and levoconvex curvature of the lumbar spine.  The thoracic dextroconvex curvature measures approximately 30.6 degrees measured from the superior endplate of T4 to the inferior endplate of T11.  Levoconvex curvature of the lumbar spine measures approximately 20.7 degrees measured from the superior endplate of T12 to the inferior endplate of L3.  sagittal balance unable to be assessed due to obscuration of the C7 vertebral body.  No evidence for vertebral segmentation anomaly.  Vertebral body heights are " maintained.  No suspicious appearing lytic or blastic lesions.    Xray lumbar spine 4/21/22  FINDINGS:  Vertebral bodies are normal in height without evidence of fracture.  Mild rotatory levoconvex curvature of the lumbar spine.  Normal sagittal alignment is preserved.  No spondylolisthesis. No abnormal translation with flexion and extension.  Intervertebral disc heights are well maintained.Mild lower lumbar facet arthropathy.    Xray cervical spine 4/21/22  FINDINGS:  C1-C2: Pre-dens space is maintained.  Dens and lateral masses of C1 are unremarkable.  Alignment: Chronic straightening of the normal cervical lordosis, possibly secondary to positioning or muscle spasm.  No spondylolisthesis.  No dynamic instability.  Vertebrae: Vertebral body heights are maintained.  No suspicious appearing lytic or blastic lesions.  Discs and facets: Disc heights are maintained.  Facet joints are unremarkable. Neural foramina are maintained on oblique projections.  Miscellaneous: No additional findings.    Labs:  BMP  Lab Results   Component Value Date     04/17/2024    K 3.7 04/17/2024     04/17/2024    CO2 27 04/17/2024    BUN 6 (L) 04/17/2024    CREATININE 0.58 04/17/2024    CALCIUM 9.4 04/17/2024    ANIONGAP 8 04/17/2024    ESTGFRAFRICA >60 11/29/2021    EGFRNONAA >60 11/29/2021     Lab Results   Component Value Date    ALT 21 04/17/2024    AST 29 04/17/2024    ALKPHOS 73 04/17/2024    BILITOT 0.6 04/17/2024       Assessment:   Problem List Items Addressed This Visit    None  Visit Diagnoses       Lumbar radiculopathy, chronic    -  Primary    Myofascial pain        Relevant Medications    tiZANidine (ZANAFLEX) 2 MG tablet    Lumbar spondylosis        Adolescent idiopathic scoliosis of thoracolumbar region                        28 y.o. year old female who presents with back pain.  She has had back pain since 2008.  She has known scoliosis.  She has known fibromyalgia.  Today she rates her pain 4-10, constant,  throbbing, tingling, deep.  She has neck pain into the trapezius muscles.  She also has midthoracic and lower back pain.  Denies any rosmery radicular symptoms.  No numbness no weakness.  Pain worse with standing, bending, walking, nighttime and not relieved with anything.    5/28/2024: Holly Nichols returns to the office for follow up.  She returns for follow-up with continued midback pain, 5/10, mainly located in her midback on the right side.  Previous severe pain throughout her back has improved.  She denies any new numbness or tingling, weakness or any new changes to her bowel or bladder function.  She reports intermittent pain radiating into legs but not frequent.  She feels like previous severe pain in her lower back has improved since the RFA.  She continues to take Zanaflex nightly in addition to Lyrica 75 mg b.i.d. with added improvement.  No ill side effects or adverse events with these medications.      - on exam she is full strength in her lower extremities.  - at this time, I believe she is finding relief from the lumbar RFA.  - her current remaining pain is in her thoracic spine, I think a lot of his pain is likely a combination of myofascial pain and  thoracic facet mediated pain, worsened with her scoliosis.  - at this time, her pain is currently manageable and she is finding added relief with Zanaflex at Lyrica.  - she is finding relief with Lyrica 75 mg however not complete, we will trial increasing this to 100 mg b.i.d..  Prescription sent to Dr. Lawrence today for approval.  - we will continue to maximize conservative management with medications and at-home PT directed exercises.  - refill for Zanaflex provided today.  - follow up as needed.      : Not applicable    This note was completed with dictation software and grammatical errors may exist.

## 2024-05-29 RX ORDER — PREGABALIN 100 MG/1
100 CAPSULE ORAL 2 TIMES DAILY
Qty: 60 CAPSULE | Refills: 1 | Status: SHIPPED | OUTPATIENT
Start: 2024-05-29 | End: 2024-06-28

## 2024-07-07 ENCOUNTER — PATIENT MESSAGE (OUTPATIENT)
Dept: FAMILY MEDICINE | Facility: CLINIC | Age: 29
End: 2024-07-07
Payer: COMMERCIAL

## 2024-07-13 DIAGNOSIS — Z87.19 HISTORY OF IBS: ICD-10-CM

## 2024-07-13 DIAGNOSIS — R12 HEARTBURN: ICD-10-CM

## 2024-07-13 DIAGNOSIS — Z90.49 S/P CHOLECYSTECTOMY: ICD-10-CM

## 2024-07-13 RX ORDER — ESOMEPRAZOLE MAGNESIUM 40 MG/1
40 CAPSULE, DELAYED RELEASE ORAL
Qty: 90 CAPSULE | Refills: 3 | Status: SHIPPED | OUTPATIENT
Start: 2024-07-13

## 2024-07-13 NOTE — TELEPHONE ENCOUNTER
No care due was identified.  [facility] Embedded Care Due Messages. Reference number: [id number].   7/13/2024 6:17:27 AM CDT

## 2024-07-13 NOTE — TELEPHONE ENCOUNTER
Refill Decision Note   Holly Simone  is requesting a refill authorization.  Brief Assessment and Rationale for Refill:  Approve     Medication Therapy Plan:         Comments:     Note composed:12:57 PM 07/13/2024

## 2024-07-15 RX ORDER — MONTELUKAST SODIUM 4 MG/1
TABLET, CHEWABLE ORAL
Qty: 180 TABLET | Refills: 0 | Status: SHIPPED | OUTPATIENT
Start: 2024-07-15

## 2024-07-17 ENCOUNTER — OFFICE VISIT (OUTPATIENT)
Dept: FAMILY MEDICINE | Facility: CLINIC | Age: 29
End: 2024-07-17
Payer: COMMERCIAL

## 2024-07-17 VITALS
DIASTOLIC BLOOD PRESSURE: 80 MMHG | HEART RATE: 91 BPM | OXYGEN SATURATION: 99 % | WEIGHT: 145.06 LBS | SYSTOLIC BLOOD PRESSURE: 102 MMHG | BODY MASS INDEX: 21.99 KG/M2 | HEIGHT: 68 IN

## 2024-07-17 DIAGNOSIS — F98.8 ATTENTION DEFICIT DISORDER (ADD) IN ADULT: ICD-10-CM

## 2024-07-17 DIAGNOSIS — R23.2 HOT FLASHES: ICD-10-CM

## 2024-07-17 DIAGNOSIS — L21.9 SEBORRHEIC DERMATITIS: ICD-10-CM

## 2024-07-17 DIAGNOSIS — R53.83 OTHER FATIGUE: ICD-10-CM

## 2024-07-17 DIAGNOSIS — R21 RASH: Primary | ICD-10-CM

## 2024-07-17 DIAGNOSIS — R63.0 LOSS OF APPETITE: ICD-10-CM

## 2024-07-17 PROCEDURE — 3074F SYST BP LT 130 MM HG: CPT | Mod: CPTII,S$GLB,, | Performed by: FAMILY MEDICINE

## 2024-07-17 PROCEDURE — 99999 PR PBB SHADOW E&M-EST. PATIENT-LVL V: CPT | Mod: PBBFAC,,, | Performed by: FAMILY MEDICINE

## 2024-07-17 PROCEDURE — 3079F DIAST BP 80-89 MM HG: CPT | Mod: CPTII,S$GLB,, | Performed by: FAMILY MEDICINE

## 2024-07-17 PROCEDURE — 1160F RVW MEDS BY RX/DR IN RCRD: CPT | Mod: CPTII,S$GLB,, | Performed by: FAMILY MEDICINE

## 2024-07-17 PROCEDURE — 1159F MED LIST DOCD IN RCRD: CPT | Mod: CPTII,S$GLB,, | Performed by: FAMILY MEDICINE

## 2024-07-17 PROCEDURE — 99214 OFFICE O/P EST MOD 30 MIN: CPT | Mod: S$GLB,,, | Performed by: FAMILY MEDICINE

## 2024-07-17 PROCEDURE — 3008F BODY MASS INDEX DOCD: CPT | Mod: CPTII,S$GLB,, | Performed by: FAMILY MEDICINE

## 2024-07-17 RX ORDER — DEXTROAMPHETAMINE SACCHARATE, AMPHETAMINE ASPARTATE MONOHYDRATE, DEXTROAMPHETAMINE SULFATE AND AMPHETAMINE SULFATE 3.75; 3.75; 3.75; 3.75 MG/1; MG/1; MG/1; MG/1
15 CAPSULE, EXTENDED RELEASE ORAL DAILY
Qty: 30 CAPSULE | Refills: 0 | Status: SHIPPED | OUTPATIENT
Start: 2024-10-02

## 2024-07-17 RX ORDER — DEXTROAMPHETAMINE SACCHARATE, AMPHETAMINE ASPARTATE MONOHYDRATE, DEXTROAMPHETAMINE SULFATE AND AMPHETAMINE SULFATE 3.75; 3.75; 3.75; 3.75 MG/1; MG/1; MG/1; MG/1
15 CAPSULE, EXTENDED RELEASE ORAL EVERY MORNING
Qty: 30 CAPSULE | Refills: 0 | Status: SHIPPED | OUTPATIENT
Start: 2024-08-02

## 2024-07-17 RX ORDER — KETOCONAZOLE 20 MG/ML
SHAMPOO, SUSPENSION TOPICAL
Qty: 120 ML | Refills: 2 | Status: SHIPPED | OUTPATIENT
Start: 2024-07-18

## 2024-07-17 RX ORDER — CYPROHEPTADINE HYDROCHLORIDE 4 MG/1
4 TABLET ORAL
Qty: 90 TABLET | Refills: 2 | Status: SHIPPED | OUTPATIENT
Start: 2024-07-17

## 2024-07-17 RX ORDER — DEXTROAMPHETAMINE SACCHARATE, AMPHETAMINE ASPARTATE MONOHYDRATE, DEXTROAMPHETAMINE SULFATE AND AMPHETAMINE SULFATE 3.75; 3.75; 3.75; 3.75 MG/1; MG/1; MG/1; MG/1
15 CAPSULE, EXTENDED RELEASE ORAL EVERY MORNING
Qty: 30 CAPSULE | Refills: 0 | Status: SHIPPED | OUTPATIENT
Start: 2024-09-02

## 2024-07-17 NOTE — PROGRESS NOTES
Assessment:       1. Rash    2. Attention deficit disorder (ADD) in adult    3. Hot flashes    4. Other fatigue    5. Seborrheic dermatitis    6. Loss of appetite        Assessment & Plan  Rash: New problem workup needed  -     E-Consult to Dermatology  Recommend the patient to avoid using triamcinolone cream on the face, avoid any make up in that area, use a mild face wash for sensitive skin.  Will eConsult dermatologist.  Attention deficit disorder (ADD) in adult: Stable  -     dextroamphetamine-amphetamine (ADDERALL XR) 15 MG 24 hr capsule; Take 1 capsule (15 mg total) by mouth once daily.  Dispense: 30 capsule; Refill: 0  -     dextroamphetamine-amphetamine (ADDERALL XR) 15 MG 24 hr capsule; Take 1 capsule (15 mg total) by mouth every morning.  Dispense: 30 capsule; Refill: 0  -     dextroamphetamine-amphetamine (ADDERALL XR) 15 MG 24 hr capsule; Take 1 capsule (15 mg total) by mouth every morning.  Dispense: 30 capsule; Refill: 0  [place] prescription monitoring program was checked and okay, Adderall was prescribed for the patient.  Hot flashes:  Worsening  -     Testosterone; Future; Expected date: 07/17/2024  -     ESTRADIOL; Future; Expected date: 07/17/2024  -     PROGESTERONE; Future; Expected date: 07/17/2024  -     FOLLICLE STIMULATING HORMONE; Future; Expected date: 07/17/2024    Other fatigue: Worsening  -     Comprehensive Metabolic Panel; Future; Expected date: 07/17/2024  -     CBC Auto Differential; Future; Expected date: 07/17/2024  -     TSH; Future; Expected date: 07/17/2024    Seborrheic dermatitis: Stable  -     ketoconazole (NIZORAL) 2 % shampoo; Apply topically twice a week.  Dispense: 120 mL; Refill: 2    Loss of appetite: Worsening  -     cyproheptadine (PERIACTIN) 4 mg tablet; Take 1 tablet (4 mg total) by mouth before meals as needed (decrease appetite).  Dispense: 90 tablet; Refill: 2  Cyproheptadine was prescribed, to be taken before meals. She was advised against taking hydroxyzine  concurrently with cyproheptadine. A 90-day supply of Periactin was prescribed. Blood work was ordered, including CBC, CMP, thyroid function, estradiol, progesterone, FSH, and testosterone.      Follow-up  A follow-up appointment is scheduled for 3 months.        Patient agreed with assessment and plan. Patient verbalized understanding.     Subjective:       Patient ID: Holly Nichols is a 28 y.o. female.    Chief Complaint: Follow-up (3 month follow up )    HPI  History of Present Illness  The patient presents for evaluation of a rash.    She has been experiencing a persistent facial rash for the past 2 weeks. The rash is characterized by red bumps and peeling, but it does not cause itching or burning. Despite using nystatin, an antifungal, Vaseline, and Aquaphor, the rash has not improved. She has been using Dr. Murphy's diluted soap to eliminate chemicals from the rash and a regular moisturizer. She is not currently using any products like retinol. The rash is particularly irritating, preventing her from covering it.    She has been on medication for Attention Deficit Disorder (ADD) for 2 years. Over the past 3 months, she has been unable to eat due to a lack of appetite. Her weight has always fluctuated. She is uncertain how to stimulate her appetite to regain her appetite. She occasionally takes hydroxyzine. Her grandfather passed away unexpectedly on 04/01/2024, and she is beginning to grieve. She has been experiencing fatigue and a lack of energy. She suspects her hormone levels are imbalanced and wishes to have a Complete Blood Count (CBC), Comprehensive Metabolic Panel (CMP), and a full hormone panel with testosterone. She only has one ovary. She has thyroid issues but is not on thyroid medication. She underwent a hysterectomy in 2020 and has not menstruated since. She experiences hot flashes. Her heart rate consistently high, ranging from 90s to 100s. She maintains an active lifestyle and does not  consume excessive sodas or caffeine. Her urine is not dark. Her anxiety increases, particularly with her children, but she felt calm before arriving.   She has 3 kids.   She has a family history of thyroid issues, high blood pressure, and heart issues.     Past medical history, past social history was reviewed and discussed with the patient.    Review of Systems   Constitutional:  Positive for appetite change and fatigue.   Cardiovascular:  Negative for chest pain and leg swelling.   Gastrointestinal:  Negative for abdominal distention and abdominal pain.   Endocrine: Positive for heat intolerance.   Skin:  Positive for rash.   Psychiatric/Behavioral:  Positive for decreased concentration and dysphoric mood.        Objective:      Physical Exam    Physical Exam  Vital Signs  Vitals show a blood pressure of 102/80.   The patient is in no acute distress, lungs are clear to auscultation, heart is regular rate and rhythm, phase has presence of a rash, see attach picture.  Results       This note was generated with the assistance of ambient listening technology. Verbal consent was obtained by the patient and accompanying visitor(s) for the recording of patient appointment to facilitate this note. I attest to having reviewed and edited the generated note for accuracy, though some syntax or spelling errors may persist. Please contact the author of this note for any clarification.

## 2024-07-20 ENCOUNTER — LAB VISIT (OUTPATIENT)
Dept: LAB | Facility: HOSPITAL | Age: 29
End: 2024-07-20
Attending: FAMILY MEDICINE
Payer: COMMERCIAL

## 2024-07-20 ENCOUNTER — E-CONSULT (OUTPATIENT)
Dept: DERMATOLOGY | Facility: CLINIC | Age: 29
End: 2024-07-20

## 2024-07-20 DIAGNOSIS — R53.83 OTHER FATIGUE: ICD-10-CM

## 2024-07-20 DIAGNOSIS — L71.0 PERIORAL DERMATITIS: Primary | ICD-10-CM

## 2024-07-20 DIAGNOSIS — R23.2 HOT FLASHES: ICD-10-CM

## 2024-07-20 LAB
ALBUMIN SERPL BCP-MCNC: 3.8 G/DL (ref 3.5–5.2)
ALP SERPL-CCNC: 67 U/L (ref 55–135)
ALT SERPL W/O P-5'-P-CCNC: 12 U/L (ref 10–44)
ANION GAP SERPL CALC-SCNC: 5 MMOL/L (ref 8–16)
AST SERPL-CCNC: 13 U/L (ref 10–40)
BASOPHILS # BLD AUTO: 0.07 K/UL (ref 0–0.2)
BASOPHILS NFR BLD: 1.1 % (ref 0–1.9)
BILIRUB SERPL-MCNC: 0.4 MG/DL (ref 0.1–1)
BUN SERPL-MCNC: 8 MG/DL (ref 6–20)
CALCIUM SERPL-MCNC: 9.4 MG/DL (ref 8.7–10.5)
CHLORIDE SERPL-SCNC: 107 MMOL/L (ref 95–110)
CO2 SERPL-SCNC: 28 MMOL/L (ref 23–29)
CREAT SERPL-MCNC: 0.8 MG/DL (ref 0.5–1.4)
DIFFERENTIAL METHOD BLD: ABNORMAL
EOSINOPHIL # BLD AUTO: 0.2 K/UL (ref 0–0.5)
EOSINOPHIL NFR BLD: 3.5 % (ref 0–8)
ERYTHROCYTE [DISTWIDTH] IN BLOOD BY AUTOMATED COUNT: 12.2 % (ref 11.5–14.5)
EST. GFR  (NO RACE VARIABLE): >60 ML/MIN/1.73 M^2
ESTRADIOL SERPL-MCNC: 32 PG/ML
FSH SERPL-ACNC: 6.89 MIU/ML
GLUCOSE SERPL-MCNC: 86 MG/DL (ref 70–110)
HCT VFR BLD AUTO: 42.3 % (ref 37–48.5)
HGB BLD-MCNC: 14.8 G/DL (ref 12–16)
IMM GRANULOCYTES # BLD AUTO: 0.02 K/UL (ref 0–0.04)
IMM GRANULOCYTES NFR BLD AUTO: 0.3 % (ref 0–0.5)
LYMPHOCYTES # BLD AUTO: 2.1 K/UL (ref 1–4.8)
LYMPHOCYTES NFR BLD: 32.5 % (ref 18–48)
MCH RBC QN AUTO: 31.5 PG (ref 27–31)
MCHC RBC AUTO-ENTMCNC: 35 G/DL (ref 32–36)
MCV RBC AUTO: 90 FL (ref 82–98)
MONOCYTES # BLD AUTO: 0.3 K/UL (ref 0.3–1)
MONOCYTES NFR BLD: 5.4 % (ref 4–15)
NEUTROPHILS # BLD AUTO: 3.6 K/UL (ref 1.8–7.7)
NEUTROPHILS NFR BLD: 57.2 % (ref 38–73)
NRBC BLD-RTO: 0 /100 WBC
PLATELET # BLD AUTO: 188 K/UL (ref 150–450)
PMV BLD AUTO: 10.9 FL (ref 9.2–12.9)
POTASSIUM SERPL-SCNC: 4.6 MMOL/L (ref 3.5–5.1)
PROGEST SERPL-MCNC: 0.5 NG/ML
PROT SERPL-MCNC: 6.5 G/DL (ref 6–8.4)
RBC # BLD AUTO: 4.7 M/UL (ref 4–5.4)
SODIUM SERPL-SCNC: 140 MMOL/L (ref 136–145)
TESTOST SERPL-MCNC: 36 NG/DL (ref 5–73)
TSH SERPL DL<=0.005 MIU/L-ACNC: 0.92 UIU/ML (ref 0.4–4)
WBC # BLD AUTO: 6.34 K/UL (ref 3.9–12.7)

## 2024-07-20 PROCEDURE — 80053 COMPREHEN METABOLIC PANEL: CPT | Performed by: FAMILY MEDICINE

## 2024-07-20 PROCEDURE — 85025 COMPLETE CBC W/AUTO DIFF WBC: CPT | Performed by: FAMILY MEDICINE

## 2024-07-20 PROCEDURE — 82670 ASSAY OF TOTAL ESTRADIOL: CPT | Performed by: FAMILY MEDICINE

## 2024-07-20 PROCEDURE — 84403 ASSAY OF TOTAL TESTOSTERONE: CPT | Performed by: FAMILY MEDICINE

## 2024-07-20 PROCEDURE — 36415 COLL VENOUS BLD VENIPUNCTURE: CPT | Mod: PO | Performed by: FAMILY MEDICINE

## 2024-07-20 PROCEDURE — 84443 ASSAY THYROID STIM HORMONE: CPT | Performed by: FAMILY MEDICINE

## 2024-07-20 PROCEDURE — 84144 ASSAY OF PROGESTERONE: CPT | Performed by: FAMILY MEDICINE

## 2024-07-20 PROCEDURE — 83001 ASSAY OF GONADOTROPIN (FSH): CPT | Performed by: FAMILY MEDICINE

## 2024-07-20 NOTE — CONSULTS
[facility]  Response for E-Consult     Patient Name: Holly Nichols  MRN: [id number]  Primary Care Provider: Marie Berumen MD   Requesting Provider: Marie Berumen MD  E-Consult to Dermatology  Consult performed by: Inna Mckeon MD  Consult ordered by: Marie Berumen MD  Reason for consult: Rash  Assessment/Recommendations: Thank you for this consult. This appears to be perioral dermatitis. I recommend stopping all topical, nasal, or inhaled steroids if possible. I recommend going very basic with cosmetic products/lip balm. I like protopic or elidel bid and doxycycline 100 mg bid x 3-4 weeks. This may be recurrent and identifying a trigger is important.           Recommendation: as above     Contingency that warrants a repeat eConsult or referral: Reconsult if the condition worsens or fails to improve       Total time of Consultation: 10 minute    I did not speak to the requesting provider verbally about this.     *This eConsult is based on the clinical data available to me and is furnished without benefit of a physical examination. The eConsult will need to be interpreted in light of any clinical issues or changes in patient status not available to me at the time of filing this eConsults. Significant changes in patient condition or level of acuity should result in immediate formal consultation and reevaluation. Please alert me if you have further questions.    Thank you for this eConsult referral.     Inna Mckeon MD  [facility]

## 2024-07-22 ENCOUNTER — PATIENT MESSAGE (OUTPATIENT)
Dept: FAMILY MEDICINE | Facility: CLINIC | Age: 29
End: 2024-07-22
Payer: COMMERCIAL

## 2024-07-22 DIAGNOSIS — A49.9 BACTERIAL INFECTION: ICD-10-CM

## 2024-07-22 DIAGNOSIS — L71.0 PERIORAL DERMATITIS: Primary | ICD-10-CM

## 2024-07-22 DIAGNOSIS — L71.0 PERIORAL DERMATITIS: ICD-10-CM

## 2024-07-22 RX ORDER — TACROLIMUS 0.3 MG/G
OINTMENT TOPICAL 2 TIMES DAILY
Qty: 60 G | Refills: 0 | Status: SHIPPED | OUTPATIENT
Start: 2024-07-22

## 2024-07-22 RX ORDER — DOXYCYCLINE 100 MG/1
100 CAPSULE ORAL 2 TIMES DAILY
Qty: 42 CAPSULE | Refills: 0 | Status: SHIPPED | OUTPATIENT
Start: 2024-07-22 | End: 2024-08-12

## 2024-07-22 RX ORDER — DOXYCYCLINE 100 MG/1
CAPSULE ORAL
Qty: 180 CAPSULE | OUTPATIENT
Start: 2024-07-22

## 2024-07-29 ENCOUNTER — PATIENT MESSAGE (OUTPATIENT)
Dept: FAMILY MEDICINE | Facility: CLINIC | Age: 29
End: 2024-07-29
Payer: COMMERCIAL

## 2024-07-29 ENCOUNTER — TELEPHONE (OUTPATIENT)
Dept: OBSTETRICS AND GYNECOLOGY | Facility: CLINIC | Age: 29
End: 2024-07-29
Payer: COMMERCIAL

## 2024-07-29 NOTE — TELEPHONE ENCOUNTER
----- Message from Marie Berumen MD sent at 7/28/2024  8:04 PM CDT -----  Hello.  Have this patient in common, she is having hot flashes, she requested hormonal check and showed low estradiol, low progesterone.  She had a hysterectomy.  I told the patient that I will forward this to you, do you recommend a follow-up appointment with you?  Or any other recommendations that you have.  Thank you.

## 2024-08-06 ENCOUNTER — PATIENT MESSAGE (OUTPATIENT)
Dept: PAIN MEDICINE | Facility: CLINIC | Age: 29
End: 2024-08-06
Payer: COMMERCIAL

## 2024-08-06 DIAGNOSIS — M54.16 LUMBAR RADICULOPATHY, CHRONIC: ICD-10-CM

## 2024-08-06 RX ORDER — PREGABALIN 100 MG/1
100 CAPSULE ORAL NIGHTLY
Qty: 30 CAPSULE | Refills: 1 | Status: SHIPPED | OUTPATIENT
Start: 2024-08-06 | End: 2024-10-05

## 2024-08-06 RX ORDER — PREGABALIN 75 MG/1
75 CAPSULE ORAL DAILY
Qty: 30 CAPSULE | Refills: 1 | Status: SHIPPED | OUTPATIENT
Start: 2024-08-06 | End: 2024-10-05

## 2024-08-08 ENCOUNTER — OFFICE VISIT (OUTPATIENT)
Dept: OBSTETRICS AND GYNECOLOGY | Facility: CLINIC | Age: 29
End: 2024-08-08
Payer: COMMERCIAL

## 2024-08-08 VITALS
BODY MASS INDEX: 22.29 KG/M2 | WEIGHT: 147.06 LBS | HEIGHT: 68 IN | SYSTOLIC BLOOD PRESSURE: 118 MMHG | DIASTOLIC BLOOD PRESSURE: 72 MMHG

## 2024-08-08 DIAGNOSIS — N94.10 DYSPAREUNIA IN FEMALE: ICD-10-CM

## 2024-08-08 DIAGNOSIS — N89.8 VAGINAL DRYNESS: ICD-10-CM

## 2024-08-08 DIAGNOSIS — R23.2 HOT FLASHES: Primary | ICD-10-CM

## 2024-08-08 PROCEDURE — 3074F SYST BP LT 130 MM HG: CPT | Mod: CPTII,S$GLB,, | Performed by: OBSTETRICS & GYNECOLOGY

## 2024-08-08 PROCEDURE — 99999 PR PBB SHADOW E&M-EST. PATIENT-LVL III: CPT | Mod: PBBFAC,,, | Performed by: OBSTETRICS & GYNECOLOGY

## 2024-08-08 PROCEDURE — 3078F DIAST BP <80 MM HG: CPT | Mod: CPTII,S$GLB,, | Performed by: OBSTETRICS & GYNECOLOGY

## 2024-08-08 PROCEDURE — 1159F MED LIST DOCD IN RCRD: CPT | Mod: CPTII,S$GLB,, | Performed by: OBSTETRICS & GYNECOLOGY

## 2024-08-08 PROCEDURE — 3008F BODY MASS INDEX DOCD: CPT | Mod: CPTII,S$GLB,, | Performed by: OBSTETRICS & GYNECOLOGY

## 2024-08-08 PROCEDURE — 99214 OFFICE O/P EST MOD 30 MIN: CPT | Mod: S$GLB,,, | Performed by: OBSTETRICS & GYNECOLOGY

## 2024-08-08 RX ORDER — ESTRADIOL 0.05 MG/D
1 FILM, EXTENDED RELEASE TRANSDERMAL
Qty: 24 PATCH | Refills: 3 | Status: SHIPPED | OUTPATIENT
Start: 2024-08-08 | End: 2025-08-08

## 2024-09-12 NOTE — [employer] PATIENT INSTRUCTIONS
Discharge Summary/Instructions after an Endoscopic Procedure  Patient Name: Holly Nichols  Patient MRN: [id number]  Patient YOB: 1995 Friday, December 16, 2022  Jarret Zhao MD  Dear patient,  As a result of recent federal legislation (The Federal Cures Act), you may   receive lab or pathology results from your procedure in your [facility]   account before your physician is able to contact you. Your physician or   their representative will relay the results to you with their   recommendations at their soonest availability.  Thank you,  RESTRICTIONS:  During your procedure today, you received medications for sedation.  These   medications may affect your judgment, balance and coordination.  Therefore,   for 24 hours, you have the following restrictions:   - DO NOT drive a car, operate machinery, make legal/financial decisions,   sign important papers or drink alcohol.    ACTIVITY:  Today: no heavy lifting, straining or running due to procedural   sedation/anesthesia.  The following day: return to full activity including work.  DIET:  Eat and drink normally unless instructed otherwise.     TREATMENT FOR COMMON SIDE EFFECTS:  - Mild abdominal pain, nausea, belching, bloating or excessive gas:  rest,   eat lightly and use a heating pad.  - Sore Throat: treat with throat lozenges and/or gargle with warm salt   water.  - Because air was used during the procedure, expelling large amounts of air   from your rectum or belching is normal.  - If a bowel prep was taken, you may not have a bowel movement for 1-3 days.    This is normal.  SYMPTOMS TO WATCH FOR AND REPORT TO YOUR PHYSICIAN:  1. Abdominal pain or bloating, other than gas cramps.  2. Chest pain.  3. Back pain.  4. Signs of infection such as: chills or fever occurring within 24 hours   after the procedure.  5. Rectal bleeding, which would show as bright red, maroon, or black stools.   (A tablespoon of blood from the rectum is not serious, especially  error   if   hemorrhoids are present.)  6. Vomiting.  7. Weakness or dizziness.  GO DIRECTLY TO THE NEAREST EMERGENCY ROOM IF YOU HAVE ANY OF THE FOLLOWING:      Difficulty breathing              Chills and/or fever over 101 F   Persistent vomiting and/or vomiting blood   Severe abdominal pain   Severe chest pain   Black, tarry stools   Bleeding- more than one tablespoon   Any other symptom or condition that you feel may need urgent attention  Your doctor recommends these additional instructions:  If any biopsies were taken, your doctors clinic will contact you in 1 to 2   weeks with any results.  .   Follow an antireflux regimen.  This includes:       - Do not lie down for at least 3 to 4 hours after meals.        - Raise the head of the bed 4 to 6 inches.        - Decrease excess weight.        - Avoid citrus juices and other acidic foods, alcohol, chocolate, mints,   coffee and other caffeinated beverages, carbonated beverages, fatty and   fried foods.        - Avoid tight-fitting clothing.        - Avoid cigarettes and other tobacco products.   Chew 1-2 tablets completely before swallowing -  Pepcid Complete   (famotidine+calcium carbonate+magnesium hydroxide) for symptoms 2-3 times a   day, as needed.   Take Nexium (esomeprazole) 20 mg by mouth once a day for several days as   needed.   Call the [facility] in 2 weeks for reports (if you haven't heard from us   sooner)  [phone].  For questions, problems or results please call your physician - Jarret Zhao MD at Work:  [phone].  EMERGENCY PHONE NUMBER: [phone], LAB RESULTS: [phone]  IF A COMPLICATION OR EMERGENCY SITUATION ARISES AND YOU ARE UNABLE TO REACH   YOUR PHYSICIAN - GO DIRECTLY TO THE EMERGENCY ROOM.  ___________________________________________  Nurse Signature  ___________________________________________  Patient/Designated Responsible Party Signature  Jarret Zhao MD  12/16/2022 1:05:35 PM  This report has been verified  and signed electronically.  Dear patient,  As a result of recent federal legislation (The Federal Cures Act), you may   receive lab or pathology results from your procedure in your [facility]   account before your physician is able to contact you. Your physician or   their representative will relay the results to you with their   recommendations at their soonest availability.  Thank you.  [employer]

## 2024-09-20 ENCOUNTER — ANESTHESIA EVENT (OUTPATIENT)
Dept: ENDOSCOPY | Facility: HOSPITAL | Age: 29
End: 2024-09-20
Payer: COMMERCIAL

## 2024-09-23 ENCOUNTER — HOSPITAL ENCOUNTER (OUTPATIENT)
Facility: HOSPITAL | Age: 29
Discharge: HOME | End: 2024-09-23
Attending: INTERNAL MEDICINE | Admitting: FAMILY MEDICINE
Payer: COMMERCIAL

## 2024-09-23 ENCOUNTER — ANESTHESIA (OUTPATIENT)
Dept: ENDOSCOPY | Facility: HOSPITAL | Age: 29
End: 2024-09-23
Payer: COMMERCIAL

## 2024-09-23 DIAGNOSIS — R19.7 DIARRHEA, UNSPECIFIED TYPE: ICD-10-CM

## 2024-09-23 DIAGNOSIS — R63.4 WEIGHT LOSS: ICD-10-CM

## 2024-09-23 LAB
C DIFF GDH STL QL: NEGATIVE
C DIFF TOX A+B STL QL IA: NEGATIVE

## 2024-09-23 PROCEDURE — 87449 NOS EACH ORGANISM AG IA: CPT | Mod: 91 | Performed by: INTERNAL MEDICINE

## 2024-09-23 PROCEDURE — 88305 TISSUE EXAM BY PATHOLOGIST: CPT | Mod: PO | Performed by: PATHOLOGY

## 2024-09-23 PROCEDURE — 87046 STOOL CULTR AEROBIC BACT EA: CPT | Performed by: INTERNAL MEDICINE

## 2024-09-23 PROCEDURE — 25000003 PHARM REV CODE 250: Mod: PO | Performed by: NURSE ANESTHETIST, CERTIFIED REGISTERED

## 2024-09-23 PROCEDURE — 87449 NOS EACH ORGANISM AG IA: CPT | Performed by: INTERNAL MEDICINE

## 2024-09-23 PROCEDURE — 45380 COLONOSCOPY AND BIOPSY: CPT | Mod: ,,, | Performed by: INTERNAL MEDICINE

## 2024-09-23 PROCEDURE — 63600175 PHARM REV CODE 636 W HCPCS: Mod: PO | Performed by: INTERNAL MEDICINE

## 2024-09-23 PROCEDURE — 37000008 HC ANESTHESIA 1ST 15 MINUTES: Mod: PO | Performed by: INTERNAL MEDICINE

## 2024-09-23 PROCEDURE — 87045 FECES CULTURE AEROBIC BACT: CPT | Performed by: INTERNAL MEDICINE

## 2024-09-23 PROCEDURE — 63600175 PHARM REV CODE 636 W HCPCS: Mod: PO | Performed by: NURSE ANESTHETIST, CERTIFIED REGISTERED

## 2024-09-23 PROCEDURE — 87209 SMEAR COMPLEX STAIN: CPT | Performed by: INTERNAL MEDICINE

## 2024-09-23 PROCEDURE — 45380 COLONOSCOPY AND BIOPSY: CPT | Mod: PO | Performed by: INTERNAL MEDICINE

## 2024-09-23 PROCEDURE — 88305 TISSUE EXAM BY PATHOLOGIST: CPT | Mod: 26,,, | Performed by: PATHOLOGY

## 2024-09-23 PROCEDURE — 87427 SHIGA-LIKE TOXIN AG IA: CPT | Performed by: INTERNAL MEDICINE

## 2024-09-23 PROCEDURE — 37000009 HC ANESTHESIA EA ADD 15 MINS: Mod: PO | Performed by: INTERNAL MEDICINE

## 2024-09-23 RX ORDER — PROPOFOL 10 MG/ML
VIAL (ML) INTRAVENOUS CONTINUOUS PRN
Status: DISCONTINUED | OUTPATIENT
Start: 2024-09-23 | End: 2024-09-23

## 2024-09-23 RX ORDER — LIDOCAINE HYDROCHLORIDE 20 MG/ML
INJECTION INTRAVENOUS
Status: DISCONTINUED | OUTPATIENT
Start: 2024-09-23 | End: 2024-09-23

## 2024-09-23 RX ORDER — SODIUM CHLORIDE 0.9 % (FLUSH) 0.9 %
10 SYRINGE (ML) INJECTION
Status: DISCONTINUED | OUTPATIENT
Start: 2024-09-23 | End: 2024-09-23 | Stop reason: HOSPADM

## 2024-09-23 RX ORDER — PROPOFOL 10 MG/ML
VIAL (ML) INTRAVENOUS
Status: DISCONTINUED | OUTPATIENT
Start: 2024-09-23 | End: 2024-09-23

## 2024-09-23 RX ORDER — DIPHENOXYLATE HYDROCHLORIDE AND ATROPINE SULFATE 2.5; .025 MG/1; MG/1
1 TABLET ORAL EVERY 6 HOURS PRN
Qty: 60 TABLET | Refills: 3 | Status: SHIPPED | OUTPATIENT
Start: 2024-09-23

## 2024-09-23 RX ORDER — SODIUM CHLORIDE, SODIUM LACTATE, POTASSIUM CHLORIDE, CALCIUM CHLORIDE 600; 310; 30; 20 MG/100ML; MG/100ML; MG/100ML; MG/100ML
INJECTION, SOLUTION INTRAVENOUS CONTINUOUS
Status: DISCONTINUED | OUTPATIENT
Start: 2024-09-23 | End: 2024-09-23 | Stop reason: HOSPADM

## 2024-09-23 RX ADMIN — PROPOFOL 150 MCG/KG/MIN: 10 INJECTION, EMULSION INTRAVENOUS at 11:09

## 2024-09-23 RX ADMIN — SODIUM CHLORIDE, SODIUM LACTATE, POTASSIUM CHLORIDE, AND CALCIUM CHLORIDE: .6; .31; .03; .02 INJECTION, SOLUTION INTRAVENOUS at 11:09

## 2024-09-23 RX ADMIN — LIDOCAINE HYDROCHLORIDE 700 MG: 20 INJECTION INTRAVENOUS at 11:09

## 2024-09-23 RX ADMIN — PROPOFOL 100 MG: 10 INJECTION, EMULSION INTRAVENOUS at 11:09

## 2024-09-23 NOTE — ANESTHESIA PREPROCEDURE EVALUATION
09/23/2024  Holly Nichols is a 29 y.o., female.      Pre-op Assessment    I have reviewed the Patient Summary Reports.     I have reviewed the Nursing Notes. I have reviewed the NPO Status.   I have reviewed the Medications.     Review of Systems  Anesthesia Hx:  No problems with previous Anesthesia                Social:  Former Smoker       Cardiovascular:                hyperlipidemia                             Pulmonary:    Asthma       Asthma:               Neurological:    Neuromuscular Disease,  Headaches     HLA-B27 Dx of Headaches                         Neuromuscular Disease   Psych:  Psychiatric History anxiety                 Physical Exam  General: Well nourished    Airway:  Mallampati: II   Mouth Opening: Normal  TM Distance: Normal  Neck ROM: Normal ROM        Anesthesia Plan  Type of Anesthesia, risks & benefits discussed:    Anesthesia Type: Gen Natural Airway  Intra-op Monitoring Plan: Standard ASA Monitors  Induction:  IV  Informed Consent: Informed consent signed with the Patient and all parties understand the risks and agree with anesthesia plan.  All questions answered.   ASA Score: 2    Ready For Surgery From Anesthesia Perspective.     .

## 2024-09-23 NOTE — [employer]OVATION PATIENT INSTRUCTIONS
Discharge Summary/Instructions for after Colonoscopy with   Biopsy/Polypectomy  Holly Nichols    Monday, September 23, 2024  Jarret Zhao MD  RESTRICTIONS ON ACTIVITY:  - Do not drive a car or operate machinery until the day after the procedure.      - The following day: return to full activity including work.  - For  3 days: No heavy lifting, straining or running.  - Diet: You can have solid foods, but no gassy foods (i.e. beans, broccoli,   cabbage, etc).  TREATMENT FOR COMMON SIDE EFFECTS:  - Mild abdominal pain and bloating or excessive gas: rest, eat lightly and   use a heating pad.  SYMPTOMS TO WATCH FOR AND REPORT TO YOUR PHYSICIAN:  1. Severe abdominal pain.  2. Fever within 24 hours after a procedure.  3. A large amount of rectal bleeding. (A small amount of blood from the   rectum is not serious, especially if hemorrhoids are present.  3.  Because air was put into your colon during the procedure, expelling   large amounts of air from your rectum is normal.  4.  You may not have a bowel movement for 1-3 days because of the   colonoscopy prep.  This is normal.  5.  Call immediately if you notice any of the following:   Chills and/or fever over 101   Persistent vomiting   Severe abdominal pain, other than gas cramps   Severe chest pain   Black, tarry stools   Any bleeding - exceeding one tablespoon  Your doctor recommends these additional instructions:  You are being discharged to home.   We are waiting for your pathology results.   Eat a high fiber diet and eat a low fat diet.   Take a fiber supplement, for example Citrucel, Fibercon, Konsyl or   Metamucil.   Continue your present medications.   Take Colestid (colestipol) 1 tablet by mouth twice a day.   Take Lomotil 1-2 tablets by mouth as needed after each loose bowel movement.     Your physician has recommended a repeat colonoscopy at age 44 (please   contact the clinic prior to your 44th birthday to schedule) for screening   purposes.  None  If  you have any questions or problems, please call your physician.  EMERGENCY PHONE NUMBER: [phone]  LAB RESULTS: Call in two (2) weeks for lab results, [phone]  ___________________________________________  Nurse Signature  ___________________________________________  Patient/Designated Responsible Party Signature  Jarret Zhao MD  9/23/2024 12:12:57 PM  This report has been verified and signed electronically.  Dear patient,  As a result of recent federal legislation (The Federal Cures Act), you may   receive lab or pathology results from your procedure in your [facility]   account before your physician is able to contact you. Your physician or   their representative will relay the results to you with their   recommendations at their soonest availability.  Thank you.  [employer]ATION

## 2024-09-23 NOTE — ANESTHESIA POSTPROCEDURE EVALUATION
Anesthesia Post Evaluation    Patient: Holly Nichols    Procedure(s) Performed: Procedure(s) (LRB):  COLONOSCOPY (N/A)    Final Anesthesia Type: general      Patient location during evaluation: PACU  Patient participation: Yes- Able to Participate  Level of consciousness: awake  Post-procedure vital signs: reviewed and stable  Pain management: adequate  Airway patency: patent    PONV status at discharge: No PONV  Anesthetic complications: no      Cardiovascular status: blood pressure returned to baseline  Respiratory status: unassisted  Hydration status: euvolemic  Follow-up not needed.              Vitals Value Taken Time   /72 09/23/24 1225   Temp  09/23/24 1427   Pulse 77 09/23/24 1225   Resp 15 09/23/24 1225   SpO2 98 % 09/23/24 1225         Event Time   Out of Recovery 12:30:00         Pain/Diamond Score: Diamond Score: 5 (9/23/2024 11:58 AM)

## 2024-09-23 NOTE — H&P
History & Physical - Short Stay  Gastroenterology      SUBJECTIVE:     Procedure: Colonoscopy    Chief Complaint/Indication for Procedure:  Loss of appetite, weight loss, lower abdominal pain, nausea, and diarrhea.    History of Present Illness:  See recent GI OV note:  Office Visit   3/12/2024  [place] - Gastroenterology       Nina Bedoya FNP  Gastroenterology Lower abdominal pain +8 more  Dx Other   Reason for Visit     Progress Notes    Nina Bedoya FNP at 3/12/2024  1:00 PM    Status: Addendum   Expand All Collapse All  Subjective:         Subjective  Patient ID: Holly Nichols is a 28 y.o. female Body mass index is 21.79 kg/m².     Chief Complaint: Other (IBS flare)  This is an established patient of Dr. Zhao and myself.     GI Problem  The primary symptoms include weight loss (decreased appetite, lost ~5-9 lbs over the past year), abdominal pain, nausea (occasional, mild with intermittent abdominal pain & diarrhea), vomiting and diarrhea (probiotic align daily, imodium PRN; lomotil PRN- has taken 3 times recently, helps). Primary symptoms do not include fever, fatigue, melena, hematemesis, hematochezia or dysuria. The illness began more than 7 days ago (irregular bowel habits for several years). Progression since onset: had resolved after our last visit; recurred over the past few months.   The abdominal pain began more than 2 days ago. The abdominal pain is located in the LLQ, RLQ, periumbilical region and suprapubic region (described as sharp to throbbing). The severity of the abdominal pain is 0/10 (currently). The abdominal pain is relieved by bowel movements (after a bowel movement; triggered after eating (especially heavy meals) & by needing to have a bowel movement).   The vomiting began yesterday. Vomiting occurred once. The emesis contains undigested food (everything she ate the day before).   The diarrhea began more than 1 week ago. The diarrhea occurs 5 to 10 times per day  (anytime she eats or drinks). Risk factors: denies recent antibiotic/hospitalization, suspect food intake, ill contacts, or foreign travel.   The illness does not include chills, dysphagia, odynophagia, constipation or back pain. Associated symptoms comments: Eating a bland diet, on low lactose diet. Significant associated medical issues include GERD (controlled with taking nexium 40 mg daily; PAST TREATMENT: protonix- no relief,  prilosec- became ineffective) and irritable bowel syndrome (PAST TREATMENT: bentyl helped in the past). Associated medical issues comments: PAST TREATMENT: lialda- helped colitis diagnosis.      Review of Systems   Constitutional:  Positive for appetite change (recent decreased appetite started ~1/2024; been on adderall for the past 2 years without change in dosage) and weight loss (decreased appetite, lost ~5-9 lbs over the past year). Negative for chills, diaphoresis, fatigue and fever.   HENT:  Negative for sore throat and trouble swallowing.    Respiratory:  Negative for cough, choking, chest tightness and shortness of breath.    Cardiovascular:  Negative for chest pain.   Gastrointestinal:  Positive for abdominal pain, diarrhea (probiotic align daily, imodium PRN; lomotil PRN- has taken 3 times recently, helps), nausea (occasional, mild with intermittent abdominal pain & diarrhea) and vomiting. Negative for abdominal distention, anal bleeding, blood in stool, constipation, dysphagia, hematemesis, hematochezia, melena and rectal pain.       Wt Readings from Last 20 Encounters:   09/18/24 66.7 kg (147 lb)   08/08/24 66.7 kg (147 lb 0.8 oz)   07/17/24 65.8 kg (145 lb 1 oz)   07/10/24 66.6 kg (146 lb 12.8 oz)   05/28/24 66.4 kg (146 lb 6.2 oz)   05/08/24 65.1 kg (143 lb 8.3 oz)   05/03/24 65.1 kg (143 lb 8.3 oz)   04/26/24 65.5 kg (144 lb 6.4 oz)   04/18/24 65.3 kg (144 lb)   04/17/24 65.3 kg (144 lb)   04/16/24 65.7 kg (144 lb 13.5 oz)   03/20/24 64.9 kg (143 lb)   03/13/24 65 kg (143 lb  4.8 oz)   03/12/24 65 kg (143 lb 4.8 oz)   02/28/24 66 kg (145 lb 8.1 oz)   01/26/24 65.7 kg (144 lb 13.5 oz)   10/25/23 65.5 kg (144 lb 6.4 oz)   06/30/23 67.9 kg (149 lb 11.1 oz)   03/30/23 68.1 kg (150 lb 0.4 oz)   03/02/23 69.3 kg (152 lb 12.5 oz)       Assessment:      Assessment  1. Lower abdominal pain    2. History of colitis    3. History of IBS    4. Diarrhea, unspecified type    5. S/P cholecystectomy    6. Non-intractable vomiting with nausea    7. Decreased appetite    8. Weight loss    9. History of gastroesophageal reflux (GERD)             Plan:      Plan  Lower abdominal pain  -     Stool Exam-Ova,Cysts,Parasites; Future; Expected date: 03/12/2024  -     Giardia / Cryptosporidum, EIA; Future; Expected date: 03/12/2024  -     Stool culture; Future; Expected date: 03/12/2024  -     Clostridium difficile EIA; Future; Expected date: 03/12/2024  -     Calprotectin, Stool; Future; Expected date: 03/12/2024  -     CT Abdomen Pelvis With IV Contrast Routine Oral Contrast; Future; Expected date: 03/12/2024  -     CBC Without Differential; Future; Expected date: 03/12/2024  -     Creatinine, Serum; Future; Expected date: 03/12/2024  - schedule Colonoscopy, discussed procedure with the patient, including risks and benefits, patient verbalized understanding     History of colitis  -     Calprotectin, Stool; Future; Expected date: 03/12/2024  -     CT Abdomen Pelvis With IV Contrast Routine Oral Contrast; Future; Expected date: 03/12/2024  - schedule Colonoscopy, discussed procedure with the patient, including risks and benefits, patient verbalized understanding  - avoid use of NSAIDs- since they can cause GI upset, bleeding and/or ulcers.     History of IBS  -  START   colestipoL (COLESTID) 1 gram Tab; Take 1 tablet (1 g total) by mouth 2 (two) times daily. Take other oral medications >1h before or >4h after colestipol  Dispense: 60 tablet; Refill: 2     Diarrhea, unspecified type  -     Stool  Exam-Ova,Cysts,Parasites; Future; Expected date: 03/12/2024  -     Giardia / Cryptosporidum, EIA; Future; Expected date: 03/12/2024  -     Stool culture; Future; Expected date: 03/12/2024  -     Clostridium difficile EIA; Future; Expected date: 03/12/2024  -     Calprotectin, Stool; Future; Expected date: 03/12/2024  -     CT Abdomen Pelvis With IV Contrast Routine Oral Contrast; Future; Expected date: 03/12/2024  -   REFILL  diphenoxylate-atropine 2.5-0.025 mg (LOMOTIL) 2.5-0.025 mg per tablet; Take 1 tablet by mouth every 6 (six) hours PRN diarrhea.  Dispense: 40 tablet; Refill: 1  - schedule Colonoscopy, discussed procedure with the patient, including risks and benefits, patient verbalized understanding  - CONTINUE OTC probiotic taken as directed on packaging  - avoid lactose, alcohol, & caffeine  - avoid known triggers     S/P cholecystectomy  -     CT Abdomen Pelvis With IV Contrast Routine Oral Contrast; Future; Expected date: 03/12/2024  -  START   colestipoL (COLESTID) 1 gram Tab; Take 1 tablet (1 g total) by mouth 2 (two) times daily. Take other oral medications >1h before or >4h after colestipol  Dispense: 60 tablet; Refill: 2     Non-intractable vomiting with nausea  -     CT Abdomen Pelvis With IV Contrast Routine Oral Contrast; Future; Expected date: 03/12/2024  -  RESTART   ondansetron (ZOFRAN-ODT) 4 MG TbDL; Take 1 tablet (4 mg total) by mouth 3 (three) times daily as needed (nausea).  Dispense: 30 tablet; Refill: 1  -     CBC Without Differential; Future; Expected date: 03/12/2024  -     Creatinine, Serum; Future; Expected date: 03/12/2024  -     Lipase; Future; Expected date: 03/12/2024  -     Hepatic Function Panel; Future; Expected date: 03/12/2024  - Possible EGD pending results of testing and if symptoms persist     Decreased appetite  -     CT Abdomen Pelvis With IV Contrast Routine Oral Contrast; Future; Expected date: 03/12/2024  - Possible EGD pending results of testing and if symptoms  persist     Weight loss  -     CT Abdomen Pelvis With IV Contrast Routine Oral Contrast; Future; Expected date: 03/12/2024  - schedule Colonoscopy, discussed procedure with the patient, including risks and benefits, patient verbalized understanding  - encouraged PO intake and daily calorie counts to ensure adequate nutrition is taken in, recommend at least 2,000 calories a day  - recommend nutritional drinks, such as Boost, Ensure or Glucerna, to supplement nutrition needs  - Possible EGD pending results of testing and if symptoms persist     History of GERD  -  CONTINUE   esomeprazole (NEXIUM) 40 MG capsule; Take 1 capsule (40 mg total) by mouth before breakfast.     Follow up in about 1 month (around 4/12/2024), or if symptoms worsen or fail to improve.       See pelvic U/S, 4/17/2024:  Impression:   Postsurgical changes of hysterectomy and removal of left ovary.  Normal appearing right ovary.   Electronically signed by:Jose Spear  Date:                                            04/17/2024      And see CT scan, 4/17/2024  Impression:   1. No acute abnormality identified within the abdomen and pelvis.  2. There is moderate colonic stool present.  3. No adnexal mass appreciated as questioned.   Electronically signed by:Marvin Perrin MD  Date:                                            04/17/2024      And see last Colonoscopy 12/16/2022:  Indications:          Generalized abdominal pain, Diarrhea, Personal                          history of inflammatory bowel disease (UC??). Last                          colonoscopy: August 2020   Providers:             Jarret Zhao MD   Impression:            - Non-bleeding internal hemorrhoids.                          - The rectum and recto-sigmoid colon are normal.                          Fluid aspiration performed.                          - The entire examined colon is normal. Biopsied.                          - The examined portion of the ileum was normal.                           Biopsied.   Recommendation:        - Discharge patient to home.                          - Await pathology results.                          - High fiber diet, gluten free diet and lactose free diet.                          - Call the G.I[facility] clinic in 2 weeks for reports (if                          you haven't heard from us sooner) [phone].                          - Repeat colonoscopy in 5 years for screening purposes.                          - Continue present medications.                          - Lomotil 1-2 tablets PO PRN after loose bowel movement PRN.                          - Return to normal activities tomorrow.   Jarret Zhao MD   12/16/2022     3. Random terminal ileum, biopsy:      -  Ileal mucosa with no significant pathologic findings  Comment:   No granulomas, parasitic organisms or viral inclusions are identified.  Negative for dysplasia or malignancy.  4. Cecum and right colon, biopsy:      -  Colonic mucosa with no significant pathologic findings  5. Random left colon, biopsy:      -  Colonic mucosa with no significant pathologic findings  6. Random sigmoid colon and rectum, biopsy:      -  Colonic mucosa with no significant pathologic findings  Comment:  Features of neither collagenous or lymphocytic colitis are seen in any of the above colon specimens. No evidence of active colitis or chronic primary inflammatory bowel disease is identified. No granulomas, parasites or viral inclusion are present. There is no evidence of dysplasia or malignancy.                   PTA Medications   Medication Sig    Bifidobacterium infantis (ALIGN ORAL) Take 1 tablet by mouth once daily.    cetirizine (ZYRTEC) 10 MG tablet Take 10 mg by mouth nightly as needed for Allergies.    citalopram (CELEXA) 20 MG tablet Take 1 tablet (20 mg total) by mouth once daily. Along with 40mg for a total of 60mg daily    citalopram (CELEXA) 40 MG tablet Take 1 tablet (40 mg total) by mouth once  daily. Along with a 20 mg tablet for a total of 60 mg    cyproheptadine (PERIACTIN) 4 mg tablet Take 1 tablet (4 mg total) by mouth before meals as needed (decrease appetite).    [START ON 10/2/2024] dextroamphetamine-amphetamine (ADDERALL XR) 15 MG 24 hr capsule Take 1 capsule (15 mg total) by mouth once daily.    dextroamphetamine-amphetamine (ADDERALL XR) 15 MG 24 hr capsule Take 1 capsule (15 mg total) by mouth every morning.    dextroamphetamine-amphetamine (ADDERALL XR) 15 MG 24 hr capsule Take 1 capsule (15 mg total) by mouth every morning.    dextroamphetamine-amphetamine (ADDERALL) 5 mg Tab Take 5 mg by mouth daily as needed.    dextroamphetamine-amphetamine (ADDERALL) 5 mg Tab Take 5 mg by mouth daily as needed.    diphenoxylate-atropine 2.5-0.025 mg (LOMOTIL) 2.5-0.025 mg per tablet Take 1 tablet by mouth every 6 (six) hours as needed for Diarrhea.    DUPIXENT  mg/2 mL PnIj INJECT 600 MG (2 PENS) UNDER THE SKIN ON DAY 1, THEN 300 MG (1 PEN) EVERY 2 WEEKS STARTING ON DAY 15    esomeprazole (NEXIUM) 40 MG capsule TAKE 1 CAPSULE(40 MG) BY MOUTH BEFORE BREAKFAST    estradiol 0.05 mg/24 hr td ptsw (VIVELLE-DOT) 0.05 mg/24 hr Place 1 patch onto the skin twice a week.    ondansetron (ZOFRAN-ODT) 4 MG TbDL Take 1 tablet (4 mg total) by mouth 3 (three) times daily as needed (nausea).    pregabalin (LYRICA) 100 MG capsule Take 1 capsule (100 mg total) by mouth every evening.    pregabalin (LYRICA) 75 MG capsule Take 1 capsule (75 mg total) by mouth once daily.    tiZANidine (ZANAFLEX) 2 MG tablet Take 1 tablet (2 mg total) by mouth 2 (two) times a day.    tretinoin (RETIN-A) 0.025 % cream Apply topically every evening.    albuterol (VENTOLIN HFA) 90 mcg/actuation inhaler Inhale 2 puffs into the lungs every 6 (six) hours as needed for Wheezing. Rescue    ALPRAZolam (XANAX) 0.25 MG tablet Take 1 tablet (0.25 mg total) by mouth daily as needed for Anxiety.    colestipoL (COLESTID) 1 gram Tab TAKE 1 TABLET BY  MOUTH TWICE DAILY. TAKE OTHER ORAL MEDS MORE THAN 1 HOURS BEFORE BEDTIME OR MORE THAN 4 HOURS AFTER COLESTIPOL    dextroamphetamine-amphetamine (ADDERALL) 5 mg Tab Take 5 mg by mouth daily as needed.    fluticasone propionate (FLONASE) 50 mcg/actuation nasal spray 1 spray by Each Nostril route 2 (two) times daily as needed for Allergies.    fluticasone-salmeterol diskus inhaler 100-50 mcg Inhale 1 puff into the lungs 2 (two) times daily. Controller    hydrOXYzine HCL (ATARAX) 25 MG tablet Take 1 tablet (25 mg total) by mouth nightly as needed for Anxiety.    ipratropium-albuteroL (COMBIVENT RESPIMAT)  mcg/actuation inhaler Inhale 1 puff into the lungs 4 (four) times daily. Rescue    ketoconazole (NIZORAL) 2 % shampoo Apply topically twice a week.    tacrolimus (PROTOPIC) 0.03 % ointment Apply topically 2 (two) times daily.       Review of patient's allergies indicates:   Allergen Reactions    Latex, natural rubber      burning    Sulfur Hives    Toradol [ketorolac] Other (See Comments)     Metallic taste only once in 2013; has taken since without problems; reports R sided paralysis    Tramadol Other (See Comments)     paralysis    Penicillins Hives and Rash    Sulfa (sulfonamide antibiotics) Hives and Rash        Past Medical History:   Diagnosis Date    Abnormal Pap smear of cervix     Allergy     Anxiety     Asthma     rare inhaler use    Constipation     Depression     Endometriosis     Fibromyalgia     Gastritis     HLA B27 (HLA B27 positive)     Migraine     Postpartum depression     Scoliosis     Smoker     stopped in January 2018    Tobacco dependence 2/17/2016     Past Surgical History:   Procedure Laterality Date    ADENOIDECTOMY      APPENDECTOMY  09/2014    endometriosis    CHOLECYSTECTOMY  12/20/2017    Dr. MARBELLA Anne, [facility]     COLONOSCOPY N/A 05/19/2016    Procedure: COLONOSCOPY;  Surgeon: Jarret Zhao Jr., MD;  Location: [facility];  Service: Endoscopy;  Laterality: N/A;    COLONOSCOPY N/A  08/27/2020    Dr. Zhao: Erythema & ulcerated mucosa in descending, splenic flexure & distal transverse (r/o UC, r/o ischemia, r/o vasculiti, etc.), Mucosal changes in descending colon (secondary to left-sided colitis, to ischemic colitis(?), to vasculitis(?). hemorrhoids; biopsy: TI & random biopsies- WNL, transverse/hepatic/descending- mild active colitis without definitive features of chronicity    COLONOSCOPY N/A 12/16/2022    Procedure: COLONOSCOPY;  Surgeon: Jarret Zhao Jr., MD;  Location: [facility];  Service: Endoscopy;  Laterality: N/A; Repeat colonoscopy in 5 years for screening    CYSTOSCOPY  12/01/2021    Procedure: CYSTOSCOPY;  Surgeon: Ana María Easley MD;  Location: [facility];  Service: OB/GYN;;    ESOPHAGOGASTRODUODENOSCOPY N/A 10/01/2020    Dr. Zhao: Erythema in the lower third of the esophagus; Duodenal bulb erosions without bleeding. biopsy: duodenum- Small bowel mucosa with mild acute inflammation changes may represent erosion, Villous architecture is maintained; stomach- mild chronic gastritis; esophagus- minimal reactive changes    ESOPHAGOGASTRODUODENOSCOPY N/A 12/16/2022    Procedure: EGD (ESOPHAGOGASTRODUODENOSCOPY);  Surgeon: Jarret Zhao Jr., MD;  Location: [facility];  Service: Endoscopy;  Laterality: N/A;    INJECTION OF ANESTHETIC AGENT AROUND MEDIAL BRANCH NERVES INNERVATING LUMBAR FACET JOINT Bilateral 3/13/2024    Procedure: Block-nerve-medial branch-lumbar    L4/5, L5/S1;  Surgeon: Dain Lawrence MD;  Location: [facility] OR;  Service: Pain Management;  Laterality: Bilateral;    INJECTION OF ANESTHETIC AGENT AROUND MEDIAL BRANCH NERVES INNERVATING LUMBAR FACET JOINT Bilateral 4/2/2024    Procedure: Block-nerve-medial branch-lumbar-L4/5-L5/S1;  Surgeon: Dain Lawrence MD;  Location: [facility] OR;  Service: Pain Management;  Laterality: Bilateral;    LAPAROSCOPIC OOPHORECTOMY Left 12/01/2021    Procedure: OOPHORECTOMY, LAPAROSCOPIC;  Surgeon: Ana María Easley MD;  Location:  [facility] OR;  Service: OB/GYN;  Laterality: Left;    LAPAROSCOPIC SALPINGECTOMY Bilateral 2021    Procedure: SALPINGECTOMY, LAPAROSCOPIC;  Surgeon: Ana María Easley MD;  Location: [facility] OR;  Service: OB/GYN;  Laterality: Bilateral;    LAPAROSCOPIC TOTAL HYSTERECTOMY N/A 2021    Procedure: HYSTERECTOMY, TOTAL, LAPAROSCOPIC;  Surgeon: Ana María Easley MD;  Location: [facility] OR;  Service: OB/GYN;  Laterality: N/A;    MOUTH SURGERY      x2    PELVIC LAPAROSCOPY      RADIOFREQUENCY ABLATION OF LUMBAR MEDIAL BRANCH NERVE AT SINGLE LEVEL Bilateral 2024    Procedure: Radiofrequency Ablation, Nerve, Spinal, Lumbar, Medial Branch, L4/5, L5/L6;  Surgeon: Dain Lawrence MD;  Location: [facility] OR;  Service: Pain Management;  Laterality: Bilateral;    SINUS SURGERY  2021    FESS, turbinate red    TONSILLECTOMY  2016    UPPER GASTROINTESTINAL ENDOSCOPY  2016    Dr. Zhao     Family History   Problem Relation Name Age of Onset    Fibromyalgia Mother      Depression Mother      Irritable bowel syndrome Mother      Diabetes Father      Depression Father      Mental illness Father          bipolar disorder    Asthma Father      Multiple sclerosis Maternal Aunt      Irritable bowel syndrome Maternal Grandmother      Colon cancer Neg Hx      Ulcerative colitis Neg Hx      Stomach cancer Neg Hx      Esophageal cancer Neg Hx      Breast cancer Neg Hx      Ovarian cancer Neg Hx      Celiac disease Neg Hx       Social History     Tobacco Use    Smoking status: Former     Current packs/day: 0.00     Types: Cigarettes, Vaping with nicotine     Quit date: 2018     Years since quittin.7    Smokeless tobacco: Never    Tobacco comments:     Still vaping   Substance Use Topics    Alcohol use: No    Drug use: No         OBJECTIVE:     Vital Signs (Most Recent)  Temp: 96.8 °F (36 °C) (24 1049)  Pulse: 85 (24 1049)  Resp: 18 (24 1049)  BP: (!) 112/54 (24 1049)  SpO2: 98 % (24  "7954)    Physical Exam:  : Ht: 5' 8" (172.7 cm)   Wt: 66.7 kg   BMI: 22.35 kg/m² .                                                       GENERAL:  Comfortable, in no acute distress.                                 HEENT EXAM:  Nonicteric.  No adenopathy.  Oropharynx is clear.               NECK:  Supple.                                                               LUNGS:  Clear.                                                               CARDIAC:  Regular rate and rhythm.  S1, S2.  No murmur.                      ABDOMEN:  Soft, positive bowel sounds, nontender.  No hepatosplenomegaly or masses.  No rebound or guarding.                                             EXTREMITIES:  No edema.     MENTAL STATUS:  Alert and oriented.    ASSESSMENT/PLAN:     Assessment: Loss of appetite, weight loss, lower abdominal pain, nausea, and diarrhea.    Plan: Colonoscopy    Anesthesia Plan:   MAC / General Anaesthesia    ASA Grade: ASA 2 - Patient with mild systemic disease with no functional limitations    MALLAMPATI SCORE: I (soft palate, uvula, fauces, and tonsillar pillars visible)    "

## 2024-09-23 NOTE — TRANSFER OF CARE
"Anesthesia Transfer of Care Note    Patient: Holly Nichols    Procedure(s) Performed: Procedure(s) (LRB):  COLONOSCOPY (N/A)    Patient location: PACU    Anesthesia Type: general    Transport from OR: Transported from OR on room air with adequate spontaneous ventilation    Post pain: adequate analgesia    Post assessment: no apparent anesthetic complications and tolerated procedure well    Post vital signs: stable    Level of consciousness: responds to stimulation    Nausea/Vomiting: no nausea/vomiting    Complications: none    Transfer of care protocol was followed      Last vitals: Visit Vitals  BP (!) 112/54 (BP Location: Left arm, Patient Position: Sitting)   Pulse 85   Temp 36 °C (96.8 °F) (Temporal)   Resp 18   Ht 5' 8" (1.727 m)   Wt 66.7 kg (147 lb)   LMP 11/01/2020   SpO2 98%   Breastfeeding No   BMI 22.35 kg/m²     "

## 2024-09-23 NOTE — BRIEF OP NOTE
Discharge Note  Short Stay      SUMMARY     Admit Date: 9/23/2024    Attending Physician: Jarret Zhao Jr., MD     Discharge Physician: Jarret Zhao Jr., MD    Discharge Date: 9/23/2024 12:16 PM    Final Diagnosis: Diarrhea, unspecified type [R19.7]  History of colitis [Z87.19]  Lower abdominal pain [R10.30]    Impression:            - The rectum and recto-sigmoid colon are normal.                          Fluid aspiration performed.                          - Redundant colon.                          - The examination was otherwise normal.                          - Randomly biopsied.                          - The examined portion of the ileum was normal.                          Biopsied.                          - Irritable bowel syndrome(?).   Recommendation:        - Discharge patient to home.                          - Await pathology results.                          - High fiber diet and low fat diet.                          - Use fiber, for example Citrucel, Fibercon,                          Konsyl or Metamucil.                          - Continue present medications.                          - Colestid (colestipol) 1 tablet PO BID.                          - Lomotil 2 tablets PO PRN after loose bowel                          movement.                          - Call the [facility] in 2 weeks for reports (if                          you haven't heard from us sooner) [phone].                          - Repeat colonoscopy at age 44 for screening                          purposes.                          - Patient has a contact number available for                          emergencies. The signs and symptoms of potential                          delayed complications were discussed with the                          patient. Return to normal activities tomorrow.                          Written discharge instructions were provided to                          the patient.                           - Return to normal activities tomorrow.   Jarret Zhao MD   9/23/2024       Disposition: HOME OR SELF CARE    Patient Instructions:   Current Discharge Medication List        CONTINUE these medications which have CHANGED    Details   diphenoxylate-atropine 2.5-0.025 mg (LOMOTIL) 2.5-0.025 mg per tablet Take 1 tablet by mouth every 6 (six) hours as needed for Diarrhea.  Qty: 60 tablet, Refills: 3    Associated Diagnoses: Diarrhea, unspecified type           CONTINUE these medications which have NOT CHANGED    Details   Bifidobacterium infantis (ALIGN ORAL) Take 1 tablet by mouth once daily.      cetirizine (ZYRTEC) 10 MG tablet Take 10 mg by mouth nightly as needed for Allergies.      !! citalopram (CELEXA) 20 MG tablet Take 1 tablet (20 mg total) by mouth once daily. Along with 40mg for a total of 60mg daily  Qty: 90 tablet, Refills: 3    Associated Diagnoses: Depression, unspecified depression type      !! citalopram (CELEXA) 40 MG tablet Take 1 tablet (40 mg total) by mouth once daily. Along with a 20 mg tablet for a total of 60 mg  Qty: 90 tablet, Refills: 3      cyproheptadine (PERIACTIN) 4 mg tablet Take 1 tablet (4 mg total) by mouth before meals as needed (decrease appetite).  Qty: 90 tablet, Refills: 2    Associated Diagnoses: Loss of appetite      !! dextroamphetamine-amphetamine (ADDERALL XR) 15 MG 24 hr capsule Take 1 capsule (15 mg total) by mouth once daily.  Qty: 30 capsule, Refills: 0    Associated Diagnoses: Attention deficit disorder (ADD) in adult      !! dextroamphetamine-amphetamine (ADDERALL XR) 15 MG 24 hr capsule Take 1 capsule (15 mg total) by mouth every morning.  Qty: 30 capsule, Refills: 0    Associated Diagnoses: Attention deficit disorder (ADD) in adult      !! dextroamphetamine-amphetamine (ADDERALL XR) 15 MG 24 hr capsule Take 1 capsule (15 mg total) by mouth every morning.  Qty: 30 capsule, Refills: 0    Associated Diagnoses: Attention deficit disorder (ADD) in adult      !!  dextroamphetamine-amphetamine (ADDERALL) 5 mg Tab Take 5 mg by mouth daily as needed.  Qty: 30 tablet, Refills: 0    Associated Diagnoses: Attention deficit disorder (ADD) in adult      !! dextroamphetamine-amphetamine (ADDERALL) 5 mg Tab Take 5 mg by mouth daily as needed.  Qty: 30 tablet, Refills: 0    Associated Diagnoses: Attention deficit disorder (ADD) in adult      DUPIXENT  mg/2 mL PnIj INJECT 600 MG (2 PENS) UNDER THE SKIN ON DAY 1, THEN 300 MG (1 PEN) EVERY 2 WEEKS STARTING ON DAY 15  Qty: 4 mL, Refills: 5    Associated Diagnoses: Severe persistent asthma with acute exacerbation      esomeprazole (NEXIUM) 40 MG capsule TAKE 1 CAPSULE(40 MG) BY MOUTH BEFORE BREAKFAST  Qty: 90 capsule, Refills: 3    Associated Diagnoses: Heartburn      estradiol 0.05 mg/24 hr td ptsw (VIVELLE-DOT) 0.05 mg/24 hr Place 1 patch onto the skin twice a week.  Qty: 24 patch, Refills: 3    Associated Diagnoses: Hot flashes; Vaginal dryness; Dyspareunia in female      ondansetron (ZOFRAN-ODT) 4 MG TbDL Take 1 tablet (4 mg total) by mouth 3 (three) times daily as needed (nausea).  Qty: 30 tablet, Refills: 1    Associated Diagnoses: Non-intractable vomiting with nausea      !! pregabalin (LYRICA) 100 MG capsule Take 1 capsule (100 mg total) by mouth every evening.  Qty: 30 capsule, Refills: 1    Associated Diagnoses: Lumbar radiculopathy, chronic      !! pregabalin (LYRICA) 75 MG capsule Take 1 capsule (75 mg total) by mouth once daily.  Qty: 30 capsule, Refills: 1    Associated Diagnoses: Lumbar radiculopathy, chronic      tiZANidine (ZANAFLEX) 2 MG tablet Take 1 tablet (2 mg total) by mouth 2 (two) times a day.  Qty: 40 tablet, Refills: 02    Associated Diagnoses: Myofascial pain      tretinoin (RETIN-A) 0.025 % cream Apply topically every evening.  Qty: 20 g, Refills: 0    Associated Diagnoses: Other acne      albuterol (VENTOLIN HFA) 90 mcg/actuation inhaler Inhale 2 puffs into the lungs every 6 (six) hours as needed for  Wheezing. Rescue  Qty: 18 g, Refills: 5    Associated Diagnoses: Moderate persistent asthma without complication      ALPRAZolam (XANAX) 0.25 MG tablet Take 1 tablet (0.25 mg total) by mouth daily as needed for Anxiety.  Qty: 15 tablet, Refills: 0    Associated Diagnoses: Anxiety      colestipoL (COLESTID) 1 gram Tab TAKE 1 TABLET BY MOUTH TWICE DAILY. TAKE OTHER ORAL MEDS MORE THAN 1 HOURS BEFORE BEDTIME OR MORE THAN 4 HOURS AFTER COLESTIPOL  Qty: 180 tablet, Refills: 0    Associated Diagnoses: History of IBS; S/P cholecystectomy      !! dextroamphetamine-amphetamine (ADDERALL) 5 mg Tab Take 5 mg by mouth daily as needed.  Qty: 30 tablet, Refills: 0    Associated Diagnoses: Attention deficit disorder (ADD) in adult      fluticasone propionate (FLONASE) 50 mcg/actuation nasal spray 1 spray by Each Nostril route 2 (two) times daily as needed for Allergies.      fluticasone-salmeterol diskus inhaler 100-50 mcg Inhale 1 puff into the lungs 2 (two) times daily. Controller  Qty: 180 each, Refills: 1    Associated Diagnoses: Aspirin-exacerbated respiratory disease (AERD)      hydrOXYzine HCL (ATARAX) 25 MG tablet Take 1 tablet (25 mg total) by mouth nightly as needed for Anxiety.  Qty: 30 tablet, Refills: 2    Associated Diagnoses: Anxiety      ipratropium-albuteroL (COMBIVENT RESPIMAT)  mcg/actuation inhaler Inhale 1 puff into the lungs 4 (four) times daily. Rescue  Qty: 4 g, Refills: 3    Associated Diagnoses: Severe persistent asthma with acute exacerbation      ketoconazole (NIZORAL) 2 % shampoo Apply topically twice a week.  Qty: 120 mL, Refills: 2    Associated Diagnoses: Seborrheic dermatitis       !! - Potential duplicate medications found. Please discuss with provider.        STOP taking these medications       tacrolimus (PROTOPIC) 0.03 % ointment Comments:   Reason for Stopping:               Discharge Procedure Orders (must include Diet, Follow-up, Activity)    Follow Up:  Follow up with PCP as per your  routine.  Please follow a high fiber diet.  Activity as tolerated.    No driving day of procedure.    PROBIOTICS:  Now that your colon is so cleaned out, now is a good time for a round of PROBIOTICS.   Take a  Probiotic product such as Align or Culturelle or Mirela-Q, every day for a month.                  (The products listed are non-prescription, but you may need to ask the pharmacist for their location.)  Repeat this at least 5-6 times a year.

## 2024-09-24 ENCOUNTER — PATIENT MESSAGE (OUTPATIENT)
Dept: PAIN MEDICINE | Facility: CLINIC | Age: 29
End: 2024-09-24
Payer: COMMERCIAL

## 2024-09-24 VITALS
WEIGHT: 147 LBS | DIASTOLIC BLOOD PRESSURE: 72 MMHG | SYSTOLIC BLOOD PRESSURE: 110 MMHG | TEMPERATURE: 97 F | BODY MASS INDEX: 22.28 KG/M2 | OXYGEN SATURATION: 98 % | HEART RATE: 77 BPM | RESPIRATION RATE: 15 BRPM | HEIGHT: 68 IN

## 2024-09-25 LAB
COMMENT: NORMAL
FINAL PATHOLOGIC DIAGNOSIS: NORMAL
GROSS: NORMAL
Lab: NORMAL

## 2024-09-26 ENCOUNTER — OFFICE VISIT (OUTPATIENT)
Dept: PAIN MEDICINE | Facility: CLINIC | Age: 29
End: 2024-09-26
Payer: COMMERCIAL

## 2024-09-26 VITALS
HEART RATE: 80 BPM | BODY MASS INDEX: 21.8 KG/M2 | DIASTOLIC BLOOD PRESSURE: 58 MMHG | WEIGHT: 143.88 LBS | SYSTOLIC BLOOD PRESSURE: 113 MMHG | HEIGHT: 68 IN

## 2024-09-26 DIAGNOSIS — M54.16 LUMBAR RADICULOPATHY, CHRONIC: Primary | ICD-10-CM

## 2024-09-26 DIAGNOSIS — M79.18 MYOFASCIAL PAIN: ICD-10-CM

## 2024-09-26 DIAGNOSIS — M47.816 LUMBAR SPONDYLOSIS: ICD-10-CM

## 2024-09-26 LAB
BACTERIA STL CULT: NORMAL
BACTERIA STL CULT: NORMAL
E COLI SXT1 STL QL IA: NEGATIVE
E COLI SXT2 STL QL IA: NEGATIVE

## 2024-09-26 PROCEDURE — 3078F DIAST BP <80 MM HG: CPT | Mod: CPTII,S$GLB,,

## 2024-09-26 PROCEDURE — 3074F SYST BP LT 130 MM HG: CPT | Mod: CPTII,S$GLB,,

## 2024-09-26 PROCEDURE — 99999 PR PBB SHADOW E&M-EST. PATIENT-LVL III: CPT | Mod: PBBFAC,,,

## 2024-09-26 PROCEDURE — 99213 OFFICE O/P EST LOW 20 MIN: CPT | Mod: S$GLB,,,

## 2024-09-26 PROCEDURE — 3008F BODY MASS INDEX DOCD: CPT | Mod: CPTII,S$GLB,,

## 2024-09-26 PROCEDURE — 1159F MED LIST DOCD IN RCRD: CPT | Mod: CPTII,S$GLB,,

## 2024-09-26 RX ORDER — METHYLPREDNISOLONE 4 MG/1
TABLET ORAL
Qty: 21 EACH | Refills: 0 | Status: SHIPPED | OUTPATIENT
Start: 2024-09-26 | End: 2024-10-17

## 2024-09-26 NOTE — PROGRESS NOTES
[facility] Follow Up Evaluation    Referred by: Marce Harris PA-C  Reason for referral: back pain    CC:   Chief Complaint   Patient presents with    Hip Pain    Leg Pain          9/26/2024     8:07 AM 5/28/2024     8:59 AM 5/3/2024     1:05 PM   Last 3 PDI Scores   Pain Disability Index (PDI) 57 33 50     Interval HPI 9/26/2024: Holly Nichols returns to the office for follow up.  She returns for follow-up with worsening left-sided lower back pain with radiation down left leg into left foot, 9/10, constant, with associated numbness and tingling throughout left leg.  No pain radiating down right leg, no weakness or any new changes to her bowel or bladder function.  She has been taking NSAIDs, Lyrica, Zanaflex and attending chiropractic care without significant relief.        HPI:   Holly Nichols is a 29 y.o. female who presents with back pain.  She has had back pain since 2008.  She has known scoliosis.  She has known fibromyalgia.  Today she rates her pain 4-10, constant, throbbing, tingling, deep.  She has neck pain into the trapezius muscles.  She also has midthoracic and lower back pain.  Denies any rosmery radicular symptoms.  No numbness no weakness.  Pain worse with standing, bending, walking, nighttime and not relieved with anything.    Pain Intervention History:  - s/p bilateral L4/5 and L5/S1 RFA on 04/24/2024 with 50% relief     History:    Current Outpatient Medications:     albuterol (VENTOLIN HFA) 90 mcg/actuation inhaler, Inhale 2 puffs into the lungs every 6 (six) hours as needed for Wheezing. Rescue, Disp: 18 g, Rfl: 5    ALPRAZolam (XANAX) 0.25 MG tablet, Take 1 tablet (0.25 mg total) by mouth daily as needed for Anxiety., Disp: 15 tablet, Rfl: 0    Bifidobacterium infantis (ALIGN ORAL), Take 1 tablet by mouth once daily., Disp: , Rfl:     cetirizine (ZYRTEC) 10 MG tablet, Take 10 mg by mouth nightly as needed for Allergies., Disp: , Rfl:     citalopram (CELEXA) 20 MG tablet,  Take 1 tablet (20 mg total) by mouth once daily. Along with 40mg for a total of 60mg daily, Disp: 90 tablet, Rfl: 3    citalopram (CELEXA) 40 MG tablet, Take 1 tablet (40 mg total) by mouth once daily. Along with a 20 mg tablet for a total of 60 mg, Disp: 90 tablet, Rfl: 3    colestipoL (COLESTID) 1 gram Tab, TAKE 1 TABLET BY MOUTH TWICE DAILY. TAKE OTHER ORAL MEDS MORE THAN 1 HOURS BEFORE BEDTIME OR MORE THAN 4 HOURS AFTER COLESTIPOL, Disp: 180 tablet, Rfl: 0    cyproheptadine (PERIACTIN) 4 mg tablet, Take 1 tablet (4 mg total) by mouth before meals as needed (decrease appetite)., Disp: 90 tablet, Rfl: 2    [START ON 10/2/2024] dextroamphetamine-amphetamine (ADDERALL XR) 15 MG 24 hr capsule, Take 1 capsule (15 mg total) by mouth once daily., Disp: 30 capsule, Rfl: 0    dextroamphetamine-amphetamine (ADDERALL XR) 15 MG 24 hr capsule, Take 1 capsule (15 mg total) by mouth every morning., Disp: 30 capsule, Rfl: 0    dextroamphetamine-amphetamine (ADDERALL XR) 15 MG 24 hr capsule, Take 1 capsule (15 mg total) by mouth every morning., Disp: 30 capsule, Rfl: 0    dextroamphetamine-amphetamine (ADDERALL) 5 mg Tab, Take 5 mg by mouth daily as needed., Disp: 30 tablet, Rfl: 0    dextroamphetamine-amphetamine (ADDERALL) 5 mg Tab, Take 5 mg by mouth daily as needed., Disp: 30 tablet, Rfl: 0    dextroamphetamine-amphetamine (ADDERALL) 5 mg Tab, Take 5 mg by mouth daily as needed., Disp: 30 tablet, Rfl: 0    diphenoxylate-atropine 2.5-0.025 mg (LOMOTIL) 2.5-0.025 mg per tablet, Take 1 tablet by mouth every 6 (six) hours as needed for Diarrhea., Disp: 60 tablet, Rfl: 3    DUPIXENT  mg/2 mL PnIj, INJECT 600 MG (2 PENS) UNDER THE SKIN ON DAY 1, THEN 300 MG (1 PEN) EVERY 2 WEEKS STARTING ON DAY 15, Disp: 4 mL, Rfl: 5    esomeprazole (NEXIUM) 40 MG capsule, TAKE 1 CAPSULE(40 MG) BY MOUTH BEFORE BREAKFAST, Disp: 90 capsule, Rfl: 3    estradiol 0.05 mg/24 hr td ptsw (VIVELLE-DOT) 0.05 mg/24 hr, Place 1 patch onto the skin twice  a week., Disp: 24 patch, Rfl: 3    fluticasone propionate (FLONASE) 50 mcg/actuation nasal spray, 1 spray by Each Nostril route 2 (two) times daily as needed for Allergies., Disp: , Rfl:     fluticasone-salmeterol diskus inhaler 100-50 mcg, Inhale 1 puff into the lungs 2 (two) times daily. Controller, Disp: 180 each, Rfl: 1    hydrOXYzine HCL (ATARAX) 25 MG tablet, Take 1 tablet (25 mg total) by mouth nightly as needed for Anxiety., Disp: 30 tablet, Rfl: 2    ipratropium-albuteroL (COMBIVENT RESPIMAT)  mcg/actuation inhaler, Inhale 1 puff into the lungs 4 (four) times daily. Rescue, Disp: 4 g, Rfl: 3    ketoconazole (NIZORAL) 2 % shampoo, Apply topically twice a week., Disp: 120 mL, Rfl: 2    methylPREDNISolone (MEDROL DOSEPACK) 4 mg tablet, use as directed, Disp: 21 each, Rfl: 0    ondansetron (ZOFRAN-ODT) 4 MG TbDL, Take 1 tablet (4 mg total) by mouth 3 (three) times daily as needed (nausea)., Disp: 30 tablet, Rfl: 1    pregabalin (LYRICA) 100 MG capsule, Take 1 capsule (100 mg total) by mouth every evening., Disp: 30 capsule, Rfl: 1    pregabalin (LYRICA) 75 MG capsule, Take 1 capsule (75 mg total) by mouth once daily., Disp: 30 capsule, Rfl: 1    tiZANidine (ZANAFLEX) 2 MG tablet, Take 1 tablet (2 mg total) by mouth 2 (two) times a day., Disp: 40 tablet, Rfl: 02    tretinoin (RETIN-A) 0.025 % cream, Apply topically every evening., Disp: 20 g, Rfl: 0    Past Medical History:   Diagnosis Date    Abnormal Pap smear of cervix     Allergy     Anxiety     Asthma     rare inhaler use    Constipation     Depression     Endometriosis     Fibromyalgia     Gastritis     HLA B27 (HLA B27 positive)     Migraine     Postpartum depression     Scoliosis     Smoker     stopped in January 2018    Tobacco dependence 2/17/2016       Past Surgical History:   Procedure Laterality Date    ADENOIDECTOMY      APPENDECTOMY  09/2014    endometriosis    CHOLECYSTECTOMY  12/20/2017    Dr. MARBELLA Anne, [facility]     COLONOSCOPY N/A 05/19/2016     Procedure: COLONOSCOPY;  Surgeon: Jarret Zhao Jr., MD;  Location: [facility];  Service: Endoscopy;  Laterality: N/A;    COLONOSCOPY N/A 08/27/2020    Dr. Zhao: Erythema & ulcerated mucosa in descending, splenic flexure & distal transverse (r/o UC, r/o ischemia, r/o vasculiti, etc.), Mucosal changes in descending colon (secondary to left-sided colitis, to ischemic colitis(?), to vasculitis(?). hemorrhoids; biopsy: TI & random biopsies- WNL, transverse/hepatic/descending- mild active colitis without definitive features of chronicity    COLONOSCOPY N/A 12/16/2022    Procedure: COLONOSCOPY;  Surgeon: Jarret Zhao Jr., MD;  Location: [facility];  Service: Endoscopy;  Laterality: N/A; Repeat colonoscopy in 5 years for screening    COLONOSCOPY N/A 9/23/2024    Procedure: COLONOSCOPY;  Surgeon: Jarret Zhao Jr., MD;  Location: [facility];  Service: Endoscopy;  Laterality: N/A;    CYSTOSCOPY  12/01/2021    Procedure: CYSTOSCOPY;  Surgeon: Ana María Easley MD;  Location: [facility];  Service: OB/GYN;;    ESOPHAGOGASTRODUODENOSCOPY N/A 10/01/2020    Dr. Zhao: Erythema in the lower third of the esophagus; Duodenal bulb erosions without bleeding. biopsy: duodenum- Small bowel mucosa with mild acute inflammation changes may represent erosion, Villous architecture is maintained; stomach- mild chronic gastritis; esophagus- minimal reactive changes    ESOPHAGOGASTRODUODENOSCOPY N/A 12/16/2022    Procedure: EGD (ESOPHAGOGASTRODUODENOSCOPY);  Surgeon: Jarret Zhao Jr., MD;  Location: [facility];  Service: Endoscopy;  Laterality: N/A;    INJECTION OF ANESTHETIC AGENT AROUND MEDIAL BRANCH NERVES INNERVATING LUMBAR FACET JOINT Bilateral 3/13/2024    Procedure: Block-nerve-medial branch-lumbar    L4/5, L5/S1;  Surgeon: Dain Lawrence MD;  Location[place] [facility];  Service: Pain Management;  Laterality: Bilateral;    INJECTION OF ANESTHETIC AGENT AROUND MEDIAL BRANCH NERVES INNERVATING LUMBAR FACET JOINT Bilateral 4/2/2024     Procedure: Block-nerve-medial branch-lumbar-L4/5-L5/S1;  Surgeon: Dain Lawrence MD;  Location: [facility] OR;  Service: Pain Management;  Laterality: Bilateral;    LAPAROSCOPIC OOPHORECTOMY Left 12/01/2021    Procedure: OOPHORECTOMY, LAPAROSCOPIC;  Surgeon: Ana María Easley MD;  Location: [facility] OR;  Service: OB/GYN;  Laterality: Left;    LAPAROSCOPIC SALPINGECTOMY Bilateral 12/01/2021    Procedure: SALPINGECTOMY, LAPAROSCOPIC;  Surgeon: Ana María Easley MD;  Location: [facility] OR;  Service: OB/GYN;  Laterality: Bilateral;    LAPAROSCOPIC TOTAL HYSTERECTOMY N/A 12/01/2021    Procedure: HYSTERECTOMY, TOTAL, LAPAROSCOPIC;  Surgeon: Ana María Easley MD;  Location: [facility] OR;  Service: OB/GYN;  Laterality: N/A;    MOUTH SURGERY      x2    PELVIC LAPAROSCOPY      RADIOFREQUENCY ABLATION OF LUMBAR MEDIAL BRANCH NERVE AT SINGLE LEVEL Bilateral 4/24/2024    Procedure: Radiofrequency Ablation, Nerve, Spinal, Lumbar, Medial Branch, L4/5, L5/L6;  Surgeon: Dain Lawrence MD;  Location: [facility] OR;  Service: Pain Management;  Laterality: Bilateral;    SINUS SURGERY  11/23/2021    FESS, turbinate red    TONSILLECTOMY  07/2016    UPPER GASTROINTESTINAL ENDOSCOPY  05/2016    Dr. Zhao       Family History   Problem Relation Name Age of Onset    Fibromyalgia Mother      Depression Mother      Irritable bowel syndrome Mother      Diabetes Father      Depression Father      Mental illness Father          bipolar disorder    Asthma Father      Multiple sclerosis Maternal Aunt      Irritable bowel syndrome Maternal Grandmother      Colon cancer Neg Hx      Ulcerative colitis Neg Hx      Stomach cancer Neg Hx      Esophageal cancer Neg Hx      Breast cancer Neg Hx      Ovarian cancer Neg Hx      Celiac disease Neg Hx         Social History     Socioeconomic History    Marital status:     Number of children: 0   Occupational History    Occupation: hotel management   Tobacco Use    Smoking status: Former     Current packs/day:  0.00     Types: Cigarettes, Vaping with nicotine     Quit date: 2018     Years since quittin.7    Smokeless tobacco: Never    Tobacco comments:     Still vaping   Substance and Sexual Activity    Alcohol use: No    Drug use: No    Sexual activity: Yes     Partners: Male   Social History Narrative    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     Social Determinants of Health     Financial Resource Strain: Unknown (2022)    Overall Financial Resource Strain (CARDIA)     Difficulty of Paying Living Expenses: Patient declined   Food Insecurity: Unknown (2022)    Hunger Vital Sign     Worried About Running Out of Food in the Last Year: Patient declined     Ran Out of Food in the Last Year: Patient declined   Transportation Needs: Unknown (2022)    PRAPARE - Transportation     Lack of Transportation (Medical): Patient declined     Lack of Transportation (Non-Medical): Patient declined   Physical Activity: Unknown (2024)    Exercise Vital Sign     Days of Exercise per Week: Patient declined   Stress: Unknown (2022)    [employer] [place] [employer] - Occupational Stress Questionnaire     Feeling of Stress : Patient declined   Recent Concern: Stress - Stress Concern Present (2022)    [employer] [place] [employer] - Occupational Stress Questionnaire     Feeling of Stress : Rather much   Housing Stability: Unknown (2022)    Housing Stability Vital Sign     Unable to Pay for Housing in the Last Year: Patient refused     Unstable Housing in the Last Year: Patient refused       Review of patient's allergies indicates:   Allergen Reactions    Latex, natural rubber      burning    Sulfur Hives    Toradol [ketorolac] Other (See Comments)     Metallic taste only once in ; has taken since without problems; reports R sided paralysis    Tramadol Other (See Comments)     paralysis    Penicillins Hives and Rash    Sulfa (sulfonamide antibiotics) Hives and Rash       Review of  "Systems:  General ROS: negative for - fever  Psychological ROS: negative for - hostility  Hematological and Lymphatic ROS: negative for - bleeding problems  Endocrine ROS: negative for - unexpected weight changes  Respiratory ROS: no cough, shortness of breath, or wheezing  Cardiovascular ROS: no chest pain or dyspnea on exertion  Gastrointestinal ROS: no abdominal pain, change in bowel habits, or black or bloody stools  Musculoskeletal ROS: negative for - muscular weakness  Neurological ROS: negative for - numbness/tingling  Dermatological ROS: negative for rash    Physical Exam:  Vitals:    09/26/24 0808   BP: (!) 113/58   Pulse: 80   Weight: 65.2 kg (143 lb 13.6 oz)   Height: 5' 8" (1.727 m)   PainSc:   9   PainLoc: Hip       Body mass index is 21.87 kg/m².    Gen: NAD,   Psych: mood appropriate for given condition  CV: 2+ radial pulse  HEENT: anicteric   Respiratory: non labored  Abd: soft nt, nd  Skin: intact,   ROM: limited AROM of the L spine in all planes, full ROM at ankles, knees and hips  Sensation: intact to light touch in all dermatomes tested from L2-S1 bilaterally  Reflexes: 2+ b/l patella and 0+ right Achilles, 2+ left Achilles  Palpation: Diffusely tender over lumbar paraspinals  -TTP over the b/l greater trochanters and bilateral SI joint  Tone: normal in the b/l knees and hips   Extremities: No edema in b/l ankles or hands  Provacative tests: + b/l axial facet loading       Right Left   L2/3 Iliacus Hip flexion  5  5   L3/4 Qudratus Femoris Knee Extension  5  5   L4/5 Tib Anterior Ankle Dorsiflexion   5  5   L5/S1 Extensor Hallicus Longus Great toe extension  5  5                 S1/S2 Gastroc/Soleus Plantar Flexion  5  5       Imaging:  Xray spine 11/6/2020  FINDINGS:  S-shaped scoliosis of the thoracolumbar spine with dextroconvex curvature of the thoracic spine and levoconvex curvature of the lumbar spine.  The thoracic dextroconvex curvature measures approximately 30.6 degrees measured from " the superior endplate of T4 to the inferior endplate of T11.  Levoconvex curvature of the lumbar spine measures approximately 20.7 degrees measured from the superior endplate of T12 to the inferior endplate of L3.  sagittal balance unable to be assessed due to obscuration of the C7 vertebral body.  No evidence for vertebral segmentation anomaly.  Vertebral body heights are maintained.  No suspicious appearing lytic or blastic lesions.    Xray lumbar spine 4/21/22  FINDINGS:  Vertebral bodies are normal in height without evidence of fracture.  Mild rotatory levoconvex curvature of the lumbar spine.  Normal sagittal alignment is preserved.  No spondylolisthesis. No abnormal translation with flexion and extension.  Intervertebral disc heights are well maintained.Mild lower lumbar facet arthropathy.    Xray cervical spine 4/21/22  FINDINGS:  C1-C2: Pre-dens space is maintained.  Dens and lateral masses of C1 are unremarkable.  Alignment: Chronic straightening of the normal cervical lordosis, possibly secondary to positioning or muscle spasm.  No spondylolisthesis.  No dynamic instability.  Vertebrae: Vertebral body heights are maintained.  No suspicious appearing lytic or blastic lesions.  Discs and facets: Disc heights are maintained.  Facet joints are unremarkable. Neural foramina are maintained on oblique projections.  Miscellaneous: No additional findings.    Labs:  BMP  Lab Results   Component Value Date     07/20/2024    K 4.6 07/20/2024     07/20/2024    CO2 28 07/20/2024    BUN 8 07/20/2024    CREATININE 0.8 07/20/2024    CALCIUM 9.4 07/20/2024    ANIONGAP 5 (L) 07/20/2024    ESTGFRAFRICA >60 11/29/2021    EGFRNONAA >60 11/29/2021     Lab Results   Component Value Date    ALT 12 07/20/2024    AST 13 07/20/2024    ALKPHOS 67 07/20/2024    BILITOT 0.4 07/20/2024       Assessment:   Problem List Items Addressed This Visit    None  Visit Diagnoses       Lumbar radiculopathy, chronic    -  Primary     Relevant Medications    methylPREDNISolone (MEDROL DOSEPACK) 4 mg tablet    Lumbar spondylosis        Myofascial pain                          29 y.o. year old female who presents with back pain.  She has had back pain since 2008.  She has known scoliosis.  She has known fibromyalgia.  Today she rates her pain 4-10, constant, throbbing, tingling, deep.  She has neck pain into the trapezius muscles.  She also has midthoracic and lower back pain.  Denies any rosmery radicular symptoms.  No numbness no weakness.  Pain worse with standing, bending, walking, nighttime and not relieved with anything.    9/26/2024: Holly Nichols returns to the office for follow up.  She returns for follow-up with worsening left-sided lower back pain with radiation down left leg into left foot, 9/10, constant, with associated numbness and tingling throughout left leg.  No pain radiating down right leg, no weakness or any new changes to her bowel or bladder function.  She has been taking NSAIDs, Lyrica, Zanaflex and attending chiropractic care without significant relief.    - on exam she is full strength in her lower extremities.  - I reviewed report of her outside lumbar MRI and she has a mild disc bulge at L5/S1 with some mild foraminal narrowing.  - she is likely having some worsening lumbar radicular pain.  - with the lumbar radiculopathy being so acute I would like to see how she progresses with more time and more conservative management.  Oral Medrol Dosepak provided today for any inflammatory component, she will continue with NSAIDs and chiropractic care in addition to Lyrica.  - follow up in 6 weeks, at that time if pain persists can consider lumbar epidural.      : Not applicable    This note was completed with dictation software and grammatical errors may exist.

## 2024-09-28 LAB — O+P STL MICRO: NORMAL

## 2024-10-09 ENCOUNTER — TELEPHONE (OUTPATIENT)
Dept: GASTROENTEROLOGY | Facility: CLINIC | Age: 29
End: 2024-10-09
Payer: COMMERCIAL

## 2024-10-09 NOTE — TELEPHONE ENCOUNTER
Results sent to patient portal, see below    Per Jarret Naik Jr., MD [facility] Staff  Please let patient know.  The stool cultures are negative / normal.  The colon biopsies were negative / normal..        Rec remains the same.  Take meds as directed.    Repeat colonoscopy at 44 yrs old.

## 2024-10-09 NOTE — TELEPHONE ENCOUNTER
----- Message from Jarret Zhao MD sent at 10/8/2024  2:35 PM CDT -----  Please let patient know.  The stool cultures are negative / normal.  The colon biopsies were negative / normal..        Rec remains the same.  Take meds as directed.     Repeat colonoscopy at 44 yrs old.

## 2024-10-15 ENCOUNTER — OFFICE VISIT (OUTPATIENT)
Dept: FAMILY MEDICINE | Facility: CLINIC | Age: 29
End: 2024-10-15
Payer: COMMERCIAL

## 2024-10-15 VITALS
WEIGHT: 142.88 LBS | DIASTOLIC BLOOD PRESSURE: 70 MMHG | HEART RATE: 98 BPM | SYSTOLIC BLOOD PRESSURE: 112 MMHG | OXYGEN SATURATION: 97 % | RESPIRATION RATE: 18 BRPM | BODY MASS INDEX: 21.65 KG/M2 | HEIGHT: 68 IN

## 2024-10-15 DIAGNOSIS — G47.09 OTHER INSOMNIA: Primary | ICD-10-CM

## 2024-10-15 DIAGNOSIS — F41.1 GENERALIZED ANXIETY DISORDER: ICD-10-CM

## 2024-10-15 DIAGNOSIS — F98.8 ATTENTION DEFICIT DISORDER (ADD) IN ADULT: ICD-10-CM

## 2024-10-15 PROCEDURE — 99999 PR PBB SHADOW E&M-EST. PATIENT-LVL V: CPT | Mod: PBBFAC,,, | Performed by: FAMILY MEDICINE

## 2024-10-15 PROCEDURE — 3078F DIAST BP <80 MM HG: CPT | Mod: CPTII,S$GLB,, | Performed by: FAMILY MEDICINE

## 2024-10-15 PROCEDURE — 3074F SYST BP LT 130 MM HG: CPT | Mod: CPTII,S$GLB,, | Performed by: FAMILY MEDICINE

## 2024-10-15 PROCEDURE — 1159F MED LIST DOCD IN RCRD: CPT | Mod: CPTII,S$GLB,, | Performed by: FAMILY MEDICINE

## 2024-10-15 PROCEDURE — 3008F BODY MASS INDEX DOCD: CPT | Mod: CPTII,S$GLB,, | Performed by: FAMILY MEDICINE

## 2024-10-15 PROCEDURE — 99214 OFFICE O/P EST MOD 30 MIN: CPT | Mod: S$GLB,,, | Performed by: FAMILY MEDICINE

## 2024-10-15 RX ORDER — DEXTROAMPHETAMINE SACCHARATE, AMPHETAMINE ASPARTATE MONOHYDRATE, DEXTROAMPHETAMINE SULFATE AND AMPHETAMINE SULFATE 3.75; 3.75; 3.75; 3.75 MG/1; MG/1; MG/1; MG/1
15 CAPSULE, EXTENDED RELEASE ORAL DAILY
Qty: 30 CAPSULE | Refills: 0 | Status: SHIPPED | OUTPATIENT
Start: 2025-01-08

## 2024-10-15 RX ORDER — DEXTROAMPHETAMINE SACCHARATE, AMPHETAMINE ASPARTATE MONOHYDRATE, DEXTROAMPHETAMINE SULFATE AND AMPHETAMINE SULFATE 3.75; 3.75; 3.75; 3.75 MG/1; MG/1; MG/1; MG/1
15 CAPSULE, EXTENDED RELEASE ORAL EVERY MORNING
Qty: 30 CAPSULE | Refills: 0 | Status: SHIPPED | OUTPATIENT
Start: 2024-12-08

## 2024-10-15 RX ORDER — TRAZODONE HYDROCHLORIDE 50 MG/1
50 TABLET ORAL NIGHTLY
Qty: 30 TABLET | Refills: 2 | Status: SHIPPED | OUTPATIENT
Start: 2024-10-15 | End: 2025-01-13

## 2024-10-15 RX ORDER — DEXTROAMPHETAMINE SACCHARATE, AMPHETAMINE ASPARTATE, DEXTROAMPHETAMINE SULFATE AND AMPHETAMINE SULFATE 1.25; 1.25; 1.25; 1.25 MG/1; MG/1; MG/1; MG/1
1 TABLET ORAL DAILY PRN
Qty: 30 TABLET | Refills: 0 | Status: SHIPPED | OUTPATIENT
Start: 2024-10-15

## 2024-10-15 RX ORDER — DEXTROAMPHETAMINE SACCHARATE, AMPHETAMINE ASPARTATE MONOHYDRATE, DEXTROAMPHETAMINE SULFATE AND AMPHETAMINE SULFATE 3.75; 3.75; 3.75; 3.75 MG/1; MG/1; MG/1; MG/1
15 CAPSULE, EXTENDED RELEASE ORAL EVERY MORNING
Qty: 30 CAPSULE | Refills: 0 | Status: SHIPPED | OUTPATIENT
Start: 2024-11-08

## 2024-10-15 NOTE — PROGRESS NOTES
Assessment:       1. Other insomnia    2. Attention deficit disorder (ADD) in adult    3. Generalized anxiety disorder        Assessment & Plan    Other insomnia:  Worsening     Trazodone (DESYREL) 50 MG tablet; Take 1 tablet (50 mg total) by mouth every evening.  Dispense: 30 tablet; Refill: 2    Attention deficit disorder (ADD) in adult:  Stable  -     dextroamphetamine-amphetamine (ADDERALL) 5 mg Tab; Take 5 mg by mouth daily as needed.  Dispense: 30 tablet; Refill: 0  -     dextroamphetamine-amphetamine (ADDERALL XR) 15 MG 24 hr capsule; Take 1 capsule (15 mg total) by mouth once daily.  Dispense: 30 capsule; Refill: 0  -     dextroamphetamine-amphetamine (ADDERALL XR) 15 MG 24 hr capsule; Take 1 capsule (15 mg total) by mouth every morning.  Dispense: 30 capsule; Refill: 0  -     dextroamphetamine-amphetamine (ADDERALL XR) 15 MG 24 hr capsule; Take 1 capsule (15 mg total) by mouth every morning.  Dispense: 30 capsule; Refill: 0    Generalized anxiety disorder:  Worsening      Assessed current medication regimen and sleep issues  Considered trazodone for insomnia, noting potential benefits for deep sleep and REM sleep  Evaluated anxiety and grief related to grandfather's passing  Noted patient's preference for self-reflection over formal counseling  Reviewed current use of Celexa, Lyrica, and hydroxyzine  Considered patient's low tolerance for medications when prescribing  Assessed heart and lung sounds    - Noted the patient's history of SVT during pregnancy when consuming caffeine.  - Observed consistently high heart rate.  - Performed a physical exam, including auscultation of the patient's heart.  - Inquired about caffeine consumption and exercise habits to assess potential causes of tachycardia.  - Advised against caffeine consumption.  - Recommend avoiding excessive caffeine consumption.    ANXIETY:  - Discussed the addictive nature of alprazolam and the importance of avoiding frequent use.  - Continued  hydroxyzine as needed.  - Continued alprazolam for panic attacks, only when not taking amphetamine/dextroamphetamine.  - Noted increased anxiety, difficulty sleeping, and racing thoughts.  - Evaluated anxiety symptoms related to grief and family responsibilities.  - Suggested counseling, which the patient declined due to past negative experiences.  - Continued citalopram 40mg and 20mg, and hydroxyzine as needed.  - Prescribed trazodone 50mg for insomnia.  - Instructed to use alprazolam for panic attacks as needed when not taking amphetamine/dextroamphetamine.  - Explained that trazodone is not addictive and can help with deep sleep and REM sleep.    ATTENTION DEFICIT HYPERACTIVITY DISORDER (ADHD):  - Continued amphetamine/dextroamphetamine XR 15mg and IR 5mg, provided refills dated for November 8th.  - Noted the extended-release medication wearing off midday, causing difficulty concentrating.  - Reviewed medication usage and effectiveness.  - Assessed the need for medication refills and reviewed prescription dates.  - Prescribed amphetamine/dextroamphetamine XR 15mg and IR 5mg, with refills for 3 months.  [place] prescription monitoring program was checked and okay.    INSOMNIA:  - Initiated trazodone 50mg for insomnia.  - Instructed the patient to ensure adequate sleep.  - Noted the patient reports getting only 4 hours of sleep per night and difficulty falling asleep due to racing thoughts.  - Evaluated the patient's sleep issues and their relation to anxiety and grief.  - Discussed the patient's current medications and their effects on sleep.  - Prescribed trazodone 50mg for insomnia, explaining its benefits for sleep and mood.    DEPRESSION:  - Noted the patient reports feeling unmotivated and in a slump, possibly related to unresolved grief.  - Evaluated the patient's mood and its relation to recent loss and grief.  - Suggested counseling, which the patient declined, preferring self-reflection and support from  friends.  - Continued citalopram for mood management, and prescribed trazodone which may also help with mood symptoms.    GRIEF:  - Noted the recent death of grandfather on April 1st, who was a significant figure in her life.  - Evaluated the impact of the grandfather's death on the patient's mental health and sleep.  - Acknowledged the patient's grief and its impact on her anxiety and sleep.  - Prescribed trazodone for sleep issues related to grief.    MEDICATIONS/SUPPLEMENTS:  - Continued citalopram 60 mg in the morning.  - Continued pregabalin as needed.    FOLLOW UP:  - Scheduled a follow-up visit in 3 months.  - Follow up in 3 months.                Patient agreed with assessment and plan. Patient verbalized understanding.     Subjective:       Patient ID: Holly Nichols is a 29 y.o. female.    Chief Complaint: Follow-up in anxiety      History of Present Illness    CHIEF COMPLAINT:  The patient presents for medication refills and to discuss ongoing anxiety and sleep issues related to recent grief.    HPI:  Patient reports increased anxiety and sleep disturbances following her grandfather's death on April 1st. She feels unmotivated and depressed, attributing these feelings to unresolved grief. 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    Sleep issues are a significant concern. The patient gets approximately 4 hours of sleep per night, with difficulty falling asleep due to racing thoughts and anxiety. She has distressing dreams about her late grandfather. By the time she gets her children to bed, she has persistent mental activity and anxiety.    The patient's anxiety has significantly increased, affecting her daily life and work. She feels unmotivated to go to work due to anxiety and feeling overwhelmed by tasks. She also reports low energy during the day, relying heavily on vitamin B12 supplements.    The patient takes  Celexa (citalopram) in the morning around 6:15-6:30 AM, and Adderall XR about an hour later, around 7:30-7:40 AM. The extended-release Adderall (15 mg) seems to wear off midday, leading to difficulties concentrating in the afternoon. She takes Lyrica sometimes during the day, and a muscle relaxer with 100 mg Lyrica at night, but reports morning grogginess that makes it difficult to start her day.    The patient has not sought professional counseling, citing past negative experiences. Instead, she relies on a support system of friends for emotional support.    MEDICATIONS:  The patient is on Adderall XR 15 mg extended release, taken daily in the morning (around 7:30-7:40 AM) for ADD. She also has Adderall 5 mg as needed for ADD. She takes Celexa 40 mg daily in the morning (around 6:15-6:30 AM) for depression/anxiety. Lyrica 100 mg is sometimes taken during the day. The patient has Hydroxyzine as needed. Xanax is used as needed for panic attacks, but not taken if Adderall is taken. She is on Zanaflex (cyclobenzaprine) for muscle relaxation, which causes shallow sleep.    MEDICAL HISTORY:  The patient has a history of ADHD (Attention Deficit Disorder), anxiety, and insomnia. She recently received a flu vaccine at the doctor's office.    FAMILY HISTORY:  Family history is significant for her grandfather who passed away on April 1st.    SOCIAL HISTORY:  The patient works as a nurse.      ROS:  ROS as indicated in HPI.          Past medical history, past social history was reviewed and discussed with the patient.        Objective:      Physical Exam    Physical Exam    General: No acute distress. Well-developed. Well-nourished.  Eyes: EOMI. Sclerae anicteric.  HENT: Normocephalic. Atraumatic. Nares patent. Moist oral mucosa.  Cardiovascular: Regular rate. Regular rhythm. Normal S1, S2.  Respiratory: Normal respiratory effort. Clear to auscultation bilaterally. No rales. No rhonchi. No wheezing. Normal lung  sounds.  Musculoskeletal: No  obvious deformity.  Extremities: No lower extremity edema.  Neurological: Alert & oriented x3. No slurred speech. Normal gait.  Psychiatric: Normal mood. Normal affect. Good insight. Good judgment.  Skin: Warm. Dry. No rash.                   This note was generated with the assistance of ambient listening technology. Verbal consent was obtained by the patient and accompanying visitor(s) for the recording of patient appointment to facilitate this note. I attest to having reviewed and edited the generated note for accuracy, though some syntax or spelling errors may persist. Please contact the author of this note for any clarification.

## 2024-11-22 ENCOUNTER — PATIENT MESSAGE (OUTPATIENT)
Dept: PODIATRY | Facility: CLINIC | Age: 29
End: 2024-11-22
Payer: COMMERCIAL

## 2024-12-14 DIAGNOSIS — F98.8 ATTENTION DEFICIT DISORDER (ADD) IN ADULT: ICD-10-CM

## 2024-12-14 NOTE — TELEPHONE ENCOUNTER
No care due was identified.  [facility] Embedded Care Due Messages. Reference number: [id number].   12/14/2024 12:43:29 PM CST

## 2024-12-16 RX ORDER — DEXTROAMPHETAMINE SACCHARATE, AMPHETAMINE ASPARTATE, DEXTROAMPHETAMINE SULFATE AND AMPHETAMINE SULFATE 1.25; 1.25; 1.25; 1.25 MG/1; MG/1; MG/1; MG/1
1 TABLET ORAL DAILY PRN
Qty: 30 TABLET | Refills: 0 | Status: SHIPPED | OUTPATIENT
Start: 2024-12-16

## 2025-01-09 ENCOUNTER — PATIENT MESSAGE (OUTPATIENT)
Dept: FAMILY MEDICINE | Facility: CLINIC | Age: 30
End: 2025-01-09
Payer: COMMERCIAL

## 2025-01-09 RX ORDER — CITALOPRAM 40 MG/1
TABLET, FILM COATED ORAL
Qty: 90 TABLET | Refills: 3 | Status: SHIPPED | OUTPATIENT
Start: 2025-01-09

## 2025-01-09 NOTE — TELEPHONE ENCOUNTER
No care due was identified.  [facility] Embedded Care Due Messages. Reference number: [id number].   1/09/2025 6:11:10 AM CST

## 2025-01-09 NOTE — TELEPHONE ENCOUNTER
Refill Decision Note   Holly Nichols  is requesting a refill authorization.  Brief Assessment and Rationale for Refill:  Approve     Medication Therapy Plan:         Alert overridden per protocol: Yes   Comments:     Note composed:9:48 AM 01/09/2025

## 2025-01-10 ENCOUNTER — OFFICE VISIT (OUTPATIENT)
Dept: FAMILY MEDICINE | Facility: CLINIC | Age: 30
End: 2025-01-10
Payer: COMMERCIAL

## 2025-01-10 VITALS
BODY MASS INDEX: 21.72 KG/M2 | WEIGHT: 142.88 LBS | HEART RATE: 98 BPM | DIASTOLIC BLOOD PRESSURE: 64 MMHG | OXYGEN SATURATION: 98 % | SYSTOLIC BLOOD PRESSURE: 104 MMHG

## 2025-01-10 DIAGNOSIS — Z00.01 ANNUAL VISIT FOR GENERAL ADULT MEDICAL EXAMINATION WITH ABNORMAL FINDINGS: Primary | ICD-10-CM

## 2025-01-10 DIAGNOSIS — G47.09 OTHER INSOMNIA: Primary | ICD-10-CM

## 2025-01-10 DIAGNOSIS — E78.49 OTHER HYPERLIPIDEMIA: ICD-10-CM

## 2025-01-10 DIAGNOSIS — F41.9 ANXIETY: ICD-10-CM

## 2025-01-10 DIAGNOSIS — J45.50 SEVERE PERSISTENT ASTHMA WITHOUT COMPLICATION: ICD-10-CM

## 2025-01-10 DIAGNOSIS — Z79.890 HORMONE REPLACEMENT THERAPY: ICD-10-CM

## 2025-01-10 DIAGNOSIS — F98.8 ATTENTION DEFICIT DISORDER (ADD) IN ADULT: ICD-10-CM

## 2025-01-10 PROCEDURE — 99999 PR PBB SHADOW E&M-EST. PATIENT-LVL IV: CPT | Mod: PBBFAC,,, | Performed by: FAMILY MEDICINE

## 2025-01-10 PROCEDURE — 99214 OFFICE O/P EST MOD 30 MIN: CPT | Mod: S$GLB,,, | Performed by: FAMILY MEDICINE

## 2025-01-10 PROCEDURE — 1159F MED LIST DOCD IN RCRD: CPT | Mod: CPTII,S$GLB,, | Performed by: FAMILY MEDICINE

## 2025-01-10 PROCEDURE — 3074F SYST BP LT 130 MM HG: CPT | Mod: CPTII,S$GLB,, | Performed by: FAMILY MEDICINE

## 2025-01-10 PROCEDURE — 3008F BODY MASS INDEX DOCD: CPT | Mod: CPTII,S$GLB,, | Performed by: FAMILY MEDICINE

## 2025-01-10 PROCEDURE — 3078F DIAST BP <80 MM HG: CPT | Mod: CPTII,S$GLB,, | Performed by: FAMILY MEDICINE

## 2025-01-10 RX ORDER — CLONAZEPAM 0.5 MG/1
0.5 TABLET ORAL NIGHTLY PRN
Qty: 30 TABLET | Refills: 0 | Status: SHIPPED | OUTPATIENT
Start: 2025-01-10

## 2025-01-10 RX ORDER — DEXTROAMPHETAMINE SACCHARATE, AMPHETAMINE ASPARTATE MONOHYDRATE, DEXTROAMPHETAMINE SULFATE AND AMPHETAMINE SULFATE 3.75; 3.75; 3.75; 3.75 MG/1; MG/1; MG/1; MG/1
15 CAPSULE, EXTENDED RELEASE ORAL EVERY MORNING
Qty: 30 CAPSULE | Refills: 0 | Status: SHIPPED | OUTPATIENT
Start: 2025-01-16

## 2025-01-10 RX ORDER — DEXTROAMPHETAMINE SACCHARATE, AMPHETAMINE ASPARTATE MONOHYDRATE, DEXTROAMPHETAMINE SULFATE AND AMPHETAMINE SULFATE 3.75; 3.75; 3.75; 3.75 MG/1; MG/1; MG/1; MG/1
15 CAPSULE, EXTENDED RELEASE ORAL DAILY
Qty: 30 CAPSULE | Refills: 0 | Status: SHIPPED | OUTPATIENT
Start: 2025-03-15

## 2025-01-10 RX ORDER — DEXTROAMPHETAMINE SACCHARATE, AMPHETAMINE ASPARTATE MONOHYDRATE, DEXTROAMPHETAMINE SULFATE AND AMPHETAMINE SULFATE 3.75; 3.75; 3.75; 3.75 MG/1; MG/1; MG/1; MG/1
15 CAPSULE, EXTENDED RELEASE ORAL EVERY MORNING
Qty: 30 CAPSULE | Refills: 0 | Status: SHIPPED | OUTPATIENT
Start: 2025-02-15

## 2025-01-13 PROBLEM — K51.50 LEFT SIDED COLITIS WITHOUT COMPLICATIONS: Status: RESOLVED | Noted: 2023-03-30 | Resolved: 2025-01-13

## 2025-01-13 NOTE — PROGRESS NOTES
Assessment:       1. Other insomnia    2. Anxiety    3. Attention deficit disorder (ADD) in adult    4. Other hyperlipidemia        Assessment & Plan    Other insomnia:  Worsening  -     clonazePAM (KLONOPIN) 0.5 MG tablet; Take 1 tablet (0.5 mg total) by mouth nightly as needed for Anxiety.  Dispense: 30 tablet; Refill: 0  [place] prescription monitoring program was checked and okay.    Anxiety: Worsening  -     clonazePAM (KLONOPIN) 0.5 MG tablet; Take 1 tablet (0.5 mg total) by mouth nightly as needed for Anxiety.  Dispense: 30 tablet; Refill: 0    Attention deficit disorder (ADD) in adult: Stable  -     dextroamphetamine-amphetamine (ADDERALL XR) 15 MG 24 hr capsule; Take 1 capsule (15 mg total) by mouth every morning.  Dispense: 30 capsule; Refill: 0  -     dextroamphetamine-amphetamine (ADDERALL XR) 15 MG 24 hr capsule; Take 1 capsule (15 mg total) by mouth every morning.  Dispense: 30 capsule; Refill: 0  -     dextroamphetamine-amphetamine (ADDERALL XR) 15 MG 24 hr capsule; Take 1 capsule (15 mg total) by mouth once daily.  Dispense: 30 capsule; Refill: 0    Other hyperlipidemia: Uncontrolled    Severe persistent asthma: Stable.       Considered Lunesta for insomnia, but determined anxiety was the primary issue causing sleep disturbance  Opted for clonazepam to address anxiety and racing thoughts at bedtime, aiming to improve sleep indirectly  Evaluated current medication regimen, including Adderall and hormone therapy  Noted Dupixent's efficacy in managing asthma symptoms, reducing need for other asthma medications  Will reassess hormone levels to ensure appropriate management of menopausal symptoms    LEFT SIDED COLITIS WITHOUT COMPLICATIONS:  - Patient reports IBS with alternating diarrhea and constipation.  - Prescribed Lomotil for chronic diarrhea and cramps.  - Patient no longer takes Colestin for diarrhea due to unpredictability of symptoms.      SEVERE PERSISTENT ASTHMA:  - Patient reports Dupixent  is working well, not requiring other asthma medications.  - Continued Dupixent for asthma management.  - Noted upcoming appointment with pulmonologist for Dupixent follow-up.    ANXIETY:  - Evaluated that the patient's insomnia is more related to anxiety than true insomnia, particularly after Adderall wears off, with racing thoughts.  - Explained the difference between medications for insomnia (e.g., Lunesta) and anxiety (e.g., clonazepam).  - Discussed risks associated with benzodiazepines, including potential for tolerance.  - Prescribed clonazepam 0.5 mg at bedtime as needed for anxiety.  - Instructed the patient to contact the office if clonazepam needs to be refilled before the next appointment.    ATTENTION DEFICIT HYPERACTIVITY DISORDER (ADHD):  - Continued Adderall for ADHD management.  - Provided Adderall refills dated for 1/16, 2/15, and 3/15.    IRRITABLE BOWEL SYNDROME (IBS):  - Discussed the patient's IBS symptoms, including unpredictable diarrhea episodes.    MEDICATIONS/SUPPLEMENTS:  - Discontinued trazodone.    LABS:  - Ordered comprehensive lab work including cholesterol and hormone panel (estradiol, progesterone, testosterone) to be completed before next visit.    MENOPAUSE:  - Patient reports experiencing hot flashes.  - Continued estrogen patch for hormone replacement therapy.  - Planned to check hormone levels including estrogen, progesterone, and testosterone to assess current hormone replacement therapy.  - Noted that the patient may also be using progesterone as part of hormone replacement therapy.    OTHER INSTRUCTIONS:  - Noted the patient's history of hysterectomy performed by Dr. Easley.  - Recommend wearing compression socks due to prolonged standing/sitting at work.    FOLLOW UP:  - Follow up in 3 months on a Saturday morning.  - Complete fasting lab work (12-hour fast required) on a Saturday prior to the follow-up visit.               Patient agreed with assessment and plan. Patient  verbalized understanding.     Subjective:       Patient ID: Holly Nichols is a 29 y.o. female.    Chief Complaint: Follow-up (Pt here for refills, Trazadone is not working )      History of Present Illness    CHIEF COMPLAINT:  Patient presents for medication management and follow-up, particularly regarding anxiety, insomnia, and hormone therapy.    HPI:  Patient reports ongoing issues with anxiety and insomnia. She has racing thoughts, especially after Adderall wears off, making it difficult to fall asleep. She previously tried trazodone 50 mg for about a month, with unclear efficacy. Her anxiety has worsened recently, affecting her ability to fall asleep. She has hot flashes, possibly related to hormone therapy. She is currently using an estrogen patch prescribed by her OB/GYN but continues to have some hot flashes. Patient has IBS with unpredictable alternating diarrhea and constipation. She uses Lomotil for chronic diarrhea and cramps as needed. Dupixent is effectively managing her asthma, eliminating the need for other asthma medications like Combivent. She takes Lyrica as needed for pain. Patient sees a pulmonologist for Dupixent treatment and has an appointment scheduled for next week. She denies leg swelling and any current issues with her asthma.    MEDICATIONS:  Patient is on Adderall 15 mg and 5 mg, taken in the morning and midday. She is also on Xanax as needed, which she takes infrequently. She uses Lyrica as needed for pain and Lamodal as needed for chronic diarrhea and cramps. Patient is on an estrogen patch for hormone replacement.    MEDICAL HISTORY:  Patient has a history of anxiety, IBS (Irritable Bowel Syndrome), and asthma. Patient has undergone a hysterectomy.     SURGICAL HISTORY:  Patient has undergone a hysterectomy and a laparoscopy.    TEST RESULTS:  Patient's last TSH test results were good. In July, she had tests for estrogen, progesterone, and testosterone. Her estrogen levels were  found to be low at that time.    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      ROS:  ROS as indicated in HPI.          Past medical history, past social history was reviewed and discussed with the patient.        Objective:      Physical Exam      General: No acute distress. Well-developed. Well-nourished.  Eyes: EOMI. Sclerae anicteric.  HENT: Normocephalic. Atraumatic. Nares patent. Moist oral mucosa.  Cardiovascular: Regular rate. Regular rhythm.  Respiratory: Normal respiratory effort. Clear to auscultation bilaterally. No rales. No rhonchi. No wheezing.  Musculoskeletal: No  obvious deformity.  Extremities: No lower extremity edema.  Neurological: Alert & oriented x3. No slurred speech. Normal gait.  Psychiatric: Normal mood. Normal affect. Good insight. Good judgment.  Skin: Warm. Dry. No rash.                   This note was generated with the assistance of ambient listening technology. Verbal consent was obtained by the patient and accompanying visitor(s) for the recording of patient appointment to facilitate this note. I attest to having reviewed and edited the generated note for accuracy, though some syntax or spelling errors may persist. Please contact the author of this note for any clarification.

## 2025-04-05 ENCOUNTER — LAB VISIT (OUTPATIENT)
Dept: LAB | Facility: HOSPITAL | Age: 30
End: 2025-04-05
Attending: FAMILY MEDICINE
Payer: COMMERCIAL

## 2025-04-05 DIAGNOSIS — Z79.890 HORMONE REPLACEMENT THERAPY: ICD-10-CM

## 2025-04-05 DIAGNOSIS — F32.A DEPRESSION, UNSPECIFIED DEPRESSION TYPE: ICD-10-CM

## 2025-04-05 DIAGNOSIS — Z00.01 ANNUAL VISIT FOR GENERAL ADULT MEDICAL EXAMINATION WITH ABNORMAL FINDINGS: ICD-10-CM

## 2025-04-05 LAB
ABSOLUTE EOSINOPHIL (OHS): 0.22 K/UL
ABSOLUTE MONOCYTE (OHS): 0.35 K/UL (ref 0.3–1)
ABSOLUTE NEUTROPHIL COUNT (OHS): 2.85 K/UL (ref 1.8–7.7)
ALBUMIN SERPL BCP-MCNC: 3.7 G/DL (ref 3.5–5.2)
ALP SERPL-CCNC: 93 UNIT/L (ref 40–150)
ALT SERPL W/O P-5'-P-CCNC: 46 UNIT/L (ref 10–44)
ANION GAP (OHS): 6 MMOL/L (ref 8–16)
AST SERPL-CCNC: 17 UNIT/L (ref 11–45)
BASOPHILS # BLD AUTO: 0.05 K/UL
BASOPHILS NFR BLD AUTO: 0.9 %
BILIRUB SERPL-MCNC: 0.4 MG/DL (ref 0.1–1)
BUN SERPL-MCNC: 10 MG/DL (ref 6–20)
CALCIUM SERPL-MCNC: 8.7 MG/DL (ref 8.7–10.5)
CHLORIDE SERPL-SCNC: 106 MMOL/L (ref 95–110)
CHOLEST SERPL-MCNC: 236 MG/DL (ref 120–199)
CHOLEST/HDLC SERPL: 3.6 {RATIO} (ref 2–5)
CO2 SERPL-SCNC: 27 MMOL/L (ref 23–29)
CREAT SERPL-MCNC: 0.7 MG/DL (ref 0.5–1.4)
ERYTHROCYTE [DISTWIDTH] IN BLOOD BY AUTOMATED COUNT: 12.5 % (ref 11.5–14.5)
ESTRADIOL SERPL HS-MCNC: 153 PG/ML
GFR SERPLBLD CREATININE-BSD FMLA CKD-EPI: >60 ML/MIN/1.73/M2
GLUCOSE SERPL-MCNC: 84 MG/DL (ref 70–110)
HCT VFR BLD AUTO: 43.1 % (ref 37–48.5)
HDLC SERPL-MCNC: 66 MG/DL (ref 40–75)
HDLC SERPL: 28 % (ref 20–50)
HGB BLD-MCNC: 14.1 GM/DL (ref 12–16)
IMM GRANULOCYTES # BLD AUTO: 0.07 K/UL (ref 0–0.04)
IMM GRANULOCYTES NFR BLD AUTO: 1.2 % (ref 0–0.5)
LDLC SERPL CALC-MCNC: 154.8 MG/DL (ref 63–159)
LYMPHOCYTES # BLD AUTO: 2.23 K/UL (ref 1–4.8)
MCH RBC QN AUTO: 30.1 PG (ref 27–31)
MCHC RBC AUTO-ENTMCNC: 32.7 G/DL (ref 32–36)
MCV RBC AUTO: 92 FL (ref 82–98)
NONHDLC SERPL-MCNC: 170 MG/DL
NUCLEATED RBC (/100WBC) (OHS): 0 /100 WBC
PLATELET # BLD AUTO: 247 K/UL (ref 150–450)
PMV BLD AUTO: 10.5 FL (ref 9.2–12.9)
POTASSIUM SERPL-SCNC: 3.8 MMOL/L (ref 3.5–5.1)
PROGEST SERPL-MCNC: 11.2 NG/ML
PROT SERPL-MCNC: 6.9 GM/DL (ref 6–8.4)
RBC # BLD AUTO: 4.69 M/UL (ref 4–5.4)
RELATIVE EOSINOPHIL (OHS): 3.8 %
RELATIVE LYMPHOCYTE (OHS): 38.6 % (ref 18–48)
RELATIVE MONOCYTE (OHS): 6.1 % (ref 4–15)
RELATIVE NEUTROPHIL (OHS): 49.4 % (ref 38–73)
SODIUM SERPL-SCNC: 139 MMOL/L (ref 136–145)
TESTOST SERPL-MCNC: 34 NG/DL (ref 5–73)
TRIGL SERPL-MCNC: 76 MG/DL (ref 30–150)
TSH SERPL-ACNC: 1.97 UIU/ML (ref 0.4–4)
WBC # BLD AUTO: 5.77 K/UL (ref 3.9–12.7)

## 2025-04-05 PROCEDURE — 36415 COLL VENOUS BLD VENIPUNCTURE: CPT | Mod: PO

## 2025-04-05 PROCEDURE — 84443 ASSAY THYROID STIM HORMONE: CPT

## 2025-04-05 PROCEDURE — 84144 ASSAY OF PROGESTERONE: CPT

## 2025-04-05 PROCEDURE — 84403 ASSAY OF TOTAL TESTOSTERONE: CPT

## 2025-04-05 PROCEDURE — 80061 LIPID PANEL: CPT

## 2025-04-05 PROCEDURE — 85025 COMPLETE CBC W/AUTO DIFF WBC: CPT

## 2025-04-05 PROCEDURE — 82670 ASSAY OF TOTAL ESTRADIOL: CPT

## 2025-04-05 PROCEDURE — 80053 COMPREHEN METABOLIC PANEL: CPT

## 2025-04-05 NOTE — TELEPHONE ENCOUNTER
No care due was identified.  [facility] Embedded Care Due Messages. Reference number: [id number].   4/05/2025 6:00:39 AM CDT

## 2025-04-06 RX ORDER — CITALOPRAM 20 MG/1
TABLET, FILM COATED ORAL
Qty: 90 TABLET | Refills: 3 | Status: SHIPPED | OUTPATIENT
Start: 2025-04-06

## 2025-04-06 NOTE — TELEPHONE ENCOUNTER
Refill Decision Note   Holly Simone  is requesting a refill authorization.  Brief Assessment and Rationale for Refill:  Approve     Medication Therapy Plan:        Comments:     Note composed:12:27 AM 04/06/2025

## 2025-04-10 ENCOUNTER — OFFICE VISIT (OUTPATIENT)
Dept: FAMILY MEDICINE | Facility: CLINIC | Age: 30
End: 2025-04-10
Payer: COMMERCIAL

## 2025-04-10 VITALS
OXYGEN SATURATION: 98 % | WEIGHT: 149.69 LBS | SYSTOLIC BLOOD PRESSURE: 114 MMHG | HEART RATE: 102 BPM | HEIGHT: 68 IN | DIASTOLIC BLOOD PRESSURE: 86 MMHG | BODY MASS INDEX: 22.69 KG/M2

## 2025-04-10 DIAGNOSIS — R42 VERTIGO: Primary | ICD-10-CM

## 2025-04-10 DIAGNOSIS — F41.9 ANXIETY: ICD-10-CM

## 2025-04-10 DIAGNOSIS — R63.0 LOSS OF APPETITE: ICD-10-CM

## 2025-04-10 DIAGNOSIS — F98.8 ATTENTION DEFICIT DISORDER (ADD) IN ADULT: ICD-10-CM

## 2025-04-10 DIAGNOSIS — R20.0 NUMBNESS: ICD-10-CM

## 2025-04-10 PROCEDURE — 3008F BODY MASS INDEX DOCD: CPT | Mod: CPTII,S$GLB,, | Performed by: FAMILY MEDICINE

## 2025-04-10 PROCEDURE — 1160F RVW MEDS BY RX/DR IN RCRD: CPT | Mod: CPTII,S$GLB,, | Performed by: FAMILY MEDICINE

## 2025-04-10 PROCEDURE — 3079F DIAST BP 80-89 MM HG: CPT | Mod: CPTII,S$GLB,, | Performed by: FAMILY MEDICINE

## 2025-04-10 PROCEDURE — 99999 PR PBB SHADOW E&M-EST. PATIENT-LVL IV: CPT | Mod: PBBFAC,,, | Performed by: FAMILY MEDICINE

## 2025-04-10 PROCEDURE — 99214 OFFICE O/P EST MOD 30 MIN: CPT | Mod: S$GLB,,, | Performed by: FAMILY MEDICINE

## 2025-04-10 PROCEDURE — 3074F SYST BP LT 130 MM HG: CPT | Mod: CPTII,S$GLB,, | Performed by: FAMILY MEDICINE

## 2025-04-10 PROCEDURE — 1159F MED LIST DOCD IN RCRD: CPT | Mod: CPTII,S$GLB,, | Performed by: FAMILY MEDICINE

## 2025-04-10 RX ORDER — DIAZEPAM 5 MG/1
5 TABLET ORAL DAILY PRN
Qty: 20 TABLET | Refills: 0 | Status: SHIPPED | OUTPATIENT
Start: 2025-04-10

## 2025-04-10 RX ORDER — CYPROHEPTADINE HYDROCHLORIDE 4 MG/1
4 TABLET ORAL
Qty: 90 TABLET | Refills: 2 | Status: SHIPPED | OUTPATIENT
Start: 2025-04-10

## 2025-04-10 RX ORDER — DEXTROAMPHETAMINE SACCHARATE, AMPHETAMINE ASPARTATE MONOHYDRATE, DEXTROAMPHETAMINE SULFATE AND AMPHETAMINE SULFATE 3.75; 3.75; 3.75; 3.75 MG/1; MG/1; MG/1; MG/1
15 CAPSULE, EXTENDED RELEASE ORAL DAILY
Qty: 30 CAPSULE | Refills: 0 | Status: SHIPPED | OUTPATIENT
Start: 2025-04-10

## 2025-04-10 RX ORDER — DEXTROAMPHETAMINE SACCHARATE, AMPHETAMINE ASPARTATE MONOHYDRATE, DEXTROAMPHETAMINE SULFATE AND AMPHETAMINE SULFATE 3.75; 3.75; 3.75; 3.75 MG/1; MG/1; MG/1; MG/1
15 CAPSULE, EXTENDED RELEASE ORAL EVERY MORNING
Qty: 30 CAPSULE | Refills: 0 | Status: SHIPPED | OUTPATIENT
Start: 2025-05-21

## 2025-04-10 RX ORDER — DEXTROAMPHETAMINE SACCHARATE, AMPHETAMINE ASPARTATE MONOHYDRATE, DEXTROAMPHETAMINE SULFATE AND AMPHETAMINE SULFATE 3.75; 3.75; 3.75; 3.75 MG/1; MG/1; MG/1; MG/1
15 CAPSULE, EXTENDED RELEASE ORAL EVERY MORNING
Qty: 30 CAPSULE | Refills: 0 | Status: SHIPPED | OUTPATIENT
Start: 2025-06-20

## 2025-04-10 NOTE — PROGRESS NOTES
Assessment:       1. Vertigo    2. Numbness    3. Anxiety    4. Loss of appetite    5. Attention deficit disorder (ADD) in adult        Plan:       Vertigo:  New problem workup needed.  -     diazePAM (VALIUM) 5 MG tablet; Take 1 tablet (5 mg total) by mouth daily as needed (vertigo).  Dispense: 20 tablet; Refill: 0  [place] prescription monitoring program was checked and okay.    Numbness:  Stable  -     Vitamin B12; Future; Expected date: 04/10/2025  -     Methylmalonic Acid, Serum; Future; Expected date: 04/10/2025  -     Folate; Future; Expected date: 04/10/2025    Anxiety:  Stable    Loss of appetite:  Worsening  -     cyproheptadine (PERIACTIN) 4 mg tablet; Take 1 tablet (4 mg total) by mouth before meals as needed (decrease appetite).  Dispense: 90 tablet; Refill: 2    Attention deficit disorder (ADD) in adult:  Stable  -     dextroamphetamine-amphetamine (ADDERALL XR) 15 MG 24 hr capsule; Take 1 capsule (15 mg total) by mouth every morning.  Dispense: 30 capsule; Refill: 0  -     dextroamphetamine-amphetamine (ADDERALL XR) 15 MG 24 hr capsule; Take 1 capsule (15 mg total) by mouth every morning.  Dispense: 30 capsule; Refill: 0  -     dextroamphetamine-amphetamine (ADDERALL XR) 15 MG 24 hr capsule; Take 1 capsule (15 mg total) by mouth once daily.  Dispense: 30 capsule; Refill: 0  [place] prescription monitoring program was checked and okay.  Adderall was prescribed for the patient.       Will release the medical records from the ER, will send a message when we have the results of the test, blood work was ordered, Adderall was prescribed for the patient.  Recommend the patient to take Valium as needed for vertigo and avoid taking at the same time of clonazepam due to side effects.   Patient agreed with assessment and plan. Patient verbalized understanding.     Subjective:       Patient ID: Holly Nichols is a 29 y.o. female.    Chief Complaint:  Follow-up and medication refill.    HPI    The patient  went to the emergency room secondary to symptoms of vertigo, had blood work and scans, no records available at this time.  Previously the patient was diagnosed with a ear infection and given antibiotics steroids that did not help.  Also was told that she has TMJ and she will need to see the specialist for this.  The patient was prescribed Valium and seemed to help tremendously for vertigo and she would like a prescription.  The patient currently taking clonazepam for anxiety.  She is taking Adderall and she will need refills today.    Past medical history, past social history was reviewed and discussed with the patient.    Review of Systems    Objective:      Physical Exam  Constitutional:       General: She is not in acute distress.     Appearance: Normal appearance. She is normal weight. She is not toxic-appearing.   HENT:      Head: Normocephalic and atraumatic.      Right Ear: There is no impacted cerumen. Tympanic membrane is not retracted.      Left Ear: There is no impacted cerumen. Tympanic membrane is not retracted.   Cardiovascular:      Rate and Rhythm: Normal rate and regular rhythm.   Pulmonary:      Effort: Pulmonary effort is normal. No respiratory distress.      Breath sounds: No stridor. No wheezing.   Musculoskeletal:      Right lower leg: No edema.      Left lower leg: No edema.   Neurological:      Mental Status: She is alert.   Psychiatric:         Mood and Affect: Mood normal.         Behavior: Behavior normal.         Thought Content: Thought content normal.         Judgment: Judgment normal.

## 2025-06-05 DIAGNOSIS — G47.09 OTHER INSOMNIA: ICD-10-CM

## 2025-06-05 DIAGNOSIS — M79.18 MYOFASCIAL PAIN: ICD-10-CM

## 2025-06-05 DIAGNOSIS — F41.9 ANXIETY: ICD-10-CM

## 2025-06-05 RX ORDER — TIZANIDINE 2 MG/1
2 TABLET ORAL 2 TIMES DAILY
Qty: 40 TABLET | Refills: 2 | Status: SHIPPED | OUTPATIENT
Start: 2025-06-05

## 2025-06-05 RX ORDER — TIZANIDINE 2 MG/1
2 TABLET ORAL 2 TIMES DAILY
Qty: 40 TABLET | Refills: 2 | OUTPATIENT
Start: 2025-06-05

## 2025-06-05 RX ORDER — CLONAZEPAM 0.5 MG/1
0.5 TABLET ORAL NIGHTLY PRN
Qty: 30 TABLET | Refills: 0 | Status: SHIPPED | OUTPATIENT
Start: 2025-06-05

## 2025-06-15 NOTE — TELEPHONE ENCOUNTER
----- Message from Linda Epstein sent at 1/17/2019  3:43 PM CST -----  Contact: Holly  Type: Needs Medical Advice    Who Called:  patient  Pharmacy name and phone #:    [id number] - [place], [place] - [address] AT [place] [address] & [place] [address]  [address] [address] [address]  Phone: [phone] Fax: [phone]    Best Call Back Number: [phone]  Additional Information: patient is trying to get her prednisone but the pharmacy won't fill it as they are telling her we cancelled it--she takes this to help prevent her asthma from flaring up--please advise--thank you  
Patient requesting new prescription for prednisone to treat asthma. Please advise.   
ED

## 2025-07-01 ENCOUNTER — TELEPHONE (OUTPATIENT)
Dept: FAMILY MEDICINE | Facility: CLINIC | Age: 30
End: 2025-07-01
Payer: COMMERCIAL

## 2025-07-01 NOTE — TELEPHONE ENCOUNTER
Copied from CRM #[id number]. Topic: General Inquiry - Patient Advice  >> Jul 1, 2025  9:38 AM Monet wrote:  Type:  Needs Medical Advice    Who Called: pt  Symptoms (please be specific): lower stomach on  to touch. Urgency to go to urinate but nothing comes out  How long has patient had these symptoms:  yesterday  Pharmacy name and phone #:    [facility] #[id number] - [facility] [id number] [place] [address] AT [place] [address] & [place] [address]  [id number] [place] [address]  Phone: [phone] Fax: [phone]  Would the patient rather a call back or a response via [facility]? call  Best Call Back Number: [phone]   Additional Information: pls give pt a call. In a lot of pain

## 2025-07-01 NOTE — TELEPHONE ENCOUNTER
Spoke to pt. She is going to [facility] ER.   She is in a lot of abd pain, frequent urination, feeling constipated and stomach is hard.

## 2025-07-03 ENCOUNTER — OFFICE VISIT (OUTPATIENT)
Dept: PAIN MEDICINE | Facility: CLINIC | Age: 30
End: 2025-07-03
Payer: COMMERCIAL

## 2025-07-03 ENCOUNTER — RESULTS FOLLOW-UP (OUTPATIENT)
Dept: FAMILY MEDICINE | Facility: CLINIC | Age: 30
End: 2025-07-03

## 2025-07-03 ENCOUNTER — TELEPHONE (OUTPATIENT)
Dept: PAIN MEDICINE | Facility: CLINIC | Age: 30
End: 2025-07-03

## 2025-07-03 ENCOUNTER — LAB VISIT (OUTPATIENT)
Dept: LAB | Facility: HOSPITAL | Age: 30
End: 2025-07-03
Attending: FAMILY MEDICINE
Payer: COMMERCIAL

## 2025-07-03 ENCOUNTER — OFFICE VISIT (OUTPATIENT)
Dept: FAMILY MEDICINE | Facility: CLINIC | Age: 30
End: 2025-07-03
Payer: COMMERCIAL

## 2025-07-03 VITALS
WEIGHT: 155.56 LBS | BODY MASS INDEX: 23.63 KG/M2 | HEART RATE: 79 BPM | DIASTOLIC BLOOD PRESSURE: 84 MMHG | HEIGHT: 68 IN | DIASTOLIC BLOOD PRESSURE: 88 MMHG | BODY MASS INDEX: 23.58 KG/M2 | HEART RATE: 104 BPM | WEIGHT: 155.44 LBS | SYSTOLIC BLOOD PRESSURE: 138 MMHG | SYSTOLIC BLOOD PRESSURE: 116 MMHG | OXYGEN SATURATION: 98 %

## 2025-07-03 DIAGNOSIS — M41.125 ADOLESCENT IDIOPATHIC SCOLIOSIS OF THORACOLUMBAR REGION: ICD-10-CM

## 2025-07-03 DIAGNOSIS — M79.18 MYOFASCIAL PAIN: ICD-10-CM

## 2025-07-03 DIAGNOSIS — M47.816 LUMBAR SPONDYLOSIS: Primary | ICD-10-CM

## 2025-07-03 DIAGNOSIS — M54.16 LUMBAR RADICULOPATHY, CHRONIC: ICD-10-CM

## 2025-07-03 DIAGNOSIS — M47.816 LUMBAR SPONDYLOSIS: ICD-10-CM

## 2025-07-03 DIAGNOSIS — F41.9 ANXIETY: ICD-10-CM

## 2025-07-03 DIAGNOSIS — K58.2 IRRITABLE BOWEL SYNDROME WITH BOTH CONSTIPATION AND DIARRHEA: ICD-10-CM

## 2025-07-03 DIAGNOSIS — M47.816 SPONDYLOSIS OF LUMBAR JOINT: ICD-10-CM

## 2025-07-03 DIAGNOSIS — R20.0 NUMBNESS: ICD-10-CM

## 2025-07-03 DIAGNOSIS — M54.9 DORSALGIA, UNSPECIFIED: ICD-10-CM

## 2025-07-03 DIAGNOSIS — F98.8 ATTENTION DEFICIT DISORDER (ADD) IN ADULT: ICD-10-CM

## 2025-07-03 DIAGNOSIS — M41.20 IDIOPATHIC SCOLIOSIS AND KYPHOSCOLIOSIS: Primary | ICD-10-CM

## 2025-07-03 LAB
FOLATE SERPL-MCNC: 5.4 NG/ML (ref 4–24)
VIT B12 SERPL-MCNC: 368 PG/ML (ref 210–950)

## 2025-07-03 PROCEDURE — 3008F BODY MASS INDEX DOCD: CPT | Mod: CPTII,S$GLB,,

## 2025-07-03 PROCEDURE — 3008F BODY MASS INDEX DOCD: CPT | Mod: CPTII,S$GLB,, | Performed by: FAMILY MEDICINE

## 2025-07-03 PROCEDURE — 1159F MED LIST DOCD IN RCRD: CPT | Mod: CPTII,S$GLB,,

## 2025-07-03 PROCEDURE — 82746 ASSAY OF FOLIC ACID SERUM: CPT

## 2025-07-03 PROCEDURE — 36415 COLL VENOUS BLD VENIPUNCTURE: CPT | Mod: PO

## 2025-07-03 PROCEDURE — 3074F SYST BP LT 130 MM HG: CPT | Mod: CPTII,S$GLB,, | Performed by: FAMILY MEDICINE

## 2025-07-03 PROCEDURE — 82607 VITAMIN B-12: CPT

## 2025-07-03 PROCEDURE — 99214 OFFICE O/P EST MOD 30 MIN: CPT | Mod: S$GLB,,, | Performed by: FAMILY MEDICINE

## 2025-07-03 PROCEDURE — 99999 PR PBB SHADOW E&M-EST. PATIENT-LVL V: CPT | Mod: PBBFAC,,, | Performed by: FAMILY MEDICINE

## 2025-07-03 PROCEDURE — 83921 ORGANIC ACID SINGLE QUANT: CPT

## 2025-07-03 PROCEDURE — 99999 PR PBB SHADOW E&M-EST. PATIENT-LVL IV: CPT | Mod: PBBFAC,,,

## 2025-07-03 PROCEDURE — 3075F SYST BP GE 130 - 139MM HG: CPT | Mod: CPTII,S$GLB,,

## 2025-07-03 PROCEDURE — 3079F DIAST BP 80-89 MM HG: CPT | Mod: CPTII,S$GLB,,

## 2025-07-03 PROCEDURE — 99214 OFFICE O/P EST MOD 30 MIN: CPT | Mod: S$GLB,,,

## 2025-07-03 PROCEDURE — 3079F DIAST BP 80-89 MM HG: CPT | Mod: CPTII,S$GLB,, | Performed by: FAMILY MEDICINE

## 2025-07-03 RX ORDER — PREGABALIN 75 MG/1
75 CAPSULE ORAL DAILY
Qty: 30 CAPSULE | Refills: 1 | Status: SHIPPED | OUTPATIENT
Start: 2025-07-03 | End: 2025-09-01

## 2025-07-03 RX ORDER — DEXTROAMPHETAMINE SACCHARATE, AMPHETAMINE ASPARTATE MONOHYDRATE, DEXTROAMPHETAMINE SULFATE AND AMPHETAMINE SULFATE 3.75; 3.75; 3.75; 3.75 MG/1; MG/1; MG/1; MG/1
15 CAPSULE, EXTENDED RELEASE ORAL EVERY MORNING
Qty: 30 CAPSULE | Refills: 0 | Status: SHIPPED | OUTPATIENT
Start: 2025-08-30

## 2025-07-03 RX ORDER — SODIUM CHLORIDE, SODIUM LACTATE, POTASSIUM CHLORIDE, CALCIUM CHLORIDE 600; 310; 30; 20 MG/100ML; MG/100ML; MG/100ML; MG/100ML
INJECTION, SOLUTION INTRAVENOUS CONTINUOUS
OUTPATIENT
Start: 2025-07-03

## 2025-07-03 RX ORDER — DEXTROAMPHETAMINE SACCHARATE, AMPHETAMINE ASPARTATE MONOHYDRATE, DEXTROAMPHETAMINE SULFATE AND AMPHETAMINE SULFATE 3.75; 3.75; 3.75; 3.75 MG/1; MG/1; MG/1; MG/1
15 CAPSULE, EXTENDED RELEASE ORAL DAILY
Qty: 30 CAPSULE | Refills: 0 | Status: SHIPPED | OUTPATIENT
Start: 2025-07-30

## 2025-07-03 RX ORDER — DEXTROAMPHETAMINE SACCHARATE, AMPHETAMINE ASPARTATE MONOHYDRATE, DEXTROAMPHETAMINE SULFATE AND AMPHETAMINE SULFATE 3.75; 3.75; 3.75; 3.75 MG/1; MG/1; MG/1; MG/1
15 CAPSULE, EXTENDED RELEASE ORAL EVERY MORNING
Qty: 30 CAPSULE | Refills: 0 | Status: SHIPPED | OUTPATIENT
Start: 2025-09-30

## 2025-07-03 NOTE — H&P (VIEW-ONLY)
[facility] Follow Up Evaluation    Referred by: Marce Harris PA-C  Reason for referral: back pain    CC:   Chief Complaint   Patient presents with    Follow-up    Pain          7/3/2025     7:59 AM 9/26/2024     8:07 AM 5/28/2024     8:59 AM   Last 3 PDI Scores   Pain Disability Index (PDI) 48 [phone] 33     Interval HPI 7/3/2025: Holly Nichols returns to the office for follow up.  She returns for follow-up with worsening lower back pain and midback pain, 8/10, constant, pain is more across her lower back with some radiating in into her lower midback.  She also reports some pain radiating into abdomen.  Pain was so severe recently she went to emergency department.  Imaging of abdomen and pelvis did not show any acute abnormalities.  She denies any pain radiating into her legs, numbness, weakness or any new changes to her bowel bladder function.  She continues to take NSAIDs, Lyrica, Zanaflex and maintain her at-home PT directed exercises without significant relief of her pain.      HPI:   Holly Nichols is a 29 y.o. female who presents with back pain.  She has had back pain since 2008.  She has known scoliosis.  She has known fibromyalgia.  Today she rates her pain 4-10, constant, throbbing, tingling, deep.  She has neck pain into the trapezius muscles.  She also has midthoracic and lower back pain.  Denies any rosmery radicular symptoms.  No numbness no weakness.  Pain worse with standing, bending, walking, nighttime and not relieved with anything.    Pain Intervention History:  - s/p bilateral L4/5 and L5/S1 RFA on 04/24/2024 with 50% relief     History:    Current Outpatient Medications:     albuterol (VENTOLIN HFA) 90 mcg/actuation inhaler, Inhale 2 puffs into the lungs every 6 (six) hours as needed for Wheezing. Rescue, Disp: 18 g, Rfl: 5    Bifidobacterium infantis (ALIGN ORAL), Take 1 tablet by mouth once daily., Disp: , Rfl:     cetirizine (ZYRTEC) 10 MG tablet, Take 10 mg by mouth nightly  as needed for Allergies., Disp: , Rfl:     citalopram (CELEXA) 20 MG tablet, TAKE 1 TABLET BY MOUTH ONCE DAILY. TAKE ALONG WITH 40 MG FOR A TOTAL OF 60 MG DAILY, Disp: 90 tablet, Rfl: 3    citalopram (CELEXA) 40 MG tablet, TAKE 1 TABLET BY MOUTH DAILY( ALONG WITH A 20 MG TABLET FOR A TOTAL OF 60 MG), Disp: 90 tablet, Rfl: 3    clonazePAM (KLONOPIN) 0.5 MG tablet, Take 1 tablet (0.5 mg total) by mouth nightly as needed for Anxiety., Disp: 30 tablet, Rfl: 0    cyproheptadine (PERIACTIN) 4 mg tablet, Take 1 tablet (4 mg total) by mouth before meals as needed (decrease appetite)., Disp: 90 tablet, Rfl: 2    [START ON 9/30/2025] dextroamphetamine-amphetamine (ADDERALL XR) 15 MG 24 hr capsule, Take 1 capsule (15 mg total) by mouth every morning., Disp: 30 capsule, Rfl: 0    [START ON 8/30/2025] dextroamphetamine-amphetamine (ADDERALL XR) 15 MG 24 hr capsule, Take 1 capsule (15 mg total) by mouth every morning., Disp: 30 capsule, Rfl: 0    [START ON 7/30/2025] dextroamphetamine-amphetamine (ADDERALL XR) 15 MG 24 hr capsule, Take 1 capsule (15 mg total) by mouth once daily., Disp: 30 capsule, Rfl: 0    dextroamphetamine-amphetamine (ADDERALL) 5 mg Tab, Take 5 mg by mouth daily as needed., Disp: 30 tablet, Rfl: 0    diazePAM (VALIUM) 5 MG tablet, Take 1 tablet (5 mg total) by mouth daily as needed (vertigo)., Disp: 20 tablet, Rfl: 0    diphenoxylate-atropine 2.5-0.025 mg (LOMOTIL) 2.5-0.025 mg per tablet, Take 1 tablet by mouth every 6 (six) hours as needed for Diarrhea., Disp: 60 tablet, Rfl: 3    DUPIXENT  mg/2 mL PnIj, INJECT 300 MG (1 PEN) UNDER THE SKIN EVERY 2 WEEKS, Disp: 12 mL, Rfl: 11    esomeprazole (NEXIUM) 40 MG capsule, TAKE 1 CAPSULE(40 MG) BY MOUTH BEFORE BREAKFAST, Disp: 90 capsule, Rfl: 3    estradiol 0.05 mg/24 hr td ptsw (VIVELLE-DOT) 0.05 mg/24 hr, Place 1 patch onto the skin twice a week., Disp: 24 patch, Rfl: 3    fluticasone-salmeterol diskus inhaler 100-50 mcg, Inhale 1 puff into the lungs 2  (two) times daily. Controller, Disp: 180 each, Rfl: 1    ondansetron (ZOFRAN-ODT) 4 MG TbDL, Take 1 tablet (4 mg total) by mouth 3 (three) times daily as needed (nausea)., Disp: 30 tablet, Rfl: 1    tiZANidine (ZANAFLEX) 2 MG tablet, Take 1 tablet (2 mg total) by mouth 2 (two) times a day., Disp: 40 tablet, Rfl: 02    tretinoin (RETIN-A) 0.025 % cream, Apply topically every evening., Disp: 20 g, Rfl: 0    pregabalin (LYRICA) 75 MG capsule, Take 1 capsule (75 mg total) by mouth once daily. (Patient taking differently: Take 75 mg by mouth daily as needed.), Disp: 30 capsule, Rfl: 1    Past Medical History:   Diagnosis Date    Abnormal Pap smear of cervix     Allergy     Anxiety     Asthma     rare inhaler use    Constipation     Depression     Endometriosis     Fibromyalgia     Gastritis     HLA B27 (HLA B27 positive)     Migraine     Postpartum depression     Scoliosis     Smoker     stopped in January 2018    Tobacco dependence 2/17/2016       Past Surgical History:   Procedure Laterality Date    ADENOIDECTOMY      APPENDECTOMY  09/2014    endometriosis    CHOLECYSTECTOMY  12/20/2017    Dr. MARBELLA Anne, [facility]     COLONOSCOPY N/A 05/19/2016    Procedure: COLONOSCOPY;  Surgeon: Jarret Zhao Jr., MD;  Location: [facility];  Service: Endoscopy;  Laterality: N/A;    COLONOSCOPY N/A 08/27/2020    Dr. Zhao: Erythema & ulcerated mucosa in descending, splenic flexure & distal transverse (r/o UC, r/o ischemia, r/o vasculiti, etc.), Mucosal changes in descending colon (secondary to left-sided colitis, to ischemic colitis(?), to vasculitis(?). hemorrhoids; biopsy: TI & random biopsies- WNL, transverse/hepatic/descending- mild active colitis without definitive features of chronicity    COLONOSCOPY N/A 12/16/2022    Procedure: COLONOSCOPY;  Surgeon: Jarret Zhao Jr., MD;  Location: [facility];  Service: Endoscopy;  Laterality: N/A; Repeat colonoscopy in 5 years for screening    COLONOSCOPY N/A 9/23/2024    Procedure:  COLONOSCOPY;  Surgeon: Jarret Zhao Jr., MD;  Location: [facility];  Service: Endoscopy;  Laterality: N/A;    CYSTOSCOPY  12/01/2021    Procedure: CYSTOSCOPY;  Surgeon: Ana María Easley MD;  Location: [facility] OR;  Service: OB/GYN;;    ESOPHAGOGASTRODUODENOSCOPY N/A 10/01/2020    Dr. Zhao: Erythema in the lower third of the esophagus; Duodenal bulb erosions without bleeding. biopsy: duodenum- Small bowel mucosa with mild acute inflammation changes may represent erosion, Villous architecture is maintained; stomach- mild chronic gastritis; esophagus- minimal reactive changes    ESOPHAGOGASTRODUODENOSCOPY N/A 12/16/2022    Procedure: EGD (ESOPHAGOGASTRODUODENOSCOPY);  Surgeon: Jarret Zhao Jr., MD;  Location: [facility];  Service: Endoscopy;  Laterality: N/A;    INJECTION OF ANESTHETIC AGENT AROUND MEDIAL BRANCH NERVES INNERVATING LUMBAR FACET JOINT Bilateral 3/13/2024    Procedure: Block-nerve-medial branch-lumbar    L4/5, L5/S1;  Surgeon: Dain Lawrence MD;  Location: [facility] OR;  Service: Pain Management;  Laterality: Bilateral;    INJECTION OF ANESTHETIC AGENT AROUND MEDIAL BRANCH NERVES INNERVATING LUMBAR FACET JOINT Bilateral 4/2/2024    Procedure: Block-nerve-medial branch-lumbar-L4/5-L5/S1;  Surgeon: Dain Lawrence MD;  Location: [facility] OR;  Service: Pain Management;  Laterality: Bilateral;    LAPAROSCOPIC OOPHORECTOMY Left 12/01/2021    Procedure: OOPHORECTOMY, LAPAROSCOPIC;  Surgeon: Ana María Easley MD;  Location: [facility] OR;  Service: OB/GYN;  Laterality: Left;    LAPAROSCOPIC SALPINGECTOMY Bilateral 12/01/2021    Procedure: SALPINGECTOMY, LAPAROSCOPIC;  Surgeon: Ana María Easley MD;  Location: [facility] OR;  Service: OB/GYN;  Laterality: Bilateral;    LAPAROSCOPIC TOTAL HYSTERECTOMY N/A 12/01/2021    Procedure: HYSTERECTOMY, TOTAL, LAPAROSCOPIC;  Surgeon: Ana María Easley MD;  Location: [facility] OR;  Service: OB/GYN;  Laterality: N/A;    MOUTH SURGERY      x2    PELVIC LAPAROSCOPY       RADIOFREQUENCY ABLATION OF LUMBAR MEDIAL BRANCH NERVE AT SINGLE LEVEL Bilateral 2024    Procedure: Radiofrequency Ablation, Nerve, Spinal, Lumbar, Medial Branch, L4/5, L5/L6;  Surgeon: Dain Lawrence MD;  Location: [facility] OR;  Service: Pain Management;  Laterality: Bilateral;    SINUS SURGERY  2021    FESS, turbinate red    TONSILLECTOMY  2016    UPPER GASTROINTESTINAL ENDOSCOPY  2016    Dr. Zhao       Family History   Problem Relation Name Age of Onset    Fibromyalgia Mother      Depression Mother      Irritable bowel syndrome Mother      Diabetes Father      Depression Father      Mental illness Father          bipolar disorder    Asthma Father      Multiple sclerosis Maternal Aunt      Irritable bowel syndrome Maternal Grandmother      Colon cancer Neg Hx      Ulcerative colitis Neg Hx      Stomach cancer Neg Hx      Esophageal cancer Neg Hx      Breast cancer Neg Hx      Ovarian cancer Neg Hx      Celiac disease Neg Hx         Social History     Socioeconomic History    Marital status:     Number of children: 0   Occupational History    Occupation: hotel management   Tobacco Use    Smoking status: Former     Current packs/day: 0.00     Types: Cigarettes, Vaping with nicotine     Quit date: 2018     Years since quittin.5    Smokeless tobacco: Never    Tobacco comments:     Still vaping   Substance and Sexual Activity    Alcohol use: No    Drug use: No    Sexual activity: Yes     Partners: Male   Social History Narrative    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     Social Drivers of Health     Financial Resource Strain: Unknown (2022)    Overall Financial Resource Strain (CARDIA)     Difficulty of Paying Living Expenses: Patient declined   Food Insecurity: Unknown (2022)    Hunger Vital Sign     Worried About Running Out of Food in the Last Year: Patient declined     Ran Out of Food in the Last Year: Patient declined   Transportation Needs: Unknown (2022)    PRAPARE  - Transportation     Lack of Transportation (Medical): Patient declined     Lack of Transportation (Non-Medical): Patient declined   Physical Activity: Unknown (1/26/2024)    Exercise Vital Sign     Days of Exercise per Week: Patient declined   Stress: Unknown (4/7/2022)    [employer] [place] - Occupational Stress Questionnaire     Feeling of Stress : Patient declined   Recent Concern: Stress - Stress Concern Present (1/24/2022)    [employer] [place] [place] [employer] - Occupational Stress Questionnaire     Feeling of Stress : Rather much   Housing Stability: Unknown (4/7/2022)    Housing Stability Vital Sign     Unable to Pay for Housing in the Last Year: Patient refused     Unstable Housing in the Last Year: Patient refused       Review of patient's allergies indicates:   Allergen Reactions    Latex, natural rubber      burning    Sulfur Hives    Toradol [ketorolac] Other (See Comments)     Metallic taste only once in 2013; has taken since without problems; reports R sided paralysis    Tramadol Other (See Comments)     paralysis    Penicillins Hives and Rash    Sulfa (sulfonamide antibiotics) Hives and Rash       Review of Systems:  General ROS: negative for - fever  Psychological ROS: negative for - hostility  Hematological and Lymphatic ROS: negative for - bleeding problems  Endocrine ROS: negative for - unexpected weight changes  Respiratory ROS: no cough, shortness of breath, or wheezing  Cardiovascular ROS: no chest pain or dyspnea on exertion  Gastrointestinal ROS: no abdominal pain, change in bowel habits, or black or bloody stools  Musculoskeletal ROS: negative for - muscular weakness  Neurological ROS: negative for - numbness/tingling  Dermatological ROS: negative for rash    Physical Exam:  Vitals:    07/03/25 0753   BP: 138/88   Pulse: 104   Weight: 70.5 kg (155 lb 6.8 oz)   PainSc:   8   PainLoc: Back       Body mass index is 23.63 kg/m².    Gen: NAD,   Psych: mood appropriate  for given condition  CV: 2+ radial pulse  HEENT: anicteric   Respiratory: non labored  Abd: soft nt, nd  Skin: intact,   ROM: limited AROM of the L spine in all planes, full ROM at ankles, knees and hips  Sensation: intact to light touch in all dermatomes tested from L2-S1 bilaterally  Reflexes: 2+ b/l patella and 0+ right Achilles, 2+ left Achilles  Palpation: Diffusely tender over lumbar paraspinals  -TTP over the b/l greater trochanters and bilateral SI joint  Tone: normal in the b/l knees and hips   Extremities: No edema in b/l ankles or hands  Provacative tests: + b/l axial facet loading       Right Left   L2/3 Iliacus Hip flexion  5  5   L3/4 Qudratus Femoris Knee Extension  5  5   L4/5 Tib Anterior Ankle Dorsiflexion   5  5   L5/S1 Extensor Hallicus Longus Great toe extension  5  5                 S1/S2 Gastroc/Soleus Plantar Flexion  5  5       Imaging:  Xray spine 11/6/2020  FINDINGS:  S-shaped scoliosis of the thoracolumbar spine with dextroconvex curvature of the thoracic spine and levoconvex curvature of the lumbar spine.  The thoracic dextroconvex curvature measures approximately 30.6 degrees measured from the superior endplate of T4 to the inferior endplate of T11.  Levoconvex curvature of the lumbar spine measures approximately 20.7 degrees measured from the superior endplate of T12 to the inferior endplate of L3.  sagittal balance unable to be assessed due to obscuration of the C7 vertebral body.  No evidence for vertebral segmentation anomaly.  Vertebral body heights are maintained.  No suspicious appearing lytic or blastic lesions.    Xray lumbar spine 4/21/22  FINDINGS:  Vertebral bodies are normal in height without evidence of fracture.  Mild rotatory levoconvex curvature of the lumbar spine.  Normal sagittal alignment is preserved.  No spondylolisthesis. No abnormal translation with flexion and extension.  Intervertebral disc heights are well maintained.Mild lower lumbar facet  arthropathy.    Xray cervical spine 4/21/22  FINDINGS:  C1-C2: Pre-dens space is maintained.  Dens and lateral masses of C1 are unremarkable.  Alignment: Chronic straightening of the normal cervical lordosis, possibly secondary to positioning or muscle spasm.  No spondylolisthesis.  No dynamic instability.  Vertebrae: Vertebral body heights are maintained.  No suspicious appearing lytic or blastic lesions.  Discs and facets: Disc heights are maintained.  Facet joints are unremarkable. Neural foramina are maintained on oblique projections.  Miscellaneous: No additional findings.    Labs:  BMP  Lab Results   Component Value Date     04/05/2025    K 3.8 04/05/2025     04/05/2025    CO2 27 04/05/2025    BUN 10 04/05/2025    CREATININE 0.7 04/05/2025    CALCIUM 8.7 04/05/2025    ANIONGAP 6 (L) 04/05/2025    ESTGFRAFRICA >60 11/29/2021    EGFRNONAA >60 11/29/2021     Lab Results   Component Value Date    ALT 46 (H) 04/05/2025    AST 17 04/05/2025    ALKPHOS 93 04/05/2025    BILITOT 0.4 04/05/2025       Assessment:   Problem List Items Addressed This Visit    None  Visit Diagnoses         Lumbar spondylosis    -  Primary      Myofascial pain          Lumbar radiculopathy, chronic          Adolescent idiopathic scoliosis of thoracolumbar region          Dorsalgia, unspecified          Spondylosis of lumbar joint                            29 y.o. year old female who presents with back pain.  She has had back pain since 2008.  She has known scoliosis.  She has known fibromyalgia.  Today she rates her pain 4-10, constant, throbbing, tingling, deep.  She has neck pain into the trapezius muscles.  She also has midthoracic and lower back pain.  Denies any rosmery radicular symptoms.  No numbness no weakness.  Pain worse with standing, bending, walking, nighttime and not relieved with anything.    7/3/2025: Holly Nichols returns to the office for follow up.  She returns for follow-up with worsening lower back pain  and midback pain, 8/10, constant, pain is more across her lower back with some radiating in into her lower midback.  She also reports some pain radiating into abdomen.  Pain was so severe recently she went to emergency department.  Imaging of abdomen and pelvis did not show any acute abnormalities.  She denies any pain radiating into her legs, numbness, weakness or any new changes to her bowel bladder function.  She continues to take NSAIDs, Lyrica, Zanaflex and maintain her at-home PT directed exercises without significant relief of her pain.      - on exam she is full strength in her lower extremities.  Increased pain with bilateral axial facet loading  - we reviewed her x-ray of her thoracolumbar spine together in office today in his consistent with Mild rotatory levoconvex curvature of the lumbar spine., multilevel facet arthropathy, worse at lower 2 levels.  She has transitional lumbosacral anatomy.  - I believe her axial facet mediated pain has returned.  This pain is limiting her mobility and interfering with her ADLs and quality of life.  She is not finding relief with Lyrica, Zanaflex, NSAIDs and maintaining her at-home PT directed exercises.  - to address his pain I would like to schedule her for repeat bilateral L4/5 and L5/6 RFA.  She has done well with these in the past providing 50% relief lasting over a year.  - follow up 4 weeks post procedure or sooner if needed.  If she finds relief of her lower back pain but midback pain persists can consider updating her imaging with consideration of thoracic MRI.      : Not applicable    This note was completed with dictation software and grammatical errors may exist.

## 2025-07-03 NOTE — TELEPHONE ENCOUNTER
Scheduled appt 7/1 with NP Spoke with patient and scheduled. Pre op information given to patient and follow up appointment scheduled.

## 2025-07-03 NOTE — TELEPHONE ENCOUNTER
Please schedule patient for the following procedure:    Physician - Dr Lawrence      Type of Procedure/Injection - Lumbar Radiofrequency Ablation  L4/5 and L5/6           Laterality - Bilateral      Priority - Normal      Anxiolysis - RNIV      Fasting - NPO after midnight      Need to hold medication - No        Clearance needed - No      Follow up - 4 week

## 2025-07-03 NOTE — PROGRESS NOTES
[facility] Follow Up Evaluation    Referred by: Marce Harris PA-C  Reason for referral: back pain    CC:   Chief Complaint   Patient presents with    Follow-up    Pain          7/3/2025     7:59 AM 9/26/2024     8:07 AM 5/28/2024     8:59 AM   Last 3 PDI Scores   Pain Disability Index (PDI) 48 [phone] 33     Interval HPI 7/3/2025: Hloly Nichols returns to the office for follow up.  She returns for follow-up with worsening lower back pain and midback pain, 8/10, constant, pain is more across her lower back with some radiating in into her lower midback.  She also reports some pain radiating into abdomen.  Pain was so severe recently she went to emergency department.  Imaging of abdomen and pelvis did not show any acute abnormalities.  She denies any pain radiating into her legs, numbness, weakness or any new changes to her bowel bladder function.  She continues to take NSAIDs, Lyrica, Zanaflex and maintain her at-home PT directed exercises without significant relief of her pain.      HPI:   Holly Nichols is a 29 y.o. female who presents with back pain.  She has had back pain since 2008.  She has known scoliosis.  She has known fibromyalgia.  Today she rates her pain 4-10, constant, throbbing, tingling, deep.  She has neck pain into the trapezius muscles.  She also has midthoracic and lower back pain.  Denies any rosmery radicular symptoms.  No numbness no weakness.  Pain worse with standing, bending, walking, nighttime and not relieved with anything.    Pain Intervention History:  - s/p bilateral L4/5 and L5/S1 RFA on 04/24/2024 with 50% relief     History:    Current Outpatient Medications:     albuterol (VENTOLIN HFA) 90 mcg/actuation inhaler, Inhale 2 puffs into the lungs every 6 (six) hours as needed for Wheezing. Rescue, Disp: 18 g, Rfl: 5    Bifidobacterium infantis (ALIGN ORAL), Take 1 tablet by mouth once daily., Disp: , Rfl:     cetirizine (ZYRTEC) 10 MG tablet, Take 10 mg by mouth nightly  as needed for Allergies., Disp: , Rfl:     citalopram (CELEXA) 20 MG tablet, TAKE 1 TABLET BY MOUTH ONCE DAILY. TAKE ALONG WITH 40 MG FOR A TOTAL OF 60 MG DAILY, Disp: 90 tablet, Rfl: 3    citalopram (CELEXA) 40 MG tablet, TAKE 1 TABLET BY MOUTH DAILY( ALONG WITH A 20 MG TABLET FOR A TOTAL OF 60 MG), Disp: 90 tablet, Rfl: 3    clonazePAM (KLONOPIN) 0.5 MG tablet, Take 1 tablet (0.5 mg total) by mouth nightly as needed for Anxiety., Disp: 30 tablet, Rfl: 0    cyproheptadine (PERIACTIN) 4 mg tablet, Take 1 tablet (4 mg total) by mouth before meals as needed (decrease appetite)., Disp: 90 tablet, Rfl: 2    [START ON 9/30/2025] dextroamphetamine-amphetamine (ADDERALL XR) 15 MG 24 hr capsule, Take 1 capsule (15 mg total) by mouth every morning., Disp: 30 capsule, Rfl: 0    [START ON 8/30/2025] dextroamphetamine-amphetamine (ADDERALL XR) 15 MG 24 hr capsule, Take 1 capsule (15 mg total) by mouth every morning., Disp: 30 capsule, Rfl: 0    [START ON 7/30/2025] dextroamphetamine-amphetamine (ADDERALL XR) 15 MG 24 hr capsule, Take 1 capsule (15 mg total) by mouth once daily., Disp: 30 capsule, Rfl: 0    dextroamphetamine-amphetamine (ADDERALL) 5 mg Tab, Take 5 mg by mouth daily as needed., Disp: 30 tablet, Rfl: 0    diazePAM (VALIUM) 5 MG tablet, Take 1 tablet (5 mg total) by mouth daily as needed (vertigo)., Disp: 20 tablet, Rfl: 0    diphenoxylate-atropine 2.5-0.025 mg (LOMOTIL) 2.5-0.025 mg per tablet, Take 1 tablet by mouth every 6 (six) hours as needed for Diarrhea., Disp: 60 tablet, Rfl: 3    DUPIXENT  mg/2 mL PnIj, INJECT 300 MG (1 PEN) UNDER THE SKIN EVERY 2 WEEKS, Disp: 12 mL, Rfl: 11    esomeprazole (NEXIUM) 40 MG capsule, TAKE 1 CAPSULE(40 MG) BY MOUTH BEFORE BREAKFAST, Disp: 90 capsule, Rfl: 3    estradiol 0.05 mg/24 hr td ptsw (VIVELLE-DOT) 0.05 mg/24 hr, Place 1 patch onto the skin twice a week., Disp: 24 patch, Rfl: 3    fluticasone-salmeterol diskus inhaler 100-50 mcg, Inhale 1 puff into the lungs 2  (two) times daily. Controller, Disp: 180 each, Rfl: 1    ondansetron (ZOFRAN-ODT) 4 MG TbDL, Take 1 tablet (4 mg total) by mouth 3 (three) times daily as needed (nausea)., Disp: 30 tablet, Rfl: 1    tiZANidine (ZANAFLEX) 2 MG tablet, Take 1 tablet (2 mg total) by mouth 2 (two) times a day., Disp: 40 tablet, Rfl: 02    tretinoin (RETIN-A) 0.025 % cream, Apply topically every evening., Disp: 20 g, Rfl: 0    pregabalin (LYRICA) 75 MG capsule, Take 1 capsule (75 mg total) by mouth once daily. (Patient taking differently: Take 75 mg by mouth daily as needed.), Disp: 30 capsule, Rfl: 1    Past Medical History:   Diagnosis Date    Abnormal Pap smear of cervix     Allergy     Anxiety     Asthma     rare inhaler use    Constipation     Depression     Endometriosis     Fibromyalgia     Gastritis     HLA B27 (HLA B27 positive)     Migraine     Postpartum depression     Scoliosis     Smoker     stopped in January 2018    Tobacco dependence 2/17/2016       Past Surgical History:   Procedure Laterality Date    ADENOIDECTOMY      APPENDECTOMY  09/2014    endometriosis    CHOLECYSTECTOMY  12/20/2017    Dr. MARBELLA Anne, [facility]     COLONOSCOPY N/A 05/19/2016    Procedure: COLONOSCOPY;  Surgeon: Jarret Zhao Jr., MD;  Location: [facility];  Service: Endoscopy;  Laterality: N/A;    COLONOSCOPY N/A 08/27/2020    Dr. Zhao: Erythema & ulcerated mucosa in descending, splenic flexure & distal transverse (r/o UC, r/o ischemia, r/o vasculiti, etc.), Mucosal changes in descending colon (secondary to left-sided colitis, to ischemic colitis(?), to vasculitis(?). hemorrhoids; biopsy: TI & random biopsies- WNL, transverse/hepatic/descending- mild active colitis without definitive features of chronicity    COLONOSCOPY N/A 12/16/2022    Procedure: COLONOSCOPY;  Surgeon: Jarret Zhao Jr., MD;  Location: [facility];  Service: Endoscopy;  Laterality: N/A; Repeat colonoscopy in 5 years for screening    COLONOSCOPY N/A 9/23/2024    Procedure:  COLONOSCOPY;  Surgeon: Jarret Zhao Jr., MD;  Location: [facility];  Service: Endoscopy;  Laterality: N/A;    CYSTOSCOPY  12/01/2021    Procedure: CYSTOSCOPY;  Surgeon: Ana María Easley MD;  Location: [facility] OR;  Service: OB/GYN;;    ESOPHAGOGASTRODUODENOSCOPY N/A 10/01/2020    Dr. Zhao: Erythema in the lower third of the esophagus; Duodenal bulb erosions without bleeding. biopsy: duodenum- Small bowel mucosa with mild acute inflammation changes may represent erosion, Villous architecture is maintained; stomach- mild chronic gastritis; esophagus- minimal reactive changes    ESOPHAGOGASTRODUODENOSCOPY N/A 12/16/2022    Procedure: EGD (ESOPHAGOGASTRODUODENOSCOPY);  Surgeon: Jarret Zhao Jr., MD;  Location: [facility];  Service: Endoscopy;  Laterality: N/A;    INJECTION OF ANESTHETIC AGENT AROUND MEDIAL BRANCH NERVES INNERVATING LUMBAR FACET JOINT Bilateral 3/13/2024    Procedure: Block-nerve-medial branch-lumbar    L4/5, L5/S1;  Surgeon: Dain Lawrence MD;  Location: [facility] OR;  Service: Pain Management;  Laterality: Bilateral;    INJECTION OF ANESTHETIC AGENT AROUND MEDIAL BRANCH NERVES INNERVATING LUMBAR FACET JOINT Bilateral 4/2/2024    Procedure: Block-nerve-medial branch-lumbar-L4/5-L5/S1;  Surgeon: Dain Lawrence MD;  Location: [facility] OR;  Service: Pain Management;  Laterality: Bilateral;    LAPAROSCOPIC OOPHORECTOMY Left 12/01/2021    Procedure: OOPHORECTOMY, LAPAROSCOPIC;  Surgeon: Ana María Easley MD;  Location: [facility] OR;  Service: OB/GYN;  Laterality: Left;    LAPAROSCOPIC SALPINGECTOMY Bilateral 12/01/2021    Procedure: SALPINGECTOMY, LAPAROSCOPIC;  Surgeon: Ana María Easley MD;  Location: [facility] OR;  Service: OB/GYN;  Laterality: Bilateral;    LAPAROSCOPIC TOTAL HYSTERECTOMY N/A 12/01/2021    Procedure: HYSTERECTOMY, TOTAL, LAPAROSCOPIC;  Surgeon: Ana María Easley MD;  Location: [facility] OR;  Service: OB/GYN;  Laterality: N/A;    MOUTH SURGERY      x2    PELVIC LAPAROSCOPY       RADIOFREQUENCY ABLATION OF LUMBAR MEDIAL BRANCH NERVE AT SINGLE LEVEL Bilateral 2024    Procedure: Radiofrequency Ablation, Nerve, Spinal, Lumbar, Medial Branch, L4/5, L5/L6;  Surgeon: Dain Lawrence MD;  Location: [facility] OR;  Service: Pain Management;  Laterality: Bilateral;    SINUS SURGERY  2021    FESS, turbinate red    TONSILLECTOMY  2016    UPPER GASTROINTESTINAL ENDOSCOPY  2016    Dr. Zhao       Family History   Problem Relation Name Age of Onset    Fibromyalgia Mother      Depression Mother      Irritable bowel syndrome Mother      Diabetes Father      Depression Father      Mental illness Father          bipolar disorder    Asthma Father      Multiple sclerosis Maternal Aunt      Irritable bowel syndrome Maternal Grandmother      Colon cancer Neg Hx      Ulcerative colitis Neg Hx      Stomach cancer Neg Hx      Esophageal cancer Neg Hx      Breast cancer Neg Hx      Ovarian cancer Neg Hx      Celiac disease Neg Hx         Social History     Socioeconomic History    Marital status:     Number of children: 0   Occupational History    Occupation: hotel management   Tobacco Use    Smoking status: Former     Current packs/day: 0.00     Types: Cigarettes, Vaping with nicotine     Quit date: 2018     Years since quittin.5    Smokeless tobacco: Never    Tobacco comments:     Still vaping   Substance and Sexual Activity    Alcohol use: No    Drug use: No    Sexual activity: Yes     Partners: Male   Social History Narrative    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     Social Drivers of Health     Financial Resource Strain: Unknown (2022)    Overall Financial Resource Strain (CARDIA)     Difficulty of Paying Living Expenses: Patient declined   Food Insecurity: Unknown (2022)    Hunger Vital Sign     Worried About Running Out of Food in the Last Year: Patient declined     Ran Out of Food in the Last Year: Patient declined   Transportation Needs: Unknown (2022)    PRAPARE  - Transportation     Lack of Transportation (Medical): Patient declined     Lack of Transportation (Non-Medical): Patient declined   Physical Activity: Unknown (1/26/2024)    Exercise Vital Sign     Days of Exercise per Week: Patient declined   Stress: Unknown (4/7/2022)    [employer] - Occupational Stress Questionnaire     Feeling of Stress : Patient declined   Recent Concern: Stress - Stress Concern Present (1/24/2022)    [employer] - Occupational Stress Questionnaire     Feeling of Stress : Rather much   Housing Stability: Unknown (4/7/2022)    Housing Stability Vital Sign     Unable to Pay for Housing in the Last Year: Patient refused     Unstable Housing in the Last Year: Patient refused       Review of patient's allergies indicates:   Allergen Reactions    Latex, natural rubber      burning    Sulfur Hives    Toradol [ketorolac] Other (See Comments)     Metallic taste only once in 2013; has taken since without problems; reports R sided paralysis    Tramadol Other (See Comments)     paralysis    Penicillins Hives and Rash    Sulfa (sulfonamide antibiotics) Hives and Rash       Review of Systems:  General ROS: negative for - fever  Psychological ROS: negative for - hostility  Hematological and Lymphatic ROS: negative for - bleeding problems  Endocrine ROS: negative for - unexpected weight changes  Respiratory ROS: no cough, shortness of breath, or wheezing  Cardiovascular ROS: no chest pain or dyspnea on exertion  Gastrointestinal ROS: no abdominal pain, change in bowel habits, or black or bloody stools  Musculoskeletal ROS: negative for - muscular weakness  Neurological ROS: negative for - numbness/tingling  Dermatological ROS: negative for rash    Physical Exam:  Vitals:    07/03/25 0753   BP: 138/88   Pulse: 104   Weight: 70.5 kg (155 lb 6.8 oz)   PainSc:   8   PainLoc: Back       Body mass index is 23.63 kg/m².    Gen: NAD,   Psych: mood appropriate  for given condition  CV: 2+ radial pulse  HEENT: anicteric   Respiratory: non labored  Abd: soft nt, nd  Skin: intact,   ROM: limited AROM of the L spine in all planes, full ROM at ankles, knees and hips  Sensation: intact to light touch in all dermatomes tested from L2-S1 bilaterally  Reflexes: 2+ b/l patella and 0+ right Achilles, 2+ left Achilles  Palpation: Diffusely tender over lumbar paraspinals  -TTP over the b/l greater trochanters and bilateral SI joint  Tone: normal in the b/l knees and hips   Extremities: No edema in b/l ankles or hands  Provacative tests: + b/l axial facet loading       Right Left   L2/3 Iliacus Hip flexion  5  5   L3/4 Qudratus Femoris Knee Extension  5  5   L4/5 Tib Anterior Ankle Dorsiflexion   5  5   L5/S1 Extensor Hallicus Longus Great toe extension  5  5                 S1/S2 Gastroc/Soleus Plantar Flexion  5  5       Imaging:  Xray spine 11/6/2020  FINDINGS:  S-shaped scoliosis of the thoracolumbar spine with dextroconvex curvature of the thoracic spine and levoconvex curvature of the lumbar spine.  The thoracic dextroconvex curvature measures approximately 30.6 degrees measured from the superior endplate of T4 to the inferior endplate of T11.  Levoconvex curvature of the lumbar spine measures approximately 20.7 degrees measured from the superior endplate of T12 to the inferior endplate of L3.  sagittal balance unable to be assessed due to obscuration of the C7 vertebral body.  No evidence for vertebral segmentation anomaly.  Vertebral body heights are maintained.  No suspicious appearing lytic or blastic lesions.    Xray lumbar spine 4/21/22  FINDINGS:  Vertebral bodies are normal in height without evidence of fracture.  Mild rotatory levoconvex curvature of the lumbar spine.  Normal sagittal alignment is preserved.  No spondylolisthesis. No abnormal translation with flexion and extension.  Intervertebral disc heights are well maintained.Mild lower lumbar facet  arthropathy.    Xray cervical spine 4/21/22  FINDINGS:  C1-C2: Pre-dens space is maintained.  Dens and lateral masses of C1 are unremarkable.  Alignment: Chronic straightening of the normal cervical lordosis, possibly secondary to positioning or muscle spasm.  No spondylolisthesis.  No dynamic instability.  Vertebrae: Vertebral body heights are maintained.  No suspicious appearing lytic or blastic lesions.  Discs and facets: Disc heights are maintained.  Facet joints are unremarkable. Neural foramina are maintained on oblique projections.  Miscellaneous: No additional findings.    Labs:  BMP  Lab Results   Component Value Date     04/05/2025    K 3.8 04/05/2025     04/05/2025    CO2 27 04/05/2025    BUN 10 04/05/2025    CREATININE 0.7 04/05/2025    CALCIUM 8.7 04/05/2025    ANIONGAP 6 (L) 04/05/2025    ESTGFRAFRICA >60 11/29/2021    EGFRNONAA >60 11/29/2021     Lab Results   Component Value Date    ALT 46 (H) 04/05/2025    AST 17 04/05/2025    ALKPHOS 93 04/05/2025    BILITOT 0.4 04/05/2025       Assessment:   Problem List Items Addressed This Visit    None  Visit Diagnoses         Lumbar spondylosis    -  Primary      Myofascial pain          Lumbar radiculopathy, chronic          Adolescent idiopathic scoliosis of thoracolumbar region          Dorsalgia, unspecified          Spondylosis of lumbar joint                            29 y.o. year old female who presents with back pain.  She has had back pain since 2008.  She has known scoliosis.  She has known fibromyalgia.  Today she rates her pain 4-10, constant, throbbing, tingling, deep.  She has neck pain into the trapezius muscles.  She also has midthoracic and lower back pain.  Denies any rosmery radicular symptoms.  No numbness no weakness.  Pain worse with standing, bending, walking, nighttime and not relieved with anything.    7/3/2025: Holly Nichols returns to the office for follow up.  She returns for follow-up with worsening lower back pain  and midback pain, 8/10, constant, pain is more across her lower back with some radiating in into her lower midback.  She also reports some pain radiating into abdomen.  Pain was so severe recently she went to emergency department.  Imaging of abdomen and pelvis did not show any acute abnormalities.  She denies any pain radiating into her legs, numbness, weakness or any new changes to her bowel bladder function.  She continues to take NSAIDs, Lyrica, Zanaflex and maintain her at-home PT directed exercises without significant relief of her pain.      - on exam she is full strength in her lower extremities.  Increased pain with bilateral axial facet loading  - we reviewed her x-ray of her thoracolumbar spine together in office today in his consistent with Mild rotatory levoconvex curvature of the lumbar spine., multilevel facet arthropathy, worse at lower 2 levels.  She has transitional lumbosacral anatomy.  - I believe her axial facet mediated pain has returned.  This pain is limiting her mobility and interfering with her ADLs and quality of life.  She is not finding relief with Lyrica, Zanaflex, NSAIDs and maintaining her at-home PT directed exercises.  - to address his pain I would like to schedule her for repeat bilateral L4/5 and L5/6 RFA.  She has done well with these in the past providing 50% relief lasting over a year.  - follow up 4 weeks post procedure or sooner if needed.  If she finds relief of her lower back pain but midback pain persists can consider updating her imaging with consideration of thoracic MRI.      : Not applicable    This note was completed with dictation software and grammatical errors may exist.

## 2025-07-05 PROBLEM — M47.816 LUMBAR SPONDYLOSIS: Status: ACTIVE | Noted: 2025-07-05

## 2025-07-08 DIAGNOSIS — R12 HEARTBURN: ICD-10-CM

## 2025-07-08 LAB — W METHYLMALONIC ACID: 0.23 UMOL/L

## 2025-07-08 RX ORDER — ESOMEPRAZOLE MAGNESIUM 40 MG/1
40 CAPSULE, DELAYED RELEASE ORAL
Qty: 90 CAPSULE | Refills: 3 | Status: SHIPPED | OUTPATIENT
Start: 2025-07-08

## 2025-07-08 NOTE — TELEPHONE ENCOUNTER
No care due was identified.  [facility] Embedded Care Due Messages. Reference number: [id number].   7/08/2025 5:56:10 AM CDT

## 2025-07-08 NOTE — TELEPHONE ENCOUNTER
Refill Decision Note   Holly Simone  is requesting a refill authorization.  Brief Assessment and Rationale for Refill:  Approve     Medication Therapy Plan:         Comments:     Note composed:2:55 PM 07/08/2025

## 2025-07-29 ENCOUNTER — HOSPITAL ENCOUNTER (OUTPATIENT)
Facility: HOSPITAL | Age: 30
Discharge: HOME | End: 2025-07-29
Attending: ANESTHESIOLOGY | Admitting: ANESTHESIOLOGY
Payer: COMMERCIAL

## 2025-07-29 ENCOUNTER — HOSPITAL ENCOUNTER (OUTPATIENT)
Dept: RADIOLOGY | Facility: HOSPITAL | Age: 30
Discharge: HOME | End: 2025-07-29
Attending: ANESTHESIOLOGY | Admitting: ANESTHESIOLOGY
Payer: COMMERCIAL

## 2025-07-29 VITALS
BODY MASS INDEX: 21.98 KG/M2 | RESPIRATION RATE: 13 BRPM | TEMPERATURE: 98 F | HEART RATE: 64 BPM | WEIGHT: 145 LBS | OXYGEN SATURATION: 98 % | DIASTOLIC BLOOD PRESSURE: 74 MMHG | SYSTOLIC BLOOD PRESSURE: 121 MMHG | HEIGHT: 68 IN

## 2025-07-29 DIAGNOSIS — M54.50 LOWER BACK PAIN: ICD-10-CM

## 2025-07-29 DIAGNOSIS — M47.816 LUMBAR SPONDYLOSIS: Primary | ICD-10-CM

## 2025-07-29 PROCEDURE — 63600175 PHARM REV CODE 636 W HCPCS: Mod: PO | Performed by: ANESTHESIOLOGY

## 2025-07-29 PROCEDURE — 64635 DESTROY LUMB/SAC FACET JNT: CPT | Mod: 50,,, | Performed by: ANESTHESIOLOGY

## 2025-07-29 PROCEDURE — 64635 DESTROY LUMB/SAC FACET JNT: CPT | Mod: 50,PO | Performed by: ANESTHESIOLOGY

## 2025-07-29 PROCEDURE — 64636 DESTROY L/S FACET JNT ADDL: CPT | Mod: 50,,, | Performed by: ANESTHESIOLOGY

## 2025-07-29 PROCEDURE — 64636 DESTROY L/S FACET JNT ADDL: CPT | Mod: 50,PO | Performed by: ANESTHESIOLOGY

## 2025-07-29 RX ORDER — SODIUM CHLORIDE, SODIUM LACTATE, POTASSIUM CHLORIDE, CALCIUM CHLORIDE 600; 310; 30; 20 MG/100ML; MG/100ML; MG/100ML; MG/100ML
INJECTION, SOLUTION INTRAVENOUS CONTINUOUS
Status: DISCONTINUED | OUTPATIENT
Start: 2025-07-29 | End: 2025-07-29 | Stop reason: HOSPADM

## 2025-07-29 RX ORDER — BUPIVACAINE HYDROCHLORIDE 2.5 MG/ML
INJECTION, SOLUTION EPIDURAL; INFILTRATION; INTRACAUDAL; PERINEURAL
Status: DISCONTINUED | OUTPATIENT
Start: 2025-07-29 | End: 2025-07-29 | Stop reason: HOSPADM

## 2025-07-29 RX ORDER — LIDOCAINE HYDROCHLORIDE 10 MG/ML
INJECTION, SOLUTION EPIDURAL; INFILTRATION; INTRACAUDAL; PERINEURAL
Status: DISCONTINUED | OUTPATIENT
Start: 2025-07-29 | End: 2025-07-29 | Stop reason: HOSPADM

## 2025-07-29 RX ORDER — LIDOCAINE HYDROCHLORIDE 20 MG/ML
INJECTION, SOLUTION EPIDURAL; INFILTRATION; INTRACAUDAL; PERINEURAL
Status: DISCONTINUED | OUTPATIENT
Start: 2025-07-29 | End: 2025-07-29 | Stop reason: HOSPADM

## 2025-07-29 RX ORDER — MIDAZOLAM HYDROCHLORIDE 1 MG/ML
INJECTION INTRAMUSCULAR; INTRAVENOUS
Status: DISCONTINUED | OUTPATIENT
Start: 2025-07-29 | End: 2025-07-29 | Stop reason: HOSPADM

## 2025-07-29 RX ORDER — TRIAMCINOLONE ACETONIDE 40 MG/ML
INJECTION, SUSPENSION INTRA-ARTICULAR; INTRAMUSCULAR
Status: DISCONTINUED | OUTPATIENT
Start: 2025-07-29 | End: 2025-07-29 | Stop reason: HOSPADM

## 2025-07-29 RX ORDER — FENTANYL CITRATE 50 UG/ML
INJECTION, SOLUTION INTRAMUSCULAR; INTRAVENOUS
Status: DISCONTINUED | OUTPATIENT
Start: 2025-07-29 | End: 2025-07-29 | Stop reason: HOSPADM

## 2025-07-29 RX ADMIN — SODIUM CHLORIDE, POTASSIUM CHLORIDE, SODIUM LACTATE AND CALCIUM CHLORIDE: 600; 310; 30; 20 INJECTION, SOLUTION INTRAVENOUS at 02:07

## 2025-07-29 NOTE — OP NOTE
Procedure Note    Procedure Date: 7/29/2025    Procedure Performed:  Radiofrequency ablation of the L4/5 and L5/L6 medial branch nerves on the  bilateral side utilizing fluoroscopy    Indication: The patient has clinical and radiologic findings suggestive of recurrent facet mediated pain.  The patient is s/p bilateral lumbar L4/5 and L5/L6 RFA with over 50% improvement of their pain lasting at least 6 months.     Pre-op diagnosis: Lumbar Spondylosis    Post-op diagnosis: same    Physician: Dain Lawrence MD    IV Sedation medications: Moderate sedation was achieved with midazolam 2 mg and fentanyl 125 mcg.  Continuous monitoring of EKG, blood pressure and pulse oximetry was provided by a registered nurse during the entire course of the procedure under my supervision and recorded in the patient's medical record.   Total time for sedation was 27 minutes.    Medications injected:  Pre-lesion, 1ml of 2% lidocaine at each level.  From a mixture of 5ml of 0.25% bupivacaine and 40mg of methylprednisolone, 1ml of this solution was injected at each level post-lesion.    Local anesthetic used: 1% Lidocaine, 5 ml    Estimated Blood Loss: none    Complications:  none    Technique:  The patient was interviewed in the holding area and Risks/Benefits were discussed, including, but not limited to, the possibility of new or different pain, bleeding or infection.   All questions were answered.  The patient understood and accepted risks.  Consent was reviewed.  A time out was taken to identify the patient, procedure and side of the procedure. The patient was placed in a prone position, then prepped and draped in the usual sterile fashion using ChloraPrep x2 and sterile towels.  The levels were determined under fluoroscopic guidance and then marked.  AP and oblique fluoroscopy were used to identify and lucinda the junctions between the superior articular processes and transverse processes at the L3-5 levels on the Bilateral side.  The  Bilateral sacral ala was also identified and marked.  The skin and subcutaneous tissues in these identified areas were anesthetized with 1% lidocaine. A 20-gauge 100 mm VCV RF needle was advanced towards each of these points under fluoroscopic guidance such that the tip of the needle was positioned posterior to the Neuro-foramen on lateral fluoroscopic view.  Then, each needle was positioned such that, on stimulation, the patient had an appropriate pain response between 0.3-0.5 V, with no motor response, other than Lumbar Paraspinal Muscles up to 2.0V.  One mL of 2% lidocaine was then injected at each level prior to lesioning, which was performed for 90 seconds at 80°C.  Once the lesion was complete, 1 mL of a solution consisting of 5 mL 0.25% bupivacaine and 40mg methylprednisolone was injected through each probe. The probes were removed with a 1% lidocaine flush.  The patient tolerated the procedure well.  The patient was monitored after the procedure.  Patient was given post procedure and discharge instructions to follow at home.  The patient was discharged in a stable condition.    Event Time In   In Facility 1316   In Pre-Procedure 1353   Physician Available    Anesthesia Available    Pre-Op: Bedside Procedure Start    Pre-Op: Bedside Procedure Stop    Pre-Procedure Complete 1416   Out of Pre-Procedure    Anesthesia Start    Anesthesia Start Data Collection    Setup Start    Setup Complete    In Room 1521   Prep Start    Procedure Prep Complete    MD notified pt. ready    Procedure Start 1529   Procedure Closing    Emergence    Procedure Finish 1547   Sedation Start 1520   Scope In    Extent Reached    Scope Out    Sedation End 1547   Out of Room 1550   Cleanup Start    Cleanup Complete    Cosmetic Start    Cosmetic Stop    Pain Mgmt In Room    Pain Mgmt Out Room    In Recovery    Anesthesia Finish    Bedside Procedure Start    Bedside Procedure Stop    Recovery Care Complete    Out of Recovery    To Phase  II    In Phase II    Pain Mgmt Recovery Start    Pain Mgmt Recovery Stop    Obs Rec Start    Obs Rec Stop    Phase II Care Complete    Out of Phase II    Procedural Care Complete    Discharge    Pain Follow Up Needed    Pain Follow Up Complete

## 2025-07-29 NOTE — DISCHARGE SUMMARY
[facility]  Discharge Note  Short Stay    Admit Date: 7/29/2025    Discharge Date: 7/29/2025    Attending Physician: Dain Lawrence     Discharge Provider: Dain Lawrence    Diagnoses:  There are no hospital problems to display for this patient.      Discharged Condition: Good    Final Diagnoses: Lumbar spondylosis [M47.816]    Disposition: Home or Self Care    Hospital Course: No complications, uneventful    Outcome of Hospitalization, Treatment, Procedure, or Surgery:  Patient was admitted for outpatient interventional pain management procedure. The patient tolerated the procedure well with no complications.    Follow up/Patient Instructions:  Follow up as scheduled in Pain Management office in 2-3 weeks.  Patient has received instructions and follow up date and time.    Medications:  Continue previous medications    Discharge Procedure Orders   Notify your health care provider if you experience any of the following:  temperature >100.4     Notify your health care provider if you experience any of the following:  persistent nausea and vomiting or diarrhea     Notify your health care provider if you experience any of the following:  severe uncontrolled pain     Notify your health care provider if you experience any of the following:  redness, tenderness, or signs of infection (pain, swelling, redness, odor or green/yellow discharge around incision site)     Notify your health care provider if you experience any of the following:  difficulty breathing or increased cough     Notify your health care provider if you experience any of the following:  severe persistent headache     Notify your health care provider if you experience any of the following:  worsening rash     Notify your health care provider if you experience any of the following:  persistent dizziness, light-headedness, or visual disturbances     Notify your health care provider if you experience any of the following:  increased confusion or  weakness     Activity as tolerated

## (undated) DEVICE — TRAY NERVE BLOCK

## (undated) DEVICE — GLOVE 7.5 PROTEXIS PI MICRO

## (undated) DEVICE — PAD ELECTROSURGICAL PAT PLATE

## (undated) DEVICE — HANDLE SURG LIGHT NONRIGID

## (undated) DEVICE — APPLICATOR CHLORAPREP CLR 10.5

## (undated) DEVICE — TOWEL OR DISP STRL BLUE 4/PK

## (undated) DEVICE — NDL SPINAL 25GX3.5 SPINOCAN

## (undated) DEVICE — CANNULA VENOM 20G 10X100MM